# Patient Record
Sex: MALE | Race: WHITE | Employment: OTHER | URBAN - METROPOLITAN AREA
[De-identification: names, ages, dates, MRNs, and addresses within clinical notes are randomized per-mention and may not be internally consistent; named-entity substitution may affect disease eponyms.]

---

## 2017-01-24 RX ORDER — GLIMEPIRIDE 4 MG/1
TABLET ORAL
Qty: 90 TABLET | Refills: 0 | Status: SHIPPED | OUTPATIENT
Start: 2017-01-24 | End: 2017-04-26 | Stop reason: SDUPTHER

## 2017-01-31 RX ORDER — OXYCODONE HYDROCHLORIDE 10 MG/1
TABLET ORAL
Qty: 120 TABLET | Refills: 0 | Status: SHIPPED | OUTPATIENT
Start: 2017-01-31 | End: 2017-03-01 | Stop reason: SDUPTHER

## 2017-01-31 RX ORDER — FENTANYL 75 UG/H
1 PATCH TRANSDERMAL
Qty: 10 PATCH | Refills: 0 | Status: SHIPPED | OUTPATIENT
Start: 2017-01-31 | End: 2017-03-01 | Stop reason: SDUPTHER

## 2017-02-28 ENCOUNTER — HOSPITAL ENCOUNTER (OUTPATIENT)
Dept: OTHER | Age: 52
Discharge: HOME | End: 2017-02-28
Attending: FAMILY MEDICINE | Admitting: FAMILY MEDICINE

## 2017-02-28 LAB
A/G RATIO: 1 (ref 1.1–2.2)
ALBUMIN SERPL-MCNC: 4.1 G/DL (ref 3.4–5)
ALP BLD-CCNC: 93 U/L (ref 40–129)
ALT SERPL-CCNC: 41 U/L (ref 10–40)
ANION GAP SERPL CALCULATED.3IONS-SCNC: 17 MMOL/L (ref 3–16)
AST SERPL-CCNC: 22 U/L (ref 15–37)
BILIRUB SERPL-MCNC: 0.3 MG/DL (ref 0–1)
BUN BLDV-MCNC: 13 MG/DL (ref 7–20)
CALCIUM SERPL-MCNC: 9.3 MG/DL (ref 8.3–10.6)
CHLORIDE BLD-SCNC: 97 MMOL/L (ref 99–110)
CHOLESTEROL, TOTAL: 139 MG/DL (ref 0–199)
CO2: 25 MMOL/L (ref 21–32)
CREAT SERPL-MCNC: 0.7 MG/DL (ref 0.9–1.3)
CREATININE URINE: 304.5 MG/DL (ref 39–259)
ESTIMATED AVERAGE GLUCOSE: 165.7 MG/DL
GFR AFRICAN AMERICAN: >60
GFR NON-AFRICAN AMERICAN: >60
GLOBULIN: 4.2 G/DL
GLUCOSE BLD-MCNC: 118 MG/DL (ref 70–99)
HBA1C MFR BLD: 7.4 %
HDLC SERPL-MCNC: 36 MG/DL (ref 40–60)
LDL CHOLESTEROL CALCULATED: 74 MG/DL
MICROALBUMIN UR-MCNC: 3 MG/DL
MICROALBUMIN/CREAT UR-RTO: 9.9 MG/G (ref 0–30)
POTASSIUM SERPL-SCNC: 4 MMOL/L (ref 3.5–5.1)
SODIUM BLD-SCNC: 139 MMOL/L (ref 136–145)
TOTAL PROTEIN: 8.3 G/DL (ref 6.4–8.2)
TRIGL SERPL-MCNC: 147 MG/DL (ref 0–150)
VLDLC SERPL CALC-MCNC: 29 MG/DL

## 2017-02-28 RX ORDER — OXYCODONE HYDROCHLORIDE 10 MG/1
TABLET ORAL
Qty: 120 TABLET | Refills: 0 | OUTPATIENT
Start: 2017-02-28

## 2017-02-28 RX ORDER — FENTANYL 75 UG/H
1 PATCH TRANSDERMAL
Qty: 10 PATCH | Refills: 0 | OUTPATIENT
Start: 2017-02-28

## 2017-02-28 RX ORDER — DIAZEPAM 5 MG/1
5 TABLET ORAL EVERY 12 HOURS PRN
Qty: 30 TABLET | Refills: 2 | OUTPATIENT
Start: 2017-02-28

## 2017-03-01 ENCOUNTER — OFFICE VISIT (OUTPATIENT)
Dept: FAMILY MEDICINE CLINIC | Age: 52
End: 2017-03-01

## 2017-03-01 VITALS
HEART RATE: 67 BPM | DIASTOLIC BLOOD PRESSURE: 80 MMHG | WEIGHT: 308 LBS | BODY MASS INDEX: 40.92 KG/M2 | OXYGEN SATURATION: 98 % | SYSTOLIC BLOOD PRESSURE: 124 MMHG

## 2017-03-01 DIAGNOSIS — E11.65 TYPE 2 DIABETES MELLITUS WITH HYPERGLYCEMIA, WITH LONG-TERM CURRENT USE OF INSULIN (HCC): Primary | ICD-10-CM

## 2017-03-01 DIAGNOSIS — Z79.4 TYPE 2 DIABETES MELLITUS WITH HYPERGLYCEMIA, WITH LONG-TERM CURRENT USE OF INSULIN (HCC): Primary | ICD-10-CM

## 2017-03-01 DIAGNOSIS — Z79.4 TYPE 2 DIABETES MELLITUS WITH DIABETIC NEPHROPATHY, WITH LONG-TERM CURRENT USE OF INSULIN (HCC): ICD-10-CM

## 2017-03-01 DIAGNOSIS — G89.4 CHRONIC PAIN SYNDROME: ICD-10-CM

## 2017-03-01 DIAGNOSIS — I10 ESSENTIAL HYPERTENSION: ICD-10-CM

## 2017-03-01 DIAGNOSIS — E11.21 TYPE 2 DIABETES MELLITUS WITH DIABETIC NEPHROPATHY, WITH LONG-TERM CURRENT USE OF INSULIN (HCC): ICD-10-CM

## 2017-03-01 DIAGNOSIS — E78.2 MIXED HYPERLIPIDEMIA: ICD-10-CM

## 2017-03-01 PROCEDURE — 99214 OFFICE O/P EST MOD 30 MIN: CPT | Performed by: FAMILY MEDICINE

## 2017-03-01 RX ORDER — OXYCODONE HYDROCHLORIDE 10 MG/1
TABLET ORAL
Qty: 120 TABLET | Refills: 0 | Status: SHIPPED | OUTPATIENT
Start: 2017-03-01 | End: 2017-03-29 | Stop reason: SDUPTHER

## 2017-03-01 RX ORDER — FENTANYL 75 UG/H
1 PATCH TRANSDERMAL
Qty: 10 PATCH | Refills: 0 | Status: SHIPPED | OUTPATIENT
Start: 2017-03-01 | End: 2017-03-29 | Stop reason: SDUPTHER

## 2017-03-01 RX ORDER — DIAZEPAM 5 MG/1
5 TABLET ORAL EVERY 12 HOURS PRN
Qty: 30 TABLET | Refills: 2 | Status: SHIPPED | OUTPATIENT
Start: 2017-03-01 | End: 2017-08-07 | Stop reason: SDUPTHER

## 2017-03-14 RX ORDER — INSULIN DETEMIR 100 [IU]/ML
INJECTION, SOLUTION SUBCUTANEOUS
Qty: 15 ML | Refills: 12 | Status: SHIPPED | OUTPATIENT
Start: 2017-03-14 | End: 2017-08-31 | Stop reason: SDUPTHER

## 2017-03-29 RX ORDER — LOSARTAN POTASSIUM AND HYDROCHLOROTHIAZIDE 25; 100 MG/1; MG/1
TABLET ORAL
Qty: 90 TABLET | Refills: 0 | Status: SHIPPED | OUTPATIENT
Start: 2017-03-29 | End: 2017-06-26 | Stop reason: SDUPTHER

## 2017-03-30 RX ORDER — FENTANYL 75 UG/H
1 PATCH TRANSDERMAL
Qty: 10 PATCH | Refills: 0 | Status: SHIPPED | OUTPATIENT
Start: 2017-03-30 | End: 2017-04-28 | Stop reason: SDUPTHER

## 2017-03-30 RX ORDER — OXYCODONE HYDROCHLORIDE 10 MG/1
TABLET ORAL
Qty: 120 TABLET | Refills: 0 | Status: SHIPPED | OUTPATIENT
Start: 2017-03-30 | End: 2017-04-28 | Stop reason: SDUPTHER

## 2017-04-04 RX ORDER — SIMVASTATIN 40 MG
TABLET ORAL
Qty: 90 TABLET | Refills: 1 | Status: SHIPPED | OUTPATIENT
Start: 2017-04-04 | End: 2017-10-02 | Stop reason: SDUPTHER

## 2017-04-06 RX ORDER — METFORMIN HYDROCHLORIDE 500 MG/1
TABLET, EXTENDED RELEASE ORAL
Qty: 360 TABLET | Refills: 0 | Status: SHIPPED | OUTPATIENT
Start: 2017-04-06 | End: 2017-07-07 | Stop reason: SDUPTHER

## 2017-04-26 RX ORDER — GLIMEPIRIDE 4 MG/1
TABLET ORAL
Qty: 90 TABLET | Refills: 0 | Status: SHIPPED | OUTPATIENT
Start: 2017-04-26 | End: 2017-07-23 | Stop reason: SDUPTHER

## 2017-04-27 RX ORDER — ONDANSETRON 4 MG/1
TABLET, FILM COATED ORAL
Qty: 30 TABLET | Refills: 0 | Status: SHIPPED | OUTPATIENT
Start: 2017-04-27 | End: 2017-06-26 | Stop reason: SDUPTHER

## 2017-04-28 RX ORDER — OXYCODONE HYDROCHLORIDE 10 MG/1
TABLET ORAL
Qty: 120 TABLET | Refills: 0 | Status: SHIPPED | OUTPATIENT
Start: 2017-04-28 | End: 2017-05-30 | Stop reason: SDUPTHER

## 2017-04-28 RX ORDER — FENTANYL 75 UG/H
1 PATCH TRANSDERMAL
Qty: 10 PATCH | Refills: 0 | Status: SHIPPED | OUTPATIENT
Start: 2017-04-28 | End: 2017-05-30 | Stop reason: SDUPTHER

## 2017-05-03 ENCOUNTER — TELEPHONE (OUTPATIENT)
Dept: FAMILY MEDICINE CLINIC | Age: 52
End: 2017-05-03

## 2017-05-25 RX ORDER — EMPAGLIFLOZIN 10 MG/1
TABLET, FILM COATED ORAL
Qty: 90 TABLET | Refills: 0 | Status: SHIPPED | OUTPATIENT
Start: 2017-05-25 | End: 2017-08-28 | Stop reason: SDUPTHER

## 2017-05-30 ENCOUNTER — HOSPITAL ENCOUNTER (OUTPATIENT)
Dept: OTHER | Age: 52
Discharge: HOME | End: 2017-05-30
Attending: FAMILY MEDICINE | Admitting: FAMILY MEDICINE

## 2017-05-30 DIAGNOSIS — E11.65 TYPE 2 DIABETES MELLITUS WITH HYPERGLYCEMIA, WITH LONG-TERM CURRENT USE OF INSULIN (HCC): ICD-10-CM

## 2017-05-30 DIAGNOSIS — Z79.4 TYPE 2 DIABETES MELLITUS WITH HYPERGLYCEMIA, WITH LONG-TERM CURRENT USE OF INSULIN (HCC): ICD-10-CM

## 2017-05-30 LAB
ANION GAP SERPL CALCULATED.3IONS-SCNC: 15 MMOL/L (ref 3–16)
BUN BLDV-MCNC: 14 MG/DL (ref 7–20)
CALCIUM SERPL-MCNC: 8.6 MG/DL (ref 8.3–10.6)
CHLORIDE BLD-SCNC: 100 MMOL/L (ref 99–110)
CO2: 27 MMOL/L (ref 21–32)
CREAT SERPL-MCNC: 0.6 MG/DL (ref 0.9–1.3)
ESTIMATED AVERAGE GLUCOSE: 159.9 MG/DL
GFR AFRICAN AMERICAN: >60
GFR NON-AFRICAN AMERICAN: >60
GLUCOSE BLD-MCNC: 141 MG/DL (ref 70–99)
HBA1C MFR BLD: 7.2 %
POTASSIUM SERPL-SCNC: 3.9 MMOL/L (ref 3.5–5.1)
SODIUM BLD-SCNC: 142 MMOL/L (ref 136–145)

## 2017-05-30 RX ORDER — FENTANYL 75 UG/H
1 PATCH TRANSDERMAL
Qty: 10 PATCH | Refills: 0 | Status: SHIPPED | OUTPATIENT
Start: 2017-05-30 | End: 2017-06-28 | Stop reason: SDUPTHER

## 2017-05-30 RX ORDER — OXYCODONE HYDROCHLORIDE 10 MG/1
TABLET ORAL
Qty: 120 TABLET | Refills: 0 | Status: SHIPPED | OUTPATIENT
Start: 2017-05-30 | End: 2017-06-28 | Stop reason: SDUPTHER

## 2017-05-31 ENCOUNTER — OFFICE VISIT (OUTPATIENT)
Dept: FAMILY MEDICINE CLINIC | Age: 52
End: 2017-05-31

## 2017-05-31 VITALS
OXYGEN SATURATION: 98 % | BODY MASS INDEX: 40.38 KG/M2 | DIASTOLIC BLOOD PRESSURE: 88 MMHG | WEIGHT: 304 LBS | HEART RATE: 69 BPM | SYSTOLIC BLOOD PRESSURE: 134 MMHG

## 2017-05-31 DIAGNOSIS — G89.4 CHRONIC PAIN SYNDROME: ICD-10-CM

## 2017-05-31 DIAGNOSIS — J02.9 SORETHROAT: ICD-10-CM

## 2017-05-31 DIAGNOSIS — Z79.4 TYPE 2 DIABETES MELLITUS WITH HYPERGLYCEMIA, WITH LONG-TERM CURRENT USE OF INSULIN (HCC): Primary | ICD-10-CM

## 2017-05-31 DIAGNOSIS — E11.65 TYPE 2 DIABETES MELLITUS WITH HYPERGLYCEMIA, WITH LONG-TERM CURRENT USE OF INSULIN (HCC): Primary | ICD-10-CM

## 2017-05-31 DIAGNOSIS — I10 ESSENTIAL HYPERTENSION: ICD-10-CM

## 2017-05-31 PROCEDURE — 99214 OFFICE O/P EST MOD 30 MIN: CPT | Performed by: FAMILY MEDICINE

## 2017-06-26 RX ORDER — LOSARTAN POTASSIUM AND HYDROCHLOROTHIAZIDE 25; 100 MG/1; MG/1
TABLET ORAL
Qty: 90 TABLET | Refills: 0 | Status: SHIPPED | OUTPATIENT
Start: 2017-06-26 | End: 2017-09-25 | Stop reason: SDUPTHER

## 2017-06-26 RX ORDER — ONDANSETRON 4 MG/1
TABLET, FILM COATED ORAL
Qty: 30 TABLET | Refills: 0 | Status: SHIPPED | OUTPATIENT
Start: 2017-06-26 | End: 2017-08-07 | Stop reason: SDUPTHER

## 2017-06-28 ENCOUNTER — TELEPHONE (OUTPATIENT)
Dept: FAMILY MEDICINE CLINIC | Age: 52
End: 2017-06-28

## 2017-06-30 RX ORDER — FENTANYL 75 UG/H
1 PATCH TRANSDERMAL
Qty: 10 PATCH | Refills: 0 | Status: SHIPPED | OUTPATIENT
Start: 2017-06-30 | End: 2017-07-31 | Stop reason: SDUPTHER

## 2017-06-30 RX ORDER — OXYCODONE HYDROCHLORIDE 10 MG/1
TABLET ORAL
Qty: 120 TABLET | Refills: 0 | Status: SHIPPED | OUTPATIENT
Start: 2017-06-30 | End: 2017-07-31 | Stop reason: SDUPTHER

## 2017-07-01 ENCOUNTER — TELEPHONE (OUTPATIENT)
Dept: FAMILY MEDICINE CLINIC | Age: 52
End: 2017-07-01

## 2017-07-03 ENCOUNTER — TELEPHONE (OUTPATIENT)
Dept: FAMILY MEDICINE CLINIC | Age: 52
End: 2017-07-03

## 2017-07-07 RX ORDER — METFORMIN HYDROCHLORIDE 500 MG/1
TABLET, EXTENDED RELEASE ORAL
Qty: 360 TABLET | Refills: 0 | Status: SHIPPED | OUTPATIENT
Start: 2017-07-07 | End: 2017-10-11 | Stop reason: SDUPTHER

## 2017-07-14 RX ORDER — ESOMEPRAZOLE MAGNESIUM 40 MG/1
CAPSULE, DELAYED RELEASE ORAL
Qty: 90 CAPSULE | Refills: 3 | Status: SHIPPED | OUTPATIENT
Start: 2017-07-14 | End: 2017-11-27

## 2017-07-14 RX ORDER — NYSTATIN 100000 U/G
CREAM TOPICAL
Qty: 60 G | Refills: 3 | Status: SHIPPED | OUTPATIENT
Start: 2017-07-14 | End: 2018-11-05 | Stop reason: SDUPTHER

## 2017-07-14 RX ORDER — CLOBETASOL PROPIONATE 0.5 MG/G
AEROSOL, FOAM TOPICAL
Qty: 50 G | Refills: 3 | Status: SHIPPED | OUTPATIENT
Start: 2017-07-14 | End: 2017-12-28 | Stop reason: SDUPTHER

## 2017-07-14 RX ORDER — FLUTICASONE PROPIONATE 50 MCG
SPRAY, SUSPENSION (ML) NASAL
Qty: 3 BOTTLE | Refills: 3 | Status: SHIPPED | OUTPATIENT
Start: 2017-07-14 | End: 2017-08-31

## 2017-07-24 RX ORDER — GLIMEPIRIDE 4 MG/1
TABLET ORAL
Qty: 90 TABLET | Refills: 0 | Status: SHIPPED | OUTPATIENT
Start: 2017-07-24 | End: 2017-10-23 | Stop reason: SDUPTHER

## 2017-07-31 ENCOUNTER — TELEPHONE (OUTPATIENT)
Dept: FAMILY MEDICINE CLINIC | Age: 52
End: 2017-07-31

## 2017-07-31 RX ORDER — OXYCODONE HYDROCHLORIDE 10 MG/1
TABLET ORAL
Qty: 120 TABLET | Refills: 0 | Status: SHIPPED | OUTPATIENT
Start: 2017-07-31 | End: 2017-08-31 | Stop reason: SDUPTHER

## 2017-07-31 RX ORDER — FENTANYL 75 UG/H
1 PATCH TRANSDERMAL
Qty: 10 PATCH | Refills: 0 | Status: SHIPPED | OUTPATIENT
Start: 2017-07-31 | End: 2017-08-31 | Stop reason: SDUPTHER

## 2017-08-07 RX ORDER — ONDANSETRON 4 MG/1
TABLET, FILM COATED ORAL
Qty: 30 TABLET | Refills: 0 | Status: SHIPPED | OUTPATIENT
Start: 2017-08-07 | End: 2017-09-26 | Stop reason: SDUPTHER

## 2017-08-07 RX ORDER — DIAZEPAM 5 MG/1
TABLET ORAL
Qty: 30 TABLET | Refills: 0 | Status: SHIPPED | OUTPATIENT
Start: 2017-08-07 | End: 2017-08-31 | Stop reason: SDUPTHER

## 2017-08-28 RX ORDER — EMPAGLIFLOZIN 10 MG/1
TABLET, FILM COATED ORAL
Qty: 90 TABLET | Refills: 0 | Status: SHIPPED | OUTPATIENT
Start: 2017-08-28 | End: 2017-11-27 | Stop reason: SDUPTHER

## 2017-08-30 ENCOUNTER — HOSPITAL ENCOUNTER (OUTPATIENT)
Dept: OTHER | Age: 52
Discharge: HOME | End: 2017-08-30
Attending: FAMILY MEDICINE | Admitting: FAMILY MEDICINE

## 2017-08-30 DIAGNOSIS — Z79.4 TYPE 2 DIABETES MELLITUS WITH HYPERGLYCEMIA, WITH LONG-TERM CURRENT USE OF INSULIN (HCC): ICD-10-CM

## 2017-08-30 DIAGNOSIS — E11.65 TYPE 2 DIABETES MELLITUS WITH HYPERGLYCEMIA, WITH LONG-TERM CURRENT USE OF INSULIN (HCC): ICD-10-CM

## 2017-08-30 LAB
ANION GAP SERPL CALCULATED.3IONS-SCNC: 16 MMOL/L (ref 3–16)
BUN BLDV-MCNC: 7 MG/DL (ref 7–20)
CALCIUM SERPL-MCNC: 8.8 MG/DL (ref 8.3–10.6)
CHLORIDE BLD-SCNC: 103 MMOL/L (ref 99–110)
CO2: 24 MMOL/L (ref 21–32)
CREAT SERPL-MCNC: 0.6 MG/DL (ref 0.9–1.3)
ESTIMATED AVERAGE GLUCOSE: 157.1 MG/DL
GFR AFRICAN AMERICAN: >60
GFR NON-AFRICAN AMERICAN: >60
GLUCOSE BLD-MCNC: 107 MG/DL (ref 70–99)
HBA1C MFR BLD: 7.1 %
POTASSIUM SERPL-SCNC: 3.9 MMOL/L (ref 3.5–5.1)
SODIUM BLD-SCNC: 143 MMOL/L (ref 136–145)

## 2017-08-31 ENCOUNTER — OFFICE VISIT (OUTPATIENT)
Dept: FAMILY MEDICINE CLINIC | Age: 52
End: 2017-08-31

## 2017-08-31 ENCOUNTER — TELEPHONE (OUTPATIENT)
Dept: FAMILY MEDICINE CLINIC | Age: 52
End: 2017-08-31

## 2017-08-31 VITALS
HEART RATE: 80 BPM | BODY MASS INDEX: 39.49 KG/M2 | SYSTOLIC BLOOD PRESSURE: 120 MMHG | DIASTOLIC BLOOD PRESSURE: 84 MMHG | WEIGHT: 298 LBS | OXYGEN SATURATION: 97 % | HEIGHT: 73 IN

## 2017-08-31 DIAGNOSIS — E11.65 TYPE 2 DIABETES MELLITUS WITH HYPERGLYCEMIA, WITH LONG-TERM CURRENT USE OF INSULIN (HCC): Primary | ICD-10-CM

## 2017-08-31 DIAGNOSIS — S46.912A STRAIN OF LEFT UPPER ARM, INITIAL ENCOUNTER: ICD-10-CM

## 2017-08-31 DIAGNOSIS — Z12.11 SCREENING FOR COLON CANCER: Primary | ICD-10-CM

## 2017-08-31 DIAGNOSIS — I10 ESSENTIAL HYPERTENSION: ICD-10-CM

## 2017-08-31 DIAGNOSIS — G89.4 CHRONIC PAIN SYNDROME: ICD-10-CM

## 2017-08-31 DIAGNOSIS — Z79.4 TYPE 2 DIABETES MELLITUS WITH HYPERGLYCEMIA, WITH LONG-TERM CURRENT USE OF INSULIN (HCC): Primary | ICD-10-CM

## 2017-08-31 PROCEDURE — 99214 OFFICE O/P EST MOD 30 MIN: CPT | Performed by: FAMILY MEDICINE

## 2017-08-31 RX ORDER — OXYCODONE HYDROCHLORIDE 10 MG/1
TABLET ORAL
Qty: 120 TABLET | Refills: 0 | Status: SHIPPED | OUTPATIENT
Start: 2017-08-31 | End: 2017-09-28 | Stop reason: SDUPTHER

## 2017-08-31 RX ORDER — DIAZEPAM 5 MG/1
5 TABLET ORAL EVERY 12 HOURS PRN
Qty: 30 TABLET | Refills: 0 | Status: SHIPPED | OUTPATIENT
Start: 2017-08-31 | End: 2017-10-11 | Stop reason: SDUPTHER

## 2017-08-31 RX ORDER — MELOXICAM 15 MG/1
TABLET ORAL
Qty: 90 TABLET | Refills: 0 | Status: SHIPPED | OUTPATIENT
Start: 2017-08-31 | End: 2017-11-27

## 2017-08-31 RX ORDER — DIAZEPAM 5 MG/1
TABLET ORAL
Qty: 30 TABLET | Refills: 0 | Status: CANCELLED | OUTPATIENT
Start: 2017-08-31

## 2017-08-31 RX ORDER — MELOXICAM 15 MG/1
15 TABLET ORAL DAILY
Qty: 30 TABLET | Refills: 3 | Status: SHIPPED | OUTPATIENT
Start: 2017-08-31 | End: 2017-08-31 | Stop reason: SDUPTHER

## 2017-08-31 RX ORDER — FENTANYL 75 UG/H
1 PATCH TRANSDERMAL
Qty: 10 PATCH | Refills: 0 | Status: SHIPPED | OUTPATIENT
Start: 2017-08-31 | End: 2017-09-28 | Stop reason: SDUPTHER

## 2017-08-31 ASSESSMENT — PATIENT HEALTH QUESTIONNAIRE - PHQ9
SUM OF ALL RESPONSES TO PHQ QUESTIONS 1-9: 2
1. LITTLE INTEREST OR PLEASURE IN DOING THINGS: 1
2. FEELING DOWN, DEPRESSED OR HOPELESS: 1
SUM OF ALL RESPONSES TO PHQ9 QUESTIONS 1 & 2: 2

## 2017-09-26 RX ORDER — LOSARTAN POTASSIUM AND HYDROCHLOROTHIAZIDE 25; 100 MG/1; MG/1
TABLET ORAL
Qty: 90 TABLET | Refills: 1 | Status: SHIPPED | OUTPATIENT
Start: 2017-09-26 | End: 2018-03-26 | Stop reason: SDUPTHER

## 2017-09-27 RX ORDER — ONDANSETRON 4 MG/1
TABLET, FILM COATED ORAL
Qty: 30 TABLET | Refills: 0 | Status: SHIPPED | OUTPATIENT
Start: 2017-09-27 | End: 2017-11-16 | Stop reason: SDUPTHER

## 2017-09-28 RX ORDER — OXYCODONE HYDROCHLORIDE 10 MG/1
TABLET ORAL
Qty: 120 TABLET | Refills: 0 | Status: SHIPPED | OUTPATIENT
Start: 2017-09-28 | End: 2017-10-30 | Stop reason: SDUPTHER

## 2017-09-28 RX ORDER — FENTANYL 75 UG/H
1 PATCH TRANSDERMAL
Qty: 10 PATCH | Refills: 0 | Status: SHIPPED | OUTPATIENT
Start: 2017-09-28 | End: 2017-10-30 | Stop reason: SDUPTHER

## 2017-10-02 RX ORDER — SIMVASTATIN 40 MG
TABLET ORAL
Qty: 90 TABLET | Refills: 3 | Status: SHIPPED | OUTPATIENT
Start: 2017-10-02 | End: 2018-10-08 | Stop reason: SDUPTHER

## 2017-10-11 RX ORDER — DIAZEPAM 5 MG/1
TABLET ORAL
Qty: 30 TABLET | Refills: 0 | Status: SHIPPED | OUTPATIENT
Start: 2017-10-11 | End: 2017-11-16 | Stop reason: SDUPTHER

## 2017-10-12 RX ORDER — METFORMIN HYDROCHLORIDE 500 MG/1
TABLET, EXTENDED RELEASE ORAL
Qty: 360 TABLET | Refills: 0 | Status: SHIPPED | OUTPATIENT
Start: 2017-10-12 | End: 2018-01-18 | Stop reason: SDUPTHER

## 2017-10-23 RX ORDER — GLIMEPIRIDE 4 MG/1
TABLET ORAL
Qty: 90 TABLET | Refills: 0 | Status: SHIPPED | OUTPATIENT
Start: 2017-10-23 | End: 2018-01-18 | Stop reason: SDUPTHER

## 2017-10-25 RX ORDER — PEN NEEDLE, DIABETIC 31 GX5/16"
NEEDLE, DISPOSABLE MISCELLANEOUS
Qty: 100 EACH | Refills: 3 | Status: SHIPPED | OUTPATIENT
Start: 2017-10-25 | End: 2018-11-28 | Stop reason: SDUPTHER

## 2017-10-30 ENCOUNTER — TELEPHONE (OUTPATIENT)
Dept: FAMILY MEDICINE CLINIC | Age: 52
End: 2017-10-30

## 2017-10-30 RX ORDER — FENTANYL 75 UG/H
1 PATCH TRANSDERMAL
Qty: 10 PATCH | Refills: 0 | Status: SHIPPED | OUTPATIENT
Start: 2017-10-30 | End: 2017-11-29

## 2017-10-30 RX ORDER — OXYCODONE HYDROCHLORIDE 10 MG/1
TABLET ORAL
Qty: 120 TABLET | Refills: 0 | Status: SHIPPED | OUTPATIENT
Start: 2017-10-30 | End: 2017-11-27 | Stop reason: SDUPTHER

## 2017-11-16 ENCOUNTER — TELEPHONE (OUTPATIENT)
Dept: FAMILY MEDICINE CLINIC | Age: 52
End: 2017-11-16

## 2017-11-20 RX ORDER — DIAZEPAM 5 MG/1
TABLET ORAL
Qty: 30 TABLET | Refills: 0 | Status: SHIPPED | OUTPATIENT
Start: 2017-11-20 | End: 2018-02-13 | Stop reason: SDUPTHER

## 2017-11-20 RX ORDER — ONDANSETRON 4 MG/1
TABLET, FILM COATED ORAL
Qty: 30 TABLET | Refills: 0 | Status: SHIPPED | OUTPATIENT
Start: 2017-11-20 | End: 2018-01-09 | Stop reason: SDUPTHER

## 2017-11-20 NOTE — TELEPHONE ENCOUNTER
Noted. Have doubts about this given pt has had normal UDS in past, OARRS report does not show scripts from any providers other than this office and pt does not request early refills on meds and comes in for visits as requested. Will check Care Everywhere when comes in for visit as well as run UDS again. Pt and wife have had a long history of marital issues and apparently are now getting , may be attempt by wife to cause issues for .

## 2017-11-21 ENCOUNTER — HOSPITAL ENCOUNTER (OUTPATIENT)
Dept: OTHER | Age: 52
Discharge: HOME | End: 2017-11-21
Attending: FAMILY MEDICINE | Admitting: FAMILY MEDICINE

## 2017-11-21 DIAGNOSIS — E11.65 TYPE 2 DIABETES MELLITUS WITH HYPERGLYCEMIA, WITH LONG-TERM CURRENT USE OF INSULIN (HCC): ICD-10-CM

## 2017-11-21 DIAGNOSIS — Z79.4 TYPE 2 DIABETES MELLITUS WITH HYPERGLYCEMIA, WITH LONG-TERM CURRENT USE OF INSULIN (HCC): ICD-10-CM

## 2017-11-21 LAB
ANION GAP SERPL CALCULATED.3IONS-SCNC: 16 MMOL/L (ref 3–16)
BUN BLDV-MCNC: 15 MG/DL (ref 7–20)
CALCIUM SERPL-MCNC: 9.5 MG/DL (ref 8.3–10.6)
CHLORIDE BLD-SCNC: 98 MMOL/L (ref 99–110)
CO2: 27 MMOL/L (ref 21–32)
CREAT SERPL-MCNC: 0.8 MG/DL (ref 0.9–1.3)
ESTIMATED AVERAGE GLUCOSE: 151.3 MG/DL
GFR AFRICAN AMERICAN: >60
GFR NON-AFRICAN AMERICAN: >60
GLUCOSE BLD-MCNC: 102 MG/DL (ref 70–99)
HBA1C MFR BLD: 6.9 %
POTASSIUM SERPL-SCNC: 3.4 MMOL/L (ref 3.5–5.1)
SODIUM BLD-SCNC: 141 MMOL/L (ref 136–145)

## 2017-11-27 ENCOUNTER — OFFICE VISIT (OUTPATIENT)
Dept: FAMILY MEDICINE CLINIC | Age: 52
End: 2017-11-27

## 2017-11-27 VITALS
DIASTOLIC BLOOD PRESSURE: 90 MMHG | HEART RATE: 75 BPM | OXYGEN SATURATION: 98 % | SYSTOLIC BLOOD PRESSURE: 138 MMHG | WEIGHT: 298 LBS | BODY MASS INDEX: 39.59 KG/M2

## 2017-11-27 DIAGNOSIS — G89.4 CHRONIC PAIN SYNDROME: ICD-10-CM

## 2017-11-27 DIAGNOSIS — Z79.4 TYPE 2 DIABETES MELLITUS WITH DIABETIC NEPHROPATHY, WITH LONG-TERM CURRENT USE OF INSULIN (HCC): Primary | ICD-10-CM

## 2017-11-27 DIAGNOSIS — E11.21 TYPE 2 DIABETES MELLITUS WITH DIABETIC NEPHROPATHY, WITH LONG-TERM CURRENT USE OF INSULIN (HCC): Primary | ICD-10-CM

## 2017-11-27 PROCEDURE — 99213 OFFICE O/P EST LOW 20 MIN: CPT | Performed by: FAMILY MEDICINE

## 2017-11-27 NOTE — PROGRESS NOTES
Subjective:      Patient ID: Darrell Renteria is a 50 y.o. male. HPI patient presents today for his diabetes check up and his 3 month medication check up. Patient is here today to follow up for their chronic conditions. Sugars in AM [id number]. Has been walking 2 miles 4-5 times a week. Has been having where sugars drop to 60;s. Taking other meds ok, no SE. Taking diazepam prn, usually few times a week. No SE. Pain has been stable on current dosing. Has good days and bad but overall stable. No SE from meds noted. Review of Systems    Objective:   Physical Exam  Vitals:    11/27/17 0830   BP: (!) 138/90   Site: Left Arm   Position: Sitting   Cuff Size: Large Adult   Pulse: 75   SpO2: 98%   Weight: 298 lb (135.2 kg)     Wt Readings from Last 3 Encounters:   11/27/17 298 lb (135.2 kg)   08/31/17 298 lb (135.2 kg)   08/05/17 299 lb 4 oz (135.7 kg)     Body mass index is 39.59 kg/m². GENERAL: Alert and oriented x 4 NAD, Obese, well hydrated, well developed. NECK:supple and non tender without mass, no thyromegaly or thyroid nodules, no cervical lymphadenopathy  LUNG:clear to auscultation bilaterally with normal respiratory effort  CV: Normal heart sounds, regular rate and rhythm without murmurs  Monofilament Sensation: done last visit  Pulses:  normal,   Edema: normal,  Skin lesions: callus left great toe and ball of foot laterally, right callus great toe and ball under great toe, wart top of right great toe      Assessment:       Jose E Root was seen today for diabetes.     Diagnoses and all orders for this visit:    Type 2 diabetes mellitus with diabetic nephropathy, with long-term current use of insulin ([facility])  -decrease levemir to 35 units nightly, call if continued low sugars or if start to go up again  -reviewed labs with patient  Lab Results   Component Value Date    LABA1C 6.9 11/21/2017     -lab slip given for next visit  -patient to check sugars 1 times a day(s)  -encouraged a healthy diet, regular exercise and maintaining a healthy weight  -RTO  3 months  -eye form given:  No    -     Hemoglobin A1C; Future  -     Comprehensive Metabolic Panel; Future  -     Lipid Panel; Future  -     Microalbumin / Creatinine Urine Ratio; Future    Chronic pain syndrome  Majority of today spent discussing pain medication. Discussed with pt his soon to be ex-wife's phone calls. Pt's OARRS has always been ok. Has never failed UDS and comes to all visits. Discussed with pt very high MME dose. Discussed high risk of OD. Discussed data on higher doses not improving pain. Pt has not had good experience with pain mgmt in past. Has been put on morphine which made him \"drool and out of it. \" Has been stable on this dose for some time. Had gone off fentanyl in past and then restarted and on lower dose than was previously. Has concerns that will have more pain attacks and end up in ED more. Discussed the paradoxical effects high doses can have on pain. Discussed would like to attempt dose reduction on the fentanyl. Discussed will go slow. Also discussed consultation with pain mgmt. Pt reluctant as in past has been too \"doped up\" when seen pain mgmt but discussed with pt that that was many years ago and we are trying to be better about not over drugging patients. Pt also under tremendous stress with divorce and he is concerned this is just one more thing to deal with. Encouraged pt to try to be positive about the change, that we can reduce dose and still control pain. Controlled Substances Monitoring:     Attestation: The Prescription Monitoring Report for this patient was reviewed today. Sonal Ochoa MD)  Documentation: Possible medication side effects, risk of tolerance and/or dependence, and alternative treatments discussed. , Random urine drug screen sent today., No signs of potential drug abuse or diversion identified.  Sonal Ochoa MD)  Chronic Pain: Functional status reviewed - continues with improved or maintaining ADL's., Dose reduction has been attempted. Fabián Patel MD)        Other orders    -     insulin detemir (LEVEMIR FLEXTOUCH) 100 UNIT/ML injection pen; Inject 35 Units into the skin nightly  -     empagliflozin (JARDIANCE) 10 MG tablet;  Take 1 tablet by mouth daily

## 2017-11-29 RX ORDER — FENTANYL 75 UG/H
1 PATCH TRANSDERMAL
Qty: 10 PATCH | Refills: 0 | Status: CANCELLED | OUTPATIENT
Start: 2017-11-29

## 2017-11-29 RX ORDER — OXYCODONE HYDROCHLORIDE 10 MG/1
TABLET ORAL
Qty: 120 TABLET | Refills: 0 | Status: SHIPPED | OUTPATIENT
Start: 2017-11-29 | End: 2017-12-28 | Stop reason: SDUPTHER

## 2017-11-29 RX ORDER — FENTANYL 50 UG/H
1 PATCH TRANSDERMAL
Qty: 10 PATCH | Refills: 0 | Status: SHIPPED | OUTPATIENT
Start: 2017-11-29 | End: 2017-12-28 | Stop reason: SDUPTHER

## 2017-12-28 ENCOUNTER — TELEPHONE (OUTPATIENT)
Dept: FAMILY MEDICINE CLINIC | Age: 52
End: 2017-12-28

## 2017-12-28 RX ORDER — OXYCODONE HYDROCHLORIDE 10 MG/1
TABLET ORAL
Qty: 120 TABLET | Refills: 0 | Status: SHIPPED | OUTPATIENT
Start: 2017-12-28 | End: 2018-01-29 | Stop reason: SDUPTHER

## 2017-12-28 RX ORDER — FENTANYL 50 UG/H
1 PATCH TRANSDERMAL
Qty: 10 PATCH | Refills: 0 | Status: SHIPPED | OUTPATIENT
Start: 2017-12-28 | End: 2018-01-29 | Stop reason: SDUPTHER

## 2017-12-28 RX ORDER — CLOBETASOL PROPIONATE 0.5 MG/G
AEROSOL, FOAM TOPICAL
Qty: 50 G | Refills: 3 | Status: SHIPPED | OUTPATIENT
Start: 2017-12-28 | End: 2020-12-03 | Stop reason: SDUPTHER

## 2017-12-28 NOTE — TELEPHONE ENCOUNTER
Patient requesting medication refill    oxyCODONE HCl (OXY-IR) 10 MG immediate release tablet 120 tablet 0 2017     Si tablets every six hours prn pain      fentaNYL (DURAGESIC) 50 MCG/HR 10 patch 0 2017    Sig - Route: Place 1 patch onto the skin every 72 hours .  - Transdermal      clobetasol (OLUX) 0.05 % foam 50 g 3 2017     Sig: APPLY EXTERNALLY TO THE SCALP TWICE DAILY AS NEEDED          Pharmacy:  [facility] [facility] [address], [facility] P [phone] - F [phone]

## 2018-01-09 RX ORDER — ONDANSETRON 4 MG/1
TABLET, FILM COATED ORAL
Qty: 30 TABLET | Refills: 0 | Status: SHIPPED | OUTPATIENT
Start: 2018-01-09 | End: 2018-02-13 | Stop reason: SDUPTHER

## 2018-01-19 RX ORDER — METFORMIN HYDROCHLORIDE 500 MG/1
TABLET, EXTENDED RELEASE ORAL
Qty: 360 TABLET | Refills: 0 | Status: SHIPPED | OUTPATIENT
Start: 2018-01-19 | End: 2018-04-20 | Stop reason: SDUPTHER

## 2018-01-19 RX ORDER — GLIMEPIRIDE 4 MG/1
TABLET ORAL
Qty: 90 TABLET | Refills: 0 | Status: SHIPPED | OUTPATIENT
Start: 2018-01-19 | End: 2018-04-23 | Stop reason: SDUPTHER

## 2018-01-29 ENCOUNTER — TELEPHONE (OUTPATIENT)
Dept: FAMILY MEDICINE CLINIC | Age: 53
End: 2018-01-29

## 2018-01-29 DIAGNOSIS — G89.4 CHRONIC PAIN SYNDROME: Primary | ICD-10-CM

## 2018-01-29 RX ORDER — FENTANYL 50 UG/H
1 PATCH TRANSDERMAL
Qty: 10 PATCH | Refills: 0 | Status: SHIPPED | OUTPATIENT
Start: 2018-01-29 | End: 2018-02-26

## 2018-01-29 RX ORDER — OXYCODONE HYDROCHLORIDE 10 MG/1
TABLET ORAL
Qty: 120 TABLET | Refills: 0 | Status: SHIPPED | OUTPATIENT
Start: 2018-01-29 | End: 2018-02-26 | Stop reason: SDUPTHER

## 2018-02-02 RX ORDER — ONDANSETRON 4 MG/1
TABLET, FILM COATED ORAL
Qty: 30 TABLET | Refills: 0 | OUTPATIENT
Start: 2018-02-02

## 2018-02-14 RX ORDER — DIAZEPAM 5 MG/1
TABLET ORAL
Qty: 30 TABLET | Refills: 0 | Status: SHIPPED | OUTPATIENT
Start: 2018-02-14 | End: 2018-03-23 | Stop reason: SDUPTHER

## 2018-02-14 RX ORDER — ONDANSETRON 4 MG/1
TABLET, FILM COATED ORAL
Qty: 30 TABLET | Refills: 0 | Status: SHIPPED | OUTPATIENT
Start: 2018-02-14 | End: 2018-02-26 | Stop reason: SDUPTHER

## 2018-02-24 ENCOUNTER — HOSPITAL ENCOUNTER (OUTPATIENT)
Dept: OTHER | Age: 53
Discharge: HOME | End: 2018-02-24
Attending: FAMILY MEDICINE | Admitting: FAMILY MEDICINE

## 2018-02-24 DIAGNOSIS — Z79.4 TYPE 2 DIABETES MELLITUS WITH DIABETIC NEPHROPATHY, WITH LONG-TERM CURRENT USE OF INSULIN (HCC): ICD-10-CM

## 2018-02-24 DIAGNOSIS — E11.21 TYPE 2 DIABETES MELLITUS WITH DIABETIC NEPHROPATHY, WITH LONG-TERM CURRENT USE OF INSULIN (HCC): ICD-10-CM

## 2018-02-24 LAB
A/G RATIO: 1 (ref 1.1–2.2)
ALBUMIN SERPL-MCNC: 3.9 G/DL (ref 3.4–5)
ALP BLD-CCNC: 70 U/L (ref 40–129)
ALT SERPL-CCNC: 32 U/L (ref 10–40)
ANION GAP SERPL CALCULATED.3IONS-SCNC: 14 MMOL/L (ref 3–16)
AST SERPL-CCNC: 22 U/L (ref 15–37)
BILIRUB SERPL-MCNC: 0.5 MG/DL (ref 0–1)
BUN BLDV-MCNC: 13 MG/DL (ref 7–20)
CALCIUM SERPL-MCNC: 8.9 MG/DL (ref 8.3–10.6)
CHLORIDE BLD-SCNC: 102 MMOL/L (ref 99–110)
CHOLESTEROL, TOTAL: 130 MG/DL (ref 0–199)
CO2: 26 MMOL/L (ref 21–32)
CREAT SERPL-MCNC: 0.7 MG/DL (ref 0.9–1.3)
CREATININE URINE: 161.4 MG/DL (ref 39–259)
GFR AFRICAN AMERICAN: >60
GFR NON-AFRICAN AMERICAN: >60
GLOBULIN: 3.8 G/DL
GLUCOSE BLD-MCNC: 110 MG/DL (ref 70–99)
HDLC SERPL-MCNC: 37 MG/DL (ref 40–60)
LDL CHOLESTEROL CALCULATED: 71 MG/DL
MICROALBUMIN UR-MCNC: <1.2 MG/DL
MICROALBUMIN/CREAT UR-RTO: NORMAL MG/G (ref 0–30)
POTASSIUM SERPL-SCNC: 4 MMOL/L (ref 3.5–5.1)
SODIUM BLD-SCNC: 142 MMOL/L (ref 136–145)
TOTAL PROTEIN: 7.7 G/DL (ref 6.4–8.2)
TRIGL SERPL-MCNC: 111 MG/DL (ref 0–150)
VLDLC SERPL CALC-MCNC: 22 MG/DL

## 2018-02-25 LAB
ESTIMATED AVERAGE GLUCOSE: 151.3 MG/DL
HBA1C MFR BLD: 6.9 %

## 2018-02-26 ENCOUNTER — OFFICE VISIT (OUTPATIENT)
Dept: FAMILY MEDICINE CLINIC | Age: 53
End: 2018-02-26

## 2018-02-26 VITALS
WEIGHT: 296 LBS | HEART RATE: 74 BPM | BODY MASS INDEX: 39.32 KG/M2 | OXYGEN SATURATION: 98 % | DIASTOLIC BLOOD PRESSURE: 84 MMHG | SYSTOLIC BLOOD PRESSURE: 126 MMHG

## 2018-02-26 DIAGNOSIS — Z79.4 TYPE 2 DIABETES MELLITUS WITH DIABETIC NEPHROPATHY, WITH LONG-TERM CURRENT USE OF INSULIN (HCC): Primary | ICD-10-CM

## 2018-02-26 DIAGNOSIS — E11.21 TYPE 2 DIABETES MELLITUS WITH DIABETIC NEPHROPATHY, WITH LONG-TERM CURRENT USE OF INSULIN (HCC): Primary | ICD-10-CM

## 2018-02-26 DIAGNOSIS — G89.4 CHRONIC PAIN SYNDROME: ICD-10-CM

## 2018-02-26 DIAGNOSIS — B07.8 OTHER VIRAL WARTS: ICD-10-CM

## 2018-02-26 PROCEDURE — 99214 OFFICE O/P EST MOD 30 MIN: CPT | Performed by: FAMILY MEDICINE

## 2018-02-26 PROCEDURE — 17110 DESTRUCTION B9 LES UP TO 14: CPT | Performed by: FAMILY MEDICINE

## 2018-02-26 RX ORDER — OXYCODONE HYDROCHLORIDE 10 MG/1
TABLET ORAL
Qty: 120 TABLET | Refills: 0 | Status: CANCELLED | OUTPATIENT
Start: 2018-02-26 | End: 2018-03-28

## 2018-02-26 RX ORDER — ONDANSETRON 4 MG/1
4 TABLET, FILM COATED ORAL EVERY 8 HOURS PRN
Qty: 30 TABLET | Refills: 5 | Status: SHIPPED | OUTPATIENT
Start: 2018-02-26 | End: 2018-11-30 | Stop reason: SDUPTHER

## 2018-02-26 RX ORDER — FENTANYL 37.5 UG/H
37.5 PATCH, EXTENDED RELEASE TRANSDERMAL
Qty: 10 PATCH | Refills: 0 | Status: SHIPPED | OUTPATIENT
Start: 2018-02-26 | End: 2018-03-23 | Stop reason: DRUGHIGH

## 2018-02-26 RX ORDER — OXYCODONE HYDROCHLORIDE 10 MG/1
TABLET ORAL
Qty: 120 TABLET | Refills: 0 | Status: SHIPPED | OUTPATIENT
Start: 2018-02-26 | End: 2018-03-23 | Stop reason: SDUPTHER

## 2018-02-26 RX ORDER — FENTANYL 50 UG/H
1 PATCH TRANSDERMAL
Qty: 10 PATCH | Refills: 0 | Status: CANCELLED | OUTPATIENT
Start: 2018-02-26 | End: 2018-03-28

## 2018-02-26 NOTE — PROGRESS NOTES
Subjective:      Patient ID: Masha Jauregui is a 50 y.o. male. HPI patient presents today for his 3 month medication check up and diabetes check up. Patient is due in March for his eye exam.  Patient would like to discuss his insulin. States he has only taken his levemir 5 times in the last 5-6 weeks. Patient is here today to follow up for their chronic conditions. Had been having lot of lows. Mostly stopped the levemir and when would take would reduce to 25 units. Highest have been 140 but dropping to 50's in AM a lot. Feels bad. Hasn't been exercising     Changed fentanyl last visit 3 months ago. States pain no better, no worse. January was a bigger flare of pain than normal but some better now. Luis Schuler with continuing to work on dose reduction. Wart on great toe right starting to get irritated. Hasn't done anything OTC. Has some skin tags around belt line that are starting to get irritated, wonders about removing next time. OD risk score: 360  [address] MME: 168       Review of Systems    Objective:   Physical Exam    Body mass index is 39.32 kg/m². Vitals:    02/26/18 0921   BP: 126/84   Site: Left Arm   Position: Sitting   Cuff Size: Large Adult   Pulse: 74   SpO2: 98%   Weight: 296 lb (134.3 kg)     Wt Readings from Last 3 Encounters:   02/26/18 296 lb (134.3 kg)   11/27/17 298 lb (135.2 kg)   08/31/17 298 lb (135.2 kg)       GENERAL:Alert and oriented x 4 NAD, obese, well hydrated, well developed.   NECK:supple and non tender without mass, no thyromegaly or thyroid nodules, no cervical lymphadenopathy, no bruits  LUNG:clear to auscultation bilaterally with normal respiratory effort  CV: Normal heart sounds, regular rate and rhythm without murmurs  Monofilament Sensation:  normal,   Pulses:  normal,   Edema: normal,  Skin lesions: abnormal - right great toe at MTP 7mm wart, callus bilateral balls of feet under small toes    PHQ score: PHQ-9 Total Score: 2 (8/31/2017 10:54

## 2018-03-01 ENCOUNTER — TELEPHONE (OUTPATIENT)
Dept: FAMILY MEDICINE CLINIC | Age: 53
End: 2018-03-01

## 2018-03-01 NOTE — TELEPHONE ENCOUNTER
Letter received from insurance, they will provide the patient with a temporary supply of the 37.5 mcg patches, then per RK will decrease the dose to 25 mcg patches which can be sent in once the other is finished.     MB has redout

## 2018-03-19 RX ORDER — INSULIN DETEMIR 100 [IU]/ML
INJECTION, SOLUTION SUBCUTANEOUS
Qty: 15 ML | Refills: 3 | Status: SHIPPED | OUTPATIENT
Start: 2018-03-19 | End: 2018-04-16 | Stop reason: ALTCHOICE

## 2018-03-23 ENCOUNTER — TELEPHONE (OUTPATIENT)
Dept: FAMILY MEDICINE CLINIC | Age: 53
End: 2018-03-23

## 2018-03-23 DIAGNOSIS — G89.4 CHRONIC PAIN SYNDROME: ICD-10-CM

## 2018-03-23 RX ORDER — DIAZEPAM 5 MG/1
TABLET ORAL
Qty: 30 TABLET | Refills: 0 | Status: SHIPPED | OUTPATIENT
Start: 2018-03-23 | End: 2018-05-03 | Stop reason: SDUPTHER

## 2018-03-23 RX ORDER — FENTANYL 25 UG/H
1 PATCH TRANSDERMAL
Qty: 10 PATCH | Refills: 0 | Status: SHIPPED | OUTPATIENT
Start: 2018-03-23 | End: 2018-04-26 | Stop reason: SDUPTHER

## 2018-03-23 RX ORDER — OXYCODONE HYDROCHLORIDE 10 MG/1
TABLET ORAL
Qty: 120 TABLET | Refills: 0 | Status: SHIPPED | OUTPATIENT
Start: 2018-03-23 | End: 2018-04-26 | Stop reason: SDUPTHER

## 2018-03-26 RX ORDER — LOSARTAN POTASSIUM AND HYDROCHLOROTHIAZIDE 25; 100 MG/1; MG/1
TABLET ORAL
Qty: 90 TABLET | Refills: 0 | Status: SHIPPED | OUTPATIENT
Start: 2018-03-26 | End: 2018-06-26 | Stop reason: SDUPTHER

## 2018-04-14 RX ORDER — ONDANSETRON 4 MG/1
TABLET, FILM COATED ORAL
Qty: 30 TABLET | Refills: 0 | Status: SHIPPED | OUTPATIENT
Start: 2018-04-14 | End: 2018-04-16 | Stop reason: SDUPTHER

## 2018-04-19 ENCOUNTER — HOSPITAL ENCOUNTER (OUTPATIENT)
Dept: ENDOSCOPY | Age: 53
Discharge: HOME | End: 2018-04-19
Attending: INTERNAL MEDICINE | Admitting: INTERNAL MEDICINE

## 2018-04-19 LAB
GLUCOSE BLD-MCNC: 122 MG/DL (ref 70–99)
GLUCOSE BLD-MCNC: 130 MG/DL (ref 70–99)
PERFORMED ON: ABNORMAL
PERFORMED ON: ABNORMAL

## 2018-04-19 RX ORDER — SODIUM CHLORIDE 0.9 % (FLUSH) 0.9 %
10 SYRINGE (ML) INJECTION PRN
Status: DISCONTINUED | OUTPATIENT
Start: 2018-04-19 | End: 2018-04-20 | Stop reason: HOSPADM

## 2018-04-19 RX ORDER — SODIUM CHLORIDE 9 MG/ML
INJECTION, SOLUTION INTRAVENOUS CONTINUOUS
Status: DISCONTINUED | OUTPATIENT
Start: 2018-04-19 | End: 2018-04-20 | Stop reason: HOSPADM

## 2018-04-19 RX ORDER — LIDOCAINE HYDROCHLORIDE 10 MG/ML
1 INJECTION, SOLUTION EPIDURAL; INFILTRATION; INTRACAUDAL; PERINEURAL
Status: ACTIVE | OUTPATIENT
Start: 2018-04-19 | End: 2018-04-19

## 2018-04-19 RX ORDER — SODIUM CHLORIDE 0.9 % (FLUSH) 0.9 %
10 SYRINGE (ML) INJECTION EVERY 12 HOURS SCHEDULED
Status: DISCONTINUED | OUTPATIENT
Start: 2018-04-19 | End: 2018-04-20 | Stop reason: HOSPADM

## 2018-04-20 RX ORDER — METFORMIN HYDROCHLORIDE 500 MG/1
TABLET, EXTENDED RELEASE ORAL
Qty: 360 TABLET | Refills: 0 | Status: SHIPPED | OUTPATIENT
Start: 2018-04-20 | End: 2018-07-20 | Stop reason: SDUPTHER

## 2018-04-23 RX ORDER — GLIMEPIRIDE 4 MG/1
TABLET ORAL
Qty: 90 TABLET | Refills: 0 | Status: SHIPPED | OUTPATIENT
Start: 2018-04-23 | End: 2018-07-27 | Stop reason: SDUPTHER

## 2018-04-26 DIAGNOSIS — G89.4 CHRONIC PAIN SYNDROME: ICD-10-CM

## 2018-04-26 RX ORDER — OXYCODONE HYDROCHLORIDE 10 MG/1
TABLET ORAL
Qty: 120 TABLET | Refills: 0 | Status: SHIPPED | OUTPATIENT
Start: 2018-04-26 | End: 2018-05-24 | Stop reason: SDUPTHER

## 2018-04-26 RX ORDER — FENTANYL 25 UG/H
1 PATCH TRANSDERMAL
Qty: 10 PATCH | Refills: 0 | Status: SHIPPED | OUTPATIENT
Start: 2018-04-26 | End: 2018-05-24 | Stop reason: SDUPTHER

## 2018-05-03 RX ORDER — DIAZEPAM 5 MG/1
TABLET ORAL
Qty: 30 TABLET | Refills: 0 | Status: SHIPPED | OUTPATIENT
Start: 2018-05-03 | End: 2018-06-05 | Stop reason: SDUPTHER

## 2018-05-24 DIAGNOSIS — G89.4 CHRONIC PAIN SYNDROME: ICD-10-CM

## 2018-05-24 RX ORDER — FENTANYL 25 UG/H
1 PATCH TRANSDERMAL
Qty: 10 PATCH | Refills: 0 | Status: SHIPPED | OUTPATIENT
Start: 2018-05-24 | End: 2018-06-22 | Stop reason: SDUPTHER

## 2018-05-24 RX ORDER — OXYCODONE HYDROCHLORIDE 10 MG/1
TABLET ORAL
Qty: 120 TABLET | Refills: 0 | Status: SHIPPED | OUTPATIENT
Start: 2018-05-24 | End: 2018-06-22 | Stop reason: SDUPTHER

## 2018-05-25 ENCOUNTER — TELEPHONE (OUTPATIENT)
Dept: FAMILY MEDICINE CLINIC | Age: 53
End: 2018-05-25

## 2018-05-29 ENCOUNTER — TELEPHONE (OUTPATIENT)
Dept: FAMILY MEDICINE CLINIC | Age: 53
End: 2018-05-29

## 2018-05-29 ENCOUNTER — HOSPITAL ENCOUNTER (OUTPATIENT)
Dept: OTHER | Age: 53
Discharge: HOME | End: 2018-05-29
Attending: FAMILY MEDICINE | Admitting: FAMILY MEDICINE

## 2018-05-29 ENCOUNTER — PROCEDURE VISIT (OUTPATIENT)
Dept: FAMILY MEDICINE CLINIC | Age: 53
End: 2018-05-29

## 2018-05-29 VITALS
WEIGHT: 290 LBS | DIASTOLIC BLOOD PRESSURE: 88 MMHG | BODY MASS INDEX: 39.33 KG/M2 | HEART RATE: 71 BPM | SYSTOLIC BLOOD PRESSURE: 128 MMHG | OXYGEN SATURATION: 97 %

## 2018-05-29 DIAGNOSIS — G89.4 CHRONIC PAIN SYNDROME: Primary | ICD-10-CM

## 2018-05-29 DIAGNOSIS — E11.21 TYPE 2 DIABETES MELLITUS WITH DIABETIC NEPHROPATHY, WITH LONG-TERM CURRENT USE OF INSULIN (HCC): Primary | ICD-10-CM

## 2018-05-29 DIAGNOSIS — E11.21 TYPE 2 DIABETES MELLITUS WITH DIABETIC NEPHROPATHY, WITH LONG-TERM CURRENT USE OF INSULIN (HCC): ICD-10-CM

## 2018-05-29 DIAGNOSIS — Z79.4 TYPE 2 DIABETES MELLITUS WITH DIABETIC NEPHROPATHY, WITH LONG-TERM CURRENT USE OF INSULIN (HCC): Primary | ICD-10-CM

## 2018-05-29 DIAGNOSIS — B07.9 VIRAL WARTS, UNSPECIFIED TYPE: ICD-10-CM

## 2018-05-29 DIAGNOSIS — G89.4 CHRONIC PAIN SYNDROME: ICD-10-CM

## 2018-05-29 DIAGNOSIS — Z79.4 TYPE 2 DIABETES MELLITUS WITH DIABETIC NEPHROPATHY, WITH LONG-TERM CURRENT USE OF INSULIN (HCC): ICD-10-CM

## 2018-05-29 DIAGNOSIS — L91.8 ACQUIRED SKIN TAG: ICD-10-CM

## 2018-05-29 LAB
ANION GAP SERPL CALCULATED.3IONS-SCNC: 15 MMOL/L (ref 3–16)
BUN BLDV-MCNC: 18 MG/DL (ref 7–20)
CALCIUM SERPL-MCNC: 9 MG/DL (ref 8.3–10.6)
CHLORIDE BLD-SCNC: 100 MMOL/L (ref 99–110)
CO2: 25 MMOL/L (ref 21–32)
CREAT SERPL-MCNC: 0.7 MG/DL (ref 0.9–1.3)
ESTIMATED AVERAGE GLUCOSE: 154.2 MG/DL
GFR AFRICAN AMERICAN: >60
GFR NON-AFRICAN AMERICAN: >60
GLUCOSE BLD-MCNC: 131 MG/DL (ref 70–99)
HBA1C MFR BLD: 7 %
POTASSIUM SERPL-SCNC: 3.6 MMOL/L (ref 3.5–5.1)
SODIUM BLD-SCNC: 140 MMOL/L (ref 136–145)

## 2018-05-29 PROCEDURE — 11200 RMVL SKIN TAGS UP TO&INC 15: CPT | Performed by: FAMILY MEDICINE

## 2018-05-29 PROCEDURE — 17110 DESTRUCTION B9 LES UP TO 14: CPT | Performed by: FAMILY MEDICINE

## 2018-05-29 PROCEDURE — 99213 OFFICE O/P EST LOW 20 MIN: CPT | Performed by: FAMILY MEDICINE

## 2018-05-31 ENCOUNTER — TELEPHONE (OUTPATIENT)
Dept: FAMILY MEDICINE CLINIC | Age: 53
End: 2018-05-31

## 2018-06-05 DIAGNOSIS — F41.9 ANXIETY: Primary | ICD-10-CM

## 2018-06-07 RX ORDER — DIAZEPAM 5 MG/1
5 TABLET ORAL EVERY 12 HOURS PRN
Qty: 30 TABLET | Refills: 0 | Status: SHIPPED | OUTPATIENT
Start: 2018-06-07 | End: 2018-07-10 | Stop reason: SDUPTHER

## 2018-06-22 DIAGNOSIS — G89.4 CHRONIC PAIN SYNDROME: ICD-10-CM

## 2018-06-22 RX ORDER — OXYCODONE HYDROCHLORIDE 10 MG/1
TABLET ORAL
Qty: 120 TABLET | Refills: 0 | Status: SHIPPED | OUTPATIENT
Start: 2018-06-22 | End: 2018-08-30 | Stop reason: ALTCHOICE

## 2018-06-22 RX ORDER — FENTANYL 25 UG/H
1 PATCH TRANSDERMAL
Qty: 10 PATCH | Refills: 0 | Status: SHIPPED | OUTPATIENT
Start: 2018-06-22 | End: 2018-10-12 | Stop reason: SDUPTHER

## 2018-06-26 RX ORDER — LOSARTAN POTASSIUM AND HYDROCHLOROTHIAZIDE 25; 100 MG/1; MG/1
TABLET ORAL
Qty: 90 TABLET | Refills: 0 | Status: SHIPPED | OUTPATIENT
Start: 2018-06-26 | End: 2018-09-27 | Stop reason: SDUPTHER

## 2018-07-10 DIAGNOSIS — F41.9 ANXIETY: ICD-10-CM

## 2018-07-10 RX ORDER — DIAZEPAM 5 MG/1
TABLET ORAL
Qty: 30 TABLET | Refills: 0 | Status: SHIPPED | OUTPATIENT
Start: 2018-07-10 | End: 2018-08-23 | Stop reason: SDUPTHER

## 2018-07-21 RX ORDER — METFORMIN HYDROCHLORIDE 500 MG/1
TABLET, EXTENDED RELEASE ORAL
Qty: 360 TABLET | Refills: 0 | Status: SHIPPED | OUTPATIENT
Start: 2018-07-21 | End: 2018-10-20 | Stop reason: SDUPTHER

## 2018-07-27 RX ORDER — GLIMEPIRIDE 4 MG/1
TABLET ORAL
Qty: 90 TABLET | Refills: 0 | Status: SHIPPED | OUTPATIENT
Start: 2018-07-27 | End: 2018-10-31 | Stop reason: SDUPTHER

## 2018-08-23 DIAGNOSIS — F41.9 ANXIETY: ICD-10-CM

## 2018-08-23 RX ORDER — DIAZEPAM 5 MG/1
TABLET ORAL
Qty: 30 TABLET | Refills: 0 | Status: SHIPPED | OUTPATIENT
Start: 2018-08-23 | End: 2019-06-07 | Stop reason: SDUPTHER

## 2018-08-23 RX ORDER — FLUTICASONE PROPIONATE 50 MCG
SPRAY, SUSPENSION (ML) NASAL
Qty: 1 BOTTLE | Refills: 12 | Status: SHIPPED | OUTPATIENT
Start: 2018-08-23 | End: 2018-08-30 | Stop reason: SDUPTHER

## 2018-08-29 ENCOUNTER — HOSPITAL ENCOUNTER (OUTPATIENT)
Dept: OTHER | Age: 53
Discharge: HOME | End: 2018-08-29
Attending: FAMILY MEDICINE | Admitting: FAMILY MEDICINE

## 2018-08-29 ENCOUNTER — TELEPHONE (OUTPATIENT)
Dept: FAMILY MEDICINE CLINIC | Age: 53
End: 2018-08-29

## 2018-08-29 DIAGNOSIS — E11.21 TYPE 2 DIABETES MELLITUS WITH DIABETIC NEPHROPATHY, WITH LONG-TERM CURRENT USE OF INSULIN (HCC): ICD-10-CM

## 2018-08-29 DIAGNOSIS — Z79.4 TYPE 2 DIABETES MELLITUS WITH DIABETIC NEPHROPATHY, WITH LONG-TERM CURRENT USE OF INSULIN (HCC): ICD-10-CM

## 2018-08-29 LAB
ESTIMATED AVERAGE GLUCOSE: 171.4 MG/DL
HBA1C MFR BLD: 7.6 %

## 2018-08-29 NOTE — TELEPHONE ENCOUNTER
Pt was called to confirm his appt 8-30 pt states he may 10 to 15 mins he was told he maybe asked to reschedule if he 15 mins late pt said if he's late oh well    He told me to eat him as he was hanging up

## 2018-08-30 ENCOUNTER — OFFICE VISIT (OUTPATIENT)
Dept: FAMILY MEDICINE CLINIC | Age: 53
End: 2018-08-30

## 2018-08-30 VITALS
HEART RATE: 68 BPM | DIASTOLIC BLOOD PRESSURE: 86 MMHG | SYSTOLIC BLOOD PRESSURE: 130 MMHG | BODY MASS INDEX: 40.33 KG/M2 | WEIGHT: 297.4 LBS | OXYGEN SATURATION: 96 %

## 2018-08-30 DIAGNOSIS — E11.21 TYPE 2 DIABETES MELLITUS WITH DIABETIC NEPHROPATHY, WITH LONG-TERM CURRENT USE OF INSULIN (HCC): Primary | ICD-10-CM

## 2018-08-30 DIAGNOSIS — Z79.4 TYPE 2 DIABETES MELLITUS WITH DIABETIC NEPHROPATHY, WITH LONG-TERM CURRENT USE OF INSULIN (HCC): Primary | ICD-10-CM

## 2018-08-30 DIAGNOSIS — B07.8 OTHER VIRAL WARTS: ICD-10-CM

## 2018-08-30 DIAGNOSIS — F51.01 PRIMARY INSOMNIA: ICD-10-CM

## 2018-08-30 DIAGNOSIS — G89.4 CHRONIC PAIN SYNDROME: ICD-10-CM

## 2018-08-30 PROCEDURE — 17110 DESTRUCTION B9 LES UP TO 14: CPT | Performed by: FAMILY MEDICINE

## 2018-08-30 PROCEDURE — 99214 OFFICE O/P EST MOD 30 MIN: CPT | Performed by: FAMILY MEDICINE

## 2018-08-30 RX ORDER — OXYCODONE AND ACETAMINOPHEN 7.5; 3 MG/1; MG/1
1 TABLET ORAL EVERY 4 HOURS PRN
COMMUNITY
End: 2018-10-08 | Stop reason: SDUPTHER

## 2018-08-30 NOTE — PROGRESS NOTES
cervical lymphadenopathy, no bruits  LUNG:clear to auscultation bilaterally with normal respiratory effort  CV: Normal heart sounds, regular rate and rhythm without murmurs  Monofilament Sensation:  normal,   LE Pulses:  normal,   LE Edema: normal,  LE Skin lesions: abnormal - 3 warts, 2-3 mm each, over MTP 1st right, left second nail thick and yellow, callus balls bilaterally         PHQ score: PHQ-9 Total Score: 2 (8/31/2017 10:54 AM)      Assessment:     Jessica Garland was seen today for 3 month follow-up and diabetes. Diagnoses and all orders for this visit:    Type 2 diabetes mellitus with diabetic nephropathy, with long-term current use of insulin ([address]  -     Hemoglobin A1C; Future  -     Basic Metabolic Panel; Future  -A1C worse  -continue current meds, encouraged to try to be more active again as really helped sugars  -reviewed labs with patient  Lab Results   Component Value Date    LABA1C 7.6 08/29/2018     -lab slip given for next visit  -patient to check sugars 1 times a day(s)  -encouraged a healthy diet, regular exercise and maintaining a healthy weight  -RTO  3 months  -eye form given:  No  ORDER FOOT EXAM!    Primary insomnia  -Stable, continue current medications. Controlled Substances Monitoring:     Attestation: The Prescription Monitoring Report for this patient was reviewed today. Kirit Wade MD)  Documentation: No signs of potential drug abuse or diversion identified. Kirit Wade MD)        Chronic Pain Syndrome  Encouraged pt to continue to work with pt mgmt finding pain meds that will work for him. Discussed may take a little to find the right one. Pt obviously frustrated as was doing well and we had been able to decrease his pain meds significantly over past year.        Other viral warts  -     NE DESTRUCTION BENIGN LESIONS UP TO 14  Discussed this is 3rd time cryo, have gotten smaller but if not resolved this time rec seeing derm given they bother him as on top of toe and rub on shoes. Also with DM risk for irritation and sores. Return in about 3 months (around 11/30/2018). Cryotherapy Procedure Note    Pre-operative Diagnosis:warts : #3    Post-operative Diagnosis: same    Locations: right great toe      Procedure Details   3 cycles of liquid nitrogen applied to affected sites until 1 mm ice ball formed. Complications: none. Plan:  1. Patient was educated regarding the potential risks of blister formation, discomfort, hypopigmentation, and scar. Verbal consent obtained. 2. Wound care was discussed. 3. Recommended that the patient use OTC analgesics as needed for pain. 4. Plan for RTC in 2-4 weeks for re-treatment if needed.

## 2018-08-31 RX ORDER — INSULIN DETEMIR 100 [IU]/ML
INJECTION, SOLUTION SUBCUTANEOUS
Qty: 15 ML | Refills: 5 | Status: SHIPPED | OUTPATIENT
Start: 2018-08-31 | End: 2018-11-30

## 2018-09-27 RX ORDER — LOSARTAN POTASSIUM AND HYDROCHLOROTHIAZIDE 25; 100 MG/1; MG/1
TABLET ORAL
Qty: 90 TABLET | Refills: 0 | Status: SHIPPED | OUTPATIENT
Start: 2018-09-27 | End: 2018-12-26 | Stop reason: SDUPTHER

## 2018-10-08 ENCOUNTER — OFFICE VISIT (OUTPATIENT)
Dept: FAMILY MEDICINE CLINIC | Age: 53
End: 2018-10-08
Payer: MEDICARE

## 2018-10-08 VITALS
BODY MASS INDEX: 40.28 KG/M2 | DIASTOLIC BLOOD PRESSURE: 92 MMHG | OXYGEN SATURATION: 98 % | HEART RATE: 73 BPM | WEIGHT: 297 LBS | SYSTOLIC BLOOD PRESSURE: 140 MMHG

## 2018-10-08 DIAGNOSIS — Z23 NEED FOR INFLUENZA VACCINATION: ICD-10-CM

## 2018-10-08 DIAGNOSIS — G89.4 CHRONIC PAIN SYNDROME: Primary | ICD-10-CM

## 2018-10-08 PROCEDURE — 90682 RIV4 VACC RECOMBINANT DNA IM: CPT | Performed by: FAMILY MEDICINE

## 2018-10-08 PROCEDURE — 99213 OFFICE O/P EST LOW 20 MIN: CPT | Performed by: FAMILY MEDICINE

## 2018-10-08 PROCEDURE — G0008 ADMIN INFLUENZA VIRUS VAC: HCPCS | Performed by: FAMILY MEDICINE

## 2018-10-08 RX ORDER — SIMVASTATIN 40 MG
TABLET ORAL
Qty: 90 TABLET | Refills: 3 | Status: SHIPPED | OUTPATIENT
Start: 2018-10-08 | End: 2019-10-07 | Stop reason: SDUPTHER

## 2018-10-08 RX ORDER — SIMVASTATIN 40 MG
TABLET ORAL
Qty: 90 TABLET | Refills: 3 | Status: CANCELLED | OUTPATIENT
Start: 2018-10-08

## 2018-10-08 RX ORDER — OXYCODONE AND ACETAMINOPHEN 7.5; 325 MG/1; MG/1
1 TABLET ORAL EVERY 6 HOURS PRN
Qty: 28 TABLET | Refills: 0 | Status: SHIPPED | OUTPATIENT
Start: 2018-10-08 | End: 2018-10-12 | Stop reason: SDUPTHER

## 2018-10-08 ASSESSMENT — PATIENT HEALTH QUESTIONNAIRE - PHQ9
2. FEELING DOWN, DEPRESSED OR HOPELESS: 3
SUM OF ALL RESPONSES TO PHQ QUESTIONS 1-9: 6
SUM OF ALL RESPONSES TO PHQ9 QUESTIONS 1 & 2: 6
SUM OF ALL RESPONSES TO PHQ QUESTIONS 1-9: 6
1. LITTLE INTEREST OR PLEASURE IN DOING THINGS: 3

## 2018-10-12 DIAGNOSIS — G89.4 CHRONIC PAIN SYNDROME: ICD-10-CM

## 2018-10-12 RX ORDER — OXYCODONE AND ACETAMINOPHEN 7.5; 325 MG/1; MG/1
1 TABLET ORAL EVERY 6 HOURS PRN
Qty: 120 TABLET | Refills: 0 | Status: SHIPPED | OUTPATIENT
Start: 2018-10-12 | End: 2018-11-05 | Stop reason: SDUPTHER

## 2018-10-12 RX ORDER — FENTANYL 25 UG/H
1 PATCH TRANSDERMAL
Qty: 10 PATCH | Refills: 0 | Status: SHIPPED | OUTPATIENT
Start: 2018-10-12 | End: 2018-11-05 | Stop reason: DRUGHIGH

## 2018-10-12 NOTE — TELEPHONE ENCOUNTER
Refill:      oxyCODONE-acetaminophen (ENDOCET) 7.5-325 MG per tablet [[id number]]   Order Details   Dose: 1 tablet Route: Oral Frequency: EVERY 6 HOURS PRN for Pain   Dispense Quantity:  28 tablet Refills:  0 Fills remaining:  --           Sig: Take 1 tablet by mouth every 6 hours as needed for Pain for up to 7 days. .          Written Date:  10/08/18 Expiration Date:  12/07/18     Start Date:  10/08/18 End Date:  10/15/18     Ordering Provider:  -- Authorizing Provider:  Shanel Aden MD Ordering User:  Shanel Aden MD          Diagnosis Association: Chronic pain syndrome (G89.4)      Original Order:  oxyCODONE-acetaminophen (Dayami Lemons) 7.5-300 MG per tablet [[id number]]      Pharmacy:  [facility] [phone] - F [phone]        fentaNYL ([address]) 25 MCG/HR [[id number]] ENDED   Order Details   Dose: 1 patch Route: Transdermal Frequency: EVERY 72 HOURS   Dispense Quantity:  10 patch Refills:  0 Fills remaining:  --           Sig: Place 1 patch onto the skin every 72 hours for 30 days. .          Written Date:  06/22/18 Expiration Date:  08/21/18     Start Date:  06/22/18 End Date:  07/22/18 after 10 doses     Ordering Provider:  -- Authorizing Provider:  Shanel Aden MD Ordering User:  Shanel Aden MD          Diagnosis Association: Chronic pain syndrome (G89.4)      Original Order:  fentaNYL ([address]) [address] [[id number]]      Pharmacy:  [facility] [address] [phone] - F [phone]

## 2018-10-22 ENCOUNTER — TELEPHONE (OUTPATIENT)
Dept: PAIN MANAGEMENT | Age: 53
End: 2018-10-22

## 2018-10-22 RX ORDER — METFORMIN HYDROCHLORIDE 500 MG/1
TABLET, EXTENDED RELEASE ORAL
Qty: 360 TABLET | Refills: 0 | Status: SHIPPED | OUTPATIENT
Start: 2018-10-22 | End: 2019-01-23 | Stop reason: SDUPTHER

## 2018-10-22 NOTE — TELEPHONE ENCOUNTER
[address]    Screening Questionnaire     Name of current [address] [facility] PCP (per patient): Dr. Magali Deluca   Referring diagnosis: Abdomen pain      1. Name of last Pain provider: Dr. Holly Buckley   2. When were you last seen by this Pain provider: 08/2018   3. Last time you had MRI/XRays done for your pain: No xrays pt has had Ct scans    Report available?: Yes  4. Are you taking any opioids at this time:  Yes   Name of medication: oxyCODONE fentaNYL  5. Are you under opioid contract with your current provider:  No   Last date medication filled: 10/12/18   6. Reason for switch:    - Were you discharged?:  Yes   - Other Reason: N/a      We need the last 3 office notes and MRI reports (within past 5 years), if any    available, before being seen    PLEASE READ FOLLOWING INFORMATION TO PATIENTS:     - We are a Comprehensive Pain Management program.   - Prior pain medications may be changed, based on our evaluation.   - You need a CURRENT [facility] provider.    (check with the referring provider's office to confirm). IF OLD RECORDS (INCLUDING MRI REPORT) NOT RECEIVED WITHIN 2 WEEKS, WE MAY SEND REFERRAL BACK TO REFERRING PROVIDER.      Any unresolved questions, please refer to the Provider for approval.    Approved for Consult:  Pending

## 2018-11-01 RX ORDER — GLIMEPIRIDE 4 MG/1
TABLET ORAL
Qty: 90 TABLET | Refills: 0 | Status: SHIPPED | OUTPATIENT
Start: 2018-11-01 | End: 2019-01-29 | Stop reason: SDUPTHER

## 2018-11-02 NOTE — PROGRESS NOTES
use, malignancy, weakness, perianal numbness, bladder/bowel changes) - No    Co-morbid Issues     HTN, GERD, diabetes type 2, chronic pancreatitis, MRSA, kidney stones, sleep apnea, anxiety, hearing impaired. History of subtotal colectomy and tracheostomy in 2001.        PRIOR TREATMENTS:     Therapies Tried      [x] Chiropractic Manipulation:  No / N/A    [x] Physical Therapy:  No / N/A    [x] Home Exercises:  No / N/A    [x] Behavioral Health Counseling: No / N/A    [x] Injections/Interventions: No / N/A    [x] Surgery: None Recently / N/A    Pain Medications Tried      [x] NSAIDS: Yes / N/A - meloxicam    [x] Antidepressants/Anxiolytics: Yes / anxiety - Valium    [x] Anticonvulsants: Yes / gabapentin     [x] Muscle Relaxants: Yes / N/A - baclofen    [x] Prescription Pain Medications: Yes / Benefit 40% - OxyIR 10 mg, [place], Fentanyl 25 mcg/hr q3d, Percocet 7.5 mg q6h PRN    CO-EXISTING CONDITIONS:     · Past Medical History:  Past Medical History:   Diagnosis Date    Anxiety state, unspecified     Calculus of kidney     Deaf     RIGHT EAR    Diabetes mellitus ([address]     Diverticula     GERD (gastroesophageal reflux disease)     Hordeolum externum     Hyperlipidemia     Hypertension     Insomnia, unspecified     Kidney stones     MRSA carrier     Pancreatitis, chronic ([address]     Psychiatric problem     Unspecified hypertrophic and atrophic condition of skin        · Past Surgical History:  Past Surgical History:   Procedure Laterality Date    ABSCESS DRAINAGE Left 1/5/14    incision and drainage of an abcess on left calf    APPENDECTOMY      CHOLECYSTECTOMY      COLOSTOMY      INNER EAR SURGERY      RIGHT    PANCREAS SURGERY      duct stent    REVISION COLOSTOMY      SUBTOTAL COLECTOMY      Had wound dehiscence, mesh    TRACHEOSTOMY      TRACHEOSTOMY CLOSURE         · Allergies:  Amoxicillin and Morphine    · Current Medications:  Current Outpatient Prescriptions   Medication Sig Dispense Hematologic/lymphatic:   []  Bleeding    []  Easy bruising/bleeding   []  Anemia    [] Cancer       [x] Denies all of the above   Musculoskeletal:    [] Back pain       []  Neck pain    []  Myalgia     [] Joint pain    [] Arthritis       [x] Denies all of the above  Neurological:    [] Headache    []  Dizziness     []  Seizures   [] Tingling    [] Sensory changes    []  Focal weakness     []  Incontinence      [x] Denies all of the above   Behavioral/Psych:     [x] Anxiety    [x]  Depression     []  Mood swings      [] Denies all of the above   Endocrine:     [x] Diabetes       [] Thyroid issues    [] Fatigue     [] Other      [] Denies all of the above   Allergic/Immunologic:    [] Seasonal allergies  [] allergies to iodine/seafood  [] Contrast dye allergies      [x] Denies all of the above     [x] The patient was instructed to contact primary care physician regarding ROS positives not being addressed during today's visit. OBJECTIVE:     PHYSICAL EXAMINATION     Vital Signs:  /86 (Site: Right Upper Arm, Position: Sitting, Cuff Size: Large Adult)   Pulse 112   Ht 6' (1.829 m)   Wt 296 lb (134.3 kg)   BMI 40.14 kg/m²    Body mass index is 40.14 kg/m². Pain Score:   6 /10    Constitution:  Generally, patient is [x] alert, [x] appears stated age,  [] older than stated age   [x] in no acute distress  [] in mild distress.   Body habitus is [x] Normal  [] Thin  [] Obese  [] Morbidly Obese      Gait:   Gait is [x] Normal  [] Antalgic  [] Compensated  [] Other   Assistive Device: [x] None [] Cane  [] Crutches   [] Keegan Gisela   [] Wheelchair  [] Other    HEENT:  Head: [x] Normocephalic  Eyes: [x] Extra-occular muscles intact  Ear: [x] External Ear normal   Nose: [x] Normal  Mouth: [x] Oral mucosa moist  [x] No perioral lesions    Lymphatic:   [x] No focal or generalized adenopathy    Skin:   [x] Warm [x] No rash [] Dry [] Diaphoretic     Neck:   [x] Supple [] ROM normal    [x] No meningeal signs [] Other: Alkaline Phosphatase 70  40 - 129 U/L Final 02/24/2018 11:42 AM [facility]   ALT 32  10 - 40 U/L Final 02/24/2018 11:42 AM [facility]   AST 22  15 - 37 U/L Final 02/24/2018 11:42 AM [facility]   Globulin 3.8  g/dL Final 02/24/2018 11:42 AM        [x] Results of the above studies were personally reviewed by me with the patient in detail along with the images, if available. The patient was instructed to contact the primary care provider regarding other unrelated findings not addressed during today's visit. FUNCTIONAL EVALUATION:     [x] Brief Pain Inventory (BPI)   Pain Score = 7/10   Interference Score = 6 /10    [x] PEG-3 Score = 6 /10    [x] Pain Disability Index (PDI)    Score = 24 /70      [x] PHQ-9 Score = 17 /27    ASSESSMENT (Medical Decision Making): Ward Kilgore is a 48y.o. year old male with the following diagnosis and treatments ordered today:    1. Chronic generalized abdominal pain  - pregabalin (LYRICA) 75 MG capsule; Take 1 capsule by mouth 2 times daily for 30 days. .  Dispense: 60 capsule; Refill: 0  - tiZANidine (ZANAFLEX) 4 MG tablet; Take 1 tablet by mouth 2 times daily  Dispense: 60 tablet; Refill: 0  - oxyCODONE-acetaminophen (ENDOCET) 7.5-325 MG per tablet; Take 1 tablet by mouth every 6 hours as needed for Pain for up to 20 days. Bevely Brisker Date: 11/12/18  Dispense: 80 tablet; Refill: 0    2. H/O chronic pancreatitis  - pregabalin (LYRICA) 75 MG capsule; Take 1 capsule by mouth 2 times daily for 30 days. .  Dispense: 60 capsule; Refill: 0  - tiZANidine (ZANAFLEX) 4 MG tablet; Take 1 tablet by mouth 2 times daily  Dispense: 60 tablet; Refill: 0  - oxyCODONE-acetaminophen (ENDOCET) 7.5-325 MG per tablet; Take 1 tablet by mouth every 6 hours as needed for Pain for up to 20 days. Bevely Brisker Date: 11/12/18  Dispense: 80 tablet; Refill: 0    3. Chronic pain syndrome  - pregabalin (LYRICA) 75 MG capsule;  Take 1 capsule by mouth 2 times daily

## 2018-11-05 ENCOUNTER — OFFICE VISIT (OUTPATIENT)
Dept: PAIN MANAGEMENT | Age: 53
End: 2018-11-05
Payer: MEDICARE

## 2018-11-05 ENCOUNTER — TELEPHONE (OUTPATIENT)
Dept: PAIN MANAGEMENT | Age: 53
End: 2018-11-05

## 2018-11-05 VITALS
SYSTOLIC BLOOD PRESSURE: 138 MMHG | HEIGHT: 72 IN | DIASTOLIC BLOOD PRESSURE: 86 MMHG | BODY MASS INDEX: 40.09 KG/M2 | WEIGHT: 296 LBS | HEART RATE: 112 BPM

## 2018-11-05 DIAGNOSIS — Z87.19 H/O CHRONIC PANCREATITIS: ICD-10-CM

## 2018-11-05 DIAGNOSIS — Z79.891 ENCOUNTER FOR MONITORING OPIOID MAINTENANCE THERAPY: ICD-10-CM

## 2018-11-05 DIAGNOSIS — G89.29 CHRONIC GENERALIZED ABDOMINAL PAIN: Primary | ICD-10-CM

## 2018-11-05 DIAGNOSIS — R10.84 CHRONIC GENERALIZED ABDOMINAL PAIN: Primary | ICD-10-CM

## 2018-11-05 DIAGNOSIS — G89.4 CHRONIC PAIN SYNDROME: ICD-10-CM

## 2018-11-05 DIAGNOSIS — Z51.81 ENCOUNTER FOR MONITORING OPIOID MAINTENANCE THERAPY: ICD-10-CM

## 2018-11-05 PROCEDURE — 99204 OFFICE O/P NEW MOD 45 MIN: CPT | Performed by: ANESTHESIOLOGY

## 2018-11-05 RX ORDER — TIZANIDINE 4 MG/1
4 TABLET ORAL 2 TIMES DAILY
Qty: 60 TABLET | Refills: 0 | Status: SHIPPED | OUTPATIENT
Start: 2018-11-05 | End: 2018-12-03

## 2018-11-05 RX ORDER — NYSTATIN 100000 U/G
CREAM TOPICAL
Qty: 60 G | Refills: 0 | Status: SHIPPED | OUTPATIENT
Start: 2018-11-05

## 2018-11-05 RX ORDER — OXYCODONE AND ACETAMINOPHEN 7.5; 325 MG/1; MG/1
1 TABLET ORAL EVERY 6 HOURS PRN
Qty: 80 TABLET | Refills: 0 | Status: SHIPPED | OUTPATIENT
Start: 2018-11-12 | End: 2018-12-03 | Stop reason: DRUGHIGH

## 2018-11-05 RX ORDER — PREGABALIN 75 MG/1
75 CAPSULE ORAL 2 TIMES DAILY
Qty: 60 CAPSULE | Refills: 0 | Status: SHIPPED | OUTPATIENT
Start: 2018-11-05 | End: 2018-12-03 | Stop reason: DRUGHIGH

## 2018-11-06 NOTE — TELEPHONE ENCOUNTER
Pt called back and stated that it was several years ago that he tried that Gabapentin. Pt stated that he did not tolerate it well at all.

## 2018-11-08 ENCOUNTER — TELEPHONE (OUTPATIENT)
Dept: FAMILY MEDICINE CLINIC | Age: 53
End: 2018-11-08

## 2018-11-10 LAB
6-ACETYLMORPHINE: NOT DETECTED
7-AMINOCLONAZEPAM: NOT DETECTED
ALPHA-OH-ALPRAZOLAM: NOT DETECTED
ALPRAZOLAM: NOT DETECTED
AMPHETAMINE: NOT DETECTED
BARBITURATES: NOT DETECTED
BENZOYLECGONINE: NOT DETECTED
BUPRENORPHINE: NOT DETECTED
CARISOPRODOL: NOT DETECTED
CLONAZEPAM: NOT DETECTED
CODEINE: NOT DETECTED
CREATININE URINE: 235.7 MG/DL (ref 20–400)
DIAZEPAM: NOT DETECTED
DRUGS EXPECTED: NORMAL
EER PAIN MGT DRUG PANEL, HIGH RES/EMIT U: NORMAL
ETHYL GLUCURONIDE: NOT DETECTED
FENTANYL: NOT DETECTED
HYDROCODONE: NOT DETECTED
HYDROMORPHONE: NOT DETECTED
LORAZEPAM: NOT DETECTED
MARIJUANA METABOLITE: NOT DETECTED
MDA: NOT DETECTED
MDEA: NOT DETECTED
MDMA URINE: NOT DETECTED
MEPERIDINE: NOT DETECTED
METHADONE: NOT DETECTED
METHAMPHETAMINE: NOT DETECTED
METHYLPHENIDATE: NOT DETECTED
MIDAZOLAM: NOT DETECTED
MORPHINE: NOT DETECTED
NORBUPRENORPHINE, FREE: NOT DETECTED
NORDIAZEPAM: PRESENT
NORFENTANYL: PRESENT
NORHYDROCODONE, URINE: NOT DETECTED
NOROXYCODONE: PRESENT
NOROXYMORPHONE, URINE: PRESENT
OXAZEPAM: PRESENT
OXYCODONE: PRESENT
OXYMORPHONE: NOT DETECTED
PAIN MANAGEMENT DRUG PANEL: NORMAL
PAIN MANAGEMENT DRUG PANEL: NORMAL
PCP: NOT DETECTED
PHENTERMINE: NOT DETECTED
PROPOXYPHENE: NOT DETECTED
TAPENTADOL, URINE: NOT DETECTED
TAPENTADOL-O-SULFATE, URINE: NOT DETECTED
TEMAZEPAM: PRESENT
TRAMADOL: NOT DETECTED
ZOLPIDEM: NOT DETECTED

## 2018-11-23 RX ORDER — EMPAGLIFLOZIN 10 MG/1
10 TABLET, FILM COATED ORAL DAILY
Qty: 90 TABLET | Refills: 0 | Status: SHIPPED | OUTPATIENT
Start: 2018-11-23 | End: 2019-02-20 | Stop reason: SDUPTHER

## 2018-11-27 RX ORDER — CLOBETASOL PROPIONATE 0.5 MG/G
AEROSOL, FOAM TOPICAL
Qty: 50 G | Refills: 3 | Status: SHIPPED | OUTPATIENT
Start: 2018-11-27 | End: 2018-11-30

## 2018-11-28 RX ORDER — PEN NEEDLE, DIABETIC 31 GX5/16"
NEEDLE, DISPOSABLE MISCELLANEOUS
Qty: 100 EACH | Refills: 3 | Status: SHIPPED | OUTPATIENT
Start: 2018-11-28 | End: 2019-12-10 | Stop reason: SDUPTHER

## 2018-11-30 ENCOUNTER — OFFICE VISIT (OUTPATIENT)
Dept: FAMILY MEDICINE CLINIC | Age: 53
End: 2018-11-30
Payer: MEDICARE

## 2018-11-30 VITALS
OXYGEN SATURATION: 97 % | WEIGHT: 296 LBS | BODY MASS INDEX: 40.14 KG/M2 | HEART RATE: 67 BPM | SYSTOLIC BLOOD PRESSURE: 128 MMHG | DIASTOLIC BLOOD PRESSURE: 84 MMHG

## 2018-11-30 DIAGNOSIS — E11.21 TYPE 2 DIABETES MELLITUS WITH DIABETIC NEPHROPATHY, WITH LONG-TERM CURRENT USE OF INSULIN (HCC): Primary | ICD-10-CM

## 2018-11-30 DIAGNOSIS — Z79.4 TYPE 2 DIABETES MELLITUS WITH DIABETIC NEPHROPATHY, WITH LONG-TERM CURRENT USE OF INSULIN (HCC): Primary | ICD-10-CM

## 2018-11-30 DIAGNOSIS — F41.9 ANXIETY: ICD-10-CM

## 2018-11-30 PROCEDURE — 99213 OFFICE O/P EST LOW 20 MIN: CPT | Performed by: FAMILY MEDICINE

## 2018-11-30 RX ORDER — IBUPROFEN 800 MG/1
TABLET ORAL
Qty: 270 TABLET | Refills: 1 | OUTPATIENT
Start: 2018-11-30

## 2018-11-30 RX ORDER — IBUPROFEN 800 MG/1
800 TABLET ORAL
Qty: 90 TABLET | Refills: 1 | Status: SHIPPED | OUTPATIENT
Start: 2018-11-30 | End: 2018-12-16 | Stop reason: ALTCHOICE

## 2018-11-30 RX ORDER — ONDANSETRON 4 MG/1
4 TABLET, FILM COATED ORAL EVERY 8 HOURS PRN
Qty: 30 TABLET | Refills: 5 | Status: SHIPPED | OUTPATIENT
Start: 2018-11-30 | End: 2018-12-16 | Stop reason: ALTCHOICE

## 2018-12-03 ENCOUNTER — OFFICE VISIT (OUTPATIENT)
Dept: PAIN MANAGEMENT | Age: 53
End: 2018-12-03
Payer: MEDICARE

## 2018-12-03 VITALS
WEIGHT: 299 LBS | HEIGHT: 72 IN | BODY MASS INDEX: 40.5 KG/M2 | SYSTOLIC BLOOD PRESSURE: 151 MMHG | TEMPERATURE: 97.6 F | DIASTOLIC BLOOD PRESSURE: 85 MMHG | HEART RATE: 63 BPM

## 2018-12-03 DIAGNOSIS — Z87.19 HX OF CHRONIC PANCREATITIS: ICD-10-CM

## 2018-12-03 DIAGNOSIS — R10.84 CHRONIC GENERALIZED ABDOMINAL PAIN: Primary | ICD-10-CM

## 2018-12-03 DIAGNOSIS — G89.4 CHRONIC PAIN SYNDROME: ICD-10-CM

## 2018-12-03 DIAGNOSIS — G89.29 CHRONIC GENERALIZED ABDOMINAL PAIN: Primary | ICD-10-CM

## 2018-12-03 DIAGNOSIS — F11.90 CHRONIC, CONTINUOUS USE OF OPIOIDS: ICD-10-CM

## 2018-12-03 PROCEDURE — 99214 OFFICE O/P EST MOD 30 MIN: CPT | Performed by: ANESTHESIOLOGY

## 2018-12-03 RX ORDER — TRAZODONE HYDROCHLORIDE 50 MG/1
50 TABLET ORAL NIGHTLY PRN
Qty: 30 TABLET | Refills: 0 | Status: SHIPPED | OUTPATIENT
Start: 2018-12-03 | End: 2019-01-03 | Stop reason: SINTOL

## 2018-12-03 RX ORDER — PREGABALIN 100 MG/1
100 CAPSULE ORAL 2 TIMES DAILY
Qty: 60 CAPSULE | Refills: 0 | Status: SHIPPED | OUTPATIENT
Start: 2018-12-03 | End: 2019-01-03 | Stop reason: SDUPTHER

## 2018-12-03 RX ORDER — OXYCODONE AND ACETAMINOPHEN 10; 325 MG/1; MG/1
1 TABLET ORAL EVERY 8 HOURS PRN
Qty: 90 TABLET | Refills: 0 | Status: SHIPPED | OUTPATIENT
Start: 2018-12-03 | End: 2019-01-03 | Stop reason: SDUPTHER

## 2018-12-03 ASSESSMENT — ENCOUNTER SYMPTOMS
EYE PAIN: 0
CONSTIPATION: 0
EYE DISCHARGE: 0
COUGH: 0
ABDOMINAL PAIN: 1
SHORTNESS OF BREATH: 0
WHEEZING: 0
BACK PAIN: 0
NAUSEA: 0
EYE REDNESS: 0
VOMITING: 0

## 2018-12-03 NOTE — PROGRESS NOTES
side and 2+ on the left side. Skin: Skin is warm. No rash noted. He is not diaphoretic. Psychiatric: He has a normal mood and affect. His behavior is normal. Thought content normal.       Vitals:    12/03/18 1039 12/03/18 1040   BP: (!) 150/88 (!) 151/85   Site: Left Upper Arm    Position: Sitting    Cuff Size: Large Adult    Pulse: 63    Temp: 97.6 °F (36.4 °C)    Weight: 299 lb (135.6 kg)    Height: 6' (1.829 m)        Body mass index is 40.55 kg/m². Pain Score:   7 /10    Goals of chronic pain therapy:      Multi-disciplinary comprehensive pain management with functional improvement and reducedopioid use. Prescription pain medication monitoring:      MEDD current = 45   ORT Score =  3 (LOW)   Other Risk factors - depression, anxiety. OARRS:    Checked today: Yes    Adversereport: No   UDS:    Date of last UDS: 11/05/2018    Adverse report: No      Medication Effects -        Analgesia:      Average level of pain for the past month = 8/10     Percentage of pain relief = 50%     Activities Improved: Activities of daily living = 50%     Sleep = 40%     Mood = 50%     Social functioning = 50%     Adverse Effects: Any adverse effects: No     Type/Severity of side effect: None    Aberrant Behavior:     Requests for frequent early refills: No     Increased dose without authorization: No     Reports lost or stolen prescriptions: No     Attempts to obtain prescriptions from other providers: No     Use of pain medication in response to situational stressor: No      Increasingly unkempt or impaired: No     Negative mood changes: No     Abusing alcohol or illicit drugs: No     Appears intoxicated: No     Other: NA     Controlled Substances Monitoring:    Attestation: The Prescription Monitoring Report for this patient was reviewed today.  Angelique Siddiqui MD)  Documentation: Possible medication side effects, risk of tolerance/dependence & alternative treatments discussed., No signs of potential drug

## 2018-12-03 NOTE — PATIENT INSTRUCTIONS
nerve block injection. Medicines are the most common treatment for pain. But to feel better, you'll need to do more than take medicine. You can also do things like reducing your stress level and changing how you think. How can you take medicine safely? Medicines can help you get better. But they can also be dangerous, especially if you don't take them the right way. Be safe with medicines. Read and follow all instructions on the label. If the medicine you take causes side effects such as constipation or nausea, you may need to take other medicines for those problems. Talk to your doctor about any side effects you have. If you were prescribed an opioid pain reliever, your care team will give you information on how to use it safely. You will also get directions for how to safely store the medicine and how to get rid of any that's left over. Follow these instructions carefully. What physical treatments can help? Physical treatments can be an important part of managing chronic pain. You may find that combining more than one treatment helps the most.  These treatments can include:  · Heat or cold. This can help arthritis, sore muscles, and other aches. · Hydrotherapy. It uses flowing water to relax muscles. · Massage. Massage involves rubbing the soft tissues of the body. It eases tension and pain. · Transcutaneous electrical nerve stimulation (TENS). This treatment uses a gentle electric current applied to the skin for pain relief. · Acupuncture. This is a form of traditional [facility] medicine. It uses very thin needles inserted into certain points of the body. · Physical therapy. This treatment uses stretches and exercises to reduce pain and help you move better. If you get physical therapy, make sure to do any home exercises or stretching your therapist has prescribed. Stay as active as you can. Try to get some physical activity every day. What other things can help?   You can manage chronic pain by using things other than medicines or physical treatments. For example, you can keep track of your pain in a pain diary. It can help you understand how the things you do affect your pain. Reducing stress and tension can reduce pain. And being more aware of your thought patterns can be helpful. In some cases, shifting how you think about pain can affect how you feel. Here are some options to think about:  · Breathing exercises and meditation. These techniques can help you focus your attention, relax, and get rid of tension. · Guided imagery. This is a series of thoughts and images that can focus your attention away from your pain. · Hypnosis. It's a state of focused concentration that makes you less aware of your surroundings. · Cognitive behavioral therapy. This type of counseling helps you change your thought patterns. · Yoga. Stretching and exercises can reduce stress and improve flexibility. If what you're doing to control your pain isn't working, or if you're feeling depressed, talk to your doctor. He or she can help you change your pain management plan and find resources for emotional support. Where can you learn more? Go to [email or web address] and sign in to your Program[employer] account. Enter P119 in the BluelightApp box to learn more about \"Learning About Managing Chronic Pain. \"     If you do not have an account, please click on the \"Sign Up Now\" link. Current as of: December 11, 2017  Content Version: 11.8  © 9753-3277 Healthwise, Incorporated. Care instructions adapted under license by [facility] ([facility]). If you have questions about a medical condition or this instruction, always ask your healthcare professional. [address] any warranty or liability for your use of this information.

## 2018-12-07 ENCOUNTER — OFFICE VISIT (OUTPATIENT)
Dept: FAMILY MEDICINE CLINIC | Age: 53
End: 2018-12-07
Payer: MEDICARE

## 2018-12-07 VITALS
HEART RATE: 68 BPM | SYSTOLIC BLOOD PRESSURE: 136 MMHG | BODY MASS INDEX: 40.28 KG/M2 | OXYGEN SATURATION: 98 % | DIASTOLIC BLOOD PRESSURE: 88 MMHG | TEMPERATURE: 97.5 F | WEIGHT: 297 LBS

## 2018-12-07 DIAGNOSIS — N30.00 ACUTE CYSTITIS WITHOUT HEMATURIA: Primary | ICD-10-CM

## 2018-12-07 LAB
BACTERIA URINE, POC: 1
BILIRUBIN URINE: 0 MG/DL
BLOOD, URINE: POSITIVE
CASTS URINE, POC: 0
CLARITY: CLEAR
COLOR: YELLOW
CRYSTALS URINE, POC: 0
EPI CELLS URINE, POC: 0
GLUCOSE URINE: POSITIVE
KETONES, URINE: POSITIVE
LEUKOCYTE EST, POC: NEGATIVE
NITRITE, URINE: NEGATIVE
PH UA: 6 (ref 4.5–8)
PROTEIN UA: NEGATIVE
RBC URINE, POC: NORMAL
SPECIFIC GRAVITY UA: 1.02 (ref 1–1.03)
UROBILINOGEN, URINE: NORMAL
WBC URINE, POC: 0
YEAST URINE, POC: 0

## 2018-12-07 PROCEDURE — 99213 OFFICE O/P EST LOW 20 MIN: CPT | Performed by: FAMILY MEDICINE

## 2018-12-07 PROCEDURE — 81000 URINALYSIS NONAUTO W/SCOPE: CPT | Performed by: FAMILY MEDICINE

## 2018-12-07 RX ORDER — SULFAMETHOXAZOLE AND TRIMETHOPRIM 800; 160 MG/1; MG/1
1 TABLET ORAL 2 TIMES DAILY
Qty: 28 TABLET | Refills: 0 | Status: SHIPPED | OUTPATIENT
Start: 2018-12-07 | End: 2018-12-21

## 2018-12-07 NOTE — PROGRESS NOTES
Subjective:      Patient ID: Sammi Vigil is a 48 y.o. male with PMHx significant for frequent kidney stones. HPI Patient presents today for urinary frequency, urgency, dysuria, and suprapubic pain x 8 days. Pt describes dysuria as \"sharp\" and 7/10 in severity, noting he has a high pain tolerance. He reports suprapubic pain occurs frequently, especially prior to urinating and is 7/10 in severity. He is urinating a couple times per hour. Notes some generalized testicular and penile during urination. No discharge or blood. States this feels unlike any previous kidney stones. He takes Percocet 7.5-325 mg every 4-6 hours regularly for his pancreatitis and is taking ibuprofen 800mg every 6 hours for tooth pain. Neither have improved urinary pain. Pt currently being tx for L upper molar tooth abscess x 8 days. He is taking Clindamycin 300mg BID and is scheduled to have tooth pulled in 1 week. Denies sexual activity for some time    Review of Systems    Objective:   Physical Exam   Body mass index is 40.28 kg/m². Vitals:    12/07/18 1214   BP: 136/88   Site: Left Upper Arm   Position: Sitting   Cuff Size: Large Adult   Pulse: 68   Temp: 97.5 °F (36.4 °C)   TempSrc: Tympanic   SpO2: 98%   Weight: 297 lb (134.7 kg)     Wt Readings from Last 3 Encounters:   12/07/18 297 lb (134.7 kg)   12/03/18 299 lb (135.6 kg)   11/30/18 296 lb (134.3 kg)     PHQ score: PHQ-9 Total Score: 6 (10/8/2018  2:54 PM)    GENERAL:Alert and oriented x 4 NAD, no acute distress and obese, well hydrated, well developed. ABD: normal BS, soft, NT, ND, no masses, no R/G, no organomegaly  : Skin warm and dry. Penile shaft without sores, redness, or swelling, no urethral discharge or redness, redundant foreskin, no erythema, edema, or yeast under foreskin. Left testicle mildly tender to palpation near epididymis, R testicle non-tender.  No masses or swelling note on testicles or scrotum    Assessment/plan:      Thierno Jacobson was seen today

## 2018-12-07 NOTE — PROGRESS NOTES
Subjective:      Patient ID: Teto Gr is a 48 y.o. male. HPI Patient presents today for urinary frequency, burning with urination and painful urination. Denies blood in urine, N/V. Patient is currently on an ATB for an abscessed tooth.      Review of Systems    Objective:   Physical Exam    Assessment:      ***      Plan:      ***        Danis Juárez LPN

## 2018-12-09 LAB — URINE CULTURE, ROUTINE: NORMAL

## 2018-12-10 ENCOUNTER — HOSPITAL ENCOUNTER (OUTPATIENT)
Age: 53
Discharge: HOME | End: 2018-12-10
Payer: MEDICARE

## 2018-12-10 DIAGNOSIS — Z79.4 TYPE 2 DIABETES MELLITUS WITH DIABETIC NEPHROPATHY, WITH LONG-TERM CURRENT USE OF INSULIN (HCC): ICD-10-CM

## 2018-12-10 DIAGNOSIS — E11.21 TYPE 2 DIABETES MELLITUS WITH DIABETIC NEPHROPATHY, WITH LONG-TERM CURRENT USE OF INSULIN (HCC): ICD-10-CM

## 2018-12-10 LAB
ANION GAP SERPL CALCULATED.3IONS-SCNC: 20 MMOL/L (ref 3–16)
BUN BLDV-MCNC: 17 MG/DL (ref 7–20)
CALCIUM SERPL-MCNC: 9.9 MG/DL (ref 8.3–10.6)
CHLORIDE BLD-SCNC: 99 MMOL/L (ref 99–110)
CO2: 22 MMOL/L (ref 21–32)
CREAT SERPL-MCNC: 0.9 MG/DL (ref 0.9–1.3)
GFR AFRICAN AMERICAN: >60
GFR NON-AFRICAN AMERICAN: >60
GLUCOSE BLD-MCNC: 110 MG/DL (ref 70–99)
POTASSIUM SERPL-SCNC: 3.7 MMOL/L (ref 3.5–5.1)
SODIUM BLD-SCNC: 141 MMOL/L (ref 136–145)

## 2018-12-10 PROCEDURE — 36415 COLL VENOUS BLD VENIPUNCTURE: CPT

## 2018-12-10 PROCEDURE — 80048 BASIC METABOLIC PNL TOTAL CA: CPT

## 2018-12-10 PROCEDURE — 83036 HEMOGLOBIN GLYCOSYLATED A1C: CPT

## 2018-12-11 LAB
ESTIMATED AVERAGE GLUCOSE: 159.9 MG/DL
HBA1C MFR BLD: 7.2 %

## 2018-12-16 ENCOUNTER — HOSPITAL ENCOUNTER (EMERGENCY)
Age: 53
Discharge: HOME | End: 2018-12-16
Payer: MEDICARE

## 2018-12-16 ENCOUNTER — APPOINTMENT (OUTPATIENT)
Dept: CT IMAGING | Age: 53
End: 2018-12-16
Payer: MEDICARE

## 2018-12-16 VITALS
TEMPERATURE: 98.6 F | RESPIRATION RATE: 14 BRPM | SYSTOLIC BLOOD PRESSURE: 118 MMHG | BODY MASS INDEX: 39.33 KG/M2 | HEART RATE: 70 BPM | DIASTOLIC BLOOD PRESSURE: 74 MMHG | WEIGHT: 290 LBS | OXYGEN SATURATION: 94 %

## 2018-12-16 DIAGNOSIS — N28.82 DILATATION OF LEFT URETER: ICD-10-CM

## 2018-12-16 DIAGNOSIS — R10.9 ACUTE LEFT FLANK PAIN: Primary | ICD-10-CM

## 2018-12-16 DIAGNOSIS — Z87.442 HISTORY OF KIDNEY STONES: ICD-10-CM

## 2018-12-16 LAB
ANION GAP SERPL CALCULATED.3IONS-SCNC: 14 MMOL/L (ref 3–16)
BASOPHILS ABSOLUTE: 0 K/UL (ref 0–0.2)
BASOPHILS RELATIVE PERCENT: 0.3 %
BILIRUBIN URINE: NEGATIVE
BLOOD, URINE: NEGATIVE
BUN BLDV-MCNC: 14 MG/DL (ref 7–20)
CALCIUM SERPL-MCNC: 8.7 MG/DL (ref 8.3–10.6)
CHLORIDE BLD-SCNC: 104 MMOL/L (ref 99–110)
CLARITY: CLEAR
CO2: 22 MMOL/L (ref 21–32)
COLOR: YELLOW
CREAT SERPL-MCNC: 0.8 MG/DL (ref 0.9–1.3)
EOSINOPHILS ABSOLUTE: 0.3 K/UL (ref 0–0.6)
EOSINOPHILS RELATIVE PERCENT: 4.1 %
GFR AFRICAN AMERICAN: >60
GFR NON-AFRICAN AMERICAN: >60
GLUCOSE BLD-MCNC: 97 MG/DL (ref 70–99)
GLUCOSE URINE: >=1000 MG/DL
HCT VFR BLD CALC: 47.3 % (ref 40.5–52.5)
HEMOGLOBIN: 15.5 G/DL (ref 13.5–17.5)
KETONES, URINE: NEGATIVE MG/DL
LEUKOCYTE ESTERASE, URINE: NEGATIVE
LYMPHOCYTES ABSOLUTE: 3.2 K/UL (ref 1–5.1)
LYMPHOCYTES RELATIVE PERCENT: 44.9 %
MCH RBC QN AUTO: 27.5 PG (ref 26–34)
MCHC RBC AUTO-ENTMCNC: 32.8 G/DL (ref 31–36)
MCV RBC AUTO: 83.9 FL (ref 80–100)
MICROSCOPIC EXAMINATION: ABNORMAL
MONOCYTES ABSOLUTE: 0.7 K/UL (ref 0–1.3)
MONOCYTES RELATIVE PERCENT: 10.6 %
NEUTROPHILS ABSOLUTE: 2.8 K/UL (ref 1.7–7.7)
NEUTROPHILS RELATIVE PERCENT: 40.1 %
NITRITE, URINE: NEGATIVE
PDW BLD-RTO: 14.1 % (ref 12.4–15.4)
PH UA: 6
PLATELET # BLD: 200 K/UL (ref 135–450)
PMV BLD AUTO: 10.2 FL (ref 5–10.5)
POTASSIUM SERPL-SCNC: 3.7 MMOL/L (ref 3.5–5.1)
PROTEIN UA: NEGATIVE MG/DL
RBC # BLD: 5.64 M/UL (ref 4.2–5.9)
SODIUM BLD-SCNC: 140 MMOL/L (ref 136–145)
SPECIFIC GRAVITY UA: >1.03
URINE REFLEX TO CULTURE: ABNORMAL
URINE TYPE: ABNORMAL
UROBILINOGEN, URINE: 0.2 E.U./DL
WBC # BLD: 7 K/UL (ref 4–11)

## 2018-12-16 PROCEDURE — 85025 COMPLETE CBC W/AUTO DIFF WBC: CPT

## 2018-12-16 PROCEDURE — 2580000003 HC RX 258: Performed by: PHYSICIAN ASSISTANT

## 2018-12-16 PROCEDURE — 96374 THER/PROPH/DIAG INJ IV PUSH: CPT

## 2018-12-16 PROCEDURE — 80048 BASIC METABOLIC PNL TOTAL CA: CPT

## 2018-12-16 PROCEDURE — 96375 TX/PRO/DX INJ NEW DRUG ADDON: CPT

## 2018-12-16 PROCEDURE — 6360000002 HC RX W HCPCS: Performed by: PHYSICIAN ASSISTANT

## 2018-12-16 PROCEDURE — 81003 URINALYSIS AUTO W/O SCOPE: CPT

## 2018-12-16 PROCEDURE — 74176 CT ABD & PELVIS W/O CONTRAST: CPT

## 2018-12-16 PROCEDURE — 96361 HYDRATE IV INFUSION ADD-ON: CPT

## 2018-12-16 PROCEDURE — 99284 EMERGENCY DEPT VISIT MOD MDM: CPT

## 2018-12-16 RX ORDER — ONDANSETRON 2 MG/ML
4 INJECTION INTRAMUSCULAR; INTRAVENOUS EVERY 30 MIN PRN
Status: DISCONTINUED | OUTPATIENT
Start: 2018-12-16 | End: 2018-12-16 | Stop reason: HOSPADM

## 2018-12-16 RX ORDER — ONDANSETRON 4 MG/1
4-8 TABLET, ORALLY DISINTEGRATING ORAL EVERY 8 HOURS PRN
Qty: 20 TABLET | Refills: 0 | Status: SHIPPED | OUTPATIENT
Start: 2018-12-16 | End: 2020-01-14

## 2018-12-16 RX ORDER — KETOROLAC TROMETHAMINE 30 MG/ML
15 INJECTION, SOLUTION INTRAMUSCULAR; INTRAVENOUS ONCE
Status: COMPLETED | OUTPATIENT
Start: 2018-12-16 | End: 2018-12-16

## 2018-12-16 RX ORDER — ACETAMINOPHEN 500 MG
1000 TABLET ORAL EVERY 6 HOURS PRN
Qty: 30 TABLET | Refills: 0 | Status: SHIPPED | OUTPATIENT
Start: 2018-12-16 | End: 2019-05-02 | Stop reason: CLARIF

## 2018-12-16 RX ORDER — NAPROXEN 500 MG/1
500 TABLET ORAL 2 TIMES DAILY WITH MEALS
Qty: 20 TABLET | Refills: 0 | Status: SHIPPED | OUTPATIENT
Start: 2018-12-16 | End: 2019-03-13

## 2018-12-16 RX ORDER — TAMSULOSIN HYDROCHLORIDE 0.4 MG/1
0.4 CAPSULE ORAL DAILY
Qty: 5 CAPSULE | Refills: 0 | Status: SHIPPED | OUTPATIENT
Start: 2018-12-16 | End: 2019-03-13

## 2018-12-16 RX ORDER — SODIUM CHLORIDE, SODIUM LACTATE, POTASSIUM CHLORIDE, CALCIUM CHLORIDE 600; 310; 30; 20 MG/100ML; MG/100ML; MG/100ML; MG/100ML
1000 INJECTION, SOLUTION INTRAVENOUS ONCE
Status: COMPLETED | OUTPATIENT
Start: 2018-12-16 | End: 2018-12-16

## 2018-12-16 RX ADMIN — SODIUM CHLORIDE, POTASSIUM CHLORIDE, SODIUM LACTATE AND CALCIUM CHLORIDE 1000 ML: 600; 310; 30; 20 INJECTION, SOLUTION INTRAVENOUS at 07:52

## 2018-12-16 RX ADMIN — ONDANSETRON 4 MG: 2 INJECTION INTRAMUSCULAR; INTRAVENOUS at 07:52

## 2018-12-16 RX ADMIN — HYDROMORPHONE HYDROCHLORIDE 1 MG: 1 INJECTION, SOLUTION INTRAMUSCULAR; INTRAVENOUS; SUBCUTANEOUS at 07:52

## 2018-12-16 RX ADMIN — KETOROLAC TROMETHAMINE 15 MG: 30 INJECTION, SOLUTION INTRAMUSCULAR at 07:53

## 2018-12-16 ASSESSMENT — PAIN DESCRIPTION - PAIN TYPE: TYPE: ACUTE PAIN

## 2018-12-16 ASSESSMENT — PAIN SCALES - GENERAL
PAINLEVEL_OUTOF10: 8
PAINLEVEL_OUTOF10: 8

## 2018-12-16 NOTE — ED PROVIDER NOTES
CURRENT MEDICATIONS  (may include discharge medications prescribed in the ED)  Current Outpatient Rx   Medication Sig Dispense Refill    sulfamethoxazole-trimethoprim (BACTRIM DS;SEPTRA DS) 800-160 MG per tablet Take 1 tablet by mouth 2 times daily for 14 days Drink lots of fluids 28 tablet 0    pregabalin (LYRICA) 100 MG capsule Take 1 capsule by mouth 2 times daily for 30 days. . 60 capsule 0    traZODone (DESYREL) 50 MG tablet Take 1 tablet by mouth nightly as needed for Sleep (and chronic pain) 30 tablet 0    oxyCODONE-acetaminophen (PERCOCET)  MG per tablet Take 1 tablet by mouth every 8 hours as needed for Pain for up to 30 days. Intended supply: 30 days.  Earliest Fill Date: 12/3/18 90 tablet 0    ondansetron (ZOFRAN) 4 MG tablet Take 1 tablet by mouth every 8 hours as needed for Nausea or Vomiting 30 tablet 5    ibuprofen (ADVIL;MOTRIN) 800 MG tablet Take 1 tablet by mouth 3 times daily (with meals) 90 tablet 1    insulin detemir (LEVEMIR FLEXTOUCH) 100 UNIT/ML injection pen Inject 20 Units into the skin nightly 15 mL 12    B-D UF III MINI PEN NEEDLES 31G X 5 MM MISC USE ONCE DAILY AS DIRECTED 100 each 3    JARDIANCE 10 MG tablet TAKE 1 TABLET BY MOUTH DAILY 90 tablet 0    nystatin (MYCOSTATIN) 916400 UNIT/GM cream APPLY TOPICALLY TWICE DAILY TO FOUR TIMES DAILY 60 g 0    glimepiride (AMARYL) 4 MG tablet TAKE 1 TABLET BY MOUTH EVERY MORNING 90 tablet 0    metFORMIN (GLUCOPHAGE-XR) 500 MG extended release tablet TAKE 2 TABLETS BY MOUTH TWICE DAILY 360 tablet 0    simvastatin (ZOCOR) 40 MG tablet TAKE 1 TABLET BY MOUTH NIGHTLY 90 tablet 3    losartan-hydrochlorothiazide (HYZAAR) 100-25 MG per tablet TAKE 1 TABLET BY MOUTH DAILY 90 tablet 0    clobetasol (OLUX) 0.05 % foam APPLY EXTERNALLY TO THE SCALP TWICE DAILY AS NEEDED 50 g 3    ONE TOUCH ULTRA TEST strip USE TO TEST THREE TIMES DAILY AS NEEDED 300 strip 1    fluticasone (FLONASE) 50 MCG/ACT nasal spray SHAKE LIQUID AND USE 2

## 2018-12-26 RX ORDER — LOSARTAN POTASSIUM AND HYDROCHLOROTHIAZIDE 25; 100 MG/1; MG/1
TABLET ORAL
Qty: 90 TABLET | Refills: 2 | Status: SHIPPED | OUTPATIENT
Start: 2018-12-26 | End: 2019-09-04 | Stop reason: SDUPTHER

## 2018-12-28 RX ORDER — ESOMEPRAZOLE MAGNESIUM 40 MG/1
CAPSULE, DELAYED RELEASE ORAL
Qty: 90 CAPSULE | Refills: 3 | Status: SHIPPED | OUTPATIENT
Start: 2018-12-28 | End: 2020-04-07

## 2019-01-03 ENCOUNTER — OFFICE VISIT (OUTPATIENT)
Dept: PAIN MANAGEMENT | Age: 54
End: 2019-01-03
Payer: MEDICARE

## 2019-01-03 VITALS
WEIGHT: 297 LBS | DIASTOLIC BLOOD PRESSURE: 86 MMHG | SYSTOLIC BLOOD PRESSURE: 128 MMHG | HEIGHT: 72 IN | BODY MASS INDEX: 40.23 KG/M2

## 2019-01-03 DIAGNOSIS — F11.90 CHRONIC, CONTINUOUS USE OF OPIOIDS: ICD-10-CM

## 2019-01-03 DIAGNOSIS — G89.29 CHRONIC GENERALIZED ABDOMINAL PAIN: Primary | ICD-10-CM

## 2019-01-03 DIAGNOSIS — Z87.19 HX OF CHRONIC PANCREATITIS: ICD-10-CM

## 2019-01-03 DIAGNOSIS — R10.84 CHRONIC GENERALIZED ABDOMINAL PAIN: Primary | ICD-10-CM

## 2019-01-03 DIAGNOSIS — G89.4 CHRONIC PAIN SYNDROME: ICD-10-CM

## 2019-01-03 PROCEDURE — 99214 OFFICE O/P EST MOD 30 MIN: CPT | Performed by: ANESTHESIOLOGY

## 2019-01-03 RX ORDER — TIZANIDINE 4 MG/1
4 TABLET ORAL NIGHTLY PRN
Qty: 30 TABLET | Refills: 0 | Status: SHIPPED | OUTPATIENT
Start: 2019-01-03 | End: 2019-02-07

## 2019-01-03 RX ORDER — OXYCODONE AND ACETAMINOPHEN 10; 325 MG/1; MG/1
1 TABLET ORAL EVERY 8 HOURS PRN
Qty: 90 TABLET | Refills: 0 | Status: SHIPPED | OUTPATIENT
Start: 2019-01-10 | End: 2019-02-07 | Stop reason: SDUPTHER

## 2019-01-03 RX ORDER — PREGABALIN 100 MG/1
100 CAPSULE ORAL 2 TIMES DAILY
Qty: 60 CAPSULE | Refills: 1 | Status: SHIPPED | OUTPATIENT
Start: 2019-01-03 | End: 2019-03-13

## 2019-01-03 ASSESSMENT — ENCOUNTER SYMPTOMS
ABDOMINAL PAIN: 1
EYE PAIN: 0
BACK PAIN: 0
EYE REDNESS: 0
SHORTNESS OF BREATH: 0
EYE DISCHARGE: 0
VOMITING: 0
CONSTIPATION: 0
NAUSEA: 0
WHEEZING: 0
COUGH: 0

## 2019-01-23 RX ORDER — METFORMIN HYDROCHLORIDE 500 MG/1
TABLET, EXTENDED RELEASE ORAL
Qty: 360 TABLET | Refills: 0 | Status: SHIPPED | OUTPATIENT
Start: 2019-01-23 | End: 2019-04-25 | Stop reason: SDUPTHER

## 2019-01-29 DIAGNOSIS — F41.9 ANXIETY: ICD-10-CM

## 2019-01-31 ENCOUNTER — TELEPHONE (OUTPATIENT)
Dept: FAMILY MEDICINE CLINIC | Age: 54
End: 2019-01-31

## 2019-01-31 RX ORDER — DIAZEPAM 5 MG/1
TABLET ORAL
Qty: 30 TABLET | Refills: 0 | Status: SHIPPED | OUTPATIENT
Start: 2019-01-31 | End: 2019-09-23 | Stop reason: SDUPTHER

## 2019-01-31 RX ORDER — GLIMEPIRIDE 4 MG/1
TABLET ORAL
Qty: 90 TABLET | Refills: 0 | Status: SHIPPED | OUTPATIENT
Start: 2019-01-31 | End: 2019-04-25 | Stop reason: SDUPTHER

## 2019-02-07 ENCOUNTER — OFFICE VISIT (OUTPATIENT)
Dept: PAIN MANAGEMENT | Age: 54
End: 2019-02-07
Payer: MEDICARE

## 2019-02-07 VITALS
HEART RATE: 102 BPM | DIASTOLIC BLOOD PRESSURE: 84 MMHG | HEIGHT: 72 IN | BODY MASS INDEX: 40.09 KG/M2 | SYSTOLIC BLOOD PRESSURE: 148 MMHG | WEIGHT: 296 LBS

## 2019-02-07 DIAGNOSIS — K86.1 OTHER CHRONIC PANCREATITIS (HCC): ICD-10-CM

## 2019-02-07 DIAGNOSIS — G89.29 CHRONIC GENERALIZED ABDOMINAL PAIN: Primary | ICD-10-CM

## 2019-02-07 DIAGNOSIS — G89.4 CHRONIC PAIN SYNDROME: ICD-10-CM

## 2019-02-07 DIAGNOSIS — N20.0 KIDNEY CALCULI: ICD-10-CM

## 2019-02-07 DIAGNOSIS — R10.84 CHRONIC GENERALIZED ABDOMINAL PAIN: Primary | ICD-10-CM

## 2019-02-07 DIAGNOSIS — F11.90 CHRONIC, CONTINUOUS USE OF OPIOIDS: ICD-10-CM

## 2019-02-07 PROCEDURE — 99214 OFFICE O/P EST MOD 30 MIN: CPT | Performed by: ANESTHESIOLOGY

## 2019-02-07 RX ORDER — VENLAFAXINE HYDROCHLORIDE 37.5 MG/1
37.5 CAPSULE, EXTENDED RELEASE ORAL NIGHTLY
Qty: 30 CAPSULE | Refills: 0 | Status: SHIPPED | OUTPATIENT
Start: 2019-02-07 | End: 2019-03-07 | Stop reason: ALTCHOICE

## 2019-02-07 RX ORDER — OXYCODONE AND ACETAMINOPHEN 10; 325 MG/1; MG/1
1 TABLET ORAL EVERY 8 HOURS PRN
Qty: 90 TABLET | Refills: 0 | Status: SHIPPED | OUTPATIENT
Start: 2019-02-07 | End: 2019-03-07 | Stop reason: SDUPTHER

## 2019-02-07 ASSESSMENT — ENCOUNTER SYMPTOMS
BACK PAIN: 0
EYE REDNESS: 0
EYE DISCHARGE: 0
CONSTIPATION: 0
SHORTNESS OF BREATH: 0
NAUSEA: 0
WHEEZING: 0
ABDOMINAL PAIN: 1
VOMITING: 0
COUGH: 0
EYE PAIN: 0

## 2019-02-20 RX ORDER — EMPAGLIFLOZIN 10 MG/1
10 TABLET, FILM COATED ORAL DAILY
Qty: 90 TABLET | Refills: 3 | Status: SHIPPED | OUTPATIENT
Start: 2019-02-20 | End: 2020-01-14 | Stop reason: DRUGHIGH

## 2019-02-20 RX ORDER — IBUPROFEN 800 MG/1
TABLET ORAL
Qty: 90 TABLET | Refills: 0 | Status: SHIPPED | OUTPATIENT
Start: 2019-02-20 | End: 2019-05-13 | Stop reason: SDUPTHER

## 2019-03-07 ENCOUNTER — OFFICE VISIT (OUTPATIENT)
Dept: PAIN MANAGEMENT | Age: 54
End: 2019-03-07
Payer: MEDICARE

## 2019-03-07 VITALS
SYSTOLIC BLOOD PRESSURE: 138 MMHG | WEIGHT: 299 LBS | HEART RATE: 81 BPM | HEIGHT: 72 IN | DIASTOLIC BLOOD PRESSURE: 85 MMHG | BODY MASS INDEX: 40.5 KG/M2

## 2019-03-07 DIAGNOSIS — R10.84 CHRONIC GENERALIZED ABDOMINAL PAIN: Primary | ICD-10-CM

## 2019-03-07 DIAGNOSIS — K86.1 OTHER CHRONIC PANCREATITIS (HCC): ICD-10-CM

## 2019-03-07 DIAGNOSIS — G89.4 CHRONIC PAIN SYNDROME: ICD-10-CM

## 2019-03-07 DIAGNOSIS — N20.0 PAIN DUE TO CALCULUS OF KIDNEY: ICD-10-CM

## 2019-03-07 DIAGNOSIS — G89.29 CHRONIC GENERALIZED ABDOMINAL PAIN: Primary | ICD-10-CM

## 2019-03-07 DIAGNOSIS — Z02.89 PAIN MEDICATION AGREEMENT: ICD-10-CM

## 2019-03-07 DIAGNOSIS — F11.90 CHRONIC, CONTINUOUS USE OF OPIOIDS: ICD-10-CM

## 2019-03-07 PROCEDURE — 99214 OFFICE O/P EST MOD 30 MIN: CPT | Performed by: ANESTHESIOLOGY

## 2019-03-07 RX ORDER — OXYCODONE AND ACETAMINOPHEN 10; 325 MG/1; MG/1
1 TABLET ORAL EVERY 8 HOURS PRN
Qty: 90 TABLET | Refills: 0 | Status: SHIPPED | OUTPATIENT
Start: 2019-03-07 | End: 2019-04-04 | Stop reason: SDUPTHER

## 2019-03-07 RX ORDER — VENLAFAXINE HYDROCHLORIDE 75 MG/1
75 CAPSULE, EXTENDED RELEASE ORAL DAILY
Qty: 30 CAPSULE | Refills: 0 | Status: SHIPPED | OUTPATIENT
Start: 2019-03-07 | End: 2019-04-04 | Stop reason: SDUPTHER

## 2019-03-07 ASSESSMENT — ENCOUNTER SYMPTOMS
EYE PAIN: 0
ABDOMINAL PAIN: 1
COUGH: 0
EYE DISCHARGE: 0
VOMITING: 0
CONSTIPATION: 0
BACK PAIN: 0
WHEEZING: 0
NAUSEA: 0
SHORTNESS OF BREATH: 0
EYE REDNESS: 0

## 2019-03-11 LAB
6-ACETYLMORPHINE: NOT DETECTED
7-AMINOCLONAZEPAM: NOT DETECTED
ALPHA-OH-ALPRAZOLAM: NOT DETECTED
ALPRAZOLAM: NOT DETECTED
AMPHETAMINE: NOT DETECTED
BARBITURATES: NOT DETECTED
BENZOYLECGONINE: NOT DETECTED
BUPRENORPHINE: NOT DETECTED
CARISOPRODOL: NOT DETECTED
CLONAZEPAM: NOT DETECTED
CODEINE: NOT DETECTED
CREATININE URINE: 35.7 MG/DL (ref 20–400)
DIAZEPAM: NOT DETECTED
DRUGS EXPECTED: NORMAL
EER PAIN MGT DRUG PANEL, HIGH RES/EMIT U: NORMAL
ETHYL GLUCURONIDE: NOT DETECTED
FENTANYL: NOT DETECTED
HYDROCODONE: NOT DETECTED
HYDROMORPHONE: NOT DETECTED
LORAZEPAM: NOT DETECTED
MARIJUANA METABOLITE: NOT DETECTED
MDA: NOT DETECTED
MDEA: NOT DETECTED
MDMA URINE: NOT DETECTED
MEPERIDINE: NOT DETECTED
METHADONE: NOT DETECTED
METHAMPHETAMINE: NOT DETECTED
METHYLPHENIDATE: NOT DETECTED
MIDAZOLAM: NOT DETECTED
MORPHINE: NOT DETECTED
NORBUPRENORPHINE, FREE: NOT DETECTED
NORDIAZEPAM: NOT DETECTED
NORFENTANYL: NOT DETECTED
NORHYDROCODONE, URINE: NOT DETECTED
NOROXYCODONE: PRESENT
NOROXYMORPHONE, URINE: NOT DETECTED
OXAZEPAM: PRESENT
OXYCODONE: PRESENT
OXYMORPHONE: NOT DETECTED
PAIN MANAGEMENT DRUG PANEL: NORMAL
PAIN MANAGEMENT DRUG PANEL: NORMAL
PCP: NOT DETECTED
PHENTERMINE: NOT DETECTED
PROPOXYPHENE: NOT DETECTED
TAPENTADOL, URINE: NOT DETECTED
TAPENTADOL-O-SULFATE, URINE: NOT DETECTED
TEMAZEPAM: NOT DETECTED
TRAMADOL: NOT DETECTED
ZOLPIDEM: NOT DETECTED

## 2019-03-13 ENCOUNTER — OFFICE VISIT (OUTPATIENT)
Dept: FAMILY MEDICINE CLINIC | Age: 54
End: 2019-03-13
Payer: MEDICARE

## 2019-03-13 ENCOUNTER — HOSPITAL ENCOUNTER (OUTPATIENT)
Age: 54
Discharge: HOME | End: 2019-03-13
Payer: MEDICARE

## 2019-03-13 VITALS
DIASTOLIC BLOOD PRESSURE: 76 MMHG | OXYGEN SATURATION: 97 % | BODY MASS INDEX: 40.55 KG/M2 | WEIGHT: 299 LBS | SYSTOLIC BLOOD PRESSURE: 122 MMHG | HEART RATE: 75 BPM

## 2019-03-13 DIAGNOSIS — I10 ESSENTIAL HYPERTENSION: ICD-10-CM

## 2019-03-13 DIAGNOSIS — E11.21 TYPE 2 DIABETES MELLITUS WITH DIABETIC NEPHROPATHY, WITH LONG-TERM CURRENT USE OF INSULIN (HCC): ICD-10-CM

## 2019-03-13 DIAGNOSIS — E78.2 MIXED HYPERLIPIDEMIA: ICD-10-CM

## 2019-03-13 DIAGNOSIS — H81.09 MENIERE'S DISEASE, UNSPECIFIED LATERALITY: ICD-10-CM

## 2019-03-13 DIAGNOSIS — E11.21 TYPE 2 DIABETES MELLITUS WITH DIABETIC NEPHROPATHY, WITH LONG-TERM CURRENT USE OF INSULIN (HCC): Primary | ICD-10-CM

## 2019-03-13 DIAGNOSIS — G89.29 CHRONIC GENERALIZED ABDOMINAL PAIN: ICD-10-CM

## 2019-03-13 DIAGNOSIS — Z79.4 TYPE 2 DIABETES MELLITUS WITH DIABETIC NEPHROPATHY, WITH LONG-TERM CURRENT USE OF INSULIN (HCC): ICD-10-CM

## 2019-03-13 DIAGNOSIS — R10.84 CHRONIC GENERALIZED ABDOMINAL PAIN: ICD-10-CM

## 2019-03-13 DIAGNOSIS — G89.4 CHRONIC PAIN SYNDROME: ICD-10-CM

## 2019-03-13 DIAGNOSIS — K86.1 OTHER CHRONIC PANCREATITIS (HCC): ICD-10-CM

## 2019-03-13 DIAGNOSIS — Z79.4 TYPE 2 DIABETES MELLITUS WITH DIABETIC NEPHROPATHY, WITH LONG-TERM CURRENT USE OF INSULIN (HCC): Primary | ICD-10-CM

## 2019-03-13 LAB
A/G RATIO: 1.1 (ref 1.1–2.2)
ALBUMIN SERPL-MCNC: 4.1 G/DL (ref 3.4–5)
ALP BLD-CCNC: 88 U/L (ref 40–129)
ALT SERPL-CCNC: 45 U/L (ref 10–40)
ANION GAP SERPL CALCULATED.3IONS-SCNC: 15 MMOL/L (ref 3–16)
AST SERPL-CCNC: 22 U/L (ref 15–37)
BILIRUB SERPL-MCNC: 0.3 MG/DL (ref 0–1)
BUN BLDV-MCNC: 16 MG/DL (ref 7–20)
CALCIUM SERPL-MCNC: 9.3 MG/DL (ref 8.3–10.6)
CHLORIDE BLD-SCNC: 103 MMOL/L (ref 99–110)
CHOLESTEROL, TOTAL: 158 MG/DL (ref 0–199)
CO2: 23 MMOL/L (ref 21–32)
CREAT SERPL-MCNC: 0.6 MG/DL (ref 0.9–1.3)
CREATININE URINE: 95.7 MG/DL (ref 39–259)
GFR AFRICAN AMERICAN: >60
GFR NON-AFRICAN AMERICAN: >60
GLOBULIN: 3.8 G/DL
GLUCOSE BLD-MCNC: 157 MG/DL (ref 70–99)
HDLC SERPL-MCNC: 31 MG/DL (ref 40–60)
LDL CHOLESTEROL CALCULATED: 78 MG/DL
MICROALBUMIN UR-MCNC: <1.2 MG/DL
MICROALBUMIN/CREAT UR-RTO: NORMAL MG/G (ref 0–30)
POTASSIUM SERPL-SCNC: 3.8 MMOL/L (ref 3.5–5.1)
SODIUM BLD-SCNC: 141 MMOL/L (ref 136–145)
TOTAL PROTEIN: 7.9 G/DL (ref 6.4–8.2)
TRIGL SERPL-MCNC: 244 MG/DL (ref 0–150)
VLDLC SERPL CALC-MCNC: 49 MG/DL

## 2019-03-13 PROCEDURE — 80053 COMPREHEN METABOLIC PANEL: CPT

## 2019-03-13 PROCEDURE — 82043 UR ALBUMIN QUANTITATIVE: CPT

## 2019-03-13 PROCEDURE — 83036 HEMOGLOBIN GLYCOSYLATED A1C: CPT

## 2019-03-13 PROCEDURE — 36415 COLL VENOUS BLD VENIPUNCTURE: CPT

## 2019-03-13 PROCEDURE — 82570 ASSAY OF URINE CREATININE: CPT

## 2019-03-13 PROCEDURE — 99214 OFFICE O/P EST MOD 30 MIN: CPT | Performed by: FAMILY MEDICINE

## 2019-03-13 PROCEDURE — 80061 LIPID PANEL: CPT

## 2019-03-13 RX ORDER — MONTELUKAST SODIUM 10 MG/1
TABLET ORAL
Qty: 90 TABLET | Refills: 3 | Status: SHIPPED | OUTPATIENT
Start: 2019-03-13 | End: 2021-06-28

## 2019-03-13 RX ORDER — VENLAFAXINE HYDROCHLORIDE 75 MG/1
75 CAPSULE, EXTENDED RELEASE ORAL DAILY
Qty: 30 CAPSULE | Refills: 0 | Status: CANCELLED | OUTPATIENT
Start: 2019-03-13

## 2019-03-14 LAB
ESTIMATED AVERAGE GLUCOSE: 171.4 MG/DL
HBA1C MFR BLD: 7.6 %

## 2019-04-04 ENCOUNTER — OFFICE VISIT (OUTPATIENT)
Dept: PAIN MANAGEMENT | Age: 54
End: 2019-04-04
Payer: MEDICARE

## 2019-04-04 VITALS
HEIGHT: 72 IN | HEART RATE: 85 BPM | DIASTOLIC BLOOD PRESSURE: 84 MMHG | WEIGHT: 295 LBS | SYSTOLIC BLOOD PRESSURE: 135 MMHG | BODY MASS INDEX: 39.96 KG/M2

## 2019-04-04 DIAGNOSIS — G89.4 CHRONIC PAIN SYNDROME: ICD-10-CM

## 2019-04-04 DIAGNOSIS — K86.1 OTHER CHRONIC PANCREATITIS (HCC): ICD-10-CM

## 2019-04-04 DIAGNOSIS — F45.42 PAIN DISORDER WITH PSYCHOLOGICAL FACTORS: ICD-10-CM

## 2019-04-04 DIAGNOSIS — G89.29 CHRONIC GENERALIZED ABDOMINAL PAIN: Primary | ICD-10-CM

## 2019-04-04 DIAGNOSIS — Z79.899 CHRONIC PRESCRIPTION BENZODIAZEPINE USE: ICD-10-CM

## 2019-04-04 DIAGNOSIS — R10.84 CHRONIC GENERALIZED ABDOMINAL PAIN: Primary | ICD-10-CM

## 2019-04-04 DIAGNOSIS — Z02.89 PAIN MEDICATION AGREEMENT: ICD-10-CM

## 2019-04-04 DIAGNOSIS — F11.90 CHRONIC, CONTINUOUS USE OF OPIOIDS: ICD-10-CM

## 2019-04-04 PROCEDURE — 99214 OFFICE O/P EST MOD 30 MIN: CPT | Performed by: ANESTHESIOLOGY

## 2019-04-04 RX ORDER — OXYCODONE AND ACETAMINOPHEN 10; 325 MG/1; MG/1
1 TABLET ORAL EVERY 8 HOURS PRN
Qty: 90 TABLET | Refills: 0 | Status: SHIPPED | OUTPATIENT
Start: 2019-04-06 | End: 2019-05-06 | Stop reason: SDUPTHER

## 2019-04-04 RX ORDER — VENLAFAXINE HYDROCHLORIDE 75 MG/1
75 CAPSULE, EXTENDED RELEASE ORAL DAILY
Qty: 30 CAPSULE | Refills: 0 | Status: SHIPPED | OUTPATIENT
Start: 2019-04-04 | End: 2019-05-02 | Stop reason: CLARIF

## 2019-04-04 ASSESSMENT — ENCOUNTER SYMPTOMS
EYE DISCHARGE: 0
VOMITING: 0
CONSTIPATION: 0
EYE PAIN: 0
COUGH: 0
SHORTNESS OF BREATH: 0
EYE REDNESS: 0
NAUSEA: 0
BACK PAIN: 0
ABDOMINAL PAIN: 0
WHEEZING: 0

## 2019-04-04 NOTE — PROGRESS NOTES
[facility]  [address], [place], [address]  [phone] (C)  [phone] (f)    4/4/19        Jannet Homans  1965      Eamon Diane MD      Chief Complaint:   Chief Complaint   Patient presents with    Pain     F/u on persistent abdominal pain. History of Present Illness   HPI    Seen us since 11/2018 -  Chronic abdominal pain from multiple surgeries; on chronic opioid. Pain constant in abdominal area, achy, throbbing without nausea/vomiting or changes in bowel habits. Episodic pain in the paralumbar area due to kidney stones. Pain worse with daily activities and helped somewhat by medications. RED FLAG symptoms (Symptoms may include, but not limited to, acute trauma,fever, drug use, malignancy, weakness, perianal numbness, bladder/bowel changes) since last visit - No    Changes since last visit:  Unsure about benefit from Effexor.       Past Medical History:   Diagnosis Date    Anxiety state, unspecified     Calculus of kidney     Deaf     RIGHT EAR    Diabetes mellitus ([address]     Diverticula     GERD (gastroesophageal reflux disease)     Hordeolum externum     Hyperlipidemia     Hypertension     Insomnia, unspecified     Kidney stones     MRSA carrier     Pancreatitis, chronic ([address]     Psychiatric problem     Unspecified hypertrophic and atrophic condition of skin        Past Surgical History:   Procedure Laterality Date    ABSCESS DRAINAGE Left 1/5/14    incision and drainage of an abcess on left calf    APPENDECTOMY      CHOLECYSTECTOMY      COLOSTOMY      INNER EAR SURGERY      RIGHT    PANCREAS SURGERY      duct stent    REVISION COLOSTOMY      SUBTOTAL COLECTOMY      Had wound dehiscence, mesh    TRACHEOSTOMY      TRACHEOSTOMY CLOSURE         Allergies   Allergen Reactions    Amoxicillin      Rash    Morphine      RASH, hallucinations oral and IV       Current Outpatient Medications   Medication Sig Dispense Refill  venlafaxine (EFFEXOR XR) 75 MG extended release capsule Take 1 capsule by mouth daily 30 capsule 0    [START ON 4/6/2019] oxyCODONE-acetaminophen (PERCOCET)  MG per tablet Take 1 tablet by mouth every 8 hours as needed for Pain for up to 30 days. Intended supply: 30 days 90 tablet 0    insulin detemir (LEVEMIR FLEXTOUCH) 100 UNIT/ML injection pen Inject 40 Units into the skin nightly 15 mL 12    montelukast (SINGULAIR) 10 MG tablet TAKE 1 TABLET BY MOUTH NIGHTLY 90 tablet 3    ibuprofen (ADVIL;MOTRIN) 800 MG tablet TAKE 1 TABLET BY MOUTH THREE TIMES DAILY WITH MEALS 90 tablet 0    JARDIANCE 10 MG tablet TAKE 1 TABLET BY MOUTH DAILY 90 tablet 3    glimepiride (AMARYL) 4 MG tablet TAKE 1 TABLET BY MOUTH EVERY MORNING 90 tablet 0    metFORMIN (GLUCOPHAGE-XR) 500 MG extended release tablet TAKE 2 TABLETS BY MOUTH TWICE DAILY 360 tablet 0    esomeprazole (NEXIUM) 40 MG delayed release capsule TAKE 1 CAPSULE BY MOUTH DAILY 90 capsule 3    losartan-hydrochlorothiazide (HYZAAR) 100-25 MG per tablet TAKE 1 TABLET BY MOUTH DAILY 90 tablet 2    acetaminophen (APAP EXTRA STRENGTH) 500 MG tablet Take 2 tablets by mouth every 6 hours as needed for Pain DO NOT TAKE WITH OTHER MEDICATIONS CONTAINING ACETAMINOPHEN. 30 tablet 0    ondansetron (ZOFRAN ODT) 4 MG disintegrating tablet Take 1-2 tablets by mouth every 8 hours as needed for Nausea May Sub regular tablet (non-ODT) if insurance does not cover ODT.  20 tablet 0    B-D UF III MINI PEN NEEDLES 31G X 5 MM MISC USE ONCE DAILY AS DIRECTED 100 each 3    nystatin (MYCOSTATIN) 604734 UNIT/GM cream APPLY TOPICALLY TWICE DAILY TO FOUR TIMES DAILY 60 g 0    simvastatin (ZOCOR) 40 MG tablet TAKE 1 TABLET BY MOUTH NIGHTLY 90 tablet 3    clobetasol (OLUX) 0.05 % foam APPLY EXTERNALLY TO THE SCALP TWICE DAILY AS NEEDED 50 g 3    ONE TOUCH ULTRA TEST strip USE TO TEST THREE TIMES DAILY AS NEEDED 300 strip 1    fluticasone (FLONASE) 50 MCG/ACT nasal spray SHAKE LIQUID AND USE 2 SPRAYS IN EACH NOSTRIL DAILY (Patient taking differently: as needed SHAKE LIQUID AND USE 2 SPRAYS IN EACH NOSTRIL DAILY) 3 Bottle 3    ONETOUCH DELICA LANCETS FINE MISC USE TO TEST THREE TIMES DAILY 300 each 12    Blood Glucose Monitoring Suppl (ONE TOUCH ULTRA 2) W/DEVICE KIT 1 kit by Does not apply route daily as needed. 1 kit 0    MICROLET LANCETS MISC TEST 3 TO 4 TIMES DAILY 300 each 10     No current facility-administered medications for this visit.         Family History   Problem Relation Age of Onset    Diabetes Mother     Sudden Death Mother         suicide    Alcohol Abuse Father     Alcohol Abuse Brother        Social History     Socioeconomic History    Marital status:      Spouse name: Not on file    Number of children: Not on file    Years of education: Not on file    Highest education level: Not on file   Occupational History    Occupation: Disabled     Comment: 2000   Social Needs    Financial resource strain: Not on file    Food insecurity:     Worry: Not on file     Inability: Not on file   [employer] needs:     Medical: Not on file     Non-medical: Not on file   Tobacco Use    Smoking status: Never Smoker    Smokeless tobacco: Never Used   Substance and Sexual Activity    Alcohol use: No     Alcohol/week: 0.0 oz    Drug use: No    Sexual activity: Yes     Partners: Female   Lifestyle    Physical activity:     Days per week: Not on file     Minutes per session: Not on file    Stress: Not on file   Relationships    Social connections:     Talks on phone: Not on file     Gets together: Not on file     Attends Yazdanism service: Not on file     Active member of club or organization: Not on file     Attends meetings of clubs or organizations: Not on file     Relationship status: Not on file    Intimate partner violence:     Fear of current or ex partner: Not on file     Emotionally abused: Not on file     Physically abused: Not on file     Forced sexual activity: Not on file   Other Topics Concern    Not on file   Social History Narrative     but . Imaging Studies:   CT (12/16/2018)  Impression   1. No acute infective or inflammatory process. 2. No ureteric calculi however there are couple of punctate nonobstructing   bilateral renal calculi. 3. Stable cystic lesions associated with the pancreas consistent with   pseudocyst dating back to 2010.          Results of the above studies were personally reviewed by me with the patient indetail along with the images, when available. The patient was instructed to contact the primary care provider regarding other unrelated findings not addressed during today's visit. Review of Systems:   Review of Systems   Constitutional: Negative for chills and fever. HENT: Negative for hearing loss and tinnitus. Eyes: Negative for pain, discharge and redness. Respiratory: Negative for cough, shortness of breath and wheezing. Cardiovascular: Negative for chest pain and palpitations. Gastrointestinal: Negative for abdominal pain, constipation, nausea and vomiting. Genitourinary: Negative for frequency, hematuria and urgency. Musculoskeletal: Negative for back pain, myalgias and neck pain. Skin: Negative for rash. Neurological: Negative for dizziness, seizures, weakness and headaches. Hematological: Does not bruise/bleed easily. Psychiatric/Behavioral: Negative for suicidal ideas. The patient is not nervous/anxious. The patient was instructed to contact primary care physician regarding ROS positives not being addressed during today's visit. Physical Exam:   Physical Exam   Constitutional: He is oriented to person, place, and time. No distress. No acute distress; obese  Ambulates without help   HENT:   Head: Normocephalic. Eyes: Pupils are equal, round, and reactive to light. EOM are normal.   Neck: Normal range of motion. Neck supple. No thyromegaly present. Cardiovascular: Normal rate, regular rhythm, normal heart sounds and intact distal pulses. Pulmonary/Chest: Effort normal and breath sounds normal. No respiratory distress. He exhibits no tenderness. Abdominal: Soft. Bowel sounds are normal. He exhibits no mass. There is no tenderness. There is no guarding. Musculoskeletal: Normal range of motion. He exhibits no tenderness or deformity. Lymphadenopathy:     He has no cervical adenopathy. Neurological: He is alert and oriented to person, place, and time. He has normal strength and normal reflexes. He displays normal reflexes. No cranial nerve deficit or sensory deficit. He exhibits normal muscle tone. Coordination and gait normal. He displays no Babinski's sign on the right side. He displays no Babinski's sign on the left side. Reflex Scores:       Tricep reflexes are 2+ on the right side and 2+ on the left side. Bicep reflexes are 2+ on the right side and 2+ on the left side. Brachioradialis reflexes are 2+ on the right side and 2+ on the left side. Patellar reflexes are 2+ on the right side and 2+ on the left side. Achilles reflexes are 2+ on the right side and 2+ on the left side. Skin: Skin is warm. No rash noted. He is not diaphoretic. Psychiatric: He has a normal mood and affect. His behavior is normal. Thought content normal.       Vitals:    04/04/19 1448 04/04/19 1449   BP: (!) 142/87 135/84   Site: Left Upper Arm    Position: Sitting    Cuff Size: Large Adult    Pulse: 85    Weight: 295 lb (133.8 kg)    Height: 6' (1.829 m)        Body mass index is 40.01 kg/m². Pain Score:   6 /10    Goals of chronic pain therapy:      Multi-disciplinary comprehensive pain management with functional improvement and reduced opioid use. Prescription pain medication monitoring:      MEDD current = 45   ORT Score = 3   LOW     Other Risk factors - Depression, anxiety.        Date of Last Medication Agreement: 11/05/2018   Date Naloxone prescribed: NA     UDT:    Date of last UDT: 03/07/2019    Adverse report: YES - positive for oxazepam.      OARRS:    Checked today: Yes    Adverse report: No     Medication Effects -        Analgesia:      Average level of pain for the past month = 9/10     Percentage of pain relief = 50%     Activities Improved: Activities of daily living = 50%     Sleep = 0%     Mood = 50%     Social functioning = 50%     Adverse Effects: Any adverse effects: Yes     Type/Severity of side effect: constipation - on laxative    Aberrant Behavior:     Requests for frequent early refills: No     Increased dose without authorization: No     Reports lost or stolen prescriptions: No     Attempts to obtain prescriptions from other providers: No     Use of pain medication in response to situational stressor: No      Increasingly unkempt or impaired: No     Negative mood changes: No     Abusing alcohol or illicit drugs: No     Appears intoxicated: No     Other: NA     Controlled Substances Monitoring:    Attestation: The Prescription Monitoring Report for this patient was reviewed today. Nathalia Roe MD)  Chronic Pain Routine Monitoring: Possible medication side effects, risk of tolerance/dependence & alternative treatments discussed., No signs of potential drug abuse or diversion identified: otherwise, see note documentation Nathalia Roe MD)      Overall Progress:       PEG Score = 7 / 10     Physical Therapy: N/A   Counseling: N/A   Injections: N/A     Patient compliance on plan of care: Fair   Progress on current plan:  Fair    Assessment:      ICD-10-CM    1. Chronic generalized abdominal pain R10.84 venlafaxine (EFFEXOR XR) 75 MG extended release capsule    G89.29 oxyCODONE-acetaminophen (PERCOCET)  MG per tablet   2. Other chronic pancreatitis (HCC) K86.1 venlafaxine (EFFEXOR XR) 75 MG extended release capsule     oxyCODONE-acetaminophen (PERCOCET)  MG per tablet   3.  Chronic pain syndrome G89.4 venlafaxine (EFFEXOR XR) 75 MG extended release capsule     oxyCODONE-acetaminophen (PERCOCET)  MG per tablet   4. Chronic, continuous use of opioids F11.90    5. Pain medication agreement Z02.89 oxyCODONE-acetaminophen (PERCOCET)  MG per tablet   6. Chronic prescription benzodiazepine use Z79.899    7. 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 Chronic intractable abdominal pain from multiple prior surgeries. 2. Intolerant of NSAIDs, Neurontin/Lyrica, trazodone; on trial of Effexor. 3. Last UDT positive for benzo (oxazepam); Reports he takes Valium as needed. Counseled on FDA warning on combining benzo with opioid - reports he has cut back on the medication. 4. He has chronic pain and anxiety issues - has problems with pain coping. Recommend [facility] counseling for chronic pain (Dr Federica Herrera) in our office. 5. Counseled on limitation of opioid; he has been on it for years. Encouraged limiting use for severe pain only. 6. He is not receptive to the idea of dose reduction or limiting opioid at this time, even though there is no active abdominal pathology. I will refill Percocet 10 mg TID PRN at this time. Continue multidisciplinary care. Analgesic Plan:   Continue present regimen: Yes - Percocet 10 mg TID PRN   Adjust dose of present analgesic: No   Switch analgesics: No   Add/Adjust concomitant therapy: Yes - Effexor    Education:    - Reviewed OARRS report in detail, including Narx Scores and Overdose Risk Score. - Encouraged regular home exercises and strengthening as long-termgoals. - Counseled on dual effects, limitations, side effects and long-term complications of opioids. Discussed proper use and potential life threatening side effects of inappropriate use. - Educational material provided on multidisciplinary chronic pain management, safe opioid use. Follow-up: RTC X 1 month.     Patient engaged in shared decision making. The entire treatment plan was discussed with patient and agreed. Thank you for allowing us to participate in the care of your patient - please do not hesitate to contact us if you have any questions or concerns.         Bart Adams MD  Board Certified in Anesthesiology and Pain Medicine

## 2019-04-04 NOTE — PATIENT INSTRUCTIONS
Patient Education     I have referred you to Counselor (Dr Ranjan Nath) in our office  Continue Effexor and limit use of Percocet for severe pain as discussed. Frequent Abdominal Pain: Care Instructions  Your Care Instructions    Frequent abdominal pain means you have belly pain that occurs at least 3 times over 3 months. Sometimes the pain is linked to eating certain foods or having a bowel movement. But most of the time the pain cannot be explained. Your doctor may use the words \"functional abdominal pain\" or \"recurrent abdominal pain\" to describe the problem. It can be hard to deal with pain when your doctor cannot find a cause, even after tests are done. When the pain is very bad, it can keep you from doing your normal activities. Sometimes stress can make your pain worse. Even if you cannot make the pain go away, there are things you can do to make it a little easier to manage. Follow-up care is a key part of your treatment and safety. Be sure to make and go to all appointments, and call your doctor if you are having problems. It's also a good idea to know your test results and keep a list of the medicines you take. How can you care for yourself at home? · Keep a symptom diary. This can help you see if there are events or emotions that make your pain worse. Think about what you ate, drank, or felt before the pain began. Maybe pain comes after a stressful meeting or when you have spicy foods, dairy products, or alcohol. · Reduce stress. Breathing exercises and relaxation techniques can help. Try taking a walk or doing other exercise when you feel stressed. · Try cognitive-behavioral therapy. You can work with a counselor to learn how to do this therapy. It can help you cope with pain by changing how you think. It can help you notice discouraging thoughts that make you feel bad. When should you call for help?   Call your doctor now or seek immediate medical care if:    · You have a fever and belly pain.     · You have severe pain that is different from your usual belly pain.    Watch closely for changes in your health, and be sure to contact your doctor if:    · Your pattern of pain changes.     · You have questions or concerns about your belly pain. Where can you learn more? Go to [email or web address] org and sign in to your [employer] account. Enter P973 in the get2play box to learn more about \"Frequent Abdominal Pain: Care Instructions. \"     If you do not have an account, please click on the \"Sign Up Now\" link. Current as of: September 23, 2018  Content Version: 11.9  © 3031-3768 [employer]. Care instructions adapted under license by [facility] ([facility]). If you have questions about a medical condition or this instruction, always ask your healthcare professional. [address] any warranty or liability for your use of this information.

## 2019-04-18 ENCOUNTER — TELEPHONE (OUTPATIENT)
Dept: PAIN MANAGEMENT | Age: 54
End: 2019-04-18

## 2019-04-18 NOTE — TELEPHONE ENCOUNTER
Patient called requesting last 3 office notes to be faxed to another pain management doctor, closer to his home. I faxed paperwork to [phone].

## 2019-04-25 RX ORDER — METFORMIN HYDROCHLORIDE 500 MG/1
TABLET, EXTENDED RELEASE ORAL
Qty: 360 TABLET | Refills: 3 | Status: SHIPPED | OUTPATIENT
Start: 2019-04-25 | End: 2020-04-14 | Stop reason: SDUPTHER

## 2019-04-25 RX ORDER — GLIMEPIRIDE 4 MG/1
TABLET ORAL
Qty: 90 TABLET | Refills: 3 | Status: SHIPPED | OUTPATIENT
Start: 2019-04-25 | End: 2020-04-14 | Stop reason: SDUPTHER

## 2019-05-02 ENCOUNTER — OFFICE VISIT (OUTPATIENT)
Dept: FAMILY MEDICINE CLINIC | Age: 54
End: 2019-05-02
Payer: MEDICARE

## 2019-05-02 VITALS
DIASTOLIC BLOOD PRESSURE: 82 MMHG | HEART RATE: 98 BPM | SYSTOLIC BLOOD PRESSURE: 122 MMHG | BODY MASS INDEX: 40.14 KG/M2 | OXYGEN SATURATION: 97 % | WEIGHT: 296 LBS

## 2019-05-02 DIAGNOSIS — G89.4 CHRONIC PAIN SYNDROME: Primary | ICD-10-CM

## 2019-05-02 PROCEDURE — 99213 OFFICE O/P EST LOW 20 MIN: CPT | Performed by: FAMILY MEDICINE

## 2019-05-02 NOTE — PROGRESS NOTES
Here to f/u chronic pain medication. Takes oxycodone for chronic pain. Stopped seeing the pain doctor after not being able to get his pain medication anymore. He states that he was told at his last visit that the pain doctor would no longer prescribe his medications and the only reason he gave him some last month was because of passing a kidney stone. He states that he was trying to get into another pain medication physician but since the other physician was in the same practice [facility]) he could not be scheduled. He still has an appointment scheduled with Dr. Kevin Church on May 7th. He says that he has been cut down from 120 to 90 tablets a month and states he (patient) will not go down any further than 60 tablets. He says that he does not have a problem weaning off, but he \"needs the medication and will go to the ED\" for emergency medication management if he cannot get his medications. He says he has tried to call other providers and none of them will prescribe medication, they just want to do injections. He feels like he has followed all the recommendations of the pain mgmt doctor but he doesn't think \"talking to a shrink\" will help. He is very frustrated by the situation. Reviewed OARRS and last note by Dr. Kevin Church. Vitals:    05/02/19 1312   BP: 122/82   Site: Left Upper Arm   Position: Sitting   Cuff Size: Large Adult   Pulse: 98   SpO2: 97%   Weight: 296 lb (134.3 kg)     Wt Readings from Last 3 Encounters:   05/02/19 296 lb (134.3 kg)   04/04/19 295 lb (133.8 kg)   03/13/19 299 lb (135.6 kg)     Body mass index is 40.14 kg/m². PHQ-9 Total Score: 6 (10/8/2018  2:54 PM)      GEN: Alert and oriented x 4 NAD, affect appropriate and obese, well hydrated, well developed. ASSESSMENT AND PLAN:       Simona Jara was seen today for medication check.     Diagnoses and all orders for this visit:    Chronic pain syndrome  -     External Referral To Pain Clinic      More than 50% of today's 15 minute encounter was spent counseling the patient/coordinating care of the patient regarding his pain. Discussed with pt that Dr Blaze Hodges last note does not say anything about stopping meds completely. Discussed with pt that there is a lot of data on behavioral health and improvement with pain. Pt states he can't afford to keep paying $50 for all those visits. Told patient to get the workbook Living Beyond Your Pain: Using Acceptance and Commitment Therapy to Ease Chronic Pain. By: Jackie Conrad PhD as it has been shown to help patients reduce their pain. Discussed with patient that per last note there is no documentation that medications would be stopped. Recommended he keep appt with Dr. Johanny Chris next week as scheduled.  Will send referral to  pain mgmt per pt request.      Scribe attestation: I, Tamiko Pérez, am scribing for and in the presence of Rebecca Ruvalcaba MD. Electronically signed by MARY LOU Espino on 5/2/19 at 1:26 PM    Note per MARY LOU Espino and Scribe with corrections and edits per Rebecca Ruvalcaba MD.  I agree with entirety of note and was present and performed history and physical.  I also confirm that the note above accurately reflects all work, treatment, procedures, and medical decision making performed by me, Rebecca Ruvalcaba MD

## 2019-05-02 NOTE — PATIENT INSTRUCTIONS
Living Beyond Your Pain: Using Acceptance and Commitment Therapy to Ease Chronic Pain.     By: Guillermo Vergara PhD

## 2019-05-06 ENCOUNTER — OFFICE VISIT (OUTPATIENT)
Dept: PAIN MANAGEMENT | Age: 54
End: 2019-05-06
Payer: MEDICARE

## 2019-05-06 ENCOUNTER — TELEPHONE (OUTPATIENT)
Dept: FAMILY MEDICINE CLINIC | Age: 54
End: 2019-05-06

## 2019-05-06 VITALS
SYSTOLIC BLOOD PRESSURE: 162 MMHG | DIASTOLIC BLOOD PRESSURE: 86 MMHG | HEIGHT: 72 IN | BODY MASS INDEX: 40.09 KG/M2 | HEART RATE: 86 BPM | WEIGHT: 296 LBS

## 2019-05-06 DIAGNOSIS — R10.84 CHRONIC GENERALIZED ABDOMINAL PAIN: Primary | ICD-10-CM

## 2019-05-06 DIAGNOSIS — Z02.89 PAIN MEDICATION AGREEMENT: ICD-10-CM

## 2019-05-06 DIAGNOSIS — F11.90 CHRONIC, CONTINUOUS USE OF OPIOIDS: ICD-10-CM

## 2019-05-06 DIAGNOSIS — G89.29 CHRONIC GENERALIZED ABDOMINAL PAIN: Primary | ICD-10-CM

## 2019-05-06 DIAGNOSIS — K86.1 OTHER CHRONIC PANCREATITIS (HCC): ICD-10-CM

## 2019-05-06 DIAGNOSIS — G89.4 CHRONIC PAIN SYNDROME: ICD-10-CM

## 2019-05-06 DIAGNOSIS — F45.42 PAIN DISORDER WITH PSYCHOLOGICAL FACTORS: ICD-10-CM

## 2019-05-06 PROCEDURE — 99214 OFFICE O/P EST MOD 30 MIN: CPT | Performed by: ANESTHESIOLOGY

## 2019-05-06 RX ORDER — VENLAFAXINE HYDROCHLORIDE 75 MG/1
75 CAPSULE, EXTENDED RELEASE ORAL DAILY
Qty: 30 CAPSULE | Refills: 0 | Status: SHIPPED | OUTPATIENT
Start: 2019-05-06 | End: 2019-06-05 | Stop reason: ALTCHOICE

## 2019-05-06 RX ORDER — OXYCODONE AND ACETAMINOPHEN 10; 325 MG/1; MG/1
1 TABLET ORAL EVERY 8 HOURS PRN
Qty: 90 TABLET | Refills: 0 | Status: SHIPPED | OUTPATIENT
Start: 2019-05-06 | End: 2019-06-05 | Stop reason: SDUPTHER

## 2019-05-06 ASSESSMENT — ENCOUNTER SYMPTOMS
EYE REDNESS: 0
COUGH: 0
ABDOMINAL PAIN: 1
EYE DISCHARGE: 0
EYE PAIN: 0
WHEEZING: 0
BACK PAIN: 0
NAUSEA: 0
VOMITING: 0
CONSTIPATION: 0
SHORTNESS OF BREATH: 0

## 2019-05-06 NOTE — PROGRESS NOTES
[facility]  [address], [place], [address]  [phone] (R)  [phone] (f)    5/6/19        [facility]  1965      Mikayla Whitehead MD      Chief Complaint:   Chief Complaint   Patient presents with    Abdominal Pain     F/u on persistent abdominal pain. History of Present Illness   HPI    Seen us since 11/2018 -  Chronic abdominal pain from surgeries; on chronic opioid. Pain constant in abdomen, achy sharp throbbing without nausea, vomiting or radiation. Pain worse with daily activities and helped by pain medication. RED FLAG symptoms (Symptoms may include, but not limited to, acute trauma,fever, drug use, malignancy, weakness, perianal numbness, bladder/bowel changes) since last visit - No    Changes since last visit:  Pain stable on medication.       Past Medical History:   Diagnosis Date    Anxiety state, unspecified     Calculus of kidney     Deaf     RIGHT EAR    Diabetes mellitus ([address]     Diverticula     GERD (gastroesophageal reflux disease)     Hordeolum externum     Hyperlipidemia     Hypertension     Insomnia, unspecified     Kidney stones     MRSA carrier     Pancreatitis, chronic ([address]     Psychiatric problem     Unspecified hypertrophic and atrophic condition of skin        Past Surgical History:   Procedure Laterality Date    ABSCESS DRAINAGE Left 1/5/14    incision and drainage of an abcess on left calf    APPENDECTOMY      CHOLECYSTECTOMY      COLOSTOMY      INNER EAR SURGERY      RIGHT    PANCREAS SURGERY      duct stent    REVISION COLOSTOMY      SUBTOTAL COLECTOMY      Had wound dehiscence, mesh    TRACHEOSTOMY      TRACHEOSTOMY CLOSURE         Allergies   Allergen Reactions    Amoxicillin      Rash    Morphine      RASH, hallucinations oral and IV       Current Outpatient Medications   Medication Sig Dispense Refill    oxyCODONE-acetaminophen (PERCOCET)  MG per tablet Take 1 tablet by mouth every 8 hours as needed for Pain for up to 30 days. Intended supply: 30 days 90 tablet 0    venlafaxine (EFFEXOR XR) 75 MG extended release capsule Take 1 capsule by mouth daily 30 capsule 0    metFORMIN (GLUCOPHAGE-XR) 500 MG extended release tablet TAKE 2 TABLETS BY MOUTH TWICE DAILY 360 tablet 3    glimepiride (AMARYL) 4 MG tablet TAKE 1 TABLET BY MOUTH EVERY MORNING 90 tablet 3    insulin detemir (LEVEMIR FLEXTOUCH) 100 UNIT/ML injection pen Inject 40 Units into the skin nightly 15 mL 12    montelukast (SINGULAIR) 10 MG tablet TAKE 1 TABLET BY MOUTH NIGHTLY 90 tablet 3    ibuprofen (ADVIL;MOTRIN) 800 MG tablet TAKE 1 TABLET BY MOUTH THREE TIMES DAILY WITH MEALS 90 tablet 0    JARDIANCE 10 MG tablet TAKE 1 TABLET BY MOUTH DAILY 90 tablet 3    esomeprazole (NEXIUM) 40 MG delayed release capsule TAKE 1 CAPSULE BY MOUTH DAILY 90 capsule 3    losartan-hydrochlorothiazide (HYZAAR) 100-25 MG per tablet TAKE 1 TABLET BY MOUTH DAILY 90 tablet 2    ondansetron (ZOFRAN ODT) 4 MG disintegrating tablet Take 1-2 tablets by mouth every 8 hours as needed for Nausea May Sub regular tablet (non-ODT) if insurance does not cover ODT.  20 tablet 0    B-D UF III MINI PEN NEEDLES 31G X 5 MM MISC USE ONCE DAILY AS DIRECTED 100 each 3    nystatin (MYCOSTATIN) 598228 UNIT/GM cream APPLY TOPICALLY TWICE DAILY TO FOUR TIMES DAILY 60 g 0    simvastatin (ZOCOR) 40 MG tablet TAKE 1 TABLET BY MOUTH NIGHTLY 90 tablet 3    clobetasol (OLUX) 0.05 % foam APPLY EXTERNALLY TO THE SCALP TWICE DAILY AS NEEDED 50 g 3    ONE TOUCH ULTRA TEST strip USE TO TEST THREE TIMES DAILY AS NEEDED 300 strip 1    fluticasone (FLONASE) 50 MCG/ACT nasal spray SHAKE LIQUID AND USE 2 SPRAYS IN EACH NOSTRIL DAILY (Patient taking differently: as needed SHAKE LIQUID AND USE 2 SPRAYS IN EACH NOSTRIL DAILY) 3 Bottle 3    ONETOUCH DELICA LANCETS FINE MISC USE TO TEST THREE TIMES DAILY 300 each 12    Blood Glucose Monitoring Suppl (ONE TOUCH ULTRA 2) W/DEVICE KIT 1 kit by Does not apply route daily as needed. 1 kit 0    MICROLET LANCETS MISC TEST 3 TO 4 TIMES DAILY 300 each 10     No current facility-administered medications for this visit. Family History   Problem Relation Age of Onset    Diabetes Mother     Sudden Death Mother         suicide    Alcohol Abuse Father     Alcohol Abuse Brother        Social History     Socioeconomic History    Marital status:      Spouse name: Not on file    Number of children: Not on file    Years of education: Not on file    Highest education level: Not on file   Occupational History    Occupation: Disabled     Comment: 2000   Social Needs    Financial resource strain: Not on file    Food insecurity:     Worry: Not on file     Inability: Not on file   RTF Logic needs:     Medical: Not on file     Non-medical: Not on file   Tobacco Use    Smoking status: Never Smoker    Smokeless tobacco: Never Used   Substance and Sexual Activity    Alcohol use: No     Alcohol/week: 0.0 oz    Drug use: No    Sexual activity: Yes     Partners: Female   Lifestyle    Physical activity:     Days per week: Not on file     Minutes per session: Not on file    Stress: Not on file   Relationships    Social connections:     Talks on phone: Not on file     Gets together: Not on file     Attends Buddhist service: Not on file     Active member of club or organization: Not on file     Attends meetings of clubs or organizations: Not on file     Relationship status: Not on file    Intimate partner violence:     Fear of current or ex partner: Not on file     Emotionally abused: Not on file     Physically abused: Not on file     Forced sexual activity: Not on file   Other Topics Concern    Not on file   Social History Narrative     but . Imaging Studies:   CT (12/16/2018)  Impression   1. No acute infective or inflammatory process.    2. No ureteric calculi however there are couple of punctate pain (Dr Luis Zamorano)    Plan:     1. Chronic intractable abdominal pain s/p multiple surgeries. 2. Intolerant of NSAIDs and several neuropathic medications (Neurontin/Lyrica, trazodone). 3. He is still struggling with the pain issues and reports he needs the opioid to function. 4. Reports some benefit from Effexor with sleep and anxiety issues. 5. He has reduced opioid use from high doses a year ago to current levels. Given no active disease, our goal is to lower opioid dose as much as tolerable. 6. Counseled him on dependence and limitation of opioids - I will set him up with [facility] in our office for chronic pain counseling. 7. I will refill Percocet 10 mg TID PRN, pending further evaluation by [facility]      Analgesic Plan:   Continue present regimen: Yes   Adjust dose of present analgesic: No   Switch analgesics: No   Add/Adjust concomitant therapy: Yes -  for counseling. Education:    - Reviewed OARRS report in detail, including Narx Scores and Overdose Risk Score. - Encouraged regular home exercises and strengthening as long-termgoals. - Counseled on dual effects, limitations, side effects and long-term complications of opioids. Discussed proper use and potential life threatening side effects of inappropriate use. - Educational materialprovided on multidisciplinary chronic pain management, safe opioid use. Follow-up: RTC X 1 month. Patient engaged in shared decision making. The entire treatment plan was discussed with patient and agreed. Thank you for allowing us to participate in the care of your patient - please do not hesitate to contact us if you have any questions or concerns.         Carlota Garcia MD  Board Certified in Anesthesiology and Pain Medicine

## 2019-05-06 NOTE — PATIENT INSTRUCTIONS
fat.  · Avoid anti-inflammatory medicines such as aspirin, ibuprofen (Advil, Motrin), and naproxen (Aleve). These can cause stomach upset. Talk to your doctor if you take daily aspirin for another health problem. When should you call for help? Call 911 anytime you think you may need emergency care. For example, call if:    · You passed out (lost consciousness).     · You pass maroon or very bloody stools.     · You vomit blood or what looks like coffee grounds.     · You have new, severe belly pain.    Call your doctor now or seek immediate medical care if:    · Your pain gets worse, especially if it becomes focused in one area of your belly.     · You have a new or higher fever.     · Your stools are black and look like tar, or they have streaks of blood.     · You have unexpected vaginal bleeding.     · You have symptoms of a urinary tract infection. These may include:  ? Pain when you urinate. ? Urinating more often than usual.  ? Blood in your urine.     · You are dizzy or lightheaded, or you feel like you may faint.    Watch closely for changes in your health, and be sure to contact your doctor if:    · You are not getting better after 1 day (24 hours). Where can you learn more? Go to [email or web address] and sign in to your [employer] account. Enter Z845 in the Rebelle box to learn more about \"Abdominal Pain: Care Instructions. \"     If you do not have an account, please click on the \"Sign Up Now\" link. Current as of: September 23, 2018  Content Version: 11.9  © 0742-2125 [employer]. Care instructions adapted under license by [facility] ([facility]). If you have questions about a medical condition or this instruction, always ask your healthcare professional. [address] any warranty or liability for your use of this information.

## 2019-05-13 RX ORDER — IBUPROFEN 800 MG/1
TABLET ORAL
Qty: 90 TABLET | Refills: 0 | Status: SHIPPED | OUTPATIENT
Start: 2019-05-13 | End: 2019-08-29 | Stop reason: SDUPTHER

## 2019-05-20 RX ORDER — FLUTICASONE PROPIONATE 50 MCG
SPRAY, SUSPENSION (ML) NASAL
Qty: 1 BOTTLE | Refills: 12 | Status: SHIPPED | OUTPATIENT
Start: 2019-05-20 | End: 2019-05-20 | Stop reason: SDUPTHER

## 2019-05-21 RX ORDER — FLUTICASONE PROPIONATE 50 MCG
SPRAY, SUSPENSION (ML) NASAL
Qty: 3 BOTTLE | Refills: 3 | Status: SHIPPED | OUTPATIENT
Start: 2019-05-21 | End: 2020-04-07

## 2019-06-05 ENCOUNTER — OFFICE VISIT (OUTPATIENT)
Dept: PAIN MANAGEMENT | Age: 54
End: 2019-06-05
Payer: MEDICARE

## 2019-06-05 VITALS
HEIGHT: 72 IN | WEIGHT: 295 LBS | DIASTOLIC BLOOD PRESSURE: 90 MMHG | BODY MASS INDEX: 39.96 KG/M2 | SYSTOLIC BLOOD PRESSURE: 142 MMHG | HEART RATE: 62 BPM

## 2019-06-05 DIAGNOSIS — F11.90 CHRONIC, CONTINUOUS USE OF OPIOIDS: ICD-10-CM

## 2019-06-05 DIAGNOSIS — G89.4 CHRONIC PAIN SYNDROME: ICD-10-CM

## 2019-06-05 DIAGNOSIS — F32.A ANXIETY AND DEPRESSION: ICD-10-CM

## 2019-06-05 DIAGNOSIS — G89.29 CHRONIC GENERALIZED ABDOMINAL PAIN: Primary | ICD-10-CM

## 2019-06-05 DIAGNOSIS — K86.1 OTHER CHRONIC PANCREATITIS (HCC): ICD-10-CM

## 2019-06-05 DIAGNOSIS — F45.42 PAIN DISORDER WITH PSYCHOLOGICAL FACTORS: Primary | ICD-10-CM

## 2019-06-05 DIAGNOSIS — Z02.89 PAIN MEDICATION AGREEMENT: ICD-10-CM

## 2019-06-05 DIAGNOSIS — F45.42 PAIN DISORDER WITH PSYCHOLOGICAL FACTORS: ICD-10-CM

## 2019-06-05 DIAGNOSIS — R10.84 CHRONIC GENERALIZED ABDOMINAL PAIN: Primary | ICD-10-CM

## 2019-06-05 DIAGNOSIS — F41.9 ANXIETY AND DEPRESSION: ICD-10-CM

## 2019-06-05 PROCEDURE — 99214 OFFICE O/P EST MOD 30 MIN: CPT | Performed by: ANESTHESIOLOGY

## 2019-06-05 PROCEDURE — 90791 PSYCH DIAGNOSTIC EVALUATION: CPT | Performed by: PSYCHOLOGIST

## 2019-06-05 RX ORDER — AMITRIPTYLINE HYDROCHLORIDE 25 MG/1
25 TABLET, FILM COATED ORAL NIGHTLY PRN
Qty: 15 TABLET | Refills: 1 | Status: SHIPPED | OUTPATIENT
Start: 2019-06-05 | End: 2019-06-24 | Stop reason: CLARIF

## 2019-06-05 RX ORDER — OXYCODONE AND ACETAMINOPHEN 10; 325 MG/1; MG/1
1 TABLET ORAL EVERY 8 HOURS PRN
Qty: 90 TABLET | Refills: 0 | Status: SHIPPED | OUTPATIENT
Start: 2019-06-05 | End: 2019-07-03 | Stop reason: SDUPTHER

## 2019-06-05 ASSESSMENT — PATIENT HEALTH QUESTIONNAIRE - PHQ9
4. FEELING TIRED OR HAVING LITTLE ENERGY: 2
9. THOUGHTS THAT YOU WOULD BE BETTER OFF DEAD, OR OF HURTING YOURSELF: 0
SUM OF ALL RESPONSES TO PHQ9 QUESTIONS 1 & 2: 4
5. POOR APPETITE OR OVEREATING: 2
SUM OF ALL RESPONSES TO PHQ QUESTIONS 1-9: 15
1. LITTLE INTEREST OR PLEASURE IN DOING THINGS: 2
2. FEELING DOWN, DEPRESSED OR HOPELESS: 2
SUM OF ALL RESPONSES TO PHQ QUESTIONS 1-9: 15
8. MOVING OR SPEAKING SO SLOWLY THAT OTHER PEOPLE COULD HAVE NOTICED. OR THE OPPOSITE, BEING SO FIGETY OR RESTLESS THAT YOU HAVE BEEN MOVING AROUND A LOT MORE THAN USUAL: 1
3. TROUBLE FALLING OR STAYING ASLEEP: 3
7. TROUBLE CONCENTRATING ON THINGS, SUCH AS READING THE NEWSPAPER OR WATCHING TELEVISION: 1
6. FEELING BAD ABOUT YOURSELF - OR THAT YOU ARE A FAILURE OR HAVE LET YOURSELF OR YOUR FAMILY DOWN: 2

## 2019-06-05 ASSESSMENT — ANXIETY QUESTIONNAIRES
7. FEELING AFRAID AS IF SOMETHING AWFUL MIGHT HAPPEN: 1-SEVERAL DAYS
2. NOT BEING ABLE TO STOP OR CONTROL WORRYING: 3-NEARLY EVERY DAY
6. BECOMING EASILY ANNOYED OR IRRITABLE: 1-SEVERAL DAYS
3. WORRYING TOO MUCH ABOUT DIFFERENT THINGS: 3-NEARLY EVERY DAY
GAD7 TOTAL SCORE: 13
1. FEELING NERVOUS, ANXIOUS, OR ON EDGE: 2-OVER HALF THE DAYS
4. TROUBLE RELAXING: 1-SEVERAL DAYS
5. BEING SO RESTLESS THAT IT IS HARD TO SIT STILL: 2-OVER HALF THE DAYS

## 2019-06-05 ASSESSMENT — ENCOUNTER SYMPTOMS
EYE PAIN: 0
COUGH: 0
WHEEZING: 0
SHORTNESS OF BREATH: 0
CONSTIPATION: 0
NAUSEA: 0
EYE REDNESS: 0
ABDOMINAL PAIN: 1
EYE DISCHARGE: 0
VOMITING: 0
BACK PAIN: 0

## 2019-06-05 NOTE — PROGRESS NOTES
[facility]  [address], [place], [address]  [phone] (Z)  [phone] (f)    6/5/19        Peaceon Donna  1965      Fatoumata Dorantes MD      Chief Complaint:   Chief Complaint   Patient presents with    Abdominal Pain     f/u on chronic abdominal pain       History of Present Illness   HPI    Seen us since 11/2018 -  Chronic abdominal pain, s/p multiple surgeries; on chronic opioid. Pain constant in abdomen, achy throbbing occasionally sharp without radiation; no nausea or diarrhea. Pain worse with anxiety, prolonged activities and helped somewhat by medications. RED FLAG symptoms (Symptoms may include, but not limited to, acute trauma,fever, drug use, malignancy, weakness, perianal numbness, bladder/bowel changes) since last visit - No    Changes since last visit:  Complains of poor sleep issues.       Past Medical History:   Diagnosis Date    Anxiety state, unspecified     Calculus of kidney     Deaf     RIGHT EAR    Diabetes mellitus ([address]     Diverticula     GERD (gastroesophageal reflux disease)     Hordeolum externum     Hyperlipidemia     Hypertension     Insomnia, unspecified     Kidney stones     MRSA carrier     Pancreatitis, chronic ([address]     Psychiatric problem     Unspecified hypertrophic and atrophic condition of skin        Past Surgical History:   Procedure Laterality Date    ABSCESS DRAINAGE Left 1/5/14    incision and drainage of an abcess on left calf    APPENDECTOMY      CHOLECYSTECTOMY      COLOSTOMY      INNER EAR SURGERY      RIGHT    PANCREAS SURGERY      duct stent    REVISION COLOSTOMY      SUBTOTAL COLECTOMY      Had wound dehiscence, mesh    TRACHEOSTOMY      TRACHEOSTOMY CLOSURE         Allergies   Allergen Reactions    Amoxicillin      Rash    Morphine      RASH, hallucinations oral and IV       Current Outpatient Medications   Medication Sig Dispense Refill    amitriptyline (ELAVIL) 25 MG tablet Take 1 tablet by mouth nightly as needed (sleep and chronic pain) 15 tablet 1    oxyCODONE-acetaminophen (PERCOCET)  MG per tablet Take 1 tablet by mouth every 8 hours as needed for Pain for up to 30 days. Intended supply: 30 days 90 tablet 0    fluticasone (FLONASE) 50 MCG/ACT nasal spray SHAKE WELL AND USE 2 SPRAYS IN EACH NOSTRIL DAILY 3 Bottle 3    ibuprofen (ADVIL;MOTRIN) 800 MG tablet TAKE 1 TABLET BY MOUTH THREE TIMES DAILY WITH MEALS 90 tablet 0    metFORMIN (GLUCOPHAGE-XR) 500 MG extended release tablet TAKE 2 TABLETS BY MOUTH TWICE DAILY 360 tablet 3    glimepiride (AMARYL) 4 MG tablet TAKE 1 TABLET BY MOUTH EVERY MORNING 90 tablet 3    insulin detemir (LEVEMIR FLEXTOUCH) 100 UNIT/ML injection pen Inject 40 Units into the skin nightly 15 mL 12    montelukast (SINGULAIR) 10 MG tablet TAKE 1 TABLET BY MOUTH NIGHTLY 90 tablet 3    JARDIANCE 10 MG tablet TAKE 1 TABLET BY MOUTH DAILY 90 tablet 3    esomeprazole (NEXIUM) 40 MG delayed release capsule TAKE 1 CAPSULE BY MOUTH DAILY 90 capsule 3    losartan-hydrochlorothiazide (HYZAAR) 100-25 MG per tablet TAKE 1 TABLET BY MOUTH DAILY 90 tablet 2    ondansetron (ZOFRAN ODT) 4 MG disintegrating tablet Take 1-2 tablets by mouth every 8 hours as needed for Nausea May Sub regular tablet (non-ODT) if insurance does not cover ODT.  20 tablet 0    B-D UF III MINI PEN NEEDLES 31G X 5 MM MISC USE ONCE DAILY AS DIRECTED 100 each 3    nystatin (MYCOSTATIN) 260881 UNIT/GM cream APPLY TOPICALLY TWICE DAILY TO FOUR TIMES DAILY 60 g 0    simvastatin (ZOCOR) 40 MG tablet TAKE 1 TABLET BY MOUTH NIGHTLY 90 tablet 3    clobetasol (OLUX) 0.05 % foam APPLY EXTERNALLY TO THE SCALP TWICE DAILY AS NEEDED 50 g 3    ONE TOUCH ULTRA TEST strip USE TO TEST THREE TIMES DAILY AS NEEDED 300 strip 1    fluticasone (FLONASE) 50 MCG/ACT nasal spray SHAKE LIQUID AND USE 2 SPRAYS IN EACH NOSTRIL DAILY (Patient taking differently: as needed SHAKE LIQUID AND USE 2 SPRAYS IN [facility] NOSTRIL DAILY) 3 Bottle 3    ONETOUCH DELICA LANCETS FINE MISC USE TO TEST THREE TIMES DAILY 300 each 12    Blood Glucose Monitoring Suppl (ONE TOUCH ULTRA 2) W/DEVICE KIT 1 kit by Does not apply route daily as needed. 1 kit 0    MICROLET LANCETS MISC TEST 3 TO 4 TIMES DAILY 300 each 10     No current facility-administered medications for this visit. Family History   Problem Relation Age of Onset    Diabetes Mother     Sudden Death Mother         suicide    Alcohol Abuse Father     Alcohol Abuse Brother        Social History     Socioeconomic History    Marital status:      Spouse name: Not on file    Number of children: Not on file    Years of education: Not on file    Highest education level: Not on file   Occupational History    Occupation: Disabled     Comment: 2000   Social Needs    Financial resource strain: Not on file    Food insecurity:     Worry: Not on file     Inability: Not on file   [employer] needs:     Medical: Not on file     Non-medical: Not on file   Tobacco Use    Smoking status: Never Smoker    Smokeless tobacco: Never Used   Substance and Sexual Activity    Alcohol use: No     Alcohol/week: 0.0 oz    Drug use: No    Sexual activity: Yes     Partners: Female   Lifestyle    Physical activity:     Days per week: Not on file     Minutes per session: Not on file    Stress: Not on file   Relationships    Social connections:     Talks on phone: Not on file     Gets together: Not on file     Attends [employer] service: Not on file     Active member of club or organization: Not on file     Attends meetings of clubs or organizations: Not on file     Relationship status: Not on file    Intimate partner violence:     Fear of current or ex partner: Not on file     Emotionally abused: Not on file     Physically abused: Not on file     Forced sexual activity: Not on file   Other Topics Concern    Not on file   Social History Narrative     but . Imaging Studies:   CT (12/16/2018)  Impression   1. No acute infective or inflammatory process. 2. No ureteric calculi however there are couple of punctate nonobstructing   bilateral renal calculi. 3. Stable cystic lesions associated with the pancreas consistent with   pseudocyst dating back to 2010.           Results of the above studies were personally reviewed by me with the patient indetail along with the images, when available. The patient was instructed to contact the primary care provider regarding other unrelated findings not addressed during today's visit. Review of Systems:   Review of Systems   Constitutional: Negative for chills and fever. HENT: Negative for hearing loss and tinnitus. Eyes: Negative for pain, discharge and redness. Respiratory: Negative for cough, shortness of breath and wheezing. Cardiovascular: Negative for chest pain and palpitations. Gastrointestinal: Positive for abdominal pain. Negative for constipation, nausea and vomiting. Genitourinary: Negative for frequency, hematuria and urgency. Musculoskeletal: Negative for back pain, myalgias and neck pain. Skin: Negative for rash. Neurological: Negative for dizziness, seizures, weakness and headaches. Hematological: Does not bruise/bleed easily. Psychiatric/Behavioral: Negative for suicidal ideas. The patient is not nervous/anxious. The patient was instructed to contact primary care physician regarding ROS positives not being addressed during today's visit. Physical Exam:   Physical Exam   Constitutional: He is oriented to person, place, and time. No distress. HENT:   Head: Normocephalic. Eyes: Pupils are equal, round, and reactive to light. EOM are normal.   Neck: Normal range of motion. Neck supple. No thyromegaly present. Cardiovascular: Normal rate, regular rhythm, normal heart sounds and intact distal pulses.    Pulmonary/Chest: Effort normal and breath sounds normal. No respiratory distress. He exhibits no tenderness. Abdominal: Soft. Bowel sounds are normal. He exhibits no mass. There is no tenderness. There is no guarding. Musculoskeletal: Normal range of motion. He exhibits no tenderness or deformity. Lymphadenopathy:     He has no cervical adenopathy. Neurological: He is alert and oriented to person, place, and time. He has normal strength and normal reflexes. He displays normal reflexes. No cranial nerve deficit or sensory deficit. He exhibits normal muscle tone. Coordination and gait normal. He displays no Babinski's sign on the right side. He displays no Babinski's sign on the left side. Reflex Scores:       Tricep reflexes are 2+ on the right side and 2+ on the left side. Bicep reflexes are 2+ on the right side and 2+ on the left side. Brachioradialis reflexes are 2+ on the right side and 2+ on the left side. Patellar reflexes are 2+ on the right side and 2+ on the left side. Achilles reflexes are 2+ on the right side and 2+ on the left side. Skin: Skin is warm. No rash noted. He is not diaphoretic. Psychiatric: He has a normal mood and affect. His behavior is normal. Thought content normal.       There were no vitals filed for this visit. There is no height or weight on file to calculate BMI.    /10    Goals of chronic pain therapy:      Multi-disciplinary comprehensive pain management with functional improvement and reduced opioid use. Prescription pain medication monitoring:      MEDD current = 45   ORT Score = 3   LOW     Other Risk factors - depression, anxiety.        Date of Last Medication Agreement: 11/05/2018   Date Naloxone prescribed: MARIANNA     UDT:    Date of last UDT: 03/07/2019    Adverse report: Yes (benzo)     OARRS:    Checked today: Yes    Adverse report: No     Medication Effects -        Analgesia:      Average level of pain for the past month = 8/10     Percentage of pain relief = 60%     Activities Improved: Activities of daily living = 60%     Sleep = 10%     Mood = 30%     Social functioning = 50%     Adverse Effects: Any adverse effects: Yes     Type/Severity of side effect: constipation - on laxative    Aberrant Behavior:     Requests for frequent early refills: No     Increased dose without authorization: No     Reports lost or stolen prescriptions: No     Attempts to obtain prescriptions from other providers: No     Use of pain medication in response to situational stressor: No      Increasingly unkempt or impaired: No     Negative mood changes: No     Abusing alcohol or illicit drugs: No     Appears intoxicated: No     Other: NA     Controlled Substances Monitoring:    Periodic Controlled Substance Monitoring: Possible medication side effects, risk of tolerance/dependence & alternative treatments discussed., No signs of potential drug abuse or diversion identified. Bryant Alvarez MD)      Overall Progress:       PEG Score = 6 / 10     Physical Therapy: N/A   Counseling: N/A   Injections: N/A     Patient compliance on plan of care: Fair   Progress on current plan:  Fair    Assessment:      ICD-10-CM    1. Chronic generalized abdominal pain R10.84 oxyCODONE-acetaminophen (PERCOCET)  MG per tablet    G89.29    2. Other chronic pancreatitis (HCC) K86.1 oxyCODONE-acetaminophen (PERCOCET)  MG per tablet   3. Chronic pain syndrome G89.4 amitriptyline (ELAVIL) 25 MG tablet     oxyCODONE-acetaminophen (PERCOCET)  MG per tablet   4. Pain medication agreement Z02.89 oxyCODONE-acetaminophen (PERCOCET)  MG per tablet   5. Chronic, continuous use of opioids F11.90    6. Pain disorder with psychological factors F45.41        Plan:     1. Chronic abdominal pain due to diverticulitis and chronic pancreatitis; in remission. 2. He is s/p subtotal colectomy, and multiple surgeries (last in 2004).   3. Intolerant of NSAIDs, Neurontin, Lyrica, and trazodone; on chronic opioid for years.  4. He has reduced opioid use by at least 50% since starting with us approximately 7 months ago. 5. Reports some benefit from Effexor; ongoing poor sleep issues - may trial amitriptyline. 6. He is scheduled to follow with [facility] for chronic pain counseling (Dr Joie Keys) today. 7. Meanwhile, I will continue Percocet 10 mg TID PRN - overall pain is stable. Analgesic Plan:   Continue present regimen: Yes - Percocet 10 mg TID PRN   Adjust dose of present analgesic: No   Switch analgesics: No   Add/Adjust concomitant therapy: No - , Elavil    Education:    - Reviewed OARRS report in detail, including Narx Scores and Overdose Risk Score. - Encouraged regular home exercises and strengthening as long-termgoals. - Counseled on dual effects, limitations, side effects and long-term complications of opioids. Discussed proper use and potential life threatening side effects of inappropriate use. - Educational materialprovided on multidisciplinary chronic pain management, safe opioid use. Follow-up: RTC X 1 month    Patient engaged in shared decision making. The entire treatment plan was discussed with patient and agreed. Thank you for allowing us to participate in the care of your patient - please do not hesitate to contact us if you have any questions or concerns.         Sj Beasley MD  Board Certified in Anesthesiology and Pain Medicine

## 2019-06-05 NOTE — PATIENT INSTRUCTIONS
Patient Education      May try amitriptyline for sleep and chronic pain. Limit use of Percocet for severe pain    Learning About Managing Chronic Pain  What is a plan for pain management? A pain management plan helps you find ways to control pain with side effects you can live with. Some diseases and injuries can cause pain that lasts a long time. Constant pain can make you depressed. It can cause stress and make it hard for you to eat and sleep. But you don't need to live with uncontrolled pain. How can you plan for managing your pain? You and your doctor will work to make your plan. Your plan can include more than one type of pain control. You may take prescription or over-the-counter drugs. You can also try physical treatments, like massage and acupuncture. Other things can help too, such as meditation or a type of therapy to change how you think about your pain. It's important to let your doctor know how you prefer to control your pain. Sometimes the goal of a pain management plan isn't to totally get rid of pain. Instead, it might be to reduce the pain enough that daily activities are easier. If your pain isn't controlled well enough, talk with your doctor. You may need to make a new plan. Or your doctor may refer you to a specialist.  What medicines are used? Your doctor may prescribe medicine to help with your pain. If you aren't taking a prescription medicine, you may be able to take an over-the-counter one. Here are the main types of medicine for chronic pain. · Non-opioids. These are things like acetaminophen, such as Tylenol, and non-steroidal anti-inflammatory drugs (NSAIDs), such as Advil. · Opioids. Morphine, codeine, and oxycodone are some examples. · Other medicines. Antidepressants and anti-seizure medicines may be used. These medicines seem to change the way your brain senses pain. Another option may be a nerve block injection. Medicines are the most common treatment for pain.  But to feel better, you'll need to do more than take medicine. You can also do things like reducing your stress level and changing how you think. How can you take medicine safely? Medicines can help you get better. But they can also be dangerous, especially if you don't take them the right way. Be safe with medicines. Read and follow all instructions on the label. If the medicine you take causes side effects such as constipation or nausea, you may need to take other medicines for those problems. Talk to your doctor about any side effects you have. If you were prescribed an opioid pain reliever, your care team will give you information on how to use it safely. You will also get directions for how to safely store the medicine and how to get rid of any that's left over. Follow these instructions carefully. What physical treatments can help? Physical treatments can be an important part of managing chronic pain. You may find that combining more than one treatment helps the most.  These treatments can include:  · Heat or cold. This can help arthritis, sore muscles, and other aches. · Hydrotherapy. It uses flowing water to relax muscles. · Massage. Massage involves rubbing the soft tissues of the body. It eases tension and pain. · Transcutaneous electrical nerve stimulation (TENS). This treatment uses a gentle electric current applied to the skin for pain relief. · Acupuncture. This is a form of traditional [facility] medicine. It uses very thin needles inserted into certain points of the body. · Physical therapy. This treatment uses stretches and exercises to reduce pain and help you move better. If you get physical therapy, make sure to do any home exercises or stretching your therapist has prescribed. Stay as active as you can. Try to get some physical activity every day. What other things can help? You can manage chronic pain by using things other than medicines or physical treatments.  For example, you can keep

## 2019-06-05 NOTE — PROGRESS NOTES
[address], [place], [address]  [phone] (o)  [phone] (f)    Angelica Joshi Psy.D. Clinical Psychologist/ Behavioral Health Consultant  6/5/2019  1:06 PM    Time spent with Patient: 35 minutes    The pt was referred for [facility] consult by Dr. Colton Elkins MD pain management. The pt provided informed consent for the behavioral health program. The pt was informed of the psychologists role and availability in the pain clinic. Pt indicated understanding. Feedback given to Dr. Colton Elkins, pain management physician. S:        HPI: The pt has been seen by Dr. Dusty Izquierdo since 11/2018 for chronic abdominal pain due to diverticulitis and chronic pancreatitis. Had colectomy 2001 reports multiple surgeries until 2004. The pt had abd mesh placed after that. The pt has been primary followed by PCP for chronic high dose opioid therapy and benzo for years. Other medical hx includes: HTN, GERD, diabetes type 2, chronic pancreatitis, MRSA, kidney stones, sleep apnea,           Current Visit: , lives in his daughters basement. Completes ADLs independently. Helps babysit, cut grass and do light housework. Pain is variable- 3/7 days can't get out of bed. Resting seems to help. Dealt with pain better when younger, takes more to \"gather self in the morning. \"    Reported hx of depression, Dad and mom death by suicide when pt was age 29. Depression increased in 2001 after pain increased due to mutliple surgeries as well as medical interference and decrease of QOL. Qualified for disability in 2001-02 (was in [employer]. work). Also reported some cognitive decline with memory due to pain and lack of sleep. Reported hx of anxiety due to pain that turns into generalized worry. Worries about son in [place]. When pain increases worries about health, prognosis, long term care- begins pacing, sweating and worrying about other things.       Dx with sleep facility-administered medications for this visit. Social History:   Social History     Socioeconomic History    Marital status:      Spouse name: Not on file    Number of children: Not on file    Years of education: Not on file    Highest education level: Not on file   Occupational History    Occupation: Disabled     Comment: 2000   Social Needs    Financial resource strain: Not on file    Food insecurity:     Worry: Not on file     Inability: Not on file   [employer] needs:     Medical: Not on file     Non-medical: Not on file   Tobacco Use    Smoking status: Never Smoker    Smokeless tobacco: Never Used   Substance and Sexual Activity    Alcohol use: No     Alcohol/week: 0.0 oz    Drug use: No    Sexual activity: Yes     Partners: Female   Lifestyle    Physical activity:     Days per week: Not on file     Minutes per session: Not on file    Stress: Not on file   Relationships    Social connections:     Talks on phone: Not on file     Gets together: Not on file     Attends [employer] service: Not on file     Active member of club or organization: Not on file     Attends meetings of clubs or organizations: Not on file     Relationship status: Not on file    Intimate partner violence:     Fear of current or ex partner: Not on file     Emotionally abused: Not on file     Physically abused: Not on file     Forced sexual activity: Not on file   Other Topics Concern    Not on file   Social History Narrative     but . TOBACCO:   reports that he has never smoked. He has never used smokeless tobacco.  ETOH:   reports that he does not drink alcohol. Family History:   Family History   Problem Relation Age of Onset    Diabetes Mother     Sudden Death Mother         suicide    Alcohol Abuse Father     Alcohol Abuse Brother        A:  The pt presented with chronic abd pain after multiple surgeries for diverticulitis and chronic pancreatitis.  The pt reported hx of

## 2019-06-05 NOTE — PROGRESS NOTES
Pain Medication Review -    Last Pain Medication Agreement date: 11/5/2018    Last UDT date: 3/7/2019    Last Opioid fill date (per [facility] Report): 5/6/2019   Days filled: 30    Expected Opioid pill count: 0     Actual Opiod pill count: 0    Notes -

## 2019-06-07 DIAGNOSIS — F41.9 ANXIETY: ICD-10-CM

## 2019-06-07 DIAGNOSIS — H81.09 MENIERE'S DISEASE, UNSPECIFIED LATERALITY: Primary | ICD-10-CM

## 2019-06-07 RX ORDER — DIAZEPAM 5 MG/1
5 TABLET ORAL EVERY 12 HOURS PRN
Qty: 30 TABLET | Refills: 0 | Status: SHIPPED | OUTPATIENT
Start: 2019-06-07 | End: 2019-06-24 | Stop reason: DRUGHIGH

## 2019-06-07 NOTE — TELEPHONE ENCOUNTER
Medication:   Requested Prescriptions     Pending Prescriptions Disp Refills    diazepam (VALIUM) 5 MG tablet [Pharmacy Med Name: DIAZEPAM 5MG TABLETS] 30 tablet 0     Sig: TAKE 1 TABLET BY MOUTH EVERY 12 HOURS AS NEEDED FOR ANXIETY      Last Filled: 1/31/19    Patient Phone Number: [phone] (home) [phone] (work)    Last appt: 5/2/2019   Next appt: 6/12/2019    Last OARRS:   RX Monitoring 6/5/2019   Attestation -   Periodic Controlled Substance Monitoring Possible medication side effects, risk of tolerance/dependence & alternative treatments discussed. ;No signs of potential drug abuse or diversion identified. Chronic Pain > 50 MEDD Obtained or confirmed \"Consent for Opioid Use\" on file.    Chronic Pain > 80 MEDD -       Preferred Pharmacy:   [id number] - [id number] [phone]  [address] [place] [id number]  Phone: [phone] Fax: [phone]    [facility] [address] [place] [address] [phone]  [address] [facility] [id number]  Phone: [phone] Fax: [phone]

## 2019-06-24 ENCOUNTER — HOSPITAL ENCOUNTER (OUTPATIENT)
Age: 54
Discharge: HOME | End: 2019-06-24
Payer: MEDICARE

## 2019-06-24 ENCOUNTER — OFFICE VISIT (OUTPATIENT)
Dept: FAMILY MEDICINE CLINIC | Age: 54
End: 2019-06-24
Payer: MEDICARE

## 2019-06-24 VITALS
DIASTOLIC BLOOD PRESSURE: 84 MMHG | WEIGHT: 302 LBS | SYSTOLIC BLOOD PRESSURE: 124 MMHG | BODY MASS INDEX: 40.96 KG/M2 | OXYGEN SATURATION: 97 % | HEART RATE: 70 BPM

## 2019-06-24 DIAGNOSIS — H81.09 MENIERE'S DISEASE, UNSPECIFIED LATERALITY: ICD-10-CM

## 2019-06-24 DIAGNOSIS — Z79.4 TYPE 2 DIABETES MELLITUS WITH DIABETIC NEPHROPATHY, WITH LONG-TERM CURRENT USE OF INSULIN (HCC): ICD-10-CM

## 2019-06-24 DIAGNOSIS — E11.21 TYPE 2 DIABETES MELLITUS WITH DIABETIC NEPHROPATHY, WITH LONG-TERM CURRENT USE OF INSULIN (HCC): ICD-10-CM

## 2019-06-24 DIAGNOSIS — Z79.4 TYPE 2 DIABETES MELLITUS WITH HYPERGLYCEMIA, WITH LONG-TERM CURRENT USE OF INSULIN (HCC): Primary | ICD-10-CM

## 2019-06-24 DIAGNOSIS — F51.01 PRIMARY INSOMNIA: ICD-10-CM

## 2019-06-24 DIAGNOSIS — E11.65 TYPE 2 DIABETES MELLITUS WITH HYPERGLYCEMIA, WITH LONG-TERM CURRENT USE OF INSULIN (HCC): Primary | ICD-10-CM

## 2019-06-24 DIAGNOSIS — F41.9 ANXIETY: ICD-10-CM

## 2019-06-24 LAB
ESTIMATED AVERAGE GLUCOSE: 180 MG/DL
HBA1C MFR BLD: 7.9 %

## 2019-06-24 PROCEDURE — 99214 OFFICE O/P EST MOD 30 MIN: CPT | Performed by: FAMILY MEDICINE

## 2019-06-24 PROCEDURE — 83036 HEMOGLOBIN GLYCOSYLATED A1C: CPT

## 2019-06-24 PROCEDURE — 36415 COLL VENOUS BLD VENIPUNCTURE: CPT

## 2019-06-24 RX ORDER — DIAZEPAM 5 MG/1
5 TABLET ORAL EVERY 12 HOURS PRN
Qty: 15 TABLET | Refills: 0 | Status: SHIPPED
Start: 2019-06-24 | End: 2019-07-24

## 2019-06-24 RX ORDER — DOXEPIN HYDROCHLORIDE 25 MG/1
25 CAPSULE ORAL NIGHTLY
Qty: 30 CAPSULE | Refills: 3 | Status: SHIPPED | OUTPATIENT
Start: 2019-06-24 | End: 2019-06-24 | Stop reason: SDUPTHER

## 2019-06-24 RX ORDER — DOXEPIN HYDROCHLORIDE 25 MG/1
25 CAPSULE ORAL NIGHTLY
Qty: 90 CAPSULE | Refills: 3 | Status: SHIPPED | OUTPATIENT
Start: 2019-06-24 | End: 2020-01-14

## 2019-06-24 NOTE — PROGRESS NOTES
Blood sugars 5-6 times a month will hit close to 200    Cut daughters grass with push mower 2-3 times a week. Walk every day, some days will not feel well, but will try to stay active. Just filled the diazepam and trying to take it for sleep, usually only taking when absolutely necessary when stressed out. Pain medicine put him on amitriptyline, he reports didn't help and tried trazodone 50 mg for 1 month and was not effective. Wearing his CPAP at night. Having difficulty falling asleep, and staying asleep. Once asleep will only sleep for 2 hours and will be wide awake again. He reports both heals have been hurting after mowing the lawn. He also states warts reappeared on the right great toe.       LE Pulses:  normal,   LE Edema: normal,  LE Skin lesions: abnormal - 4 warts on R MTP joint,

## 2019-06-24 NOTE — PATIENT INSTRUCTIONS
If the doxepin makes you groggy the next morning, cut the tablet in half. Podiatrists       Dr. Lalitha Willson  [address] [facility] , [address]  [phone]    [facility] [address]  Dr. Foote Phlegm  Dr. Federica Amato  [address] [facility], [address]  [phone]      [place] for [address] [place] [address]  Dr. Tanna Arellano. [address]   [phone]             Learning About Diabetes Food Guidelines  Your Care Instructions    Meal planning is important to manage diabetes. It helps keep your blood sugar at a target level (which you set with your doctor). You don't have to eat special foods. You can eat what your family eats, including sweets once in a while. But you do have to pay attention to how often you eat and how much you eat of certain foods. You may want to work with a dietitian or a certified diabetes educator (CDE) to help you plan meals and snacks. A dietitian or CDE can also help you lose weight if that is one of your goals. What should you know about eating carbs? Managing the amount of carbohydrate (carbs) you eat is an important part of healthy meals when you have diabetes. Carbohydrate is found in many foods. · Learn which foods have carbs. And learn the amounts of carbs in different foods. ? Bread, cereal, pasta, and rice have about 15 grams of carbs in a serving. A serving is 1 slice of bread (1 ounce), ½ cup of cooked cereal, or 1/3 cup of cooked pasta or rice. ? Fruits have 15 grams of carbs in a serving. A serving is 1 small fresh fruit, such as an apple or orange; ½ of a banana; ½ cup of cooked or canned fruit; ½ cup of fruit juice; 1 cup of melon or raspberries; or 2 tablespoons of dried fruit. ? Milk and no-sugar-added yogurt have 15 grams of carbs in a serving. A serving is 1 cup of milk or 2/3 cup of no-sugar-added yogurt.   ? Starchy vegetables have 15 grams of carbs in a serving. A serving is ½ cup of mashed potatoes or sweet potato; 1 cup winter squash; ½ of a small baked potato; ½ cup of cooked beans; or ½ cup cooked corn or green peas. · Learn how much carbs to eat each day and at each meal. A dietitian or CDE can teach you how to keep track of the amount of carbs you eat. This is called carbohydrate counting. · If you are not sure how to count carbohydrate grams, use the Plate Method to plan meals. It is a good, quick way to make sure that you have a balanced meal. It also helps you spread carbs throughout the day. ? Divide your plate by types of foods. Put non-starchy vegetables on half the plate, meat or other protein food on one-quarter of the plate, and a grain or starchy vegetable in the final quarter of the plate. To this you can add a small piece of fruit and 1 cup of milk or yogurt, depending on how many carbs you are supposed to eat at a meal.  · Try to eat about the same amount of carbs at each meal. Do not \"save up\" your daily allowance of carbs to eat at one meal.  · Proteins have very little or no carbs per serving. Examples of proteins are beef, chicken, turkey, fish, eggs, tofu, cheese, cottage cheese, and peanut butter. A serving size of meat is 3 ounces, which is about the size of a deck of cards. Examples of meat substitute serving sizes (equal to 1 ounce of meat) are 1/4 cup of cottage cheese, 1 egg, 1 tablespoon of peanut butter, and ½ cup of tofu. How can you eat out and still eat healthy? · Learn to estimate the serving sizes of foods that have carbohydrate. If you measure food at home, it will be easier to estimate the amount in a serving of restaurant food. · If the meal you order has too much carbohydrate (such as potatoes, corn, or baked beans), ask to have a low-carbohydrate food instead. Ask for a salad or green vegetables.   · If you use insulin, check your blood sugar before and after eating out to help you plan how much to eat in the future. · If you eat more carbohydrate at a meal than you had planned, take a walk or do other exercise. This will help lower your blood sugar. What else should you know? · Limit saturated fat, such as the fat from meat and dairy products. This is a healthy choice because people who have diabetes are at higher risk of heart disease. So choose lean cuts of meat and nonfat or low-fat dairy products. Use olive or canola oil instead of butter or shortening when cooking. · Don't skip meals. Your blood sugar may drop too low if you skip meals and take insulin or certain medicines for diabetes. · Check with your doctor before you drink alcohol. Alcohol can cause your blood sugar to drop too low. Alcohol can also cause a bad reaction if you take certain diabetes medicines. Follow-up care is a key part of your treatment and safety. Be sure to make and go to all appointments, and call your doctor if you are having problems. It's also a good idea to know your test results and keep a list of the medicines you take. Where can you learn more? Go to https://Buddha SoftwarepepicewVibby.DICOM Grid. org and sign in to your [employer] account. Enter Q691 in the MyLife box to learn more about \"Learning About Diabetes Food Guidelines. \"     If you do not have an account, please click on the \"Sign Up Now\" link. Current as of: July 25, 2018  Content Version: 12.0  © 4588-5728 Healthwise, Incorporated. Care instructions adapted under license by [facility] ([facility]). If you have questions about a medical condition or this instruction, always ask your healthcare professional. [place] [address] any warranty or liability for your use of this information. Patient Education        Learning About Diabetes and Exercise  Can you exercise if you have diabetes? When you have diabetes, it's important to get regular exercise. This helps control your blood sugar level.  You can still play sports, run, ride a bike, go swimming, and do other activities when you have diabetes. How can exercise help you manage diabetes? Your body turns the food you eat into glucose, a type of sugar. You need this sugar for energy. When you have diabetes, the sugar builds up in your blood. But when you exercise, your body uses sugar. This helps keep it from building up in your blood and results in lower blood sugar and better control of diabetes. Exercise may help you in other ways too. It can help you reach and stay at a healthy weight. It also helps improve blood pressure and cholesterol, which can reduce the risk of heart disease. Exercise can make you feel stronger and happier. It can help you relax and sleep better, and give you confidence in other things you do. How can you exercise safely? Before you start a new exercise program, talk to your doctor about how and when to exercise. You may need to have a medical exam and tests before you begin. Some types of exercise can be harmful if your diabetes is causing other problems, such as problems with your feet. Your doctor can tell you what types of exercise are good choices for you. These tips can help you exercise safely when you have diabetes. If your diabetes is controlled by diet or medicine that doesn't lower your blood sugar, you don't need to eat a snack before you exercise. · Check your blood sugar before you exercise. And be careful about what you eat. ? If your blood sugar is less than 100, eat a carbohydrate snack before you exercise. ? Be careful when you exercise if your blood sugar is over 300. High blood sugar can make you dehydrated. And that makes your blood sugar levels go even higher. If you have ketones in your blood or urine and your blood sugar is over 300, do not exercise. · Don't try to do too much at first. Build up your exercise program bit by bit. Try to get at least 30 minutes of exercise on most days of the week.  Walking is a good choice. You also may want to do other activities, such as riding a bike or swimming. You might try running or gardening. Try to include muscle-strengthening exercises at least 2 times a week. These exercises include push-ups and weight training. You can also use rubber tubing or stretch bands. You stretch or pull the tubing or band to build muscle strength. If you want to exercise more, slowly increase how hard or long you exercise. · You may get symptoms of low blood sugar during exercise or up to 24 hours later. Some symptoms of low blood sugar, such as sweating, a fast heartbeat, or feeling tired, can be confused with what can happen anytime you exercise. Other symptoms may include feeling anxious, dizzy, weak, or shaky. So it's a good idea to check your blood sugar again. · You can treat low blood sugar by eating or drinking something that has 15 grams of carbohydrate. These should be quick-sugar foods. Mariama Settles foods such as fruit juice, regular (not diet) soda, glucose tablets, hard candy, or raisins can help raise blood sugar. Check your blood sugar level again 15 minutes after having a quick-sugar food to make sure your level is getting back to your target range. · Drink plenty of water before, during, and after you exercise. · Wear medical alert jewelry that says you have diabetes. You can buy this at most drugstores. · Pay attention to your body. If you are used to exercise and notice that you can't do as much as usual, talk to your doctor. Follow-up care is a key part of your treatment and safety. Be sure to make and go to all appointments, and call your doctor if you are having problems. It's also a good idea to know your test results and keep a list of the medicines you take. Where can you learn more? Go to https://chofe.SoFits.Me. org and sign in to your [employer] account.  Enter E263 in the [employer] box to learn more about \"Learning About Diabetes and Exercise. \"     If you do not have an account, please click on the \"Sign Up Now\" link. Current as of: July 25, 2018  Content Version: 12.0  © 0219-4332 Healthwise, Incorporated. Care instructions adapted under license by [facility] ([facility]). If you have questions about a medical condition or this instruction, always ask your healthcare professional. [address] any warranty or liability for your use of this information.

## 2019-06-24 NOTE — PROGRESS NOTES
Digan Thompson is a 48 y.o. male who presents for follow-up of Type 2 diabetes mellitus. Current medication use: glimepiride, Levemir, Jardiance, metformin, taking as prescribed  Eye exam current (within one year): yes in May  Current diet: not so good, eating late at night, a lot of snacking  Current exercise:no formal exercise but does walk, sometimes will go to the mall, depends on how pain is doing, somedays when feels ok can mow ggrass but then takes it out of him for 2-3 days after.      Checks sugars at home: Yes 3x day  Hypoglycemia symptoms: Yes but not as much as he use too  AM Fasting: normal 120, this morning was 150  Postprandial:  170-200     Checks BP at home: no  Current medication use: losartan-hctz, taking as prescribed, no SE     Taking cholesterol medication simvastatin regularly:  no SE    He reports having trouble falling asleep as well as staying asleep. He states he usually gets 2 hours of sleep a night. Taking valium for sleep, does not help just makes him feel \"mellowed out. \"    No CP, no palpitations, no sob, no edema, no cough, no change in bowel or bladder,  no nausea, no vomiting, no abdominal pain, No numbness or tingling in feet. Patient's medications, allergies, past medical, surgical, social and family histories were reviewed and updated in the EHR as appropriate. Body mass index is 40.96 kg/m². Vitals:    06/24/19 1106   BP: 124/84   Site: Left Upper Arm   Position: Sitting   Cuff Size: Large Adult   Pulse: 70   SpO2: 97%   Weight: (!) 302 lb (137 kg)     Wt Readings from Last 3 Encounters:   06/24/19 (!) 302 lb (137 kg)   06/05/19 295 lb (133.8 kg)   05/06/19 296 lb (134.3 kg)       GENERAL:Alert and oriented x 4 NAD, affect appropriate and obese, well hydrated, well developed.   NECK:supple and non tender without mass, no thyromegaly or thyroid nodules, no cervical lymphadenopathy  LUNG:clear to auscultation bilaterally with normal respiratory effort and good air movement  CV: Normal heart sounds, regular rate and rhythm without murmurs  Monofilament Sensation:  normal,   LE Pulses:  normal,   LE Edema: normal,  LE Skin lesions: abnormal -  5 warts over MTP 1st right       PHQ score: PHQ-9 Total Score: 15 (6/5/2019  1:12 PM)        ASSESSMENT AND PLAN:       Easton Rausch was seen today for diabetes. Diagnoses and all orders for this visit:    Type 2 diabetes mellitus with hyperglycemia, with long-term current use of insulin ([address]  -     Hemoglobin A1C; Future  -     Basic Metabolic Panel; Future      -A1C  Higher  -back on diet and exercise  -continue current meds  -reviewed labs with patient  Lab Results   Component Value Date    LABA1C 7.9 06/24/2019     -lab slip given for next visit  -patient to check sugars as needed  -encouraged a healthy diet, regular exercise and maintaining a healthy weight  -RTO  3 months  -eye form given:  No  =  Type 2 diabetes mellitus with diabetic nephropathy, with long-term current use of insulin ([address]  -Stable, continue current medications. On losartan      Primary insomnia  [place] doxepin  Work ot decrease ambien    Anxiety  Takes diazepam for this and menieres, happens rarely, monitor  Work on cutting back  Controlled Substances Monitoring:   OARRS report reviewed       Meniere's disease, unspecified laterality  -     diazepam (VALIUM) 5 MG tablet; Take 1 tablet by mouth every 12 hours as needed (meniere's) for up to 30 days. As above    Other orders  -     Discontinue: doxepin (SINEQUAN) 25 MG capsule; Take 1 capsule by mouth nightly            Return in about 3 months (around 9/24/2019) for diabetes 30 min.

## 2019-07-03 ENCOUNTER — OFFICE VISIT (OUTPATIENT)
Dept: PAIN MANAGEMENT | Age: 54
End: 2019-07-03
Payer: MEDICARE

## 2019-07-03 VITALS
HEART RATE: 99 BPM | DIASTOLIC BLOOD PRESSURE: 87 MMHG | WEIGHT: 295.6 LBS | BODY MASS INDEX: 40.04 KG/M2 | SYSTOLIC BLOOD PRESSURE: 136 MMHG | HEIGHT: 72 IN

## 2019-07-03 DIAGNOSIS — G89.4 CHRONIC PAIN SYNDROME: Primary | ICD-10-CM

## 2019-07-03 DIAGNOSIS — F45.42 PAIN DISORDER WITH PSYCHOLOGICAL FACTORS: ICD-10-CM

## 2019-07-03 DIAGNOSIS — F41.9 CHRONIC ANXIETY: ICD-10-CM

## 2019-07-03 DIAGNOSIS — G89.29 CHRONIC GENERALIZED ABDOMINAL PAIN: Primary | ICD-10-CM

## 2019-07-03 DIAGNOSIS — F11.90 CHRONIC, CONTINUOUS USE OF OPIOIDS: ICD-10-CM

## 2019-07-03 DIAGNOSIS — R10.84 CHRONIC GENERALIZED ABDOMINAL PAIN: Primary | ICD-10-CM

## 2019-07-03 DIAGNOSIS — F41.9 ANXIETY AND DEPRESSION: ICD-10-CM

## 2019-07-03 DIAGNOSIS — K86.1 OTHER CHRONIC PANCREATITIS (HCC): ICD-10-CM

## 2019-07-03 DIAGNOSIS — G89.4 CHRONIC PAIN SYNDROME: ICD-10-CM

## 2019-07-03 DIAGNOSIS — F32.A ANXIETY AND DEPRESSION: ICD-10-CM

## 2019-07-03 DIAGNOSIS — Z02.89 PAIN MEDICATION AGREEMENT: ICD-10-CM

## 2019-07-03 PROCEDURE — 90832 PSYTX W PT 30 MINUTES: CPT | Performed by: PSYCHOLOGIST

## 2019-07-03 PROCEDURE — 99214 OFFICE O/P EST MOD 30 MIN: CPT | Performed by: ANESTHESIOLOGY

## 2019-07-03 RX ORDER — OXYCODONE AND ACETAMINOPHEN 10; 325 MG/1; MG/1
1 TABLET ORAL EVERY 8 HOURS PRN
Qty: 90 TABLET | Refills: 0 | Status: SHIPPED | OUTPATIENT
Start: 2019-07-03 | End: 2019-07-31 | Stop reason: SDUPTHER

## 2019-07-03 ASSESSMENT — ANXIETY QUESTIONNAIRES
6. BECOMING EASILY ANNOYED OR IRRITABLE: 1-SEVERAL DAYS
1. FEELING NERVOUS, ANXIOUS, OR ON EDGE: 2-OVER HALF THE DAYS
7. FEELING AFRAID AS IF SOMETHING AWFUL MIGHT HAPPEN: 1-SEVERAL DAYS
2. NOT BEING ABLE TO STOP OR CONTROL WORRYING: 1-SEVERAL DAYS
5. BEING SO RESTLESS THAT IT IS HARD TO SIT STILL: 1-SEVERAL DAYS
GAD7 TOTAL SCORE: 10
4. TROUBLE RELAXING: 3-NEARLY EVERY DAY
3. WORRYING TOO MUCH ABOUT DIFFERENT THINGS: 1-SEVERAL DAYS

## 2019-07-03 ASSESSMENT — PATIENT HEALTH QUESTIONNAIRE - PHQ9
2. FEELING DOWN, DEPRESSED OR HOPELESS: 1
4. FEELING TIRED OR HAVING LITTLE ENERGY: 1
9. THOUGHTS THAT YOU WOULD BE BETTER OFF DEAD, OR OF HURTING YOURSELF: 0
8. MOVING OR SPEAKING SO SLOWLY THAT OTHER PEOPLE COULD HAVE NOTICED. OR THE OPPOSITE, BEING SO FIGETY OR RESTLESS THAT YOU HAVE BEEN MOVING AROUND A LOT MORE THAN USUAL: 0
1. LITTLE INTEREST OR PLEASURE IN DOING THINGS: 1
SUM OF ALL RESPONSES TO PHQ QUESTIONS 1-9: 10
10. IF YOU CHECKED OFF ANY PROBLEMS, HOW DIFFICULT HAVE THESE PROBLEMS MADE IT FOR YOU TO DO YOUR WORK, TAKE CARE OF THINGS AT HOME, OR GET ALONG WITH OTHER PEOPLE: 0
6. FEELING BAD ABOUT YOURSELF - OR THAT YOU ARE A FAILURE OR HAVE LET YOURSELF OR YOUR FAMILY DOWN: 2
SUM OF ALL RESPONSES TO PHQ QUESTIONS 1-9: 10
5. POOR APPETITE OR OVEREATING: 2
3. TROUBLE FALLING OR STAYING ASLEEP: 3
SUM OF ALL RESPONSES TO PHQ9 QUESTIONS 1 & 2: 2
7. TROUBLE CONCENTRATING ON THINGS, SUCH AS READING THE NEWSPAPER OR WATCHING TELEVISION: 0

## 2019-07-03 ASSESSMENT — ENCOUNTER SYMPTOMS
EYE PAIN: 0
BACK PAIN: 1
SHORTNESS OF BREATH: 0
CONSTIPATION: 0
EYE REDNESS: 0
VOMITING: 0
COUGH: 0
NAUSEA: 0
EYE DISCHARGE: 0
WHEEZING: 0
ABDOMINAL PAIN: 0

## 2019-07-03 NOTE — PROGRESS NOTES
Emotionally abused: Not on file     Physically abused: Not on file     Forced sexual activity: Not on file   Other Topics Concern    Not on file   Social History Narrative     but . TOBACCO:   reports that he has never smoked. He has never used smokeless tobacco.  ETOH:   reports that he does not drink alcohol. Family History:   Family History   Problem Relation Age of Onset    Diabetes Mother     Sudden Death Mother         suicide    Alcohol Abuse Father     Alcohol Abuse Brother        A:  Continues to have difficulty sleeping, mainly due to anxiety and racing thoughts. Recently increased stress with ex wife conflict. Wants psychiatric referral     MARILOU 7 SCORE 7/3/2019 6/5/2019   MARILOU-7 Total Score 10 13     Interpretation of MARILOU-7 score: 5-9 = mild anxiety, 10-14 = moderate anxiety, 15+ = severe anxiety. Recommend referral to behavioral health for scores 10 or greater. PHQ Scores 7/3/2019 6/5/2019 10/8/2018 8/31/2017   PHQ2 Score 2 4 6 2   PHQ9 Score 10 15 6 2     Interpretation of Total Score Depression Severity: 1-4 = Minimal depression, 5-9 = Mild depression, 10-14 = Moderate depression, 15-19 = Moderately severe depression, 20-27 = Severe depression    Diagnosis:    1. Chronic pain syndrome    2. Anxiety and depression        Patient Active Problem List   Diagnosis    Mixed hyperlipidemia    Essential hypertension    Chronic pancreatitis    Chronic pain syndrome    Esophageal reflux    Sleep apnea    Allergic rhinitis    Depressive disorder, not elsewhere classified    Erectile dysfunction, non organic    Meniere's disease    Insomnia    Morbid obesity ([address]    Type 2 diabetes mellitus with renal manifestations ([address]    Microalbuminuria    Chronic generalized abdominal pain    Anxiety    Chronic, continuous use of opioids         Plan:  Pt interventions:  Continued to provide the pt with psycho-education regarding biopsychosocial model of pain.    Continued to

## 2019-07-08 LAB
6-ACETYLMORPHINE: NOT DETECTED
7-AMINOCLONAZEPAM: NOT DETECTED
ALPHA-OH-ALPRAZOLAM: NOT DETECTED
ALPRAZOLAM: NOT DETECTED
AMPHETAMINE: NOT DETECTED
BARBITURATES: NOT DETECTED
BENZOYLECGONINE: NOT DETECTED
BUPRENORPHINE: NOT DETECTED
CARISOPRODOL: NOT DETECTED
CLONAZEPAM: NOT DETECTED
CODEINE: NOT DETECTED
CREATININE URINE: 70.3 MG/DL (ref 20–400)
DIAZEPAM: NOT DETECTED
DRUGS EXPECTED: NORMAL
EER PAIN MGT DRUG PANEL, HIGH RES/EMIT U: NORMAL
ETHYL GLUCURONIDE: NOT DETECTED
FENTANYL: NOT DETECTED
HYDROCODONE: NOT DETECTED
HYDROMORPHONE: NOT DETECTED
LORAZEPAM: NOT DETECTED
MARIJUANA METABOLITE: NOT DETECTED
MDA: NOT DETECTED
MDEA: NOT DETECTED
MDMA URINE: NOT DETECTED
MEPERIDINE: NOT DETECTED
METHADONE: NOT DETECTED
METHAMPHETAMINE: NOT DETECTED
METHYLPHENIDATE: NOT DETECTED
MIDAZOLAM: NOT DETECTED
MORPHINE: NOT DETECTED
NORBUPRENORPHINE, FREE: NOT DETECTED
NORDIAZEPAM: NOT DETECTED
NORFENTANYL: NOT DETECTED
NORHYDROCODONE, URINE: NOT DETECTED
NOROXYCODONE: PRESENT
NOROXYMORPHONE, URINE: NOT DETECTED
OXAZEPAM: PRESENT
OXYCODONE: PRESENT
OXYMORPHONE: PRESENT
PAIN MANAGEMENT DRUG PANEL: NORMAL
PAIN MANAGEMENT DRUG PANEL: NORMAL
PCP: NOT DETECTED
PHENTERMINE: NOT DETECTED
PROPOXYPHENE: NOT DETECTED
TAPENTADOL, URINE: NOT DETECTED
TAPENTADOL-O-SULFATE, URINE: NOT DETECTED
TEMAZEPAM: PRESENT
TRAMADOL: NOT DETECTED
ZOLPIDEM: NOT DETECTED

## 2019-07-09 DIAGNOSIS — Z79.4 TYPE 2 DIABETES MELLITUS WITH DIABETIC NEPHROPATHY, WITH LONG-TERM CURRENT USE OF INSULIN (HCC): ICD-10-CM

## 2019-07-09 DIAGNOSIS — E11.21 TYPE 2 DIABETES MELLITUS WITH DIABETIC NEPHROPATHY, WITH LONG-TERM CURRENT USE OF INSULIN (HCC): ICD-10-CM

## 2019-07-09 RX ORDER — ONDANSETRON 4 MG/1
TABLET, FILM COATED ORAL
Qty: 30 TABLET | Refills: 0 | Status: SHIPPED | OUTPATIENT
Start: 2019-07-09 | End: 2019-08-15 | Stop reason: SDUPTHER

## 2019-07-30 DIAGNOSIS — Z02.89 PAIN MEDICATION AGREEMENT: ICD-10-CM

## 2019-07-30 DIAGNOSIS — G89.29 CHRONIC GENERALIZED ABDOMINAL PAIN: ICD-10-CM

## 2019-07-30 DIAGNOSIS — K86.1 OTHER CHRONIC PANCREATITIS (HCC): ICD-10-CM

## 2019-07-30 DIAGNOSIS — R10.84 CHRONIC GENERALIZED ABDOMINAL PAIN: ICD-10-CM

## 2019-07-30 DIAGNOSIS — G89.4 CHRONIC PAIN SYNDROME: ICD-10-CM

## 2019-07-30 NOTE — TELEPHONE ENCOUNTER
MEDICATION ISSUES     Reported Issue:  Medication Refill    Medication Information     - Refill medication requested: Percocet 10/325 mg       - Medication dosage and quantity: 10/325MG 1 Tab every 8 hours PRN Disp-90      - Reporting side effects, if any: No     - Other issues, if any: None      Controlled Substances      - Follow-up Appointment date: Yes    Pharmacy Information      - PHARMACY updated with patient: Yes     - PHARMACY updated in Chart: Yes      NOTE for Controlled Substances     PLEASE READ TO PATIENTS:    1. For routine refills: ALL MEDICATION REFILLS NEED 2 WORKING DAYS (48-HOUR) ADVANCED NOTICE - Not filled on weekends or holidays.     - Patient informed about the above policy:  Yes    2. If switching or changing opioid pain medications -    PATIENTS NEED TO BRING OLD MEDICATION FOR PILL COUNT AND POSSIBLE DESTRUCTION - before new medication could be filled.      - Patient informed about the above policy:  Yes

## 2019-07-31 PROBLEM — Z02.89 PAIN MEDICATION AGREEMENT: Status: ACTIVE | Noted: 2019-07-31

## 2019-07-31 RX ORDER — OXYCODONE AND ACETAMINOPHEN 10; 325 MG/1; MG/1
1 TABLET ORAL EVERY 8 HOURS PRN
Qty: 90 TABLET | Refills: 0 | Status: SHIPPED | OUTPATIENT
Start: 2019-08-02 | End: 2019-09-03 | Stop reason: SDUPTHER

## 2019-08-13 ENCOUNTER — TELEPHONE (OUTPATIENT)
Dept: PAIN MANAGEMENT | Age: 54
End: 2019-08-13

## 2019-08-14 NOTE — TELEPHONE ENCOUNTER
Patient called back today. 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 Chaz Adams will be here.

## 2019-08-15 DIAGNOSIS — E11.21 TYPE 2 DIABETES MELLITUS WITH DIABETIC NEPHROPATHY, WITH LONG-TERM CURRENT USE OF INSULIN (HCC): ICD-10-CM

## 2019-08-15 DIAGNOSIS — Z79.4 TYPE 2 DIABETES MELLITUS WITH DIABETIC NEPHROPATHY, WITH LONG-TERM CURRENT USE OF INSULIN (HCC): ICD-10-CM

## 2019-08-15 RX ORDER — ONDANSETRON 4 MG/1
TABLET, FILM COATED ORAL
Qty: 30 TABLET | Refills: 0 | Status: SHIPPED | OUTPATIENT
Start: 2019-08-15 | End: 2019-09-23 | Stop reason: SDUPTHER

## 2019-08-19 NOTE — TELEPHONE ENCOUNTER
Pt came into the office today with his bottle of Percocet 10/325mg for a pill count. Pt filled his last Rx of percocet on 8/1/19 per oarrs report. Pt had 43 Percocet pills today at his count.

## 2019-08-26 ENCOUNTER — HOSPITAL ENCOUNTER (OUTPATIENT)
Dept: GENERAL RADIOLOGY | Age: 54
Discharge: HOME | End: 2019-08-26
Payer: MEDICARE

## 2019-08-26 ENCOUNTER — OFFICE VISIT (OUTPATIENT)
Dept: FAMILY MEDICINE CLINIC | Age: 54
End: 2019-08-26
Payer: MEDICARE

## 2019-08-26 ENCOUNTER — HOSPITAL ENCOUNTER (OUTPATIENT)
Age: 54
Discharge: HOME | End: 2019-08-26
Payer: MEDICARE

## 2019-08-26 VITALS
OXYGEN SATURATION: 96 % | HEART RATE: 74 BPM | BODY MASS INDEX: 39.44 KG/M2 | DIASTOLIC BLOOD PRESSURE: 86 MMHG | WEIGHT: 290.8 LBS | SYSTOLIC BLOOD PRESSURE: 126 MMHG

## 2019-08-26 DIAGNOSIS — R05.9 COUGH: ICD-10-CM

## 2019-08-26 DIAGNOSIS — R05.9 COUGH: Primary | ICD-10-CM

## 2019-08-26 DIAGNOSIS — J40 BRONCHITIS: ICD-10-CM

## 2019-08-26 DIAGNOSIS — J01.91 ACUTE RECURRENT SINUSITIS, UNSPECIFIED LOCATION: ICD-10-CM

## 2019-08-26 PROCEDURE — 71046 X-RAY EXAM CHEST 2 VIEWS: CPT

## 2019-08-26 PROCEDURE — 99214 OFFICE O/P EST MOD 30 MIN: CPT | Performed by: FAMILY MEDICINE

## 2019-08-26 RX ORDER — LEVOFLOXACIN 750 MG/1
750 TABLET ORAL DAILY
Qty: 7 TABLET | Refills: 0 | Status: SHIPPED | OUTPATIENT
Start: 2019-08-26 | End: 2019-09-03 | Stop reason: SDUPTHER

## 2019-08-26 RX ORDER — BENZONATATE 200 MG/1
200 CAPSULE ORAL 3 TIMES DAILY PRN
Qty: 30 CAPSULE | Refills: 0 | Status: SHIPPED | OUTPATIENT
Start: 2019-08-26 | End: 2019-09-02

## 2019-08-26 RX ORDER — GUAIFENESIN, PSEUDOEPHEDRINE HYDROCHLORIDE 600; 60 MG/1; MG/1
1 TABLET, EXTENDED RELEASE ORAL EVERY 12 HOURS
Qty: 14 TABLET | Refills: 1 | Status: SHIPPED | OUTPATIENT
Start: 2019-08-26 | End: 2019-09-02

## 2019-08-26 ASSESSMENT — ENCOUNTER SYMPTOMS
DIARRHEA: 1
COUGH: 1
RHINORRHEA: 1
SORE THROAT: 1

## 2019-08-26 NOTE — PROGRESS NOTES
LIQUID AND USE 2 SPRAYS IN EACH NOSTRIL DAILY (Patient taking differently: as needed SHAKE LIQUID AND USE 2 SPRAYS IN EACH NOSTRIL DAILY) 3 Bottle 3    ONETOUCH DELICA LANCETS FINE MISC USE TO TEST THREE TIMES DAILY 300 each 12    Blood Glucose Monitoring Suppl (ONE TOUCH ULTRA 2) W/DEVICE KIT 1 kit by Does not apply route daily as needed. 1 kit 0    MICROLET LANCETS MISC TEST 3 TO 4 TIMES DAILY 300 each 10     No current facility-administered medications for this visit. Review of Systems   Constitutional: Negative for fever. HENT: Positive for ear pain, postnasal drip, rhinorrhea and sore throat. Respiratory: Positive for cough. Gastrointestinal: Positive for diarrhea. Neurological: Positive for headaches. OBJECTIVE:    /86 (Site: Left Upper Arm, Position: Sitting, Cuff Size: Large Adult)   Pulse 74   Wt 290 lb 12.8 oz (131.9 kg)   SpO2 96%   BMI 39.44 kg/m²    Physical Exam   Constitutional: He is oriented to person, place, and time. He appears well-developed and well-nourished. HENT:   Head: Normocephalic and atraumatic. Right Ear: External ear normal.   Left Ear: External ear normal.   Nose: Nose normal.   Mouth/Throat: Oropharynx is clear and moist.   Eyes: Conjunctivae and EOM are normal.   Neck: Normal range of motion. Neck supple. No JVD present. No tracheal deviation present. No thyromegaly present. Cardiovascular: Normal rate, regular rhythm and normal heart sounds. Pulmonary/Chest: Effort normal. No respiratory distress. He has rales. Lymphadenopathy:     He has no cervical adenopathy. Neurological: He is alert and oriented to person, place, and time. Skin: Skin is warm and dry. Psychiatric: He has a normal mood and affect. ASSESSMENT/PLAN:    Moshe Wilson was seen today for cough, diarrhea and nasal congestion. Diagnoses and all orders for this visit:    Cough  -     XR CHEST STANDARD (2 VW);  Future  -     levofloxacin (LEVAQUIN) 750 MG tablet;

## 2019-08-29 RX ORDER — IBUPROFEN 800 MG/1
TABLET ORAL
Qty: 90 TABLET | Refills: 0 | Status: SHIPPED | OUTPATIENT
Start: 2019-08-29 | End: 2019-11-06 | Stop reason: SDUPTHER

## 2019-09-03 ENCOUNTER — OFFICE VISIT (OUTPATIENT)
Dept: PAIN MANAGEMENT | Age: 54
End: 2019-09-03
Payer: MEDICARE

## 2019-09-03 ENCOUNTER — TELEPHONE (OUTPATIENT)
Dept: FAMILY MEDICINE CLINIC | Age: 54
End: 2019-09-03

## 2019-09-03 VITALS
DIASTOLIC BLOOD PRESSURE: 89 MMHG | BODY MASS INDEX: 39.28 KG/M2 | HEIGHT: 72 IN | HEART RATE: 89 BPM | WEIGHT: 290 LBS | SYSTOLIC BLOOD PRESSURE: 144 MMHG

## 2019-09-03 DIAGNOSIS — K86.1 OTHER CHRONIC PANCREATITIS (HCC): ICD-10-CM

## 2019-09-03 DIAGNOSIS — G89.4 CHRONIC PAIN SYNDROME: ICD-10-CM

## 2019-09-03 DIAGNOSIS — F11.90 CHRONIC, CONTINUOUS USE OF OPIOIDS: ICD-10-CM

## 2019-09-03 DIAGNOSIS — G89.29 CHRONIC GENERALIZED ABDOMINAL PAIN: Primary | ICD-10-CM

## 2019-09-03 DIAGNOSIS — R05.9 COUGH: ICD-10-CM

## 2019-09-03 DIAGNOSIS — R10.84 CHRONIC GENERALIZED ABDOMINAL PAIN: Primary | ICD-10-CM

## 2019-09-03 DIAGNOSIS — Z02.89 PAIN MEDICATION AGREEMENT: ICD-10-CM

## 2019-09-03 DIAGNOSIS — F45.42 PAIN DISORDER WITH PSYCHOLOGICAL FACTORS: ICD-10-CM

## 2019-09-03 PROCEDURE — 99213 OFFICE O/P EST LOW 20 MIN: CPT | Performed by: ANESTHESIOLOGY

## 2019-09-03 RX ORDER — OXYCODONE AND ACETAMINOPHEN 10; 325 MG/1; MG/1
1 TABLET ORAL EVERY 8 HOURS PRN
Qty: 90 TABLET | Refills: 0 | Status: SHIPPED | OUTPATIENT
Start: 2019-09-03 | End: 2021-05-19

## 2019-09-03 RX ORDER — LEVOFLOXACIN 750 MG/1
750 TABLET ORAL DAILY
Qty: 7 TABLET | Refills: 0 | Status: SHIPPED | OUTPATIENT
Start: 2019-09-03 | End: 2019-09-10

## 2019-09-03 ASSESSMENT — ENCOUNTER SYMPTOMS
VOMITING: 0
NAUSEA: 0
SHORTNESS OF BREATH: 0
EYE DISCHARGE: 0
BACK PAIN: 0
ABDOMINAL PAIN: 1
EYE PAIN: 0
COUGH: 0
EYE REDNESS: 0
CONSTIPATION: 0
WHEEZING: 0

## 2019-09-03 NOTE — PROGRESS NOTES
Pain Medication Review -    Last Pain Medication Agreement date: NA    Last UDT date: 7/03/19    Last Opioid fill date (per [facility] Report): NA   Days filled: 8/02/19    Expected Opioid pill count: 0     Actual Opiod pill count: 0    Notes -

## 2019-09-03 NOTE — PROGRESS NOTES
tablet Take 1 tablet by mouth every 8 hours as needed (severe pain) for up to 30 days. Intended supply: 30 days 90 tablet 0    ibuprofen (ADVIL;MOTRIN) 800 MG tablet TAKE 1 TABLET BY MOUTH THREE TIMES DAILY WITH MEALS 90 tablet 0    ondansetron (ZOFRAN) 4 MG tablet TAKE 1 TABLET BY MOUTH EVERY 8 HOURS AS NEEDED FOR NAUSEA AND VOMITING 30 tablet 0    doxepin (SINEQUAN) 25 MG capsule TAKE 1 CAPSULE BY MOUTH NIGHTLY 90 capsule 3    fluticasone (FLONASE) 50 MCG/ACT nasal spray SHAKE WELL AND USE 2 SPRAYS IN EACH NOSTRIL DAILY 3 Bottle 3    metFORMIN (GLUCOPHAGE-XR) 500 MG extended release tablet TAKE 2 TABLETS BY MOUTH TWICE DAILY 360 tablet 3    glimepiride (AMARYL) 4 MG tablet TAKE 1 TABLET BY MOUTH EVERY MORNING 90 tablet 3    insulin detemir (LEVEMIR FLEXTOUCH) 100 UNIT/ML injection pen Inject 40 Units into the skin nightly 15 mL 12    montelukast (SINGULAIR) 10 MG tablet TAKE 1 TABLET BY MOUTH NIGHTLY 90 tablet 3    JARDIANCE 10 MG tablet TAKE 1 TABLET BY MOUTH DAILY 90 tablet 3    esomeprazole (NEXIUM) 40 MG delayed release capsule TAKE 1 CAPSULE BY MOUTH DAILY 90 capsule 3    losartan-hydrochlorothiazide (HYZAAR) 100-25 MG per tablet TAKE 1 TABLET BY MOUTH DAILY 90 tablet 2    ondansetron (ZOFRAN ODT) 4 MG disintegrating tablet Take 1-2 tablets by mouth every 8 hours as needed for Nausea May Sub regular tablet (non-ODT) if insurance does not cover ODT.  20 tablet 0    B-D UF III MINI PEN NEEDLES 31G X 5 MM MISC USE ONCE DAILY AS DIRECTED 100 each 3    nystatin (MYCOSTATIN) 806117 UNIT/GM cream APPLY TOPICALLY TWICE DAILY TO FOUR TIMES DAILY 60 g 0    simvastatin (ZOCOR) 40 MG tablet TAKE 1 TABLET BY MOUTH NIGHTLY 90 tablet 3    clobetasol (OLUX) 0.05 % foam APPLY EXTERNALLY TO THE SCALP TWICE DAILY AS NEEDED 50 g 3    ONE TOUCH ULTRA TEST strip USE TO TEST THREE TIMES DAILY AS NEEDED 300 strip 1    fluticasone (FLONASE) 50 MCG/ACT nasal spray SHAKE LIQUID AND USE 2 SPRAYS IN EACH NOSTRIL DAILY (Patient taking differently: as needed SHAKE LIQUID AND USE 2 SPRAYS IN EACH NOSTRIL DAILY) 3 Bottle 3    ONETOUCH DELICA LANCETS FINE MISC USE TO TEST THREE TIMES DAILY 300 each 12    Blood Glucose Monitoring Suppl (ONE TOUCH ULTRA 2) W/DEVICE KIT 1 kit by Does not apply route daily as needed. 1 kit 0    MICROLET LANCETS MISC TEST 3 TO 4 TIMES DAILY 300 each 10     No current facility-administered medications for this visit.         Family History   Problem Relation Age of Onset    Diabetes Mother     Sudden Death Mother         suicide    Alcohol Abuse Father     Alcohol Abuse Brother        Social History     Socioeconomic History    Marital status:      Spouse name: Not on file    Number of children: Not on file    Years of education: Not on file    Highest education level: Not on file   Occupational History    Occupation: Disabled     Comment: 2000   Social Needs    Financial resource strain: Not on file    Food insecurity:     Worry: Not on file     Inability: Not on file   Phonethics [employer] needs:     Medical: Not on file     Non-medical: Not on file   Tobacco Use    Smoking status: Never Smoker    Smokeless tobacco: Never Used   Substance and Sexual Activity    Alcohol use: No     Alcohol/week: 0.0 standard drinks    Drug use: No    Sexual activity: Yes     Partners: Female   Lifestyle    Physical activity:     Days per week: Not on file     Minutes per session: Not on file    Stress: Not on file   Relationships    Social connections:     Talks on phone: Not on file     Gets together: Not on file     Attends [employer]ism service: Not on file     Active member of club or organization: Not on file     Attends meetings of clubs or organizations: Not on file     Relationship status: Not on file    Intimate partner violence:     Fear of current or ex partner: Not on file     Emotionally abused: Not on file     Physically abused: Not on file     Forced sexual activity: Not on file   Other NA     UDT:    Date of last UDT: 07/03/2019    Adverse report: YES (benzo)     OARRS:    Checked today: Yes    Adverse report: No     Medication Effects -        Analgesia:      Average level of pain for the past month = 8/10     Percentage of pain relief = 50%     Activities Improved: Activities of daily living = 50%     Sleep = 0%     Mood = 50%     Social functioning = 50%     Adverse Effects: Any adverse effects: Yes     Type/Severity of side effect: constipation (on laxative)    Aberrant Behavior:     Requests for frequent early refills: No     Increased dose without authorization: No     Reports lost or stolen prescriptions: No     Attempts to obtain prescriptions from other providers: No     Use of pain medication in response to situational stressor: No      Increasingly unkempt or impaired: No     Negative mood changes: No     Abusing alcohol or illicit drugs: No     Appears intoxicated: No     Other: NA     Controlled Substances Monitoring:    Periodic Controlled Substance Monitoring: Possible medication side effects, risk of tolerance/dependence & alternative treatments discussed. , Assessed functional status., Obtaining appropriate analgesic effect of treatment. Frank Stubbs MD)      Overall Progress:       PEG Score = 8 / 10     Physical Therapy: N/A   Counseling: N/A   Injections: N/A     Patient compliance on plan of care: Fair   Progress on current plan:  Fair    Assessment:      ICD-10-CM    1. Chronic generalized abdominal pain R10.84 oxyCODONE-acetaminophen (PERCOCET)  MG per tablet    G89.29    2. Other chronic pancreatitis (HCC) K86.1 oxyCODONE-acetaminophen (PERCOCET)  MG per tablet   3. Chronic, continuous use of opioids F11.90    4. Chronic pain syndrome G89.4 oxyCODONE-acetaminophen (PERCOCET)  MG per tablet   5. Pain medication agreement Z02.89 oxyCODONE-acetaminophen (PERCOCET)  MG per tablet   6.  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

## 2019-09-03 NOTE — PATIENT INSTRUCTIONS
exercises such as walking, swimming, and stationary biking. Do stretches to stay flexible. · Try heat, cold packs, and massage. · Get enough sleep. Chronic pain can make you tired and drain your energy. Talk with your doctor if you have trouble sleeping because of pain. · Think positive. Your thoughts can affect your pain level. Do things that you enjoy to distract yourself when you have pain instead of focusing on the pain. See a movie, read a book, listen to music, or spend time with a friend. · If you think you are depressed, talk to your doctor about treatment. · Keep a daily pain diary. Record how your moods, thoughts, sleep patterns, activities, and medicine affect your pain. You may find that your pain is worse during or after certain activities or when you are feeling a certain emotion. Having a record of your pain can help you and your doctor find the best ways to treat your pain. · Take pain medicines exactly as directed. ? If the doctor gave you a prescription medicine for pain, take it as prescribed. ? If you are not taking a prescription pain medicine, ask your doctor if you can take an over-the-counter medicine. Reducing constipation caused by pain medicine  · Include fruits, vegetables, beans, and whole grains in your diet each day. These foods are high in fiber. · Drink plenty of fluids, enough so that your urine is light yellow or clear like water. If you have kidney, heart, or liver disease and have to limit fluids, talk with your doctor before you increase the amount of fluids you drink. · If your doctor recommends it, get more exercise. Walking is a good choice. Bit by bit, increase the amount you walk every day. Try for at least 30 minutes on most days of the week. · Schedule time each day for a bowel movement. A daily routine may help. Take your time and do not strain when having a bowel movement. When should you call for help?   Call your doctor now or seek immediate medical care

## 2019-09-05 RX ORDER — LOSARTAN POTASSIUM AND HYDROCHLOROTHIAZIDE 25; 100 MG/1; MG/1
TABLET ORAL
Qty: 90 TABLET | Refills: 3 | Status: SHIPPED | OUTPATIENT
Start: 2019-09-05 | End: 2019-11-11 | Stop reason: SDUPTHER

## 2019-09-23 DIAGNOSIS — Z79.4 TYPE 2 DIABETES MELLITUS WITH DIABETIC NEPHROPATHY, WITH LONG-TERM CURRENT USE OF INSULIN (HCC): ICD-10-CM

## 2019-09-23 DIAGNOSIS — E11.21 TYPE 2 DIABETES MELLITUS WITH DIABETIC NEPHROPATHY, WITH LONG-TERM CURRENT USE OF INSULIN (HCC): ICD-10-CM

## 2019-09-23 DIAGNOSIS — F41.9 ANXIETY: ICD-10-CM

## 2019-09-24 NOTE — TELEPHONE ENCOUNTER
Medication:   Requested Prescriptions     Pending Prescriptions Disp Refills    diazepam (VALIUM) 5 MG tablet [Pharmacy Med Name: DIAZEPAM 5MG TABLETS] 30 tablet 0     Sig: TAKE 1 TABLET BY MOUTH EVERY 12 HOURS AS NEEDED FOR ANXIETY    ondansetron (ZOFRAN) 4 MG tablet [Pharmacy Med Name: ONDANSETRON 4MG TABLETS] 30 tablet 0     Sig: TAKE 1 TABLET BY MOUTH EVERY 8 HOURS AS NEEDED FOR NAUSEA AND VOMITING      Last Filled:  Diazepam 6/24/19                        Zofran  12/16/18    Patient Phone Number: [phone] (home) [phone] (work)    Last appt: 8/30/2018   Next appt: 10/7/2019    Last OARRS:   RX Monitoring 9/3/2019   Attestation -   Periodic Controlled Substance Monitoring Possible medication side effects, risk of tolerance/dependence & alternative treatments discussed. ;Assessed functional status. ;Obtaining appropriate analgesic effect of treatment. Chronic Pain > 50 MEDD Obtained or confirmed \"Consent for Opioid Use\" on file.    Chronic Pain > 80 MEDD -       Preferred Pharmacy:   [address] [phone]  [address] [place] [phone]  Phone: [phone] Fax: [phone]

## 2019-09-25 RX ORDER — DIAZEPAM 5 MG/1
TABLET ORAL
Qty: 30 TABLET | Refills: 0 | Status: SHIPPED | OUTPATIENT
Start: 2019-09-25 | End: 2019-10-25

## 2019-09-25 RX ORDER — ONDANSETRON 4 MG/1
TABLET, FILM COATED ORAL
Qty: 30 TABLET | Refills: 0 | Status: SHIPPED | OUTPATIENT
Start: 2019-09-25 | End: 2019-11-06 | Stop reason: SDUPTHER

## 2019-10-07 ENCOUNTER — TELEPHONE (OUTPATIENT)
Dept: FAMILY MEDICINE CLINIC | Age: 54
End: 2019-10-07

## 2019-10-07 ENCOUNTER — OFFICE VISIT (OUTPATIENT)
Dept: FAMILY MEDICINE CLINIC | Age: 54
End: 2019-10-07
Payer: MEDICARE

## 2019-10-07 VITALS
TEMPERATURE: 97.5 F | DIASTOLIC BLOOD PRESSURE: 78 MMHG | HEART RATE: 72 BPM | WEIGHT: 287 LBS | OXYGEN SATURATION: 97 % | SYSTOLIC BLOOD PRESSURE: 128 MMHG | BODY MASS INDEX: 38.92 KG/M2

## 2019-10-07 DIAGNOSIS — Z79.4 TYPE 2 DIABETES MELLITUS WITH DIABETIC NEPHROPATHY, WITH LONG-TERM CURRENT USE OF INSULIN (HCC): Primary | ICD-10-CM

## 2019-10-07 DIAGNOSIS — H81.09 MENIERE'S DISEASE, UNSPECIFIED LATERALITY: ICD-10-CM

## 2019-10-07 DIAGNOSIS — E11.21 TYPE 2 DIABETES MELLITUS WITH DIABETIC NEPHROPATHY, WITH LONG-TERM CURRENT USE OF INSULIN (HCC): Primary | ICD-10-CM

## 2019-10-07 DIAGNOSIS — J32.9 CHRONIC SINUSITIS, UNSPECIFIED LOCATION: ICD-10-CM

## 2019-10-07 DIAGNOSIS — I10 ESSENTIAL HYPERTENSION: ICD-10-CM

## 2019-10-07 PROCEDURE — 99214 OFFICE O/P EST MOD 30 MIN: CPT | Performed by: FAMILY MEDICINE

## 2019-10-07 PROCEDURE — 96372 THER/PROPH/DIAG INJ SC/IM: CPT | Performed by: FAMILY MEDICINE

## 2019-10-07 RX ORDER — METHYLPREDNISOLONE ACETATE 80 MG/ML
80 INJECTION, SUSPENSION INTRA-ARTICULAR; INTRALESIONAL; INTRAMUSCULAR; SOFT TISSUE ONCE
Status: COMPLETED | OUTPATIENT
Start: 2019-10-07 | End: 2019-10-07

## 2019-10-07 RX ORDER — SIMVASTATIN 40 MG
TABLET ORAL
Qty: 90 TABLET | Refills: 3 | Status: SHIPPED | OUTPATIENT
Start: 2019-10-07 | End: 2020-10-09 | Stop reason: SDUPTHER

## 2019-10-07 RX ADMIN — METHYLPREDNISOLONE ACETATE 80 MG: 80 INJECTION, SUSPENSION INTRA-ARTICULAR; INTRALESIONAL; INTRAMUSCULAR; SOFT TISSUE at 12:52

## 2019-10-14 RX ORDER — LOSARTAN POTASSIUM AND HYDROCHLOROTHIAZIDE 25; 100 MG/1; MG/1
TABLET ORAL
Qty: 90 TABLET | Refills: 3 | Status: SHIPPED | OUTPATIENT
Start: 2019-10-14 | End: 2020-09-10 | Stop reason: SDUPTHER

## 2019-11-06 DIAGNOSIS — E11.21 TYPE 2 DIABETES MELLITUS WITH DIABETIC NEPHROPATHY, WITH LONG-TERM CURRENT USE OF INSULIN (HCC): ICD-10-CM

## 2019-11-06 DIAGNOSIS — Z79.4 TYPE 2 DIABETES MELLITUS WITH DIABETIC NEPHROPATHY, WITH LONG-TERM CURRENT USE OF INSULIN (HCC): ICD-10-CM

## 2019-11-07 RX ORDER — ONDANSETRON 4 MG/1
TABLET, FILM COATED ORAL
Qty: 30 TABLET | Refills: 0 | Status: SHIPPED | OUTPATIENT
Start: 2019-11-07 | End: 2019-12-03 | Stop reason: SDUPTHER

## 2019-11-07 RX ORDER — IBUPROFEN 800 MG/1
TABLET ORAL
Qty: 90 TABLET | Refills: 0 | Status: SHIPPED | OUTPATIENT
Start: 2019-11-07 | End: 2019-12-30

## 2019-11-08 ENCOUNTER — TELEPHONE (OUTPATIENT)
Dept: FAMILY MEDICINE CLINIC | Age: 54
End: 2019-11-08

## 2019-12-03 DIAGNOSIS — Z79.4 TYPE 2 DIABETES MELLITUS WITH DIABETIC NEPHROPATHY, WITH LONG-TERM CURRENT USE OF INSULIN (HCC): ICD-10-CM

## 2019-12-03 DIAGNOSIS — E11.21 TYPE 2 DIABETES MELLITUS WITH DIABETIC NEPHROPATHY, WITH LONG-TERM CURRENT USE OF INSULIN (HCC): ICD-10-CM

## 2019-12-03 RX ORDER — ONDANSETRON 4 MG/1
TABLET, FILM COATED ORAL
Qty: 30 TABLET | Refills: 0 | Status: SHIPPED | OUTPATIENT
Start: 2019-12-03 | End: 2020-01-06

## 2019-12-03 RX ORDER — BLOOD-GLUCOSE METER
1 EACH MISCELLANEOUS DAILY
Qty: 1 KIT | Refills: 0 | Status: SHIPPED | OUTPATIENT
Start: 2019-12-03

## 2019-12-10 DIAGNOSIS — Z79.4 TYPE 2 DIABETES MELLITUS WITH DIABETIC NEPHROPATHY, WITH LONG-TERM CURRENT USE OF INSULIN (HCC): Primary | ICD-10-CM

## 2019-12-10 DIAGNOSIS — E11.21 TYPE 2 DIABETES MELLITUS WITH DIABETIC NEPHROPATHY, WITH LONG-TERM CURRENT USE OF INSULIN (HCC): Primary | ICD-10-CM

## 2019-12-10 RX ORDER — PEN NEEDLE, DIABETIC 31 GX5/16"
NEEDLE, DISPOSABLE MISCELLANEOUS
Qty: 100 EACH | Refills: 12 | Status: SHIPPED | OUTPATIENT
Start: 2019-12-10 | End: 2021-10-04 | Stop reason: SDUPTHER

## 2019-12-14 ENCOUNTER — OFFICE VISIT (OUTPATIENT)
Dept: FAMILY MEDICINE CLINIC | Age: 54
End: 2019-12-14
Payer: MEDICARE

## 2019-12-14 VITALS
TEMPERATURE: 97.7 F | SYSTOLIC BLOOD PRESSURE: 118 MMHG | OXYGEN SATURATION: 96 % | DIASTOLIC BLOOD PRESSURE: 80 MMHG | HEART RATE: 77 BPM | BODY MASS INDEX: 39.47 KG/M2 | WEIGHT: 291 LBS

## 2019-12-14 DIAGNOSIS — B96.89 ACUTE BACTERIAL SINUSITIS: Primary | ICD-10-CM

## 2019-12-14 DIAGNOSIS — J01.90 ACUTE BACTERIAL SINUSITIS: Primary | ICD-10-CM

## 2019-12-14 PROCEDURE — 99213 OFFICE O/P EST LOW 20 MIN: CPT | Performed by: FAMILY MEDICINE

## 2019-12-14 RX ORDER — GUAIFENESIN, PSEUDOEPHEDRINE HYDROCHLORIDE 600; 60 MG/1; MG/1
1 TABLET, EXTENDED RELEASE ORAL EVERY 12 HOURS
Qty: 20 TABLET | Refills: 1 | Status: SHIPPED | OUTPATIENT
Start: 2019-12-14 | End: 2019-12-24

## 2019-12-14 RX ORDER — LEVOFLOXACIN 500 MG/1
500 TABLET, FILM COATED ORAL DAILY
Qty: 10 TABLET | Refills: 1 | Status: SHIPPED | OUTPATIENT
Start: 2019-12-14 | End: 2019-12-24

## 2019-12-14 ASSESSMENT — ENCOUNTER SYMPTOMS
COUGH: 1
ABDOMINAL PAIN: 1
SINUS PRESSURE: 1
HOARSE VOICE: 1
SORE THROAT: 1
DIARRHEA: 1
CONSTIPATION: 1

## 2019-12-27 DIAGNOSIS — H81.09 MENIERE'S DISEASE, UNSPECIFIED LATERALITY: ICD-10-CM

## 2019-12-30 RX ORDER — IBUPROFEN 800 MG/1
TABLET ORAL
Qty: 90 TABLET | Refills: 0 | Status: SHIPPED | OUTPATIENT
Start: 2019-12-30 | End: 2020-02-20

## 2019-12-30 RX ORDER — DIAZEPAM 5 MG/1
TABLET ORAL
Qty: 30 TABLET | Refills: 0 | Status: SHIPPED | OUTPATIENT
Start: 2019-12-30 | End: 2020-02-20

## 2020-01-06 RX ORDER — ONDANSETRON 4 MG/1
TABLET, FILM COATED ORAL
Qty: 30 TABLET | Refills: 0 | Status: SHIPPED | OUTPATIENT
Start: 2020-01-06 | End: 2020-02-20

## 2020-01-13 ENCOUNTER — HOSPITAL ENCOUNTER (OUTPATIENT)
Age: 55
Discharge: HOME | End: 2020-01-13
Payer: MEDICARE

## 2020-01-13 LAB
A/G RATIO: 1.1 (ref 1.1–2.2)
ALBUMIN SERPL-MCNC: 4.1 G/DL (ref 3.4–5)
ALP BLD-CCNC: 76 U/L (ref 40–129)
ALT SERPL-CCNC: 44 U/L (ref 10–40)
ANION GAP SERPL CALCULATED.3IONS-SCNC: 14 MMOL/L (ref 3–16)
AST SERPL-CCNC: 21 U/L (ref 15–37)
BILIRUB SERPL-MCNC: 0.4 MG/DL (ref 0–1)
BUN BLDV-MCNC: 16 MG/DL (ref 7–20)
CALCIUM SERPL-MCNC: 9.2 MG/DL (ref 8.3–10.6)
CHLORIDE BLD-SCNC: 102 MMOL/L (ref 99–110)
CHOLESTEROL, TOTAL: 158 MG/DL (ref 0–199)
CO2: 26 MMOL/L (ref 21–32)
CREAT SERPL-MCNC: 0.7 MG/DL (ref 0.9–1.3)
CREATININE URINE: 97.9 MG/DL (ref 39–259)
ESTIMATED AVERAGE GLUCOSE: 182.9 MG/DL
GFR AFRICAN AMERICAN: >60
GFR NON-AFRICAN AMERICAN: >60
GLOBULIN: 3.8 G/DL
GLUCOSE BLD-MCNC: 131 MG/DL (ref 70–99)
HBA1C MFR BLD: 8 %
HDLC SERPL-MCNC: 39 MG/DL (ref 40–60)
LDL CHOLESTEROL CALCULATED: 96 MG/DL
MICROALBUMIN UR-MCNC: <1.2 MG/DL
MICROALBUMIN/CREAT UR-RTO: NORMAL MG/G (ref 0–30)
POTASSIUM SERPL-SCNC: 4.2 MMOL/L (ref 3.5–5.1)
SODIUM BLD-SCNC: 142 MMOL/L (ref 136–145)
TOTAL PROTEIN: 7.9 G/DL (ref 6.4–8.2)
TRIGL SERPL-MCNC: 117 MG/DL (ref 0–150)
VLDLC SERPL CALC-MCNC: 23 MG/DL

## 2020-01-13 PROCEDURE — 36415 COLL VENOUS BLD VENIPUNCTURE: CPT

## 2020-01-13 PROCEDURE — 80053 COMPREHEN METABOLIC PANEL: CPT

## 2020-01-13 PROCEDURE — 83036 HEMOGLOBIN GLYCOSYLATED A1C: CPT

## 2020-01-13 PROCEDURE — 80061 LIPID PANEL: CPT

## 2020-01-13 PROCEDURE — 82043 UR ALBUMIN QUANTITATIVE: CPT

## 2020-01-13 PROCEDURE — 82570 ASSAY OF URINE CREATININE: CPT

## 2020-01-14 ENCOUNTER — OFFICE VISIT (OUTPATIENT)
Dept: FAMILY MEDICINE CLINIC | Age: 55
End: 2020-01-14
Payer: MEDICARE

## 2020-01-14 VITALS
WEIGHT: 298 LBS | DIASTOLIC BLOOD PRESSURE: 82 MMHG | HEART RATE: 75 BPM | BODY MASS INDEX: 40.42 KG/M2 | OXYGEN SATURATION: 96 % | SYSTOLIC BLOOD PRESSURE: 112 MMHG

## 2020-01-14 PROCEDURE — 99214 OFFICE O/P EST MOD 30 MIN: CPT | Performed by: FAMILY MEDICINE

## 2020-01-14 NOTE — PROGRESS NOTES
Terrance Dutton is a 48 y.o. male who presents for follow-up of Type 2 diabetes mellitus. Current medication use: glimepiride, Levemir, Jardiance, metformin taking as prescribed, no SE  Eye exam current (within one year): yes  Current diet: in general, an \"unhealthy\" diet due to holidays and has been experiencing more abdominal pain, pancreas has been flaring up. Current exercise:tries to walk but not walking lately due to pain  Sees podiatrist: yes, Dr. Chuck Lucero, taking care of warts    Checks sugars at home: Yes  Hypoglycemia symptoms: Yes, has been in the 60's 3-4 times in the last 6 weeks  AM Fastin-300 this last month  Lunch:   Dinner: , if low in the night, does not take Levemir  Postprandial:     Checks BP at home: no  Current medication use: losartan-HCTZ  taking as prescribed, no SE    Last seen 3 month(s) ago. Has been taking valium for his menieres prn. States the medication has been helpful. Side Effects from medication: none. Patient only takes valium as needed, only when dizzy, average 10 a month, also takes Zofran. Following with pain management, taking Percocet 3 times a day. Has been having kidney pain, did pass a stone, drinks plenty of fluids. Going to see an allergy doctor, having a lot of issue with sinuses. Has been using Flonase daily. No CP, no palpitations, no sob, no edema, no cough, no change in bowel or bladder,  no nausea, no vomiting, no abdominal pain,     Patient's medications, allergies, past medical, surgical, social and family histories were reviewed and updated in the EHR as appropriate. Body mass index is 40.42 kg/m².   Vitals:    20 1117   BP: 112/82   Site: Left Upper Arm   Position: Sitting   Cuff Size: Large Adult   Pulse: 75   SpO2: 96%   Weight: 298 lb (135.2 kg)     Wt Readings from Last 3 Encounters:   20 298 lb (135.2 kg)   19 291 lb (132 kg)   10/07/19 287 lb (130.2 kg)       GENERAL:Alert and oriented x 4

## 2020-01-15 ENCOUNTER — TELEPHONE (OUTPATIENT)
Dept: FAMILY MEDICINE CLINIC | Age: 55
End: 2020-01-15

## 2020-02-20 RX ORDER — IBUPROFEN 800 MG/1
TABLET ORAL
Qty: 90 TABLET | Refills: 0 | Status: SHIPPED | OUTPATIENT
Start: 2020-02-20 | End: 2020-04-28

## 2020-02-20 RX ORDER — ONDANSETRON 4 MG/1
TABLET, FILM COATED ORAL
Qty: 30 TABLET | Refills: 0 | Status: SHIPPED | OUTPATIENT
Start: 2020-02-20 | End: 2020-03-30 | Stop reason: SDUPTHER

## 2020-02-20 RX ORDER — DIAZEPAM 5 MG/1
TABLET ORAL
Qty: 30 TABLET | Refills: 0 | Status: SHIPPED | OUTPATIENT
Start: 2020-02-20 | End: 2020-04-07

## 2020-02-20 NOTE — TELEPHONE ENCOUNTER
Medication:   Requested Prescriptions     Pending Prescriptions Disp Refills    diazePAM (VALIUM) 5 MG tablet [Pharmacy Med Name: DIAZEPAM 5MG TABLETS] 30 tablet      Sig: TAKE 1 TABLET BY MOUTH EVERY 12 HOURS AS NEEDED    ibuprofen (ADVIL;MOTRIN) 800 MG tablet [Pharmacy Med Name: IBUPROFEN 800MG TABLETS] 90 tablet 0     Sig: TAKE 1 TABLET BY MOUTH THREE TIMES DAILY WITH MEALS    ondansetron (ZOFRAN) 4 MG tablet [Pharmacy Med Name: ONDANSETRON 4MG TABLETS] 30 tablet 0     Sig: TAKE 1 TABLET BY MOUTH EVERY 8 HOURS AS NEEDED FOR NAUSEA/VOMITING      Last Filled:  12/30/19    Patient Phone Number: [phone] (home) [phone] (work)    Last appt: 1/14/2020   Next appt: 4/14/2020    Last OARRS:   RX Monitoring 1/14/2020   Attestation -   Periodic Controlled Substance Monitoring No signs of potential drug abuse or diversion identified.    Chronic Pain > 50 MEDD -   Chronic Pain > 80 MEDD -     PDMP Monitoring:    Last PDMP Hung as Reviewed [facility]):  Review User Review Instant Review Result   Joslyn JOAQUIN 1874 1/14/2020 11:20 AM Reviewed PDMP [1]     Preferred Pharmacy:   [address] [phone]  [address] [place] [id number]  Phone: [phone] Fax: [phone]    [address], [address] [place] [address] [address] Bosch [phone]  Tiff Hanson [address] [facility] [address]  Phone: [phone] Fax: [phone]

## 2020-03-30 RX ORDER — ONDANSETRON 4 MG/1
TABLET, FILM COATED ORAL
Qty: 30 TABLET | Refills: 1 | Status: SHIPPED | OUTPATIENT
Start: 2020-03-30 | End: 2020-07-06 | Stop reason: SDUPTHER

## 2020-04-03 ENCOUNTER — TELEPHONE (OUTPATIENT)
Dept: FAMILY MEDICINE CLINIC | Age: 55
End: 2020-04-03

## 2020-04-07 RX ORDER — ESOMEPRAZOLE MAGNESIUM 40 MG/1
CAPSULE, DELAYED RELEASE ORAL
Qty: 90 CAPSULE | Refills: 3 | Status: SHIPPED | OUTPATIENT
Start: 2020-04-07 | End: 2021-06-28 | Stop reason: SDUPTHER

## 2020-04-07 RX ORDER — DIAZEPAM 5 MG/1
TABLET ORAL
Qty: 30 TABLET | Refills: 0 | Status: SHIPPED | OUTPATIENT
Start: 2020-04-07 | End: 2020-06-08 | Stop reason: SDUPTHER

## 2020-04-07 RX ORDER — INSULIN DETEMIR 100 [IU]/ML
INJECTION, SOLUTION SUBCUTANEOUS
Qty: 15 ML | Refills: 12 | Status: SHIPPED | OUTPATIENT
Start: 2020-04-07 | End: 2020-07-22 | Stop reason: SDUPTHER

## 2020-04-07 RX ORDER — FLUTICASONE PROPIONATE 50 MCG
SPRAY, SUSPENSION (ML) NASAL
Qty: 48 G | Refills: 3 | Status: SHIPPED | OUTPATIENT
Start: 2020-04-07 | End: 2021-03-01 | Stop reason: SDUPTHER

## 2020-04-14 ENCOUNTER — VIRTUAL VISIT (OUTPATIENT)
Dept: FAMILY MEDICINE CLINIC | Age: 55
End: 2020-04-14
Payer: MEDICARE

## 2020-04-14 PROBLEM — E11.65 TYPE 2 DIABETES MELLITUS WITH HYPERGLYCEMIA, WITHOUT LONG-TERM CURRENT USE OF INSULIN (HCC): Status: ACTIVE | Noted: 2020-04-14

## 2020-04-14 PROCEDURE — 99214 OFFICE O/P EST MOD 30 MIN: CPT | Performed by: FAMILY MEDICINE

## 2020-04-14 PROCEDURE — 3052F HG A1C>EQUAL 8.0%<EQUAL 9.0%: CPT | Performed by: FAMILY MEDICINE

## 2020-04-14 RX ORDER — GLIMEPIRIDE 4 MG/1
4 TABLET ORAL
Qty: 90 TABLET | Refills: 3 | Status: SHIPPED | OUTPATIENT
Start: 2020-04-14 | End: 2021-05-13 | Stop reason: SDUPTHER

## 2020-04-14 RX ORDER — METFORMIN HYDROCHLORIDE 500 MG/1
1000 TABLET, EXTENDED RELEASE ORAL 2 TIMES DAILY
Qty: 360 TABLET | Refills: 3 | Status: SHIPPED | OUTPATIENT
Start: 2020-04-14 | End: 2021-05-18 | Stop reason: SDUPTHER

## 2020-04-28 RX ORDER — IBUPROFEN 800 MG/1
TABLET ORAL
Qty: 90 TABLET | Refills: 0 | Status: SHIPPED | OUTPATIENT
Start: 2020-04-28 | End: 2020-06-08 | Stop reason: SDUPTHER

## 2020-06-08 RX ORDER — DIAZEPAM 5 MG/1
TABLET ORAL
Qty: 30 TABLET | Refills: 0 | Status: SHIPPED | OUTPATIENT
Start: 2020-06-08 | End: 2020-09-11 | Stop reason: SDUPTHER

## 2020-06-08 RX ORDER — IBUPROFEN 800 MG/1
TABLET ORAL
Qty: 90 TABLET | Refills: 0 | Status: SHIPPED | OUTPATIENT
Start: 2020-06-08 | End: 2020-08-03 | Stop reason: SDUPTHER

## 2020-06-11 ENCOUNTER — TELEPHONE (OUTPATIENT)
Dept: FAMILY MEDICINE CLINIC | Age: 55
End: 2020-06-11

## 2020-07-06 ENCOUNTER — OFFICE VISIT (OUTPATIENT)
Dept: PRIMARY CARE CLINIC | Age: 55
End: 2020-07-06
Payer: MEDICARE

## 2020-07-06 PROCEDURE — 99211 OFF/OP EST MAY X REQ PHY/QHP: CPT | Performed by: NURSE PRACTITIONER

## 2020-07-06 RX ORDER — ONDANSETRON 4 MG/1
TABLET, FILM COATED ORAL
Qty: 30 TABLET | Refills: 1 | Status: SHIPPED | OUTPATIENT
Start: 2020-07-06 | End: 2020-09-08 | Stop reason: SDUPTHER

## 2020-07-06 NOTE — PATIENT INSTRUCTIONS
You have received a viral test for COVID-19. Below is education on quarantine per the CDC guidelines. For any symptoms, seek care from your PCP, call [phone] to establish care with a doctor, or go directly to an urgent care or the emergency room. Test results will take 2-7 days and will be sent to you in your [email or web address] account. If you test positive, you will be contacted via phone. If you test negative, the ONLY communication will be through [address] GO TO [employer] AND SIGN UP FOR [email or web address]  (LOWER LEFT OF THE HOME PAGE)  No test is 100%. If you have symptoms, you should follow the guidance of quarantine as previously stated. You can still be contagious if you have symptoms. Your [facility] will reach out to you if you have a positive result. They will provide you with a return to work date and note. If you were tested for a pre-op, then you should remain in quarantine until your procedure. How do I know if I need to be in quarantine? If you live in a community where COVID-19 is or might be spreading (currently, that is virtually everywhere in the [place])  Be alert for symptoms. Watch for fever, cough, shortness of breath, or other symptoms of COVID-19.  Take your temperature if symptoms develop.  Practice social distancing. Maintain 6 feet of distance from others and stay out of crowded places.  Follow CDC guidance if symptoms develop. If you feel healthy but:   Recently had close contact with a person with COVID-19 you need to Quarantine:   Stay home until 14 days after your last exposure.  Check your temperature twice a day and watch for symptoms of COVID-19.  If possible, stay away from people who are at higher-risk for getting very sick from COVID-19.   Stay Home and Monitor Your Health if you:   Have been diagnosed with COVID-19, or   Are waiting for test results, or   Have cough, fever, or shortness of breath, or symptoms of COVID-19      When You Can

## 2020-07-08 RX ORDER — SULFAMETHOXAZOLE AND TRIMETHOPRIM 800; 160 MG/1; MG/1
1 TABLET ORAL 2 TIMES DAILY
Qty: 20 TABLET | Refills: 0 | Status: SHIPPED | OUTPATIENT
Start: 2020-07-08 | End: 2020-07-16 | Stop reason: CLARIF

## 2020-07-08 NOTE — TELEPHONE ENCOUNTER
[facility] received a call from:    Name of Caller: Jasmin Doe    Relationship to patient: self    Organization name: n/a    Best contact number: [phone]    Reason for call:   Pt is having s/s of a possible sinus infection, sinus drainage, nasal congestion and is requesting medication for treatment be called into the pharmacy. Requesting a call back when meds have been sent.      [facility] [address] [place] [address] [phone]

## 2020-07-08 NOTE — TELEPHONE ENCOUNTER
PT states for the last week has had cough, ear pain, ST, drainage that is green and blood tinged, no fever. Went to have a covid test on Monday 7/6/2020. He states tried mucinex and wanting to know if antibiotic can be sent to pharmacy.

## 2020-07-09 LAB
SARS-COV-2: NOT DETECTED
SOURCE: NORMAL

## 2020-07-15 ENCOUNTER — HOSPITAL ENCOUNTER (OUTPATIENT)
Age: 55
Discharge: HOME | End: 2020-07-15
Payer: MEDICARE

## 2020-07-15 LAB
ANION GAP SERPL CALCULATED.3IONS-SCNC: 11 MMOL/L (ref 3–16)
BUN BLDV-MCNC: 16 MG/DL (ref 7–20)
CALCIUM SERPL-MCNC: 9.6 MG/DL (ref 8.3–10.6)
CHLORIDE BLD-SCNC: 101 MMOL/L (ref 99–110)
CO2: 25 MMOL/L (ref 21–32)
CREAT SERPL-MCNC: 0.7 MG/DL (ref 0.9–1.3)
GFR AFRICAN AMERICAN: >60
GFR NON-AFRICAN AMERICAN: >60
GLUCOSE BLD-MCNC: 137 MG/DL (ref 70–99)
POTASSIUM SERPL-SCNC: 4.5 MMOL/L (ref 3.5–5.1)
SODIUM BLD-SCNC: 137 MMOL/L (ref 136–145)

## 2020-07-15 PROCEDURE — 36415 COLL VENOUS BLD VENIPUNCTURE: CPT

## 2020-07-15 PROCEDURE — 80048 BASIC METABOLIC PNL TOTAL CA: CPT

## 2020-07-15 PROCEDURE — 83036 HEMOGLOBIN GLYCOSYLATED A1C: CPT

## 2020-07-15 NOTE — PROGRESS NOTES
Em Doty is a 48 y.o. male who presents for follow-up of Type 2 diabetes mellitus. Current medication use: taking as prescribed  Eye exam current (within one year): yes form given  Current diet: in general, an \"unhealthy\" diet  Current exercise:walk  Sees podiatrist: yes     Checks sugars at home: Yes  Hypoglycemia symptoms: No  AM Fastin    Checks BP at home: no  Current medication use: taking as prescribed    Recently treated for sinusitis. Cough, sinus drainage, pressure in ears. On abx - bactrim - states sx are improving, not gone but improved. No fever. Taking trazodone for sleep, prescribed by pain doc initially but notes sleeping better with this, not needing valium as much (uses for sleep and meniere's)    No CP, no palpitations, no sob, no edema, no cough, no change in bowel or bladder,  no nausea, no vomiting, + chronic abdominal pain (worse recently), No numbness or tingling in feet. Patient's medications, allergies, past medical, surgical, social and family histories were reviewed and updated in the EHR as appropriate. Body mass index is 40.82 kg/m². Vitals:    20 0947   BP: 128/84   Site: Left Upper Arm   Position: Sitting   Cuff Size: Large Adult   Pulse: 69   Temp: 98.4 °F (36.9 °C)   TempSrc: Infrared   SpO2: 98%   Weight: (!) 301 lb (136.5 kg)     Wt Readings from Last 3 Encounters:   20 (!) 301 lb (136.5 kg)   20 298 lb (135.2 kg)   19 291 lb (132 kg)       GENERAL:Alert and oriented x 4 NAD, affect appropriate and obese, well hydrated, well developed.   NECK:supple and non tender without mass, no thyromegaly or thyroid nodules, no cervical lymphadenopathy  LUNG:clear to auscultation bilaterally with normal respiratory effort and good air movement  CV: Normal heart sounds, regular rate and rhythm without murmurs  Monofilament Sensation:  normal,   LE Pulses:  normal,   LE Edema: normal,  LE Skin lesions: normal     PHQ Scores 7/3/2019 6/5/2019 10/8/2018 8/31/2017   PHQ2 Score 2 4 6 2   PHQ9 Score 10 15 6 2           ASSESSMENT AND PLAN:       Diagnoses and all orders for this visit:    Type 2 diabetes mellitus with hyperglycemia, without long-term current use of insulin ([facility])  -      DIABETES FOOT EXAM  -     Hemoglobin A1C; Future  -     Basic Metabolic Panel; Future      -A1C up  -has recently increased levemir, needs to do better with diet, monitor sugars and drop off in few weeks so can adjust meds if needed  -reviewed labs with patient  Lab Results   Component Value Date    LABA1C 8.4 07/15/2020     -lab slip given for next visit  -patient to check sugars as needed  -encouraged a healthy diet, regular exercise and maintaining a healthy weight  -RTO  3 months  -eye form given:  Yes    Type 2 diabetes mellitus with diabetic nephropathy, with long-term current use of insulin ([facility])  On ARB  Better sugar control    Primary insomnia  Continue trazodone  Takes valium rarely for sleep or menieres    Controlled Substances Monitoring:   OARRS report reviewed  Periodic Controlled Substance Monitoring: No signs of potential drug abuse or diversion identified. Tonio Otto MD)        Gastroesophageal reflux disease, esophagitis presence not specified  -     MAGNESIUM; Future  -Stable, continue current medications. Other orders  -     traZODone (DESYREL) 50 MG tablet; Take 1 tablet by mouth nightly            Return in about 3 months (around 10/16/2020) for Diabetes, 30 min.              Note per MARY LOU Kim and Scribe with corrections and edits per Tonio Otto MD.  I agree with entirety of note and was present and performed history and physical.  I also confirm that the note above accurately reflects all work, treatment, procedures, and medical decision making performed by me, Tonio Otto MD

## 2020-07-16 ENCOUNTER — OFFICE VISIT (OUTPATIENT)
Dept: FAMILY MEDICINE CLINIC | Age: 55
End: 2020-07-16
Payer: MEDICARE

## 2020-07-16 VITALS
OXYGEN SATURATION: 98 % | WEIGHT: 301 LBS | DIASTOLIC BLOOD PRESSURE: 84 MMHG | HEART RATE: 69 BPM | BODY MASS INDEX: 40.82 KG/M2 | TEMPERATURE: 98.4 F | SYSTOLIC BLOOD PRESSURE: 128 MMHG

## 2020-07-16 LAB
ESTIMATED AVERAGE GLUCOSE: 194.4 MG/DL
HBA1C MFR BLD: 8.4 %

## 2020-07-16 PROCEDURE — 99214 OFFICE O/P EST MOD 30 MIN: CPT | Performed by: FAMILY MEDICINE

## 2020-07-16 PROCEDURE — 3052F HG A1C>EQUAL 8.0%<EQUAL 9.0%: CPT | Performed by: FAMILY MEDICINE

## 2020-07-16 RX ORDER — TRAZODONE HYDROCHLORIDE 50 MG/1
50 TABLET ORAL NIGHTLY
Qty: 90 TABLET | Refills: 3 | Status: SHIPPED | OUTPATIENT
Start: 2020-07-16 | End: 2021-06-28

## 2020-07-22 RX ORDER — INSULIN DETEMIR 100 [IU]/ML
50 INJECTION, SOLUTION SUBCUTANEOUS NIGHTLY
Qty: 5 PEN | Refills: 12
Start: 2020-07-22 | End: 2020-10-14 | Stop reason: DRUGHIGH

## 2020-08-03 RX ORDER — IBUPROFEN 800 MG/1
TABLET ORAL
Qty: 90 TABLET | Refills: 0 | Status: SHIPPED | OUTPATIENT
Start: 2020-08-03 | End: 2020-10-08

## 2020-08-03 NOTE — TELEPHONE ENCOUNTER
Medication:   Requested Prescriptions     Pending Prescriptions Disp Refills    ibuprofen (ADVIL;MOTRIN) 800 MG tablet 90 tablet 0     Sig: TAKE 1 TABLET BY MOUTH THREE TIMES DAILY WITH MEALS      Last Filled:  6/8/20    Patient Phone Number: [phone] (home) [phone] (work)    Last appt: 7/16/2020   Next appt: 10/14/2020    Last OARRS:   RX Monitoring 7/16/2020   Attestation -   Periodic Controlled Substance Monitoring No signs of potential drug abuse or diversion identified.    Chronic Pain > 50 MEDD -   Chronic Pain > 80 MEDD -     PDMP Monitoring:    Last PDMP Delorse Alpha as Reviewed [facility]):  Review User Review Instant Review Result   Joslyn Jerome 1874 7/16/2020 10:16 AM Reviewed PDMP [1]     Preferred Pharmacy:     [address] Roberto Tomlin [phone] Mishel [place] [phone]  [address]iff [address] [place] [id number] [address] [facility] [address]  Phone: [phone] Fax: [phone]

## 2020-09-08 RX ORDER — ONDANSETRON 4 MG/1
TABLET, FILM COATED ORAL
Qty: 30 TABLET | Refills: 1 | Status: SHIPPED | OUTPATIENT
Start: 2020-09-08 | End: 2020-11-05 | Stop reason: SDUPTHER

## 2020-09-10 RX ORDER — LOSARTAN POTASSIUM AND HYDROCHLOROTHIAZIDE 25; 100 MG/1; MG/1
TABLET ORAL
Qty: 90 TABLET | Refills: 3 | Status: SHIPPED | OUTPATIENT
Start: 2020-09-10 | End: 2021-09-09

## 2020-09-10 NOTE — TELEPHONE ENCOUNTER
Medication:   Requested Prescriptions     Pending Prescriptions Disp Refills    losartan-hydroCHLOROthiazide (HYZAAR) 100-25 MG per tablet 90 tablet 3     Sig: TAKE 1 TABLET BY MOUTH DAILY      Last Filled:  10/14/19    Patient Phone Number: [phone] (home) [phone] (work)    Last appt: 7/16/2020   Next appt: 10/14/2020    Last OARRS:   RX Monitoring 7/16/2020   Attestation -   Periodic Controlled Substance Monitoring No signs of potential drug abuse or diversion identified.    Chronic Pain > 50 MEDD -   Chronic Pain > 80 MEDD -     PDMP Monitoring:    Last PDMP Hung as Reviewed [facility]):  Review User Review Instant Review Result   Joslyn JOAQUIN 1874 7/16/2020 10:16 AM Reviewed PDMP [1]     Preferred Pharmacy:   [address] [phone]  [address] [address]  Phone: [phone] Fax: [phone]    [address] Pia Carbone [address] [phone]  Tiff Hanson [address] [place] [address]  Phone: [phone] Fax: [phone]

## 2020-09-11 RX ORDER — DIAZEPAM 5 MG/1
TABLET ORAL
Qty: 30 TABLET | Refills: 0 | Status: SHIPPED | OUTPATIENT
Start: 2020-09-11 | End: 2020-11-16 | Stop reason: SDUPTHER

## 2020-10-08 RX ORDER — IBUPROFEN 800 MG/1
TABLET ORAL
Qty: 90 TABLET | Refills: 0 | Status: SHIPPED | OUTPATIENT
Start: 2020-10-08 | End: 2020-12-08 | Stop reason: SDUPTHER

## 2020-10-09 RX ORDER — SIMVASTATIN 40 MG
TABLET ORAL
Qty: 90 TABLET | Refills: 3 | Status: SHIPPED | OUTPATIENT
Start: 2020-10-09 | End: 2021-10-01 | Stop reason: SDUPTHER

## 2020-10-09 NOTE — TELEPHONE ENCOUNTER
Medication:   Requested Prescriptions     Pending Prescriptions Disp Refills    simvastatin (ZOCOR) 40 MG tablet 90 tablet 3     Sig: TAKE 1 TABLET BY MOUTH NIGHTLY     Last Filled:  10/7/19    Patient Phone Number: [phone] (home) [phone] (work)    Last appt: 7/16/2020   Next appt: 10/14/2020    Last Lipid:   Lab Results   Component Value Date    CHOL 158 01/13/2020    TRIG 117 01/13/2020    HDL 39 01/13/2020    HDL 35 12/05/2011    [address] 96 01/13/2020       Last OARRS:   RX Monitoring 7/16/2020   Attestation -   Periodic Controlled Substance Monitoring No signs of potential drug abuse or diversion identified.    Chronic Pain > 50 MEDD -   Chronic Pain > 80 MEDD [facility] [address] [place] [address] [facility] [phone] (Phone)  [phone] (Fax)

## 2020-10-14 ENCOUNTER — VIRTUAL VISIT (OUTPATIENT)
Dept: FAMILY MEDICINE CLINIC | Age: 55
End: 2020-10-14
Payer: MEDICARE

## 2020-10-14 PROCEDURE — 3052F HG A1C>EQUAL 8.0%<EQUAL 9.0%: CPT | Performed by: FAMILY MEDICINE

## 2020-10-14 PROCEDURE — 99214 OFFICE O/P EST MOD 30 MIN: CPT | Performed by: FAMILY MEDICINE

## 2020-10-14 RX ORDER — INSULIN DETEMIR 100 [IU]/ML
55 INJECTION, SOLUTION SUBCUTANEOUS NIGHTLY
Qty: 5 PEN | Refills: 12 | Status: SHIPPED
Start: 2020-10-14 | End: 2021-04-12 | Stop reason: SDUPTHER

## 2020-10-14 NOTE — PROGRESS NOTES
Patient is being seen Virtually using  D[facility]. Patient is at [facility] and Dr. Sherrie Shetty is at the home. The patient has consented to having a virtual visit due to the [place] 19 pandemic. Vitals are patient reported. Chief Complaint   Patient presents with    Diabetes        Molly Bernabe is a 50 y.o. male who presents for follow-up of Type 2 diabetes mellitus. Current medication use: taking as prescribed and adverse effects: none  Eye exam current (within one year): yes  Current diet: sometimes good, somteims not  Current exercise: not much  Sees podiatrist: n/a    Checks sugars at home: Yes - checking 3 times a day  Hypoglycemia symptoms: No  AM Fastin this AM,     Highest sugars 220's, rarely gets low to 60's. Usually 130-160 in AM, 170 -220 after meals. States thinks sugars will be same as still not doing well with diet and exercise due to pain. Takes diazepam as needed, no real schedule. Takes for dizziness but also for his anxiety usually before bed. Takes trazodone most nights for sleep. Doesn't have AM grogginess. No CP, no palpitations, no sob,  + chronic abd pain, sees pain mgmt Dr. David Jacobs? ?. Stomach pain -  has been struggling more past few months. Not able to be as active due to pain. Overall mood is stable, up and down depending on how pain is doing. Patient's medications, allergies, past medical, surgical, social and family histories were reviewed and updated in the EHR as appropriate. Wt Readings from Last 3 Encounters:   20 (!) 301 lb (136.5 kg)   20 298 lb (135.2 kg)   19 291 lb (132 kg)     There is no height or weight on file to calculate BMI.   PHQ Scores 7/3/2019 2019 10/8/2018 2017   PHQ2 Score 2 4 6 2   PHQ9 Score 10 15 6 2     Patient-Reported Vitals 10/14/2020   Patient-Reported Weight (No Data)   Patient-Reported Systolic (No Data)   Patient-Reported Diastolic (No Data)                  GEN: Alert and oriented x 4 NAD, affect appropriate and obese, well developed. ASSESSMENT AND PLAN:       Solis Waller was seen today for diabetes. Diagnoses and all orders for this visit:    Type 2 diabetes mellitus with hyperglycemia, without long-term current use of insulin (HCC)  -     Hemoglobin A1C; Future  -     Comprehensive Metabolic Panel; Future  -     Lipid Panel; Future  -     Microalbumin / Creatinine Urine Ratio; Future      -A1C pending - pt getting labs done tomorrow  -increase levemir to 55 units daily and drop off sugars in 2 weeks  -labs ordered for next visit  -patient to check sugars as needed  -encouraged a healthy diet, regular exercise and maintaining a healthy weight  -RTO  3 months  -eye form given:  No    Meniere's disease, unspecified laterality  -Stable, continue current medications. Controlled Substances Monitoring:   OARRS report reviewed  Periodic Controlled Substance Monitoring: No signs of potential drug abuse or diversion identified. Jenny Herrera MD)      Anxiety  -Stable, continue current medications. Other orders  -     insulin detemir (LEVEMIR FLEXTOUCH) 100 UNIT/ML injection pen; Inject 55 Units into the skin nightly            Return in about 3 months (around 1/14/2021) for Diabetes, 30 min, In person. Pursuant to the emergency declaration under the [facility], [facility] waiver authority and the [employer] Act, this Virtual  Visit was conducted, with patient's consent, to reduce the patient's risk of exposure to COVID-19 and provide continuity of care for an established patient. Services were provided through a video synchronous discussion virtually to substitute for in-person clinic visit. Patient was instructed that the AVS is available on My Chart or was emailed to the patient if not on My Chart. Lab orders were emailed to patient if they do not use a [facility] lab.  Any work notes were sent to patient through My Chart or email.

## 2020-10-14 NOTE — PATIENT INSTRUCTIONS
Increase levemir to 55 units nightly and drop off readings in 2 weeks. Learning About Diabetes Food Guidelines  Your Care Instructions     Meal planning is important to manage diabetes. It helps keep your blood sugar at a target level (which you set with your doctor). You don't have to eat special foods. You can eat what your family eats, including sweets once in a while. But you do have to pay attention to how often you eat and how much you eat of certain foods. You may want to work with a dietitian or a certified diabetes educator (CDE) to help you plan meals and snacks. A dietitian or CDE can also help you lose weight if that is one of your goals. What should you know about eating carbs? Managing the amount of carbohydrate (carbs) you eat is an important part of healthy meals when you have diabetes. Carbohydrate is found in many foods. · Learn which foods have carbs. And learn the amounts of carbs in different foods. ? Bread, cereal, pasta, and rice have about 15 grams of carbs in a serving. A serving is 1 slice of bread (1 ounce), ½ cup of cooked cereal, or 1/3 cup of cooked pasta or rice. ? Fruits have 15 grams of carbs in a serving. A serving is 1 small fresh fruit, such as an apple or orange; ½ of a banana; ½ cup of cooked or canned fruit; ½ cup of fruit juice; 1 cup of melon or raspberries; or 2 tablespoons of dried fruit. ? Milk and no-sugar-added yogurt have 15 grams of carbs in a serving. A serving is 1 cup of milk or 2/3 cup of no-sugar-added yogurt. ? Starchy vegetables have 15 grams of carbs in a serving. A serving is ½ cup of mashed potatoes or sweet potato; 1 cup winter squash; ½ of a small baked potato; ½ cup of cooked beans; or ½ cup cooked corn or green peas. · Learn how much carbs to eat each day and at each meal. A dietitian or CDE can teach you how to keep track of the amount of carbs you eat. This is called carbohydrate counting.   · If you are not sure how to count carbohydrate grams, use the Plate Method to plan meals. It is a good, quick way to make sure that you have a balanced meal. It also helps you spread carbs throughout the day. ? Divide your plate by types of foods. Put non-starchy vegetables on half the plate, meat or other protein food on one-quarter of the plate, and a grain or starchy vegetable in the final quarter of the plate. To this you can add a small piece of fruit and 1 cup of milk or yogurt, depending on how many carbs you are supposed to eat at a meal.  · Try to eat about the same amount of carbs at each meal. Do not \"save up\" your daily allowance of carbs to eat at one meal.  · Proteins have very little or no carbs per serving. Examples of proteins are beef, chicken, turkey, fish, eggs, tofu, cheese, cottage cheese, and peanut butter. A serving size of meat is 3 ounces, which is about the size of a deck of cards. Examples of meat substitute serving sizes (equal to 1 ounce of meat) are 1/4 cup of cottage cheese, 1 egg, 1 tablespoon of peanut butter, and ½ cup of tofu. How can you eat out and still eat healthy? · Learn to estimate the serving sizes of foods that have carbohydrate. If you measure food at home, it will be easier to estimate the amount in a serving of restaurant food. · If the meal you order has too much carbohydrate (such as potatoes, corn, or baked beans), ask to have a low-carbohydrate food instead. Ask for a salad or green vegetables. · If you use insulin, check your blood sugar before and after eating out to help you plan how much to eat in the future. · If you eat more carbohydrate at a meal than you had planned, take a walk or do other exercise. This will help lower your blood sugar. What else should you know? · Limit saturated fat, such as the fat from meat and dairy products. This is a healthy choice because people who have diabetes are at higher risk of heart disease. So choose lean cuts of meat and nonfat or low-fat dairy products.  Use control of diabetes. Exercise may help you in other ways too. It can help you reach and stay at a healthy weight. It also helps improve blood pressure and cholesterol, which can reduce the risk of heart disease. Exercise can make you feel stronger and happier. It can help you relax and sleep better, and give you confidence in other things you do. How can you exercise safely? Before you start a new exercise program, talk to your doctor about how and when to exercise. You may need to have a medical exam and tests before you begin. Some types of exercise can be harmful if your diabetes is causing other problems, such as problems with your feet. Your doctor can tell you what types of exercise are good choices for you. These tips can help you exercise safely when you have diabetes. If your diabetes is controlled by diet or medicine that doesn't lower your blood sugar, you don't need to eat a snack before you exercise. · Check your blood sugar before you exercise. And be careful about what you eat. ? If your blood sugar is less than 100, eat a carbohydrate snack before you exercise. ? Be careful when you exercise if your blood sugar is over 300. High blood sugar can make you dehydrated. And that makes your blood sugar levels go even higher. If you have ketones in your blood or urine and your blood sugar is over 300, do not exercise. · Don't try to do too much at first. Build up your exercise program bit by bit. Try to get at least 30 minutes of exercise on most days of the week. Walking is a good choice. You also may want to do other activities, such as riding a bike or swimming. You might try running or gardening. Try to include muscle-strengthening exercises at least 2 times a week. These exercises include push-ups and weight training. You can also use rubber tubing or stretch bands. You stretch or pull the tubing or band to build muscle strength.  If you want to exercise more, slowly increase how hard or long you exercise. · You may get symptoms of low blood sugar during exercise or up to 24 hours later. Some symptoms of low blood sugar, such as sweating, a fast heartbeat, or feeling tired, can be confused with what can happen anytime you exercise. Other symptoms may include feeling anxious, dizzy, weak, or shaky. So it's a good idea to check your blood sugar again. · You can treat low blood sugar by eating or drinking something that has 15 grams of carbohydrate. These should be quick-sugar foods. Quick-sugar foods such as glucose tablets, table sugar, honey, fruit juice, regular (not diet) soda pop, or hard candy can help raise blood sugar. Check your blood sugar level again 15 minutes after having a quick-sugar food to make sure your level is getting back to your target range. · Drink plenty of water before, during, and after you exercise. · Wear medical alert jewelry that says you have diabetes. You can buy this at most drugstores. · Pay attention to your body. If you are used to exercise and notice that you can't do as much as usual, talk to your doctor. Follow-up care is a key part of your treatment and safety. Be sure to make and go to all appointments, and call your doctor if you are having problems. It's also a good idea to know your test results and keep a list of the medicines you take. Where can you learn more? Go to [email or web address] org and sign in to your [employer] account. Enter P685 in the [facility] box to learn more about \"Learning About Diabetes and Exercise. \"     If you do not have an account, please click on the \"Sign Up Now\" link. Current as of: December 20, 2019               Content Version: 12.6  © 5067-7852 [employer]. Care instructions adapted under license by [facility] ([facility]).  If you have questions about a medical condition or this instruction, always ask your healthcare professional. Moshe Fung disclaims any warranty or liability for your use of this information.

## 2020-11-05 RX ORDER — ONDANSETRON 4 MG/1
TABLET, FILM COATED ORAL
Qty: 30 TABLET | Refills: 1 | Status: SHIPPED | OUTPATIENT
Start: 2020-11-05 | End: 2021-01-15 | Stop reason: SDUPTHER

## 2020-11-16 RX ORDER — DIAZEPAM 5 MG/1
TABLET ORAL
Qty: 30 TABLET | Refills: 0 | Status: SHIPPED | OUTPATIENT
Start: 2020-11-16 | End: 2020-12-16

## 2020-12-03 RX ORDER — CLOBETASOL PROPIONATE 0.5 MG/G
AEROSOL, FOAM TOPICAL
Qty: 50 G | Refills: 3 | Status: SHIPPED | OUTPATIENT
Start: 2020-12-03

## 2020-12-08 RX ORDER — IBUPROFEN 800 MG/1
TABLET ORAL
Qty: 90 TABLET | Refills: 0 | Status: SHIPPED | OUTPATIENT
Start: 2020-12-08 | End: 2021-03-01 | Stop reason: SDUPTHER

## 2020-12-23 ENCOUNTER — NURSE TRIAGE (OUTPATIENT)
Dept: OTHER | Facility: CLINIC | Age: 55
End: 2020-12-23

## 2020-12-23 ENCOUNTER — OFFICE VISIT (OUTPATIENT)
Dept: FAMILY MEDICINE CLINIC | Age: 55
End: 2020-12-23
Payer: MEDICARE

## 2020-12-23 VITALS
TEMPERATURE: 97.5 F | WEIGHT: 298.8 LBS | SYSTOLIC BLOOD PRESSURE: 136 MMHG | OXYGEN SATURATION: 97 % | DIASTOLIC BLOOD PRESSURE: 84 MMHG | HEART RATE: 67 BPM | BODY MASS INDEX: 40.52 KG/M2

## 2020-12-23 DIAGNOSIS — E11.65 TYPE 2 DIABETES MELLITUS WITH HYPERGLYCEMIA, WITHOUT LONG-TERM CURRENT USE OF INSULIN (HCC): ICD-10-CM

## 2020-12-23 DIAGNOSIS — L60.0 INGROWN TOENAIL OF LEFT FOOT: ICD-10-CM

## 2020-12-23 DIAGNOSIS — L03.032 CELLULITIS OF TOE OF LEFT FOOT: Primary | ICD-10-CM

## 2020-12-23 PROCEDURE — 99213 OFFICE O/P EST LOW 20 MIN: CPT | Performed by: NURSE PRACTITIONER

## 2020-12-23 PROCEDURE — 3052F HG A1C>EQUAL 8.0%<EQUAL 9.0%: CPT | Performed by: NURSE PRACTITIONER

## 2020-12-23 RX ORDER — DIAZEPAM 5 MG/1
5 TABLET ORAL EVERY 12 HOURS PRN
COMMUNITY
End: 2020-12-29 | Stop reason: SDUPTHER

## 2020-12-23 RX ORDER — SULFAMETHOXAZOLE AND TRIMETHOPRIM 800; 160 MG/1; MG/1
1 TABLET ORAL 2 TIMES DAILY
Qty: 20 TABLET | Refills: 0 | Status: SHIPPED | OUTPATIENT
Start: 2020-12-23 | End: 2021-01-02

## 2020-12-23 NOTE — PROGRESS NOTES
Saul Viktoriya  : 1965  Encounter date: 2020    This lindsey 50 y.o. male who presents with  Chief Complaint   Patient presents with    Toe Pain     Left foot great toe. Symptoms started in the last week area tender and red. History of present illness:    HPI   1. Presents to clinic today with concerns for an infection of his left great toenail bed. Per patient started approximately one week prior with discomfort of the nailbed and has progressively worsened with some redness and loosening of the nail. Denies any trauma to the area. Patient is diabetic - not well controlled. Hasn't seen podiatry for foot care in some time.      Allergies   Allergen Reactions    Amoxicillin      Rash    Morphine      RASH, hallucinations oral and IV     Current Outpatient Medications   Medication Sig Dispense Refill    ibuprofen (ADVIL;MOTRIN) 800 MG tablet TAKE 1 TABLET BY MOUTH THREE TIMES DAILY WITH MEALS 90 tablet 0    clobetasol (OLUX) 0.05 % foam APPLY EXTERNALLY TO THE SCALP TWICE DAILY AS NEEDED 50 g 3    ondansetron (ZOFRAN) 4 MG tablet TAKE 1 TABLET BY MOUTH EVERY 8 HOURS AS NEEDED FOR NAUSEA/VOMITING 30 tablet 1    insulin detemir (LEVEMIR FLEXTOUCH) 100 UNIT/ML injection pen Inject 55 Units into the skin nightly 5 pen 12    simvastatin (ZOCOR) 40 MG tablet TAKE 1 TABLET BY MOUTH NIGHTLY 90 tablet 3    losartan-hydroCHLOROthiazide (HYZAAR) 100-25 MG per tablet TAKE 1 TABLET BY MOUTH DAILY 90 tablet 3    traZODone (DESYREL) 50 MG tablet Take 1 tablet by mouth nightly 90 tablet 3    metFORMIN (GLUCOPHAGE-XR) 500 MG extended release tablet Take 2 tablets by mouth 2 times daily 360 tablet 3    glimepiride (AMARYL) 4 MG tablet Take 1 tablet by mouth every morning (before breakfast) 90 tablet 3    fluticasone (FLONASE) 50 MCG/ACT nasal spray USE 2 SPRAYS IN EACH NOSTRIL DAILY 48 g 3    esomeprazole (NEXIUM) 40 MG delayed release capsule TAKE 1 CAPSULE BY MOUTH DAILY 90 capsule 3  empagliflozin (JARDIANCE) 25 MG tablet Take 25 mg by mouth daily 90 tablet 3    B-D UF III MINI PEN NEEDLES 31G X 5 MM MISC USE ONCE DAILY AS DIRECTED 100 each 12    Blood Glucose Monitoring Suppl (ONE TOUCH ULTRA 2) w/Device KIT 1 kit by Does not apply route daily 1 kit 0    ONETOUCH DELICA LANCETS FINE MISC USE TO TEST THREE TIMES DAILY 300 each 12    oxyCODONE-acetaminophen (PERCOCET)  MG per tablet Take 1 tablet by mouth every 8 hours as needed (severe pain) for up to 30 days. Intended supply: 30 days 90 tablet 0    nystatin (MYCOSTATIN) 157721 UNIT/GM cream APPLY TOPICALLY TWICE DAILY TO FOUR TIMES DAILY 60 g 0    MICROLET LANCETS MISC TEST 3 TO 4 TIMES DAILY 300 each 10    diazePAM (VALIUM) 5 MG tablet Take 1 tablet by mouth every 12 hours as needed for Anxiety for up to 30 days. 30 tablet 0    blood glucose test strips (ONE TOUCH ULTRA TEST) strip USE TO TEST THREE TIMES DAILY AS NEEDED 300 strip 12    montelukast (SINGULAIR) 10 MG tablet TAKE 1 TABLET BY MOUTH NIGHTLY (Patient not taking: Reported on 10/14/2020) 90 tablet 3     No current facility-administered medications for this visit. Review of Systems   Constitutional: Negative for activity change, appetite change, chills, fatigue and fever. Respiratory: Negative for cough and shortness of breath. Cardiovascular: Negative for chest pain and palpitations. Gastrointestinal: Negative for diarrhea, nausea and vomiting. Past medical, surgical, family and social history were reviewed and updated with the patient.     Objective:    /84 (Site: Left Upper Arm, Position: Sitting, Cuff Size: Large Adult)   Pulse 67   Temp 97.5 °F (36.4 °C)   Wt 298 lb 12.8 oz (135.5 kg)   SpO2 97%   BMI 40.52 kg/m²   Weight: 298 lb 12.8 oz (135.5 kg)     BP Readings from Last 3 Encounters:   12/23/20 136/84   07/16/20 128/84   01/14/20 112/82     Wt Readings from Last 3 Encounters:   12/23/20 298 lb 12.8 oz (135.5 kg) 07/16/20 (!) 301 lb (136.5 kg)   01/14/20 298 lb (135.2 kg)     Physical Exam  Constitutional:       General: He is not in acute distress. Appearance: He is well-developed. HENT:      Head: Normocephalic and atraumatic. Cardiovascular:      Rate and Rhythm: Normal rate and regular rhythm. Heart sounds: Normal heart sounds, S1 normal and S2 normal.   Pulmonary:      Effort: Pulmonary effort is normal. No respiratory distress. Breath sounds: Normal breath sounds. Feet:      Left foot:      Toenail Condition: Left toenails are abnormally thick. Fungal disease present. Comments: Left great toenail - notable thickening, loose nail, some mild erythema surrounding nailbed - no open wound/drainage from area. Skin:     General: Skin is warm and dry. Neurological:      Mental Status: He is alert and oriented to person, place, and time. Psychiatric:         Thought Content: Thought content normal.         Judgment: Judgment normal.       Assessment/Plan    1. Cellulitis of toe of left foot  On physical exam low suspicion for cellulitis - looks as though just some irritation from ingrown toenail/nail thickening. Seems as though nail is getting ready to fall off - patient hasn't been to his podiatrist in some time. Did advise to utilize epsom salt soaks and if the redness worsens initiate Bactrim DS. Concerning with holiday coming up and patient being a poorly controlled diabetic. Follow up with office as needed. Follow up with podiatry for foot care. - sulfamethoxazole-trimethoprim (BACTRIM DS) 800-160 MG per tablet; Take 1 tablet by mouth 2 times daily for 10 days  Dispense: 20 tablet; Refill: 0    2. Ingrown toenail of left foot    3.  Type 2 diabetes mellitus with hyperglycemia, without long-term current use of insulin (HCC) Venus Borges was counseled regarding symptoms of current diagnosis, course and complications of disease if inadequately treated. Discussed side effects of medications, diagnosis, treatment options, and prognosis along with risks, benefits, complications, and alternatives of treatment including labs, imaging and other studies/treatment targets and goals. He verbalized understanding of instructions and counseling. Return if symptoms worsen or fail to improve.

## 2020-12-28 NOTE — TELEPHONE ENCOUNTER
Medication:   Requested Prescriptions      No prescriptions requested or ordered in this encounter      Last Filled:  07/30/2020    Patient Phone Number: [phone] (home) [phone] (work)    Last appt: 12/23/2020   Next appt: Visit date not found    Last OARRS:   RX Monitoring 10/14/2020   Attestation -   Periodic Controlled Substance Monitoring No signs of potential drug abuse or diversion identified.    Chronic Pain > 50 MEDD -   Chronic Pain > 80 MEDD -     PDMP Monitoring:    Last PDMP Hung as Reviewed [facility]):  Review User Review Instant Review Result   Joslyn JOAQUIN 1874 10/14/2020 11:10 AM Reviewed PDMP [1]     Preferred Pharmacy:   [place] [address] [address] [facility] [phone] - F [phone]  [address] [place] [phone]  Phone: [phone] Fax: [phone]    [address] 176 [facility] [phone]  [facility] [address] [facility] [address]  Phone: [phone] Fax: [phone]    [place] [address] [address] [facility],[address] [facility], [address] [place] [address] [phone] Altamese Carton [phone]  [address] [address]  Phone: [phone] Fax: [phone]

## 2020-12-29 ENCOUNTER — HOSPITAL ENCOUNTER (OUTPATIENT)
Age: 55
Discharge: HOME | End: 2020-12-29
Payer: MEDICARE

## 2020-12-29 DIAGNOSIS — F41.9 ANXIETY: ICD-10-CM

## 2020-12-29 DIAGNOSIS — H81.09 MENIERE'S DISEASE, UNSPECIFIED LATERALITY: Primary | ICD-10-CM

## 2020-12-29 LAB
A/G RATIO: 1.1 (ref 1.1–2.2)
ALBUMIN SERPL-MCNC: 4.1 G/DL (ref 3.4–5)
ALP BLD-CCNC: 82 U/L (ref 40–129)
ALT SERPL-CCNC: 40 U/L (ref 10–40)
ANION GAP SERPL CALCULATED.3IONS-SCNC: 12 MMOL/L (ref 3–16)
AST SERPL-CCNC: 29 U/L (ref 15–37)
BILIRUB SERPL-MCNC: 0.4 MG/DL (ref 0–1)
BUN BLDV-MCNC: 16 MG/DL (ref 7–20)
CALCIUM SERPL-MCNC: 10 MG/DL (ref 8.3–10.6)
CHLORIDE BLD-SCNC: 103 MMOL/L (ref 99–110)
CHOLESTEROL, TOTAL: 159 MG/DL (ref 0–199)
CO2: 27 MMOL/L (ref 21–32)
CREAT SERPL-MCNC: 0.7 MG/DL (ref 0.9–1.3)
CREATININE URINE: 71.4 MG/DL (ref 39–259)
ESTIMATED AVERAGE GLUCOSE: 185.8 MG/DL
GFR AFRICAN AMERICAN: >60
GFR NON-AFRICAN AMERICAN: >60
GLOBULIN: 3.6 G/DL
GLUCOSE BLD-MCNC: 148 MG/DL (ref 70–99)
HBA1C MFR BLD: 8.1 %
HCT VFR BLD CALC: 50.5 % (ref 40.5–52.5)
HDLC SERPL-MCNC: 32 MG/DL (ref 40–60)
HEMOGLOBIN: 16.3 G/DL (ref 13.5–17.5)
LDL CHOLESTEROL CALCULATED: 85 MG/DL
MAGNESIUM: 2 MG/DL (ref 1.8–2.4)
MCH RBC QN AUTO: 26.7 PG (ref 26–34)
MCHC RBC AUTO-ENTMCNC: 32.4 G/DL (ref 31–36)
MCV RBC AUTO: 82.5 FL (ref 80–100)
MICROALBUMIN UR-MCNC: <1.2 MG/DL
MICROALBUMIN/CREAT UR-RTO: NORMAL MG/G (ref 0–30)
PDW BLD-RTO: 14.4 % (ref 12.4–15.4)
PLATELET # BLD: 215 K/UL (ref 135–450)
PMV BLD AUTO: 10.8 FL (ref 5–10.5)
POTASSIUM SERPL-SCNC: 4.6 MMOL/L (ref 3.5–5.1)
RBC # BLD: 6.12 M/UL (ref 4.2–5.9)
SODIUM BLD-SCNC: 142 MMOL/L (ref 136–145)
TOTAL PROTEIN: 7.7 G/DL (ref 6.4–8.2)
TRIGL SERPL-MCNC: 209 MG/DL (ref 0–150)
VLDLC SERPL CALC-MCNC: 42 MG/DL
WBC # BLD: 7.3 K/UL (ref 4–11)

## 2020-12-29 PROCEDURE — 36415 COLL VENOUS BLD VENIPUNCTURE: CPT

## 2020-12-29 PROCEDURE — 83036 HEMOGLOBIN GLYCOSYLATED A1C: CPT

## 2020-12-29 PROCEDURE — 82570 ASSAY OF URINE CREATININE: CPT

## 2020-12-29 PROCEDURE — 80053 COMPREHEN METABOLIC PANEL: CPT

## 2020-12-29 PROCEDURE — 80061 LIPID PANEL: CPT

## 2020-12-29 PROCEDURE — 82043 UR ALBUMIN QUANTITATIVE: CPT

## 2020-12-29 PROCEDURE — 83735 ASSAY OF MAGNESIUM: CPT

## 2020-12-29 PROCEDURE — 85027 COMPLETE CBC AUTOMATED: CPT

## 2020-12-29 RX ORDER — DIAZEPAM 5 MG/1
5 TABLET ORAL EVERY 12 HOURS PRN
Qty: 30 TABLET | Refills: 0 | Status: SHIPPED | OUTPATIENT
Start: 2020-12-29 | End: 2021-03-09 | Stop reason: SDUPTHER

## 2020-12-29 NOTE — TELEPHONE ENCOUNTER
Medication:   Requested Prescriptions     Pending Prescriptions Disp Refills    diazePAM (VALIUM) 5 MG tablet        Sig: Take 1 tablet by mouth every 12 hours as needed for Anxiety. Last Filled:  11/16/2020    Patient Phone Number: [phone] (home) [phone] (work)    Last appt: 12/23/2020   Next appt: Visit date not found    Last OARRS:   RX Monitoring 10/14/2020   Attestation -   Periodic Controlled Substance Monitoring No signs of potential drug abuse or diversion identified.    Chronic Pain > 50 MEDD -   Chronic Pain > 80 MEDD -     PDMP Monitoring:    Last PDMP Hung as Reviewed [facility]):  Review User Review Instant Review Result   Joslyn JOAQUIN 1874 10/14/2020 11:10 AM Reviewed PDMP [1]     Preferred Pharmacy:   [facility] [address] [facility] [phone] - F [phone]  [address] [place] [phone]  Phone: [phone] Fax: [phone]    [facility] [address] 176 [facility] [phone]  Tiff Hutchison [place] [address] [address] [facility] [address]  Phone: [phone] Fax: [phone]    [facility] [address] [facility],4Th Floor, [address] [address] Esperanza Adank - P [phone] Oliver Mcmullen [phone]  [address] [place] [address]  [address] [place] [phone]  Phone: [phone] Fax: [phone]

## 2021-01-04 ASSESSMENT — ENCOUNTER SYMPTOMS
NAUSEA: 0
SHORTNESS OF BREATH: 0
COUGH: 0
DIARRHEA: 0
VOMITING: 0

## 2021-01-15 RX ORDER — ONDANSETRON 4 MG/1
TABLET, FILM COATED ORAL
Qty: 30 TABLET | Refills: 1 | Status: SHIPPED | OUTPATIENT
Start: 2021-01-15 | End: 2021-03-09 | Stop reason: SDUPTHER

## 2021-01-15 NOTE — TELEPHONE ENCOUNTER
Medication:   Requested Prescriptions     Pending Prescriptions Disp Refills    ondansetron (ZOFRAN) 4 MG tablet 30 tablet 1     Sig: TAKE 1 TABLET BY MOUTH EVERY 8 HOURS AS NEEDED FOR NAUSEA/VOMITING          Patient Phone Number: [phone] (home) [phone] (work)    Last appt: 12/23/2020   Next appt: 1/21/2021    Last OARRS:   RX Monitoring 10/14/2020   Attestation -   Periodic Controlled Substance Monitoring No signs of potential drug abuse or diversion identified.    Chronic Pain > 50 MEDD -   Chronic Pain > 80 MEDD -     PDMP Monitoring:    Last PDMP Hung as Reviewed [facility]):  Review User Review Instant Review Result   Joslyn JOAQUIN 1874 10/14/2020 11:10 AM Reviewed PDMP [1]     Preferred Pharmacy:   [address] [phone] - F [phone]  [address] [place] [phone]  Phone: [phone] Fax: [phone]    [address] [facility] Hiro [phone]  [facility] 12[address] [facility] [address]  Phone: [phone] Fax: [phone]    [facility] [address] [facility],[address], [address] [address] - P [phone] [place] [phone]  [address]  [address] [address]  Phone: [phone] Fax: [phone]

## 2021-01-21 ENCOUNTER — OFFICE VISIT (OUTPATIENT)
Dept: FAMILY MEDICINE CLINIC | Age: 56
End: 2021-01-21
Payer: MEDICARE

## 2021-01-21 VITALS
BODY MASS INDEX: 40.71 KG/M2 | TEMPERATURE: 96.4 F | WEIGHT: 300.2 LBS | OXYGEN SATURATION: 98 % | SYSTOLIC BLOOD PRESSURE: 128 MMHG | DIASTOLIC BLOOD PRESSURE: 80 MMHG | HEART RATE: 76 BPM

## 2021-01-21 DIAGNOSIS — I10 ESSENTIAL HYPERTENSION: ICD-10-CM

## 2021-01-21 DIAGNOSIS — Z79.4 TYPE 2 DIABETES MELLITUS WITH DIABETIC NEPHROPATHY, WITH LONG-TERM CURRENT USE OF INSULIN (HCC): ICD-10-CM

## 2021-01-21 DIAGNOSIS — E11.65 TYPE 2 DIABETES MELLITUS WITH HYPERGLYCEMIA, WITHOUT LONG-TERM CURRENT USE OF INSULIN (HCC): ICD-10-CM

## 2021-01-21 DIAGNOSIS — D75.1 POLYCYTHEMIA: Primary | ICD-10-CM

## 2021-01-21 DIAGNOSIS — F41.9 ANXIETY: ICD-10-CM

## 2021-01-21 DIAGNOSIS — E11.21 TYPE 2 DIABETES MELLITUS WITH DIABETIC NEPHROPATHY, WITH LONG-TERM CURRENT USE OF INSULIN (HCC): ICD-10-CM

## 2021-01-21 PROCEDURE — G8510 SCR DEP NEG, NO PLAN REQD: HCPCS | Performed by: FAMILY MEDICINE

## 2021-01-21 PROCEDURE — 99214 OFFICE O/P EST MOD 30 MIN: CPT | Performed by: FAMILY MEDICINE

## 2021-01-21 ASSESSMENT — PATIENT HEALTH QUESTIONNAIRE - PHQ9
1. LITTLE INTEREST OR PLEASURE IN DOING THINGS: 1
2. FEELING DOWN, DEPRESSED OR HOPELESS: 0
SUM OF ALL RESPONSES TO PHQ QUESTIONS 1-9: 1

## 2021-01-21 NOTE — PATIENT INSTRUCTIONS
Patient Education        Learning About Diabetes and Your Teeth  How does diabetes affect your teeth and gums? When you have diabetes, managing blood sugar levels and taking good care of your teeth and gums are both important. When blood sugar levels are high, there's a greater risk for:  · Gum (periodontal) disease. · Tooth decay. · Fungal infections in the mouth, like thrush. · Dry mouth, or xerostomia (say \"darryn-lucianh-STO-lacie-uh\"). The mouth needs saliva to neutralize the acids in your mouth. These acids can lead to gum disease and tooth decay. Keeping your blood sugar levels in your target range can help prevent problems with the teeth and gums. If you have any problems with your teeth or gums, see your dentist.  How do you care for your teeth and gums when you have diabetes? · Brush your teeth twice a day. · Floss daily. Make sure to press the floss against your teeth and not your gums. · Check each day for areas where your gums might be red or painful. Be sure to let your dentist know of any sores in your mouth. · See your dentist regularly for professional cleaning of your teeth and to look for gum problems. Many dentists recommend getting checkups twice a year. Remind your dentist that you have diabetes before any work is done. · Don't smoke or use smokeless tobacco. Tobacco use with diabetes can lead to a greater risk of severe gum disease. If you need help quitting, talk to your doctor about stop-smoking programs and medicines. These can increase your chances of quitting for good. Follow-up care is a key part of your treatment and safety. Be sure to make and go to all appointments, and call your doctor if you are having problems. It's also a good idea to know your test results and keep a list of the medicines you take. Where can you learn more? Go to https://chpepiceweb.[employer]. org and sign in to your [employer] account. Enter J536 in the ams AGhire box to learn more about \"Learning About Diabetes and Your Teeth. \"     If you do not have an account, please click on the \"Sign Up Now\" link. Current as of: December 20, 2019               Content Version: 12.6  © 4340-9746 [employer]. Care instructions adapted under license by [facility] ([facility]). If you have questions about a medical condition or this instruction, always ask your healthcare professional. [place] [address] any warranty or liability for your use of this information. Opioids and Benzodiazepines: What you need to know. When used together, these medications may slow or stop your breathing and increase your risk of accidental overdose. What is an accidental overdose? An accidental overdose is when your body has too much medication but you didn't know it was too much. An accidental overdose can cause you to stop breathing and die. The risk of accidental overdose can occur with any dose, large or small, even if you've been taking the medicines for a long time. Even when taken correctly, taking opioids and benzodiazepines together can cause an overdose. Work with your provider to see if there are other medications that may be safer for you. Any patient taking an opioid with a benzodiazepine has a risk of an overdose. Lung disease, sleep apnea, liver disease and alcohol use and many other things can increase your risk for an overdose. Your risks may increase as you age, even if you've been taking the medication for a long time. Do not stop taking your medications abruptly as this can lead to withdrawal which can be dangerous. Work with your provider to wean off the medication and find safer alternatives to help you.     (Courtesy of the [facility] Medical [employer] [facility])        Plastic Surgeons    Dr. Fitzpatrick Gisela [phone]  [address], [address]    Dr. Perez 43 and Reconstructive Surgery  1430 [facility]  [address] [facility], [address]  Phone:   [phone]       Dr. Bill Hawkins   [phone]  [address] [facility], [address], 2550 [facility] and Reconstructive Surgery  1000 [facility], [address] [place] [address]. [address]  Phone: [phone]

## 2021-01-21 NOTE — PROGRESS NOTES
Heidi Sheriff is a 50 y.o. male who presents for follow-up of Type 2 diabetes mellitus. Current medication use: taking as prescribed  Eye exam current (within one year): yes, Reviewed note from Dr. Familia Chang from visit on 2020 which showed no retinopathy. Current diet: states since first of year has been making concious effort to do better with diet. Current exercise:walking  Sees podiatrist: yes    Checks sugars at home: Yes  Hypoglycemia symptoms: Yes   AM Fastin's, highest is 145 in AM  Thru day highest 240's      Checks BP at home:  not doing  Current medication use: taking as prescribed      Taking cholesterol medication regularly: taking as prescribed     Takes diazepam 3-4 times a week for anxiety and meniere's. Takes it more for his anxiety. No CP, no palpitations, no sob, no edema, no cough, no change in bladder, No numbness or tingling in feet. Patient's medications, allergies, past medical, surgical, social and family histories were reviewed and updated in the EHR as appropriate. Body mass index is 40.71 kg/m². Vitals:    21 1000   BP: 128/80   Site: Left Upper Arm   Position: Sitting   Cuff Size: Large Adult   Pulse: 76   Temp: 96.4 °F (35.8 °C)   TempSrc: Infrared   SpO2: 98%   Weight: (!) 300 lb 3.2 oz (136.2 kg)     Wt Readings from Last 3 Encounters:   21 (!) 300 lb 3.2 oz (136.2 kg)   20 298 lb 12.8 oz (135.5 kg)   20 (!) 301 lb (136.5 kg)       GENERAL:Alert and oriented x 4 NAD, affect appropriate and obese, well hydrated, well developed.   NECK:supple and non tender without mass, no thyromegaly or thyroid nodules, no cervical lymphadenopathy  LUNG:clear to auscultation bilaterally with normal respiratory effort and good air movement  CV: Normal heart sounds, regular rate and rhythm without murmurs    LE Pulses:  normal,   LE Edema: normal,      PHQ Scores 2021 7/3/2019 2019 10/8/2018 2017   PHQ2 Score 1 2 4 6 2 PHQ9 Score 1 10 15 6 2           ASSESSMENT AND PLAN:       Leander Johnson was seen today for diabetes. Diagnoses and all orders for this visit:    Polycythemia  -     CBC; Future  Recheck labs    Type 2 diabetes mellitus with hyperglycemia, without long-term current use of insulin (HCC)  -     Hemoglobin A1C; Future  -     Basic Metabolic Panel; Future      -A1C stable to slightly improved but pt reporting improved TLC and sugars at home  -continue current meds, cont to monitor  -reviewed labs with patient  Lab Results   Component Value Date    LABA1C 8.1 12/29/2020     -lab slip given for next visit  -patient to check sugars as needed  -encouraged a healthy diet, regular exercise and maintaining a healthy weight  -RTO  3 months  -eye form given:  No    Type 2 diabetes mellitus with diabetic nephropathy, with long-term current use of insulin ([address]  On ARB  Renal fxn stable    Essential hypertension  -Stable, continue current medications. Anxiety  -Stable, continue current medications. Discussed again minimizing use given opiate use   Controlled Substances Monitoring:   OARRS report reviewed  Periodic Controlled Substance Monitoring: No signs of potential drug abuse or diversion identified. Delroy Roland MD)                Return in about 3 months (around 4/21/2021) for Diabetes, 30 min.

## 2021-01-22 ENCOUNTER — HOSPITAL ENCOUNTER (OUTPATIENT)
Age: 56
Discharge: HOME | End: 2021-01-22
Payer: MEDICARE

## 2021-01-22 DIAGNOSIS — D75.1 POLYCYTHEMIA: ICD-10-CM

## 2021-01-22 LAB
HCT VFR BLD CALC: 45.4 % (ref 40.5–52.5)
HEMOGLOBIN: 14.9 G/DL (ref 13.5–17.5)
MCH RBC QN AUTO: 27.3 PG (ref 26–34)
MCHC RBC AUTO-ENTMCNC: 32.9 G/DL (ref 31–36)
MCV RBC AUTO: 83 FL (ref 80–100)
PDW BLD-RTO: 14.3 % (ref 12.4–15.4)
PLATELET # BLD: 246 K/UL (ref 135–450)
PMV BLD AUTO: 10.8 FL (ref 5–10.5)
RBC # BLD: 5.47 M/UL (ref 4.2–5.9)
WBC # BLD: 7.3 K/UL (ref 4–11)

## 2021-01-22 PROCEDURE — 36415 COLL VENOUS BLD VENIPUNCTURE: CPT

## 2021-01-22 PROCEDURE — 85027 COMPLETE CBC AUTOMATED: CPT

## 2021-03-01 RX ORDER — IBUPROFEN 800 MG/1
TABLET ORAL
Qty: 90 TABLET | Refills: 0 | Status: SHIPPED | OUTPATIENT
Start: 2021-03-01 | End: 2021-06-28 | Stop reason: SDUPTHER

## 2021-03-01 RX ORDER — EMPAGLIFLOZIN 25 MG/1
25 TABLET, FILM COATED ORAL DAILY
Qty: 90 TABLET | Refills: 3 | Status: SHIPPED | OUTPATIENT
Start: 2021-03-01 | End: 2022-02-17

## 2021-03-01 RX ORDER — FLUTICASONE PROPIONATE 50 MCG
SPRAY, SUSPENSION (ML) NASAL
Qty: 48 G | Refills: 3 | Status: SHIPPED | OUTPATIENT
Start: 2021-03-01 | End: 2021-10-18 | Stop reason: SDUPTHER

## 2021-03-09 DIAGNOSIS — F41.9 ANXIETY: ICD-10-CM

## 2021-03-09 DIAGNOSIS — H81.09 MENIERE'S DISEASE, UNSPECIFIED LATERALITY: ICD-10-CM

## 2021-03-09 RX ORDER — ONDANSETRON 4 MG/1
TABLET, FILM COATED ORAL
Qty: 30 TABLET | Refills: 1 | Status: SHIPPED | OUTPATIENT
Start: 2021-03-09 | End: 2021-05-27 | Stop reason: SDUPTHER

## 2021-03-09 RX ORDER — DIAZEPAM 5 MG/1
5 TABLET ORAL EVERY 12 HOURS PRN
Qty: 30 TABLET | Refills: 0 | Status: SHIPPED | OUTPATIENT
Start: 2021-03-09 | End: 2021-05-19 | Stop reason: SDUPTHER

## 2021-03-09 NOTE — TELEPHONE ENCOUNTER
Medication:   Requested Prescriptions      No prescriptions requested or ordered in this encounter      Last Filled:  12/29/20    Patient Phone Number: [phone] (home) [phone] (work)    Last appt: 1/21/2021   Next appt: 4/22/2021    Last OARRS:   RX Monitoring 1/21/2021   Attestation -   Periodic Controlled Substance Monitoring No signs of potential drug abuse or diversion identified.    Chronic Pain > 50 MEDD -   Chronic Pain > 80 MEDD -     PDMP Monitoring:    Last PDMP Hung as Reviewed [facility]):  Review User Review Instant Review Result   HERMELINDA JOAQUIN 1/21/2021 10:15 AM Reviewed PDMP [1]     Preferred Pharmacy:   Usama Griffiths 4107 [facility] [phone] - F [phone]  [address] [place] [address]  Phone: [phone] Fax: [phone]    Usama Griffiths [address] [facility] [phone]  Tiff Hutchison [facility] 0030 [address] [facility] [address]  Phone: [phone] Fax: [phone]    Usama Griffiths 3663 [address] [facility],4Th [facility], [address] [address] Ano - P [phone] [facility] [phone]  [address]  [address] [place] [address]  Phone: [phone] Fax: [phone]

## 2021-04-12 RX ORDER — INSULIN DETEMIR 100 [IU]/ML
40 INJECTION, SOLUTION SUBCUTANEOUS NIGHTLY
Qty: 15 PEN | Refills: 12 | Status: SHIPPED | OUTPATIENT
Start: 2021-04-12 | End: 2021-06-28 | Stop reason: DRUGHIGH

## 2021-04-12 NOTE — TELEPHONE ENCOUNTER
Medication:   Requested Prescriptions      No prescriptions requested or ordered in this encounter      Pharmacy requesting with different directions on Levimir. Please advise     Patient Phone Number: [phone] (home) [phone] (work)    Last appt: 1/21/2021   Next appt: 5/3/2021    Last OARRS:   RX Monitoring 1/21/2021   Attestation -   Periodic Controlled Substance Monitoring No signs of potential drug abuse or diversion identified.    Chronic Pain > 50 MEDD -   Chronic Pain > 80 MEDD -     PDMP Monitoring:    Last PDMP Hung as Reviewed [facility]):  Review User Review Instant Review Result   HERMELINDODELLA MOLINACA 1/21/2021 10:15 AM Reviewed PDMP [1]     Preferred Pharmacy:   [place] [address] 2961 [facility] [phone] - F [phone]  [address] [place] [address]  Phone: [phone] Fax: [phone]    [address] 176 [facility] Hiro [phone]  Tiff Hutchison [facility] 1238 [address] [facility] [address]  Phone: [phone] Fax: [phone]    [place] [address] [address] [facility],4Th Floor, [address] [address] [facility] [phone] Sharyle Costa [phone]  [address] [place] [address]  Phone: [phone] Fax: [phone]

## 2021-04-22 ENCOUNTER — OFFICE VISIT (OUTPATIENT)
Dept: PRIMARY CARE CLINIC | Age: 56
End: 2021-04-22
Payer: MEDICARE

## 2021-04-22 DIAGNOSIS — Z20.828 EXPOSURE TO SARS-ASSOCIATED CORONAVIRUS: Primary | ICD-10-CM

## 2021-04-22 LAB — SARS-COV-2: NOT DETECTED

## 2021-04-22 PROCEDURE — 99211 OFF/OP EST MAY X REQ PHY/QHP: CPT | Performed by: NURSE PRACTITIONER

## 2021-04-22 NOTE — PATIENT INSTRUCTIONS

## 2021-05-05 ENCOUNTER — OFFICE VISIT (OUTPATIENT)
Dept: PRIMARY CARE CLINIC | Age: 56
End: 2021-05-05
Payer: MEDICARE

## 2021-05-05 DIAGNOSIS — Z20.828 EXPOSURE TO SARS-ASSOCIATED CORONAVIRUS: Primary | ICD-10-CM

## 2021-05-05 PROCEDURE — 99211 OFF/OP EST MAY X REQ PHY/QHP: CPT | Performed by: NURSE PRACTITIONER

## 2021-05-05 NOTE — PROGRESS NOTES
Aayush Greene received a viral test for COVID-19. They were educated on isolation and quarantine as appropriate. For any symptoms, they were directed to seek care from their PCP, given contact information to establish with a doctor, directed to an urgent care or the emergency room.

## 2021-05-06 LAB — SARS-COV-2: NOT DETECTED

## 2021-05-13 RX ORDER — GLIMEPIRIDE 4 MG/1
4 TABLET ORAL
Qty: 90 TABLET | Refills: 3 | Status: SHIPPED | OUTPATIENT
Start: 2021-05-13 | End: 2022-05-18

## 2021-05-18 RX ORDER — METFORMIN HYDROCHLORIDE 500 MG/1
1000 TABLET, EXTENDED RELEASE ORAL 2 TIMES DAILY
Qty: 360 TABLET | Refills: 3 | Status: SHIPPED | OUTPATIENT
Start: 2021-05-18 | End: 2022-06-02 | Stop reason: SDUPTHER

## 2021-05-18 NOTE — TELEPHONE ENCOUNTER
Medication:   Requested Prescriptions     Pending Prescriptions Disp Refills    metFORMIN (GLUCOPHAGE-XR) 500 MG extended release tablet 360 tablet 3     Sig: Take 2 tablets by mouth 2 times daily          Patient Phone Number: [phone] (home) [phone] (work)    Last appt: 1/21/2021   Next appt: 5/19/2021    Last OARRS:   RX Monitoring 1/21/2021   Attestation -   Periodic Controlled Substance Monitoring No signs of potential drug abuse or diversion identified.    Chronic Pain > 50 MEDD -   Chronic Pain > 80 MEDD -     PDMP Monitoring:    Last PDMP Hung as Reviewed [facility]):  Review User Review Instant Review Result   HERMELINDA JOAQUIN 1/21/2021 10:15 AM Reviewed PDMP [1]     Preferred Pharmacy:   Fabens Irina 4107 [facility] [phone] - F [phone]  [address] [place] [address]  Phone: [phone] Fax: [phone]    Fabens Irina [address] Akti [facility] [phone]  Tiff Hutchison [place] [address]6451 [address] [facility] [address]  Phone: [phone] Fax: [phone]    Fabens Irina 3663 S [facility],4Th Floor, [address] [place] [address] [phone] Shannon Houstond [phone]  3 [facility]  [address] [place] [address]  Phone: [phone] Fax: [phone]

## 2021-05-19 ENCOUNTER — VIRTUAL VISIT (OUTPATIENT)
Dept: FAMILY MEDICINE CLINIC | Age: 56
End: 2021-05-19
Payer: MEDICARE

## 2021-05-19 DIAGNOSIS — F41.9 ANXIETY: ICD-10-CM

## 2021-05-19 DIAGNOSIS — H81.09 MENIERE'S DISEASE, UNSPECIFIED LATERALITY: ICD-10-CM

## 2021-05-19 PROCEDURE — 99213 OFFICE O/P EST LOW 20 MIN: CPT | Performed by: FAMILY MEDICINE

## 2021-05-19 RX ORDER — DIAZEPAM 5 MG/1
5 TABLET ORAL EVERY 12 HOURS PRN
Qty: 30 TABLET | Refills: 0 | Status: SHIPPED | OUTPATIENT
Start: 2021-05-19 | End: 2021-09-09

## 2021-05-19 NOTE — PROGRESS NOTES
Patient is being seen Virtually using  Doximity. Patient is at home and Dr. Reese Tucker is at the home. The patient has consented to having a virtual visit due to the [place] 19 pandemic. Vitals are patient reported. Chief Complaint   Patient presents with    Diabetes     F/u still needs to get labs, also med check      Was f.u for DM but did not get labs done. States will go later this week. Valium - some days will take 2 doses for his dizziness, and some days will not take any at all. No SE with meds. Has been having a lot of sinus and allergy sx. Also was around someone that had COVID, tested twice and negative. States got Moderna - [facility] - got second dose 2 weeks. Starting to feel some better. Wt Readings from Last 3 Encounters:   01/21/21 (!) 300 lb 3.2 oz (136.2 kg)   12/23/20 298 lb 12.8 oz (135.5 kg)   07/16/20 (!) 301 lb (136.5 kg)     There is no height or weight on file to calculate BMI. PHQ Scores 1/21/2021 7/3/2019 6/5/2019 10/8/2018 8/31/2017   PHQ2 Score 1 2 4 6 2   PHQ9 Score 1 10 15 6 2     Patient-Reported Vitals 5/19/2021   Patient-Reported Weight 295lbs   Patient-Reported Height 72in   Patient-Reported Systolic -   Patient-Reported Diastolic -        GEN: Alert and oriented x 4 NAD, affect appropriate and obese, well developed. ASSESSMENT AND PLAN:       Dewayne Snowden was seen today for diabetes. Diagnoses and all orders for this visit:    Meniere's disease, unspecified laterality  -     diazePAM (VALIUM) 5 MG tablet; Take 1 tablet by mouth every 12 hours as needed for Anxiety for up to 30 days. Anxiety  -     diazePAM (VALIUM) 5 MG tablet; Take 1 tablet by mouth every 12 hours as needed for Anxiety for up to 30 days.      -Stable, continue current medications. Controlled Substances Monitoring:   OARRS report reviewed  Periodic Controlled Substance Monitoring: No signs of potential drug abuse or diversion identified.  Gini Simmons MD)      Get labs and RTO after for DM f/u  Will remove pt skin tag on leg that is bothering him at that time. Return in about 1 month (around 6/19/2021) for  , Diabetes, 30 min and remove skin tag. Pursuant to the emergency declaration under the [facility] waiver authority and the [employer] Act, this Virtual  Visit was conducted, with patient's consent, to reduce the patient's risk of exposure to COVID-19 and provide continuity of care for an established patient. Services were provided through a video synchronous discussion virtually to substitute for in-person clinic visit. Patient was instructed that the AVS is available on My Chart or was emailed to the patient if not on My Chart. Lab orders were emailed to patient if they do not use a Instant [facility] lab. Any work notes were sent to patient through My Chart or email.

## 2021-05-27 RX ORDER — ONDANSETRON 4 MG/1
TABLET, FILM COATED ORAL
Qty: 30 TABLET | Refills: 1 | Status: SHIPPED | OUTPATIENT
Start: 2021-05-27 | End: 2021-08-24

## 2021-05-27 NOTE — TELEPHONE ENCOUNTER
Medication:   Requested Prescriptions      No prescriptions requested or ordered in this encounter          Patient Phone Number: [phone] (home) [phone] (work)    Last appt: 5/19/2021   Next appt: 6/28/2021    Last OARRS:   RX Monitoring 5/19/2021   Attestation -   Periodic Controlled Substance Monitoring No signs of potential drug abuse or diversion identified.    Chronic Pain > 50 MEDD -   Chronic Pain > 80 MEDD -     PDMP Monitoring:    Last PDMP Hung as Reviewed [facility] [place]):  Review User Review Instant Review Result   Joslyn JOAQUIN Dave 1874 5/19/2021 10:38 AM Reviewed PDMP [1]     Preferred Pharmacy:   [place] [address] 4457 [facility] [phone] - F [phone]  [address] [place] [address],5Th [facility] [address]  Phone: [phone] Fax: [phone]    [address] 176 [facility] [phone]  Tiff Hutchison [facility] 1238 [address] [facility] [address]  Phone: [phone] Fax: [phone]    [place] [address] 2353 [facility],4Th Floor, [address] [place] [address] [phone] [facility] [phone]  [address] [place] [address]  [address] [place] [address]  Phone: [phone] Fax: [phone]

## 2021-06-16 ENCOUNTER — HOSPITAL ENCOUNTER (OUTPATIENT)
Age: 56
Discharge: HOME | End: 2021-06-16
Payer: MEDICARE

## 2021-06-16 DIAGNOSIS — E11.65 TYPE 2 DIABETES MELLITUS WITH HYPERGLYCEMIA, WITHOUT LONG-TERM CURRENT USE OF INSULIN (HCC): ICD-10-CM

## 2021-06-16 LAB
ANION GAP SERPL CALCULATED.3IONS-SCNC: 14 MMOL/L (ref 3–16)
BUN BLDV-MCNC: 14 MG/DL (ref 7–20)
CALCIUM SERPL-MCNC: 9.2 MG/DL (ref 8.3–10.6)
CHLORIDE BLD-SCNC: 103 MMOL/L (ref 99–110)
CO2: 24 MMOL/L (ref 21–32)
CREAT SERPL-MCNC: 0.6 MG/DL (ref 0.9–1.3)
GFR AFRICAN AMERICAN: >60
GFR NON-AFRICAN AMERICAN: >60
GLUCOSE BLD-MCNC: 129 MG/DL (ref 70–99)
POTASSIUM SERPL-SCNC: 3.7 MMOL/L (ref 3.5–5.1)
SODIUM BLD-SCNC: 141 MMOL/L (ref 136–145)

## 2021-06-16 PROCEDURE — 36415 COLL VENOUS BLD VENIPUNCTURE: CPT

## 2021-06-16 PROCEDURE — 80048 BASIC METABOLIC PNL TOTAL CA: CPT

## 2021-06-16 PROCEDURE — 83036 HEMOGLOBIN GLYCOSYLATED A1C: CPT

## 2021-06-17 LAB
ESTIMATED AVERAGE GLUCOSE: 182.9 MG/DL
HBA1C MFR BLD: 8 %

## 2021-06-25 NOTE — PROGRESS NOTES
benefits of the   procedure and Verbal informed consent obtained. The lesion and surrounding area was given a sterile prep using chloraprep. Tags removed with iris scissors. Bleeding minimal and controlled with pressure and aluminum chloride. Complications: none. Plan:  1. Wound care was discussed. 2. Recommended that the patient use OTC analgesics as needed for pain.

## 2021-06-28 ENCOUNTER — OFFICE VISIT (OUTPATIENT)
Dept: FAMILY MEDICINE CLINIC | Age: 56
End: 2021-06-28
Payer: MEDICARE

## 2021-06-28 VITALS
HEIGHT: 73 IN | SYSTOLIC BLOOD PRESSURE: 136 MMHG | TEMPERATURE: 97.4 F | BODY MASS INDEX: 39.34 KG/M2 | OXYGEN SATURATION: 97 % | HEART RATE: 65 BPM | DIASTOLIC BLOOD PRESSURE: 84 MMHG | WEIGHT: 296.8 LBS

## 2021-06-28 DIAGNOSIS — I10 ESSENTIAL HYPERTENSION: ICD-10-CM

## 2021-06-28 DIAGNOSIS — L91.8 SKIN TAGS, MULTIPLE ACQUIRED: ICD-10-CM

## 2021-06-28 DIAGNOSIS — E11.65 TYPE 2 DIABETES MELLITUS WITH HYPERGLYCEMIA, WITHOUT LONG-TERM CURRENT USE OF INSULIN (HCC): Primary | ICD-10-CM

## 2021-06-28 PROCEDURE — 11200 RMVL SKIN TAGS UP TO&INC 15: CPT | Performed by: FAMILY MEDICINE

## 2021-06-28 PROCEDURE — 3052F HG A1C>EQUAL 8.0%<EQUAL 9.0%: CPT | Performed by: FAMILY MEDICINE

## 2021-06-28 PROCEDURE — 99214 OFFICE O/P EST MOD 30 MIN: CPT | Performed by: FAMILY MEDICINE

## 2021-06-28 RX ORDER — ESOMEPRAZOLE MAGNESIUM 40 MG/1
40 CAPSULE, DELAYED RELEASE ORAL
Qty: 90 CAPSULE | Refills: 3 | Status: SHIPPED | OUTPATIENT
Start: 2021-06-28 | End: 2022-07-14

## 2021-06-28 RX ORDER — OXYCODONE AND ACETAMINOPHEN 10; 325 MG/1; MG/1
TABLET ORAL
Status: ON HOLD | COMMUNITY
Start: 2021-06-14 | End: 2022-02-21 | Stop reason: SDUPTHER

## 2021-06-28 RX ORDER — IBUPROFEN 800 MG/1
TABLET ORAL
Qty: 90 TABLET | Refills: 12 | Status: SHIPPED | OUTPATIENT
Start: 2021-06-28

## 2021-06-28 RX ORDER — INSULIN DETEMIR 100 [IU]/ML
55 INJECTION, SOLUTION SUBCUTANEOUS NIGHTLY
Qty: 15 PEN | Refills: 12 | Status: SHIPPED
Start: 2021-06-28 | End: 2021-09-09 | Stop reason: SDUPTHER

## 2021-06-28 ASSESSMENT — PATIENT HEALTH QUESTIONNAIRE - PHQ9
SUM OF ALL RESPONSES TO PHQ9 QUESTIONS 1 & 2: 0
1. LITTLE INTEREST OR PLEASURE IN DOING THINGS: 0
2. FEELING DOWN, DEPRESSED OR HOPELESS: 0
SUM OF ALL RESPONSES TO PHQ QUESTIONS 1-9: 0

## 2021-08-24 RX ORDER — ONDANSETRON 4 MG/1
TABLET, FILM COATED ORAL
Qty: 30 TABLET | Refills: 1 | Status: SHIPPED | OUTPATIENT
Start: 2021-08-24 | End: 2021-11-22

## 2021-08-24 NOTE — TELEPHONE ENCOUNTER
Medication:   Requested Prescriptions     Pending Prescriptions Disp Refills    ondansetron (ZOFRAN) 4 MG tablet [Pharmacy Med Name: ONDANSETRON 4MG TABLETS] 30 tablet 1     Sig: TAKE 1 TABLET BY MOUTH EVERY 8 HOURS AS NEEDED FOR NAUSEA OR VOMITING          Patient Phone Number: [phone] (home) [phone] (work)    Last appt: 6/28/2021   Next appt: 10/11/2021    Last OARRS:   RX Monitoring 5/19/2021   Attestation -   Periodic Controlled Substance Monitoring No signs of potential drug abuse or diversion identified.    Chronic Pain > 50 MEDD -   Chronic Pain > 80 MEDD -     PDMP Monitoring:    Last PDMP Hung as Reviewed [facility]):  Review User Review Instant Review Result   Joslyn JOAQUIN 1874 5/19/2021 10:38 AM Reviewed PDMP [1]     Preferred Pharmacy:   [address] [phone] - F [phone]  [address] [place] [address],[facility] [address]  Phone: [phone] Fax: [phone]    [address] 176 Naresh Bosch [phone]  Tiff Hanson 1237 [address] [place] [address]  Phone: [phone] Fax: [phone]    [facility] [address] [facility],4Th Floor, [address] [address] [place]  - P [phone] Amee Diane [phone]  [address] [place] [address]  Phone: [phone] Fax: [phone]

## 2021-09-08 DIAGNOSIS — F41.9 ANXIETY: ICD-10-CM

## 2021-09-08 DIAGNOSIS — H81.09 MENIERE'S DISEASE, UNSPECIFIED LATERALITY: ICD-10-CM

## 2021-09-08 DIAGNOSIS — E11.21 DIABETES MELLITUS WITH NEPHROPATHY (HCC): Primary | ICD-10-CM

## 2021-09-08 NOTE — TELEPHONE ENCOUNTER
[address] is requesting to clarify a script for insulin detemir (LEVEMIR FLEXTOUCH) 100 UNIT/ML injection pen and if its supposed to be 55 Units at night or 40 Units? ? Please Advise?

## 2021-09-09 RX ORDER — LOSARTAN POTASSIUM AND HYDROCHLOROTHIAZIDE 25; 100 MG/1; MG/1
TABLET ORAL
Qty: 90 TABLET | Refills: 3 | Status: SHIPPED | OUTPATIENT
Start: 2021-09-09 | End: 2022-09-13

## 2021-09-09 RX ORDER — INSULIN DETEMIR 100 [IU]/ML
55 INJECTION, SOLUTION SUBCUTANEOUS NIGHTLY
Qty: 15 PEN | Refills: 11 | Status: SHIPPED | OUTPATIENT
Start: 2021-09-09 | End: 2022-10-03 | Stop reason: SDUPTHER

## 2021-09-09 RX ORDER — DIAZEPAM 5 MG/1
TABLET ORAL
Qty: 30 TABLET | Refills: 0 | Status: SHIPPED | OUTPATIENT
Start: 2021-09-09 | End: 2021-12-09

## 2021-09-09 NOTE — TELEPHONE ENCOUNTER
Medication:   Requested Prescriptions     Pending Prescriptions Disp Refills    diazePAM (VALIUM) 5 MG tablet [Pharmacy Med Name: DIAZEPAM 5MG TABLETS] 30 tablet      Sig: TAKE 1 TABLET BY MOUTH EVERY 12 HOURS AS NEEDED FOR ANXIETY    losartan-hydroCHLOROthiazide (HYZAAR) 100-25 MG per tablet [Pharmacy Med Name: LOSARTAN/HCTZ 100/25MG TABLETS] 90 tablet 3     Sig: TAKE 1 TABLET BY MOUTH DAILY      Last Filled:  5/19/21    Patient Phone Number: [phone] (home) [phone] (work)    Last appt: 6/28/2021   Next appt: 9/8/2021    Last OARRS:   RX Monitoring 5/19/2021   Attestation -   Periodic Controlled Substance Monitoring No signs of potential drug abuse or diversion identified.    Chronic Pain > 50 MEDD -   Chronic Pain > 80 MEDD -     PDMP Monitoring:    Last PDMP Hung as Reviewed [facility]):  Review User Review Instant Review Result   Joslyn JOAQUIN 1874 5/19/2021 10:38 AM Reviewed PDMP [1]     Preferred Pharmacy:   [place] [address] 6339 [facility] [phone] - F [phone]  [facility] [address] [place] [address],[facility] [address]  Phone: [phone] Fax: [phone]    [address] 176 [facility] [phone]  Tiff Hutchison [facility] 1237 [address] [facility] [address]  Phone: [phone] Fax: [phone]    [place] [address] 2663 S [facility],4Th Floor, [address] [address] [phone] Tiera Arellano [phone]  [address] [place] [address]  Phone: [phone] Fax: [phone]

## 2021-10-01 RX ORDER — SIMVASTATIN 40 MG
TABLET ORAL
Qty: 90 TABLET | Refills: 3 | Status: SHIPPED | OUTPATIENT
Start: 2021-10-01 | End: 2022-10-03

## 2021-10-01 NOTE — TELEPHONE ENCOUNTER
Medication:   Requested Prescriptions      No prescriptions requested or ordered in this encounter      Provider out of office    Patient Phone Number: [phone] (home) [phone] (work)    Last appt: 6/28/2021   Next appt: 10/11/2021    Last OARRS:   RX Monitoring 5/19/2021   Attestation -   Periodic Controlled Substance Monitoring No signs of potential drug abuse or diversion identified.    Chronic Pain > 50 MEDD -   Chronic Pain > 80 MEDD -     PDMP Monitoring:    Last PDMP Hung as Reviewed [facility]):  Review User Review Instant Review Result   Joslyn JOAQUIN 1874 5/19/2021 10:38 AM Reviewed PDMP [1]     Preferred Pharmacy:   Julia Dalton 4107 [facility] [phone] - F [phone]  [address] [place] [address],5Th [facility] [address]  Phone: [phone] Fax: [phone]    Julia Dalton [address] [place] [address] 176 Akti Pagalou [phone]  Tiff Hutchison [facility] 1237 [address] [facility] [address]  Phone: [phone] Fax: [phone]    Julia Dalton 3663 S [facility],4Th Floor, [address] [place] [address] [phone] Bertin Hou [phone]  [address] [place], [place] [address]  [address] [address]  Phone: [phone] Fax: [phone]

## 2021-10-04 DIAGNOSIS — E11.21 TYPE 2 DIABETES MELLITUS WITH DIABETIC NEPHROPATHY, WITH LONG-TERM CURRENT USE OF INSULIN (HCC): ICD-10-CM

## 2021-10-04 DIAGNOSIS — Z79.4 TYPE 2 DIABETES MELLITUS WITH DIABETIC NEPHROPATHY, WITH LONG-TERM CURRENT USE OF INSULIN (HCC): ICD-10-CM

## 2021-10-04 RX ORDER — PEN NEEDLE, DIABETIC 31 GX5/16"
NEEDLE, DISPOSABLE MISCELLANEOUS
Qty: 100 EACH | Refills: 12 | Status: SHIPPED | OUTPATIENT
Start: 2021-10-04

## 2021-10-04 NOTE — TELEPHONE ENCOUNTER
Medication:   Requested Prescriptions      No prescriptions requested or ordered in this encounter      Provider out of office    Patient Phone Number: [phone] (home) [phone] (work)    Last appt: 6/28/2021   Next appt: 10/11/2021    Last OARRS:   RX Monitoring 5/19/2021   Attestation -   Periodic Controlled Substance Monitoring No signs of potential drug abuse or diversion identified.    Chronic Pain > 50 MEDD -   Chronic Pain > 80 MEDD -     PDMP Monitoring:    Last PDMP Hung as Reviewed [facility]):  Review User Review Instant Review Result   Joslyn JOAQUIN 1874 5/19/2021 10:38 AM Reviewed PDMP [1]     Preferred Pharmacy:   [place] [address] 6522 [facility] [phone] - F [phone]  3084 [address] [place] [address],5Th [facility] [address]  Phone: [phone] Fax: [phone]    [address] 176 [facility] [phone]  Tiff Hutchison [facility] 7189 8[address] [facility] [address]  Phone: [phone] Fax: [phone]    [place] [address] [address] [facility],4Th Floor, [address] [place] [address] [facility] [phone] Evan Martini [phone]  [address] [place] [address]  Phone: [phone] Fax: [phone]

## 2021-10-13 ENCOUNTER — HOSPITAL ENCOUNTER (OUTPATIENT)
Age: 56
Discharge: HOME | End: 2021-10-13
Payer: MEDICARE

## 2021-10-13 DIAGNOSIS — E11.65 TYPE 2 DIABETES MELLITUS WITH HYPERGLYCEMIA, WITHOUT LONG-TERM CURRENT USE OF INSULIN (HCC): ICD-10-CM

## 2021-10-13 LAB
ANION GAP SERPL CALCULATED.3IONS-SCNC: 12 MMOL/L (ref 3–16)
BUN BLDV-MCNC: 16 MG/DL (ref 7–20)
CALCIUM SERPL-MCNC: 9.3 MG/DL (ref 8.3–10.6)
CHLORIDE BLD-SCNC: 103 MMOL/L (ref 99–110)
CO2: 26 MMOL/L (ref 21–32)
CREAT SERPL-MCNC: 0.7 MG/DL (ref 0.9–1.3)
GFR AFRICAN AMERICAN: >60
GFR NON-AFRICAN AMERICAN: >60
GLUCOSE BLD-MCNC: 139 MG/DL (ref 70–99)
POTASSIUM SERPL-SCNC: 4.1 MMOL/L (ref 3.5–5.1)
SODIUM BLD-SCNC: 141 MMOL/L (ref 136–145)

## 2021-10-13 PROCEDURE — 36415 COLL VENOUS BLD VENIPUNCTURE: CPT

## 2021-10-13 PROCEDURE — 83036 HEMOGLOBIN GLYCOSYLATED A1C: CPT

## 2021-10-13 PROCEDURE — 80048 BASIC METABOLIC PNL TOTAL CA: CPT

## 2021-10-14 LAB
ESTIMATED AVERAGE GLUCOSE: 191.5 MG/DL
HBA1C MFR BLD: 8.3 %

## 2021-10-18 ENCOUNTER — OFFICE VISIT (OUTPATIENT)
Dept: FAMILY MEDICINE CLINIC | Age: 56
End: 2021-10-18
Payer: MEDICARE

## 2021-10-18 ENCOUNTER — TELEPHONE (OUTPATIENT)
Dept: FAMILY MEDICINE CLINIC | Age: 56
End: 2021-10-18

## 2021-10-18 VITALS
DIASTOLIC BLOOD PRESSURE: 80 MMHG | WEIGHT: 300.6 LBS | BODY MASS INDEX: 39.66 KG/M2 | OXYGEN SATURATION: 98 % | TEMPERATURE: 98.1 F | SYSTOLIC BLOOD PRESSURE: 134 MMHG | HEART RATE: 63 BPM

## 2021-10-18 DIAGNOSIS — I10 ESSENTIAL HYPERTENSION: ICD-10-CM

## 2021-10-18 DIAGNOSIS — E11.65 TYPE 2 DIABETES MELLITUS WITH HYPERGLYCEMIA, WITHOUT LONG-TERM CURRENT USE OF INSULIN (HCC): Primary | ICD-10-CM

## 2021-10-18 DIAGNOSIS — K04.7 DENTAL INFECTION: ICD-10-CM

## 2021-10-18 DIAGNOSIS — E11.21 TYPE 2 DIABETES MELLITUS WITH DIABETIC NEPHROPATHY, WITH LONG-TERM CURRENT USE OF INSULIN (HCC): ICD-10-CM

## 2021-10-18 DIAGNOSIS — Z79.4 TYPE 2 DIABETES MELLITUS WITH DIABETIC NEPHROPATHY, WITH LONG-TERM CURRENT USE OF INSULIN (HCC): ICD-10-CM

## 2021-10-18 DIAGNOSIS — Z23 NEED FOR VACCINATION: ICD-10-CM

## 2021-10-18 PROCEDURE — 90674 CCIIV4 VAC NO PRSV 0.5 ML IM: CPT | Performed by: FAMILY MEDICINE

## 2021-10-18 PROCEDURE — G0008 ADMIN INFLUENZA VIRUS VAC: HCPCS | Performed by: FAMILY MEDICINE

## 2021-10-18 PROCEDURE — 3052F HG A1C>EQUAL 8.0%<EQUAL 9.0%: CPT | Performed by: FAMILY MEDICINE

## 2021-10-18 PROCEDURE — 99214 OFFICE O/P EST MOD 30 MIN: CPT | Performed by: FAMILY MEDICINE

## 2021-10-18 RX ORDER — INSULIN LISPRO 100 [IU]/ML
INJECTION, SOLUTION INTRAVENOUS; SUBCUTANEOUS
Qty: 5 PEN | Refills: 3 | Status: SHIPPED | OUTPATIENT
Start: 2021-10-18 | End: 2022-07-20

## 2021-10-18 RX ORDER — CLINDAMYCIN HYDROCHLORIDE 300 MG/1
300 CAPSULE ORAL 3 TIMES DAILY
Qty: 30 CAPSULE | Refills: 0 | Status: SHIPPED | OUTPATIENT
Start: 2021-10-18 | End: 2021-10-28

## 2021-10-18 RX ORDER — FLUTICASONE PROPIONATE 50 MCG
SPRAY, SUSPENSION (ML) NASAL
Qty: 48 G | Refills: 3 | Status: SHIPPED | OUTPATIENT
Start: 2021-10-18 | End: 2022-08-15 | Stop reason: SDUPTHER

## 2021-10-18 NOTE — TELEPHONE ENCOUNTER
----- Message from [facility] (SOFIYA STARKEY) sent at 10/18/2021  3:40 PM EDT -----  Subject: Message to Provider    QUESTIONS  Information for Provider? Farshad Begum need to know the   direction for the Humalog [phone]  ---------------------------------------------------------------------------  --------------  CALL BACK INFO  What is the best way for the office to contact you? OK to leave message on   voicemail  Preferred Call Back Phone Number? [phone]  ---------------------------------------------------------------------------  --------------  SCRIPT ANSWERS  Relationship to Patient? Third Party  Representative Name?  Farshad Begum

## 2021-10-18 NOTE — PATIENT INSTRUCTIONS
Get COVID booster in November. Send me sugars in 2 weeks    Patient Education        Influenza (Flu) Vaccine (Inactivated or Recombinant): What You Need to Know  Why get vaccinated? Influenza vaccine can prevent influenza (flu). Flu is a contagious disease that spreads around the [place] every year, usually between October and May. Anyone can get the flu, but it is more dangerous for some people. Infants and young children, people 72years of age and older, pregnant women, and people with certain health conditions or a weakened immune system are at greatest risk of flu complications. Pneumonia, bronchitis, sinus infections and ear infections are examples of flu-related complications. If you have a medical condition, such as heart disease, cancer or diabetes, flu can make it worse. Flu can cause fever and chills, sore throat, muscle aches, fatigue, cough, headache, and runny or stuffy nose. Some people may have vomiting and diarrhea, though this is more common in children than adults. Each year, thousands of people in the [facility] die from flu, and many more are hospitalized. Flu vaccine prevents millions of illnesses and flu-related visits to the doctor each year. Influenza vaccine  CDC recommends everyone 10months of age and older get vaccinated every flu season. Children 6 months through 6years of age may need 2 doses during a single flu season. Everyone else needs only 1 dose each flu season. It takes about 2 weeks for protection to develop after vaccination. There are many flu viruses, and they are always changing. Each year a new flu vaccine is made to protect against three or four viruses that are likely to cause disease in the upcoming flu season. Even when the vaccine doesn't exactly match these viruses, it may still provide some protection. Influenza vaccine does not cause flu. Influenza vaccine may be given at the same time as other vaccines.   Talk with your health care provider  Tell your vaccine provider if the person getting the vaccine:  · Has had an allergic reaction after a previous dose of influenza vaccine, or has any severe, life-threatening allergies. · Has ever had Guillain-Barré Syndrome (also called GBS). In some cases, your health care provider may decide to postpone influenza vaccination to a future visit. People with minor illnesses, such as a cold, may be vaccinated. People who are moderately or severely ill should usually wait until they recover before getting influenza vaccine. Your health care provider can give you more information. Risks of a vaccine reaction  · Soreness, redness, and swelling where shot is given, fever, muscle aches, and headache can happen after influenza vaccine. · There may be a very small increased risk of Guillain-Barré Syndrome (GBS) after inactivated influenza vaccine (the flu shot). Ardine Gum children who get the flu shot along with pneumococcal vaccine (PCV13), and/or DTaP vaccine at the same time might be slightly more likely to have a seizure caused by fever. Tell your health care provider if a child who is getting flu vaccine has ever had a seizure. People sometimes faint after medical procedures, including vaccination. Tell your provider if you feel dizzy or have vision changes or ringing in the ears. As with any medicine, there is a very remote chance of a vaccine causing a severe allergic reaction, other serious injury, or death. What if there is a serious problem? An allergic reaction could occur after the vaccinated person leaves the clinic. If you see signs of a severe allergic reaction (hives, swelling of the face and throat, difficulty breathing, a fast heartbeat, dizziness, or weakness), call 9-1-1 and get the person to the nearest hospital.  For other signs that concern you, call your health care provider. Adverse reactions should be reported to the Vaccine Adverse Event Reporting System (VAERS).  Your health care provider will usually file this report, or you can do it yourself. Visit the VAERS website at www.vaers. [facility].gov or call 2-353.419.9191. VAERS is only for reporting reactions, and [facility] staff do not give medical advice. The National Vaccine Injury Compensation Program  The National Vaccine Injury Compensation Program (VICP) is a federal program that was created to compensate people who may have been injured by certain vaccines. Visit the VICP website at www.[facility].gov/vaccinecompensation or call 5-127.744.2891 to learn about the program and about filing a claim. There is a time limit to file a claim for compensation. How can I learn more? · Ask your healthcare provider. · Call your local or state health department. · Contact the Centers for Disease Control and Prevention (CDC):  ? Call 5-292.209.9856 (1-800-CDC-INFO) or  ? Visit CDC's website at www.cdc.gov/flu  Vaccine Information Statement (Interim)  Inactivated Influenza Vaccine  8/15/2019  42 MARY ANNE Sage 410PE-60  Department of Health and Human Services  Centers for Disease Control and Prevention  Many Vaccine Information Statements are available in Georgian and other languages. See www.immunize.org/vis. Muchas hojas de información sobre vacunas están disponibles en español y en otros idiomas. Visite www.immunize.org/vis. Care instructions adapted under license by [facility] [place] ([facility]). If you have questions about a medical condition or this instruction, always ask your healthcare professional. [place] [address] any warranty or liability for your use of this information.

## 2021-10-18 NOTE — PROGRESS NOTES
Eusebia Edwards is a 49 y.o. male who presents for follow-up of Type 2 diabetes mellitus. He also has abscessed tooth that has been going on for about 5 days. Current medication use: taking as prescribed (metformin, insulin, glimepiride and Jardiance)    Eye exam current (within one year): yes, Reviewed note from Dr. Connie López from visit on 21 which showed no retinopathy. Current diet: in general, an \"unhealthy\" diet, on average, 2 meals per day  Current exercise:walking, cutting grass  Sees podiatrist: no    Checks sugars at home: Yes - 1-3 times daily. Usually not higher than 200. Hypoglycemia symptoms: Yes - usually twice a month usually when misses a meal. States doesn't eat consistantly and diet isn't that good  AM Fastin      Checks BP at home:  not doing  Current medication use: taking as prescribed (losartan and HCTZ)    Has infection in tooth, making appt for dentist but needs abx until  Can get in. Body mass index is 39.66 kg/m². Vitals:    10/18/21 1350   BP: 134/80   Site: Left Upper Arm   Position: Sitting   Cuff Size: Large Adult   Pulse: 63   Temp: 98.1 °F (36.7 °C)   TempSrc: Infrared   SpO2: 98%   Weight: (!) 300 lb 9.6 oz (136.4 kg)     Wt Readings from Last 3 Encounters:   10/18/21 (!) 300 lb 9.6 oz (136.4 kg)   21 296 lb 12.8 oz (134.6 kg)   21 (!) 300 lb 3.2 oz (136.2 kg)       GENERAL:Alert and oriented x 4 NAD, affect appropriate and obese, well hydrated, well developed.   LUNG:clear to auscultation bilaterally with normal respiratory effort and good air movement  CV: Normal heart sounds, regular rate and rhythm without murmurs    LE Pulses:  normal,   LE Edema: normal,    Monofilament Sensation:  normal,   LE Skin lesions: normal   Bunions: yes -   Hammertoes: yes -   Callus: yes -   Nail Deformity: no  Signs of Fungus: no      PHQ Scores 2021 2021 7/3/2019 2019 10/8/2018 2017   PHQ2 Score 0 1 2 4 6 2   PHQ9 Score 0 1 10 15 6 2 ASSESSMENT AND PLAN:       Imelda Quintanilla was seen today for diabetes. Diagnoses and all orders for this visit:    Type 2 diabetes mellitus with hyperglycemia, without long-term current use of insulin ([facility])  -      DIABETES FOOT EXAM  -     Hemoglobin A1C; Future  -     Comprehensive Metabolic Panel; Future  -     Lipid Panel; Future  -     Microalbumin / Creatinine Urine Ratio; Future  -     CBC; Future      -A1C Uncontrolled  -add mealtime insulin, send me sugars in 2 weeks, more consistant with diet!!!  -Reviewed pre-visit labs with patient. (BMP and A1C) Review of these labs contributed to MDM. Lab Results   Component Value Date    LABA1C 8.3 10/13/2021       -lab slip given for next visit (ordered above, did not contribute to MDM)  -patient to check sugars as needed  -encouraged a healthy diet, regular exercise and maintaining a healthy weight  -RTO  3 months  -eye form given:  No  -     insulin lispro, 1 Unit Dial, (HUMALOG KWIKPEN) 100 UNIT/ML SOPN; <100 none, 100-150 4 units, 151-200 6 units, 201-250 8 units, 251-300 10 units      Type 2 diabetes mellitus with diabetic nephropathy, with long-term current use of insulin ([facility])  Stable, monitor  Better sugar control    Essential hypertension  -Stable, continue current medications. Need for vaccination  -     INFLUENZA, MDCK QUADV, 2 YRS AND OLDER, IM, PF, PREFILL SYR OR SDV, 0.5ML (FLUCELVAX QUADV, PF)    Dental infection  -     clindamycin (CLEOCIN) 300 MG capsule; Take 1 capsule by mouth 3 times daily for 10 days    Other orders  -     fluticasone (FLONASE) 50 MCG/ACT nasal spray; USE 2 SPRAYS IN EACH NOSTRIL DAILY              Return in about 3 months (around 1/18/2022) for Diabetes, 30 min.          Portions of Note per  Nahun Patterson CMA AAMA with corrections and edits per Ruth Ann Martin MD.  I agree with entirety of note and was present and performed history and physical.  I also confirm that the note above accurately reflects all work,

## 2021-11-08 ENCOUNTER — TELEPHONE (OUTPATIENT)
Dept: FAMILY MEDICINE CLINIC | Age: 56
End: 2021-11-08

## 2021-11-08 NOTE — TELEPHONE ENCOUNTER
Patient brought in BS log in for MD to review. Dr. Sully Roman states that numbers look better, but to still increase the Levemir to 60 units and to keep same meal time dosage. Bring sugars again in a month. Patient informed.

## 2021-11-22 RX ORDER — ONDANSETRON 4 MG/1
TABLET, FILM COATED ORAL
Qty: 30 TABLET | Refills: 1 | Status: SHIPPED | OUTPATIENT
Start: 2021-11-22 | End: 2022-02-14 | Stop reason: SDUPTHER

## 2021-11-22 NOTE — TELEPHONE ENCOUNTER
Medication:   Requested Prescriptions     Pending Prescriptions Disp Refills    ondansetron (ZOFRAN) 4 MG tablet [Pharmacy Med Name: ONDANSETRON 4MG TABLETS] 30 tablet 1     Sig: TAKE 1 TABLET BY MOUTH EVERY 8 HOURS AS NEEDED FOR NAUSEA OR VOMITING          Patient Phone Number: [phone] (home) [phone] (work)    Last appt: 10/18/2021   Next appt: Visit date not found    Last OARRS:   RX Monitoring 5/19/2021   Attestation -   Periodic Controlled Substance Monitoring No signs of potential drug abuse or diversion identified.    Chronic Pain > 50 MEDD -   Chronic Pain > 80 MEDD -     PDMP Monitoring:    Last PDMP Hung as Reviewed [facility]):  Review User Review Instant Review Result   Joslyn JOAQUIN 1874 5/19/2021 10:38 AM Reviewed PDMP [1]     Preferred Pharmacy:   [place] [address] [address] [facility] [phone] - F [phone]  [address] [place] [address],[facility] [address]  Phone: [phone] Fax: [phone]    [address] Naresh Bosch [phone]  Tiff Hutchison [facility] 1235 [address] [facility] [address]  Phone: [phone] Fax: [phone]    [place] [address] [address] [facility],[address] Floor, [address] [place] [address] [phone] [facility] [phone]   [facility]  [address] [place] [address]  Phone: [phone] Fax: [phone]

## 2021-11-26 ENCOUNTER — TELEPHONE (OUTPATIENT)
Dept: FAMILY MEDICINE CLINIC | Age: 56
End: 2021-11-26

## 2021-11-26 NOTE — TELEPHONE ENCOUNTER
Patient brought in BS log in for MD to review. Dr. Antoinette Hidalog states that increase Levemir to 70 units and send in numbers again in 2 wks. Patient informed. Log scanned to encounter.

## 2021-12-01 ENCOUNTER — OFFICE VISIT (OUTPATIENT)
Dept: FAMILY MEDICINE CLINIC | Age: 56
End: 2021-12-01
Payer: MEDICARE

## 2021-12-01 VITALS — HEART RATE: 87 BPM | TEMPERATURE: 97.8 F | OXYGEN SATURATION: 97 %

## 2021-12-01 DIAGNOSIS — B96.89 ACUTE BACTERIAL SINUSITIS: ICD-10-CM

## 2021-12-01 DIAGNOSIS — J01.90 ACUTE BACTERIAL SINUSITIS: ICD-10-CM

## 2021-12-01 DIAGNOSIS — Z20.822 SUSPECTED COVID-19 VIRUS INFECTION: Primary | ICD-10-CM

## 2021-12-01 PROCEDURE — 99213 OFFICE O/P EST LOW 20 MIN: CPT | Performed by: NURSE PRACTITIONER

## 2021-12-01 RX ORDER — DOXYCYCLINE 100 MG/1
100 CAPSULE ORAL 2 TIMES DAILY
Qty: 14 CAPSULE | Refills: 0 | Status: SHIPPED | OUTPATIENT
Start: 2021-12-01 | End: 2021-12-08

## 2021-12-01 NOTE — PROGRESS NOTES
2021  June Ross (:  1965)    Allergies: Allergies   Allergen Reactions    Amoxicillin      Rash    Morphine      RASH, hallucinations oral and IV     (review in Epic)      FLU/RESPIRATORY/COVID-19 CLINIC EVALUATION    HPI:   Chief Complaint   Patient presents with    Cough        SYMPTOMS:    INSTRUCTIONS:  \"[x]\" Indicates a positive item  \"[]\" Indicates a negative item        Symptom duration, days:    Date symptoms started : 5 days  [] 1   [] 2   [] 3   [x] 4 - 7   [] 8 - 10   [] 11 - 13   [] >14    [] Fevers    [] Symptom (not measured)  [] Measured (Result:  degrees)  [] Chills  [x] Cough [] Dry [] Productive   []Loss of Taste  [] Loss of Smell  []Decreased Appetite  [] Coughing up blood  }  [] Chest Congestion  [x] Nasal Congestion  [x] Runny  Nose- bloody  [] Sneezing  [] Feeling short of breath   []Sometimes    [] Frequently    [] All the time     [] Chest pain     [x] Headaches  []Tolerable  [] Severe     [] Fatigue  [] Sore throat  [x] Muscle aches  [x] Nausea  [] Vomiting  []Unable to keep fluids down     [] Diarrhea  [] Mild  []Severe       [x] Vaccinated for COVID 19  [] History of COVID 19 (Date:           )      [x] OTHER SYMPTOMS: Has been taking Mucinex with minimal relief. Recent sick contacts with grand-daughter- + Strep. Symptom course:   [x] Worsening     [] Stable     [] Improving      RISK FACTORS:1INSTRUCTIONS:  \"[x]\" Indicates a positive item. Negative  for risk factors if not checked.     [] Close contact with a lab confirmed COVID-19 patient within 14 days of symptom onset  [] History of travel from affected geographical areas within 14 days of symptom onset        PHYSICAL EXAMINATION:    Vitals:    21 1425   Pulse: 87   Temp: 97.8 °F (36.6 °C)   SpO2: 97%          [x] Alert  [x] Oriented to person/place/time    [x] No apparent distress   [] Toxic appearing  [] Face flushed appearing     [x] Normal Mood  [] Anxious appearing      [] Sclera clear    [x] Pinna, TMs,  Canals normal bilaterally  [] TM Red  [] Right [] Left [] Bilateral  [] TM Bulging [] Right [] Left []  Billateral    [x] Oropharynx [x] Clear [] Red [] Exudate [] Swollen    [x] No adenopathy [] Adenopathy __________    [x] Lungs clear with good movement and effort  [x] Breathing appears normal     [] Speaks in complete sentences  [] Appears tachypneic   [] Wheezing           [] Rhonchi   [] Decreased    [x] CV RRR  [x] No Murmur  [] Murmur  [] Irregular  [] Tachycardic    [] OTHER:  1}      TESTS ORDERED:    [] POCT FLU  [] POCT STREP  [x] COVID-19 Test sent  [] Appointment made at testing clinic for patient to get a COVID test.       TEST RESULTS:    POCT FLU test:  [] Positive  [] Negative  POCT STREP test:  [] Positive  [] Negative    ASSESSMENT:  [] Allergic Rhinitis  [] Asthma Exacerbation  [] Bronchitis  [] COPD Exacerbation  [] Gastroenteritis  [] Influenza  [] Sinusitis  [] Strep Throat [] Sore Throat  [] Viral URI   [x] Possible COVID-19   [] Exposure to COVID -19  [] Positive for COVID  [] Screening for Viral Disease (COVID test no sx)        Robb Avilez was seen today for cough. Diagnoses and all orders for this visit:    Suspected COVID-19 virus infection  -     Cancel: COVID-19    Acute bacterial sinusitis  Watch and wait over the next 48-72 hours before initiating antibiotics. If symptoms progressively worsen or do not resolve may start on antibiotics. -     doxycycline monohydrate (MONODOX) 100 MG capsule;  Take 1 capsule by mouth 2 times daily for 7 days    Other orders  -     COVID-19  -     Cancel: COVID-19  -     Cancel: COVID-19              [] Low risk for complications from COVID 19  [] Moderate risk for complications from COVID 19  [x] High risk for complications from COVID 19    PLAN:    [x] Discharge home with written instructions for:  [] Flu management  [] Strep throat management  [] Viral respiratory illness management  [] Sinusitis management  [] Bronchitis Management  [x] Possible COVID-19 infection with self-quarantine and management of symptoms  [x] Follow-up with primary care physician or emergency department if worsens  [] Note given for work    [] Referred to emergency department for evaluation      I, Zulma Dubin, LPN, am scribing for and in the presence of JOELLE Marie CNP.  Electronically signed by Zulma Dubin, LPN on 56/9/54 at 6:66 PM EST

## 2021-12-02 LAB — SARS-COV-2: NOT DETECTED

## 2021-12-08 DIAGNOSIS — F41.9 ANXIETY: ICD-10-CM

## 2021-12-08 DIAGNOSIS — H81.09 MENIERE'S DISEASE, UNSPECIFIED LATERALITY: ICD-10-CM

## 2021-12-09 RX ORDER — DIAZEPAM 5 MG/1
TABLET ORAL
Qty: 30 TABLET | Refills: 0 | Status: SHIPPED | OUTPATIENT
Start: 2021-12-09 | End: 2022-02-07 | Stop reason: SDUPTHER

## 2021-12-17 ENCOUNTER — TELEPHONE (OUTPATIENT)
Dept: FAMILY MEDICINE CLINIC | Age: 56
End: 2021-12-17

## 2021-12-17 DIAGNOSIS — J01.90 ACUTE BACTERIAL SINUSITIS: Primary | ICD-10-CM

## 2021-12-17 DIAGNOSIS — B96.89 ACUTE BACTERIAL SINUSITIS: Primary | ICD-10-CM

## 2021-12-17 RX ORDER — AZITHROMYCIN 250 MG/1
250 TABLET, FILM COATED ORAL SEE ADMIN INSTRUCTIONS
Qty: 6 TABLET | Refills: 0 | Status: SHIPPED | OUTPATIENT
Start: 2021-12-17 | End: 2021-12-22

## 2021-12-17 NOTE — TELEPHONE ENCOUNTER
Patient called with concerns that he feels worst than he did when he came in 12/1/21 to see Renetta Schaeffer      He would like to know if you can call something else in       [address] [place] [address] [place] [address] Roberto - F [phone]   Tiff Hanson [address] [place] [address], [address] [place] [address]   Phone:  [phone]  Fax:  [phone]

## 2022-01-24 ENCOUNTER — HOSPITAL ENCOUNTER (OUTPATIENT)
Age: 57
Discharge: HOME | End: 2022-01-24
Payer: MEDICARE

## 2022-01-24 DIAGNOSIS — E11.65 TYPE 2 DIABETES MELLITUS WITH HYPERGLYCEMIA, WITHOUT LONG-TERM CURRENT USE OF INSULIN (HCC): ICD-10-CM

## 2022-01-24 LAB
A/G RATIO: 1.2 (ref 1.1–2.2)
ALBUMIN SERPL-MCNC: 4.3 G/DL (ref 3.4–5)
ALP BLD-CCNC: 77 U/L (ref 40–129)
ALT SERPL-CCNC: 49 U/L (ref 10–40)
ANION GAP SERPL CALCULATED.3IONS-SCNC: 12 MMOL/L (ref 3–16)
AST SERPL-CCNC: 27 U/L (ref 15–37)
BILIRUB SERPL-MCNC: 0.6 MG/DL (ref 0–1)
BUN BLDV-MCNC: 13 MG/DL (ref 7–20)
CALCIUM SERPL-MCNC: 9 MG/DL (ref 8.3–10.6)
CHLORIDE BLD-SCNC: 100 MMOL/L (ref 99–110)
CHOLESTEROL, TOTAL: 153 MG/DL (ref 0–199)
CO2: 25 MMOL/L (ref 21–32)
CREAT SERPL-MCNC: 0.6 MG/DL (ref 0.9–1.3)
CREATININE URINE: 87.4 MG/DL (ref 39–259)
GFR AFRICAN AMERICAN: >60
GFR NON-AFRICAN AMERICAN: >60
GLUCOSE BLD-MCNC: 113 MG/DL (ref 70–99)
HCT VFR BLD CALC: 48.1 % (ref 40.5–52.5)
HDLC SERPL-MCNC: 35 MG/DL (ref 40–60)
HEMOGLOBIN: 16.1 G/DL (ref 13.5–17.5)
LDL CHOLESTEROL CALCULATED: 95 MG/DL
MCH RBC QN AUTO: 27.8 PG (ref 26–34)
MCHC RBC AUTO-ENTMCNC: 33.5 G/DL (ref 31–36)
MCV RBC AUTO: 82.9 FL (ref 80–100)
MICROALBUMIN UR-MCNC: <1.2 MG/DL
MICROALBUMIN/CREAT UR-RTO: NORMAL MG/G (ref 0–30)
PDW BLD-RTO: 14.5 % (ref 12.4–15.4)
PLATELET # BLD: 205 K/UL (ref 135–450)
PMV BLD AUTO: 10.3 FL (ref 5–10.5)
POTASSIUM SERPL-SCNC: 4.1 MMOL/L (ref 3.5–5.1)
RBC # BLD: 5.8 M/UL (ref 4.2–5.9)
SODIUM BLD-SCNC: 137 MMOL/L (ref 136–145)
TOTAL PROTEIN: 7.8 G/DL (ref 6.4–8.2)
TRIGL SERPL-MCNC: 114 MG/DL (ref 0–150)
VLDLC SERPL CALC-MCNC: 23 MG/DL
WBC # BLD: 6.3 K/UL (ref 4–11)

## 2022-01-24 PROCEDURE — 82043 UR ALBUMIN QUANTITATIVE: CPT

## 2022-01-24 PROCEDURE — 80053 COMPREHEN METABOLIC PANEL: CPT

## 2022-01-24 PROCEDURE — 85027 COMPLETE CBC AUTOMATED: CPT

## 2022-01-24 PROCEDURE — 82570 ASSAY OF URINE CREATININE: CPT

## 2022-01-24 PROCEDURE — 83036 HEMOGLOBIN GLYCOSYLATED A1C: CPT

## 2022-01-24 PROCEDURE — 36415 COLL VENOUS BLD VENIPUNCTURE: CPT

## 2022-01-24 PROCEDURE — 80061 LIPID PANEL: CPT

## 2022-01-25 LAB
ESTIMATED AVERAGE GLUCOSE: 182.9 MG/DL
HBA1C MFR BLD: 8 %

## 2022-02-07 ENCOUNTER — OFFICE VISIT (OUTPATIENT)
Dept: FAMILY MEDICINE CLINIC | Age: 57
End: 2022-02-07
Payer: MEDICARE

## 2022-02-07 VITALS
BODY MASS INDEX: 39.71 KG/M2 | TEMPERATURE: 97.2 F | HEART RATE: 74 BPM | DIASTOLIC BLOOD PRESSURE: 78 MMHG | OXYGEN SATURATION: 98 % | SYSTOLIC BLOOD PRESSURE: 138 MMHG | WEIGHT: 301 LBS

## 2022-02-07 DIAGNOSIS — E11.65 TYPE 2 DIABETES MELLITUS WITH HYPERGLYCEMIA, WITHOUT LONG-TERM CURRENT USE OF INSULIN (HCC): Primary | ICD-10-CM

## 2022-02-07 DIAGNOSIS — Z79.4 TYPE 2 DIABETES MELLITUS WITH MICROALBUMINURIA, WITH LONG-TERM CURRENT USE OF INSULIN (HCC): ICD-10-CM

## 2022-02-07 DIAGNOSIS — H81.09 MENIERE'S DISEASE, UNSPECIFIED LATERALITY: ICD-10-CM

## 2022-02-07 DIAGNOSIS — K21.9 GASTROESOPHAGEAL REFLUX DISEASE, UNSPECIFIED WHETHER ESOPHAGITIS PRESENT: ICD-10-CM

## 2022-02-07 DIAGNOSIS — R80.9 TYPE 2 DIABETES MELLITUS WITH MICROALBUMINURIA, WITH LONG-TERM CURRENT USE OF INSULIN (HCC): ICD-10-CM

## 2022-02-07 DIAGNOSIS — E78.2 MIXED HYPERLIPIDEMIA: ICD-10-CM

## 2022-02-07 DIAGNOSIS — I10 ESSENTIAL HYPERTENSION: ICD-10-CM

## 2022-02-07 DIAGNOSIS — F51.01 PRIMARY INSOMNIA: ICD-10-CM

## 2022-02-07 DIAGNOSIS — E11.29 TYPE 2 DIABETES MELLITUS WITH MICROALBUMINURIA, WITH LONG-TERM CURRENT USE OF INSULIN (HCC): ICD-10-CM

## 2022-02-07 DIAGNOSIS — Z12.11 SCREEN FOR COLON CANCER: ICD-10-CM

## 2022-02-07 PROCEDURE — 99214 OFFICE O/P EST MOD 30 MIN: CPT | Performed by: FAMILY MEDICINE

## 2022-02-07 PROCEDURE — 3052F HG A1C>EQUAL 8.0%<EQUAL 9.0%: CPT | Performed by: FAMILY MEDICINE

## 2022-02-07 RX ORDER — DIAZEPAM 5 MG/1
TABLET ORAL
Qty: 30 TABLET | Refills: 0 | Status: SHIPPED | OUTPATIENT
Start: 2022-02-07 | End: 2022-03-06

## 2022-02-07 ASSESSMENT — PATIENT HEALTH QUESTIONNAIRE - PHQ9
SUM OF ALL RESPONSES TO PHQ QUESTIONS 1-9: 1
SUM OF ALL RESPONSES TO PHQ9 QUESTIONS 1 & 2: 1
SUM OF ALL RESPONSES TO PHQ QUESTIONS 1-9: 1
2. FEELING DOWN, DEPRESSED OR HOPELESS: 1
1. LITTLE INTEREST OR PLEASURE IN DOING THINGS: 0

## 2022-02-07 NOTE — PROGRESS NOTES
Mark Abreu is a 49 y.o. male who presents for follow-up of Type 2 diabetes mellitus. Current medication use: taking as prescribed (metformin, insulin, glimepiride and Jardiance) States not using short acting insulin with every meal. Trying to be better about using it. Eye exam current (within one year): yes, Reviewed note from [facility] from visit on 6/16/21 which showed no retinopathy. Current diet: in general, an \"unhealthy\" diet, on average, 2 meals per day  Current exercise:walking, babysitting  Sees podiatrist: no  Checks sugars at home: Yes  Hypoglycemia symptoms: Yes, had a few days he bottomed out- states on days doesn't eat as much and more active. AM Fasting: less than 120  States highs are in 230-260's. States consistantly 170 in eveings. Checks BP at home:  not doing  Current medication use: taking as prescribed (losartan and HCTZ)      Taking cholesterol medication regularly: taking as prescribed (simvastatin)    Uses diazepam mostly for sleep now. Few times a week. Uses for menieres as well. Body mass index is 39.71 kg/m². Vitals:    02/07/22 1432   BP: 138/78   Site: Left Upper Arm   Position: Sitting   Cuff Size: Large Adult   Pulse: 74   Temp: 97.2 °F (36.2 °C)   TempSrc: Infrared   SpO2: 98%   Weight: (!) 301 lb (136.5 kg)     Wt Readings from Last 3 Encounters:   02/07/22 (!) 301 lb (136.5 kg)   10/18/21 (!) 300 lb 9.6 oz (136.4 kg)   06/28/21 296 lb 12.8 oz (134.6 kg)       GENERAL:Alert and oriented x 4 NAD, affect appropriate and obese, well hydrated, well developed.   LUNG:clear to auscultation bilaterally with normal respiratory effort and good air movement  CV: Normal heart sounds, regular rate and rhythm without murmurs    LE Edema: normal,        PHQ Scores 2/7/2022 6/28/2021 1/21/2021 7/3/2019 6/5/2019 10/8/2018 8/31/2017   PHQ2 Score 1 0 1 2 4 6 2   PHQ9 Score 1 0 1 10 15 6 2           ASSESSMENT AND PLAN:       Yessi Boone was seen today for diabetes. Diagnoses and all orders for this visit:    Type 2 diabetes mellitus with hyperglycemia, without long-term current use of insulin (HCC)  -     Hemoglobin A1C; Future  -     Comprehensive Metabolic Panel; Future      -A1C Improving and Uncontrolled  -continue insulin, likely needs increase but need sugars to determine dose.  -check sugars TID and send to me in a week  -Reviewed pre-visit labs with patient. (Lipid, CMP, A1C, Microalbumin and CBC) Review of these labs contributed to MDM. Lab Results   Component Value Date    LABA1C 8.0 01/24/2022       -lab slip given for next visit (ordered above, did not contribute to MDM)  -encouraged a healthy diet, regular exercise and maintaining a healthy weight. Discussed the importance of TLC in controlling diabetes and preventing long term complications of diabetes including CV, renal, neuropathic and ocular. -RTO  3 months  -eye form given:  no      Type 2 diabetes mellitus with microalbuminuria, with long-term current use of insulin (HCC)  Recent lab normal  On ARB  Better sugar control    Essential hypertension  -     MAGNESIUM; Future  -Stable, continue current medications. Mixed hyperlipidemia  -Stable, continue current medications. Meniere's disease, unspecified laterality  -     diazePAM (VALIUM) 5 MG tablet; TAKE 1 TABLET BY MOUTH EVERY 12 HOURS AS NEEDED FOR ANXIETY  Controlled Substances Monitoring:   OARRS report reviewed  Periodic Controlled Substance Monitoring: No signs of potential drug abuse or diversion identified. Patty Chong MD)      Insomnia  -     diazePAM (VALIUM) 5 MG tablet; TAKE 1 TABLET BY MOUTH EVERY 12 HOURS AS NEEDED FOR ANXIETY    Screen for colon cancer  -     AFL - Sunny Dempsey MD, Gastroenterology, [facility]    Gastroesophageal reflux disease, unspecified whether esophagitis present  -     MAGNESIUM; Future            Return in about 3 months (around 5/7/2022) for 30 min, AWV.          Portions of Note per  Sondra Mc CMA AAMA with corrections and edits per Zain Salgado MD.  I agree with entirety of note and was present and performed history and physical.  I also confirm that the note above accurately reflects all work, treatment, procedures, and medical decision making performed by me, Zain Salgado MD

## 2022-02-14 RX ORDER — ONDANSETRON 4 MG/1
TABLET, FILM COATED ORAL
Qty: 30 TABLET | Refills: 1 | Status: SHIPPED | OUTPATIENT
Start: 2022-02-14 | End: 2022-03-21

## 2022-02-14 NOTE — TELEPHONE ENCOUNTER
Medication:   Requested Prescriptions     Pending Prescriptions Disp Refills    ondansetron (ZOFRAN) 4 MG tablet 30 tablet 1     Sig: TAKE 1 TABLET BY MOUTH EVERY 8 HOURS AS NEEDED FOR NAUSEA OR VOMITING          Patient Phone Number: [phone] (home) [phone] (work)    Last appt: 2/7/2022   Next appt: 5/12/2022    Last OARRS:   RX Monitoring 2/7/2022   Attestation -   Periodic Controlled Substance Monitoring No signs of potential drug abuse or diversion identified.    Chronic Pain > 50 MEDD -   Chronic Pain > 80 MEDD -     PDMP Monitoring:    Last PDMP Hung as Reviewed [facility]):  Review User Review Instant Review Result   HERMELINDA Alvarado 2/7/2022  2:48 PM Reviewed PDMP [1]     Preferred Pharmacy:   Edin Hinds [address] [facility] [phone] - F [phone]  [address] [place] [address],[address] [facility] [address]  Phone: [phone] Fax: [phone]    Edin Hinds [address] [place] [address] [address] [facility] [phone]  Tiff Hutchison [place] [address] [address] [facility] [place] [place] [address]  Phone: [phone] Fax: [phone]    Edin Hinds [address] [facility],4Th Floor, [address] [place] [address] -  [phone] April Jesusita [phone]  [address] [place] [address]  Phone: [phone] Fax: [phone]

## 2022-02-16 NOTE — TELEPHONE ENCOUNTER
Medication:   Requested Prescriptions     Pending Prescriptions Disp Refills    JARDIANCE 25 MG tablet [Pharmacy Med Name: Tristan Garber 25MG TABLETS] 90 tablet 3     Sig: TAKE 1 TABLET BY MOUTH DAILY      Last Filled:      Patient Phone Number: [phone] (home) [phone] (work)    Last appt: 2/7/2022   Next appt: 5/12/2022    Last OARRS:   RX Monitoring 2/7/2022   Attestation -   Periodic Controlled Substance Monitoring No signs of potential drug abuse or diversion identified.    Chronic Pain > 50 MEDD -   Chronic Pain > 80 MEDD -     PDMP Monitoring:    Last PDMP Hung as Reviewed [facility]):  Review User Review Instant Review Result   HERMELINDA Huang 2/7/2022  2:48 PM Reviewed PDMP [1]     Preferred Pharmacy:   [facility] [address] [place] [address] [phone] - F [phone]  [address] [place] [address],[address] [facility] [address]  Phone: [phone] Fax: [phone]    [address] Naresh Bosch [phone]  [place] [id number] [address] [facility] [address]  Phone: [phone] Fax: [phone]    [facility] [address] [facility],4Th Floor, [address] [place] [address] [phone] [facility] [phone]  [facility] [address]  [address] [address]  Phone: [phone] Fax: [phone]

## 2022-02-17 RX ORDER — EMPAGLIFLOZIN 25 MG/1
TABLET, FILM COATED ORAL
Qty: 90 TABLET | Refills: 3 | Status: SHIPPED | OUTPATIENT
Start: 2022-02-17

## 2022-02-19 ENCOUNTER — APPOINTMENT (OUTPATIENT)
Dept: CT IMAGING | Age: 57
DRG: 439 | End: 2022-02-19
Payer: MEDICARE

## 2022-02-19 ENCOUNTER — HOSPITAL ENCOUNTER (INPATIENT)
Age: 57
LOS: 2 days | Discharge: HOME | DRG: 439 | End: 2022-02-21
Attending: INTERNAL MEDICINE | Admitting: INTERNAL MEDICINE
Payer: MEDICARE

## 2022-02-19 DIAGNOSIS — R10.9 INTRACTABLE ABDOMINAL PAIN: ICD-10-CM

## 2022-02-19 DIAGNOSIS — K85.00 IDIOPATHIC ACUTE PANCREATITIS WITHOUT INFECTION OR NECROSIS: ICD-10-CM

## 2022-02-19 DIAGNOSIS — K85.90 ACUTE PANCREATITIS, UNSPECIFIED COMPLICATION STATUS, UNSPECIFIED PANCREATITIS TYPE: Primary | ICD-10-CM

## 2022-02-19 DIAGNOSIS — R11.2 INTRACTABLE VOMITING WITH NAUSEA, UNSPECIFIED VOMITING TYPE: ICD-10-CM

## 2022-02-19 LAB
A/G RATIO: 1.1 (ref 1.1–2.2)
ALBUMIN SERPL-MCNC: 4 G/DL (ref 3.4–5)
ALP BLD-CCNC: 96 U/L (ref 40–129)
ALT SERPL-CCNC: 45 U/L (ref 10–40)
ANION GAP SERPL CALCULATED.3IONS-SCNC: 10 MMOL/L (ref 3–16)
AST SERPL-CCNC: 23 U/L (ref 15–37)
BASE EXCESS VENOUS: 2 MMOL/L (ref -3–3)
BASOPHILS ABSOLUTE: 0 K/UL (ref 0–0.2)
BASOPHILS RELATIVE PERCENT: 0.3 %
BETA-HYDROXYBUTYRATE: 0.2 MMOL/L (ref 0–0.27)
BILIRUB SERPL-MCNC: 0.3 MG/DL (ref 0–1)
BILIRUBIN URINE: NEGATIVE
BLOOD, URINE: NEGATIVE
BUN BLDV-MCNC: 16 MG/DL (ref 7–20)
CALCIUM SERPL-MCNC: 9.1 MG/DL (ref 8.3–10.6)
CARBOXYHEMOGLOBIN: 3.4 % (ref 0–1.5)
CHLORIDE BLD-SCNC: 102 MMOL/L (ref 99–110)
CLARITY: CLEAR
CO2: 25 MMOL/L (ref 21–32)
COLOR: YELLOW
CREAT SERPL-MCNC: 0.6 MG/DL (ref 0.9–1.3)
EOSINOPHILS ABSOLUTE: 0.2 K/UL (ref 0–0.6)
EOSINOPHILS RELATIVE PERCENT: 2.5 %
GFR AFRICAN AMERICAN: >60
GFR NON-AFRICAN AMERICAN: >60
GLUCOSE BLD-MCNC: 133 MG/DL (ref 70–99)
GLUCOSE BLD-MCNC: 137 MG/DL (ref 70–99)
GLUCOSE BLD-MCNC: 94 MG/DL (ref 70–99)
GLUCOSE URINE: >=1000 MG/DL
HCO3 VENOUS: 28.4 MMOL/L (ref 23–29)
HCT VFR BLD CALC: 47.6 % (ref 40.5–52.5)
HEMOGLOBIN, VEN, REDUCED: 8 %
HEMOGLOBIN: 15.8 G/DL (ref 13.5–17.5)
KETONES, URINE: ABNORMAL MG/DL
LEUKOCYTE ESTERASE, URINE: NEGATIVE
LIPASE: 254 U/L (ref 13–60)
LYMPHOCYTES ABSOLUTE: 2.4 K/UL (ref 1–5.1)
LYMPHOCYTES RELATIVE PERCENT: 34 %
MCH RBC QN AUTO: 27.8 PG (ref 26–34)
MCHC RBC AUTO-ENTMCNC: 33.2 G/DL (ref 31–36)
MCV RBC AUTO: 83.8 FL (ref 80–100)
METHEMOGLOBIN VENOUS: 0 %
MICROSCOPIC EXAMINATION: ABNORMAL
MONOCYTES ABSOLUTE: 0.7 K/UL (ref 0–1.3)
MONOCYTES RELATIVE PERCENT: 10.2 %
NEUTROPHILS ABSOLUTE: 3.8 K/UL (ref 1.7–7.7)
NEUTROPHILS RELATIVE PERCENT: 53 %
NITRITE, URINE: NEGATIVE
O2 CONTENT, VEN: 21 VOL %
O2 SAT, VEN: 92 %
O2 THERAPY: ABNORMAL
PCO2, VEN: 49.7 MMHG (ref 40–50)
PDW BLD-RTO: 14.8 % (ref 12.4–15.4)
PERFORMED ON: ABNORMAL
PERFORMED ON: NORMAL
PH UA: 5.5 (ref 5–8)
PH VENOUS: 7.37 (ref 7.35–7.45)
PLATELET # BLD: 196 K/UL (ref 135–450)
PMV BLD AUTO: 10.2 FL (ref 5–10.5)
PO2, VEN: 62.5 MMHG (ref 25–40)
POTASSIUM SERPL-SCNC: 3.9 MMOL/L (ref 3.5–5.1)
PROTEIN UA: NEGATIVE MG/DL
RBC # BLD: 5.67 M/UL (ref 4.2–5.9)
SODIUM BLD-SCNC: 137 MMOL/L (ref 136–145)
SPECIFIC GRAVITY UA: >1.03 (ref 1–1.03)
TCO2 CALC VENOUS: 67 MMOL/L
TOTAL PROTEIN: 7.5 G/DL (ref 6.4–8.2)
URINE REFLEX TO CULTURE: ABNORMAL
URINE TYPE: ABNORMAL
UROBILINOGEN, URINE: 0.2 E.U./DL
WBC # BLD: 7.2 K/UL (ref 4–11)

## 2022-02-19 PROCEDURE — 82010 KETONE BODYS QUAN: CPT

## 2022-02-19 PROCEDURE — 6360000002 HC RX W HCPCS: Performed by: INTERNAL MEDICINE

## 2022-02-19 PROCEDURE — 6360000002 HC RX W HCPCS: Performed by: NURSE PRACTITIONER

## 2022-02-19 PROCEDURE — 74177 CT ABD & PELVIS W/CONTRAST: CPT

## 2022-02-19 PROCEDURE — 81003 URINALYSIS AUTO W/O SCOPE: CPT

## 2022-02-19 PROCEDURE — 2580000003 HC RX 258: Performed by: INTERNAL MEDICINE

## 2022-02-19 PROCEDURE — 82803 BLOOD GASES ANY COMBINATION: CPT

## 2022-02-19 PROCEDURE — 6360000004 HC RX CONTRAST MEDICATION: Performed by: NURSE PRACTITIONER

## 2022-02-19 PROCEDURE — 1200000000 HC SEMI PRIVATE

## 2022-02-19 PROCEDURE — 99284 EMERGENCY DEPT VISIT MOD MDM: CPT

## 2022-02-19 PROCEDURE — 85025 COMPLETE CBC W/AUTO DIFF WBC: CPT

## 2022-02-19 PROCEDURE — 96376 TX/PRO/DX INJ SAME DRUG ADON: CPT

## 2022-02-19 PROCEDURE — 96374 THER/PROPH/DIAG INJ IV PUSH: CPT

## 2022-02-19 PROCEDURE — 80053 COMPREHEN METABOLIC PANEL: CPT

## 2022-02-19 PROCEDURE — 83690 ASSAY OF LIPASE: CPT

## 2022-02-19 PROCEDURE — 36415 COLL VENOUS BLD VENIPUNCTURE: CPT

## 2022-02-19 PROCEDURE — 96375 TX/PRO/DX INJ NEW DRUG ADDON: CPT

## 2022-02-19 PROCEDURE — 96361 HYDRATE IV INFUSION ADD-ON: CPT

## 2022-02-19 PROCEDURE — 2580000003 HC RX 258: Performed by: NURSE PRACTITIONER

## 2022-02-19 PROCEDURE — 84478 ASSAY OF TRIGLYCERIDES: CPT

## 2022-02-19 RX ORDER — ACETAMINOPHEN 325 MG/1
650 TABLET ORAL EVERY 6 HOURS PRN
Status: DISCONTINUED | OUTPATIENT
Start: 2022-02-19 | End: 2022-02-21 | Stop reason: HOSPADM

## 2022-02-19 RX ORDER — INSULIN LISPRO 100 [IU]/ML
0-6 INJECTION, SOLUTION INTRAVENOUS; SUBCUTANEOUS NIGHTLY
Status: DISCONTINUED | OUTPATIENT
Start: 2022-02-19 | End: 2022-02-21 | Stop reason: HOSPADM

## 2022-02-19 RX ORDER — DEXTROSE MONOHYDRATE 50 MG/ML
100 INJECTION, SOLUTION INTRAVENOUS PRN
Status: DISCONTINUED | OUTPATIENT
Start: 2022-02-19 | End: 2022-02-21 | Stop reason: HOSPADM

## 2022-02-19 RX ORDER — KETOROLAC TROMETHAMINE 30 MG/ML
30 INJECTION, SOLUTION INTRAMUSCULAR; INTRAVENOUS ONCE
Status: COMPLETED | OUTPATIENT
Start: 2022-02-19 | End: 2022-02-19

## 2022-02-19 RX ORDER — ONDANSETRON 2 MG/ML
4 INJECTION INTRAMUSCULAR; INTRAVENOUS ONCE
Status: COMPLETED | OUTPATIENT
Start: 2022-02-19 | End: 2022-02-19

## 2022-02-19 RX ORDER — HYDROMORPHONE HYDROCHLORIDE 1 MG/ML
0.5 INJECTION, SOLUTION INTRAMUSCULAR; INTRAVENOUS; SUBCUTANEOUS EVERY 4 HOURS PRN
Status: DISCONTINUED | OUTPATIENT
Start: 2022-02-19 | End: 2022-02-20

## 2022-02-19 RX ORDER — FLUTICASONE PROPIONATE 50 MCG
1 SPRAY, SUSPENSION (ML) NASAL DAILY PRN
Status: DISCONTINUED | OUTPATIENT
Start: 2022-02-19 | End: 2022-02-21 | Stop reason: HOSPADM

## 2022-02-19 RX ORDER — HYDRALAZINE HYDROCHLORIDE 20 MG/ML
5 INJECTION INTRAMUSCULAR; INTRAVENOUS ONCE
Status: COMPLETED | OUTPATIENT
Start: 2022-02-19 | End: 2022-02-19

## 2022-02-19 RX ORDER — ONDANSETRON 4 MG/1
4 TABLET, ORALLY DISINTEGRATING ORAL EVERY 8 HOURS PRN
Status: DISCONTINUED | OUTPATIENT
Start: 2022-02-19 | End: 2022-02-21 | Stop reason: HOSPADM

## 2022-02-19 RX ORDER — POLYETHYLENE GLYCOL 3350 17 G/17G
17 POWDER, FOR SOLUTION ORAL DAILY PRN
Status: DISCONTINUED | OUTPATIENT
Start: 2022-02-19 | End: 2022-02-21 | Stop reason: HOSPADM

## 2022-02-19 RX ORDER — 0.9 % SODIUM CHLORIDE 0.9 %
1000 INTRAVENOUS SOLUTION INTRAVENOUS ONCE
Status: COMPLETED | OUTPATIENT
Start: 2022-02-19 | End: 2022-02-19

## 2022-02-19 RX ORDER — SODIUM CHLORIDE 9 MG/ML
25 INJECTION, SOLUTION INTRAVENOUS PRN
Status: DISCONTINUED | OUTPATIENT
Start: 2022-02-19 | End: 2022-02-21 | Stop reason: HOSPADM

## 2022-02-19 RX ORDER — SODIUM CHLORIDE 0.9 % (FLUSH) 0.9 %
5-40 SYRINGE (ML) INJECTION EVERY 12 HOURS SCHEDULED
Status: DISCONTINUED | OUTPATIENT
Start: 2022-02-19 | End: 2022-02-21 | Stop reason: HOSPADM

## 2022-02-19 RX ORDER — NICOTINE POLACRILEX 4 MG
15 LOZENGE BUCCAL PRN
Status: DISCONTINUED | OUTPATIENT
Start: 2022-02-19 | End: 2022-02-21 | Stop reason: HOSPADM

## 2022-02-19 RX ORDER — PROMETHAZINE HYDROCHLORIDE 25 MG/ML
6.25 INJECTION, SOLUTION INTRAMUSCULAR; INTRAVENOUS ONCE
Status: COMPLETED | OUTPATIENT
Start: 2022-02-19 | End: 2022-02-19

## 2022-02-19 RX ORDER — HYDROMORPHONE HYDROCHLORIDE 1 MG/ML
1 INJECTION, SOLUTION INTRAMUSCULAR; INTRAVENOUS; SUBCUTANEOUS ONCE
Status: COMPLETED | OUTPATIENT
Start: 2022-02-19 | End: 2022-02-19

## 2022-02-19 RX ORDER — LOSARTAN POTASSIUM AND HYDROCHLOROTHIAZIDE 25; 100 MG/1; MG/1
1 TABLET ORAL DAILY
Status: DISCONTINUED | OUTPATIENT
Start: 2022-02-19 | End: 2022-02-19

## 2022-02-19 RX ORDER — ACETAMINOPHEN 650 MG/1
650 SUPPOSITORY RECTAL EVERY 6 HOURS PRN
Status: DISCONTINUED | OUTPATIENT
Start: 2022-02-19 | End: 2022-02-21 | Stop reason: HOSPADM

## 2022-02-19 RX ORDER — SODIUM CHLORIDE 0.9 % (FLUSH) 0.9 %
5-40 SYRINGE (ML) INJECTION PRN
Status: DISCONTINUED | OUTPATIENT
Start: 2022-02-19 | End: 2022-02-21 | Stop reason: HOSPADM

## 2022-02-19 RX ORDER — INSULIN LISPRO 100 [IU]/ML
0-12 INJECTION, SOLUTION INTRAVENOUS; SUBCUTANEOUS
Status: DISCONTINUED | OUTPATIENT
Start: 2022-02-19 | End: 2022-02-21 | Stop reason: HOSPADM

## 2022-02-19 RX ORDER — ONDANSETRON 2 MG/ML
4 INJECTION INTRAMUSCULAR; INTRAVENOUS EVERY 6 HOURS PRN
Status: DISCONTINUED | OUTPATIENT
Start: 2022-02-19 | End: 2022-02-21 | Stop reason: HOSPADM

## 2022-02-19 RX ORDER — HYDROCHLOROTHIAZIDE 25 MG/1
25 TABLET ORAL DAILY
Status: DISCONTINUED | OUTPATIENT
Start: 2022-02-19 | End: 2022-02-21 | Stop reason: HOSPADM

## 2022-02-19 RX ORDER — DEXTROSE MONOHYDRATE 25 G/50ML
12.5 INJECTION, SOLUTION INTRAVENOUS PRN
Status: DISCONTINUED | OUTPATIENT
Start: 2022-02-19 | End: 2022-02-21 | Stop reason: HOSPADM

## 2022-02-19 RX ORDER — PANTOPRAZOLE SODIUM 40 MG/1
40 TABLET, DELAYED RELEASE ORAL
Status: DISCONTINUED | OUTPATIENT
Start: 2022-02-20 | End: 2022-02-21 | Stop reason: HOSPADM

## 2022-02-19 RX ORDER — SODIUM CHLORIDE, SODIUM LACTATE, POTASSIUM CHLORIDE, CALCIUM CHLORIDE 600; 310; 30; 20 MG/100ML; MG/100ML; MG/100ML; MG/100ML
INJECTION, SOLUTION INTRAVENOUS CONTINUOUS
Status: DISCONTINUED | OUTPATIENT
Start: 2022-02-19 | End: 2022-02-21 | Stop reason: HOSPADM

## 2022-02-19 RX ORDER — LOSARTAN POTASSIUM 100 MG/1
100 TABLET ORAL DAILY
Status: DISCONTINUED | OUTPATIENT
Start: 2022-02-19 | End: 2022-02-21 | Stop reason: HOSPADM

## 2022-02-19 RX ADMIN — KETOROLAC TROMETHAMINE 30 MG: 30 INJECTION, SOLUTION INTRAMUSCULAR; INTRAVENOUS at 09:51

## 2022-02-19 RX ADMIN — HYDROMORPHONE HYDROCHLORIDE 1 MG: 1 INJECTION, SOLUTION INTRAMUSCULAR; INTRAVENOUS; SUBCUTANEOUS at 11:37

## 2022-02-19 RX ADMIN — IOPAMIDOL 75 ML: 755 INJECTION, SOLUTION INTRAVENOUS at 10:39

## 2022-02-19 RX ADMIN — SODIUM CHLORIDE, PRESERVATIVE FREE 10 ML: 5 INJECTION INTRAVENOUS at 21:00

## 2022-02-19 RX ADMIN — SODIUM CHLORIDE, POTASSIUM CHLORIDE, SODIUM LACTATE AND CALCIUM CHLORIDE: 600; 310; 30; 20 INJECTION, SOLUTION INTRAVENOUS at 13:28

## 2022-02-19 RX ADMIN — ONDANSETRON 4 MG: 2 INJECTION INTRAMUSCULAR; INTRAVENOUS at 20:54

## 2022-02-19 RX ADMIN — SODIUM CHLORIDE 1000 ML: 9 INJECTION, SOLUTION INTRAVENOUS at 09:50

## 2022-02-19 RX ADMIN — ONDANSETRON 4 MG: 2 INJECTION INTRAMUSCULAR; INTRAVENOUS at 11:37

## 2022-02-19 RX ADMIN — HYDROMORPHONE HYDROCHLORIDE 0.5 MG: 1 INJECTION, SOLUTION INTRAMUSCULAR; INTRAVENOUS; SUBCUTANEOUS at 16:23

## 2022-02-19 RX ADMIN — HYDROMORPHONE HYDROCHLORIDE 1 MG: 1 INJECTION, SOLUTION INTRAMUSCULAR; INTRAVENOUS; SUBCUTANEOUS at 09:52

## 2022-02-19 RX ADMIN — ONDANSETRON 4 MG: 2 INJECTION INTRAMUSCULAR; INTRAVENOUS at 10:02

## 2022-02-19 RX ADMIN — SODIUM CHLORIDE 1000 ML: 9 INJECTION, SOLUTION INTRAVENOUS at 11:38

## 2022-02-19 RX ADMIN — HYDROMORPHONE HYDROCHLORIDE 0.5 MG: 1 INJECTION, SOLUTION INTRAMUSCULAR; INTRAVENOUS; SUBCUTANEOUS at 20:54

## 2022-02-19 RX ADMIN — HYDRALAZINE HYDROCHLORIDE 5 MG: 20 INJECTION INTRAMUSCULAR; INTRAVENOUS at 16:27

## 2022-02-19 RX ADMIN — PROMETHAZINE HYDROCHLORIDE 6.25 MG: 25 INJECTION INTRAMUSCULAR; INTRAVENOUS at 14:19

## 2022-02-19 ASSESSMENT — PAIN SCALES - GENERAL
PAINLEVEL_OUTOF10: 6
PAINLEVEL_OUTOF10: 8
PAINLEVEL_OUTOF10: 6
PAINLEVEL_OUTOF10: 3
PAINLEVEL_OUTOF10: 10
PAINLEVEL_OUTOF10: 8

## 2022-02-19 ASSESSMENT — PAIN DESCRIPTION - PROGRESSION
CLINICAL_PROGRESSION: GRADUALLY IMPROVING

## 2022-02-19 ASSESSMENT — PAIN DESCRIPTION - ORIENTATION
ORIENTATION: LOWER
ORIENTATION: ANTERIOR;LOWER

## 2022-02-19 ASSESSMENT — PAIN DESCRIPTION - DESCRIPTORS
DESCRIPTORS: ACHING

## 2022-02-19 ASSESSMENT — PAIN DESCRIPTION - LOCATION
LOCATION: ABDOMEN

## 2022-02-19 ASSESSMENT — PAIN DESCRIPTION - ONSET
ONSET: ON-GOING

## 2022-02-19 ASSESSMENT — PAIN DESCRIPTION - FREQUENCY
FREQUENCY: CONTINUOUS

## 2022-02-19 ASSESSMENT — PAIN DESCRIPTION - DIRECTION
RADIATING_TOWARDS: BACK
RADIATING_TOWARDS: BACK

## 2022-02-19 ASSESSMENT — PAIN DESCRIPTION - PAIN TYPE
TYPE: ACUTE PAIN

## 2022-02-19 NOTE — ED NOTES
Bedside report given to [facility] RN. No questions or concerns at this time. Patient transported via wheelchair to [facility] by LIGIA Edouard.      Ra Bustamante RN  02/19/22 7195

## 2022-02-19 NOTE — ED NOTES
Report given to [employer], RN. All questions answered.      Ivin Sandhoff, RN  02/19/22 9074

## 2022-02-19 NOTE — ED PROVIDER NOTES
[address]  [address] [id number]  Dept: [phone]  Loc: [phone]    EMERGENCY DEPARTMENT ENCOUNTER        This patient was not seen or evaluated by the attending physician. I evaluated this patient, the attending physician was available for consultation. CHIEF COMPLAINT    Chief Complaint   Patient presents with    Abdominal Pain     Pt reports lower abdominal pain X2 days, burning with urination, HX kidney stones and Pancreatitis        HPI    Blessing Joseph is a 49 y.o. male who presents to the emergency department with all over abdominal pain for the last 2 days. He states he has been urinating more frequently. For me he denies dysuria. He has a history of kidney stones and pancreatitis and he cannot tell if the pain that he is experiencing is pain that he has had in the past from the stones or pancreatitis. He is a diabetic. He has been vomiting. No diarrhea. No body aches, fever, chills, lightheadedness or dizziness. He is in pain management for chronic pain syndrome. He does not have a pancreas doctor. REVIEW OF SYSTEMS    GI: see HPI, + vomiting, no diarrhea, no hematochezia  Cardiac: No chest pain, shortness of breath, palpitations or syncope  Pulmonary: No difficulty breathing or new cough  General: No fevers  : No dysuria, No hematuria  See HPI for further details. All other systems reviewed and are negative.     PAST MEDICAL & SURGICAL HISTORY    Past Medical History:   Diagnosis Date    Anxiety state, unspecified     Calculus of kidney     Deaf     RIGHT EAR    Diabetes mellitus ([address]     Diverticula     GERD (gastroesophageal reflux disease)     Hordeolum externum     Hyperlipidemia     Hypertension     Insomnia, unspecified     Kidney stones     MRSA carrier     Pancreatitis, chronic ([address]     Psychiatric problem     Unspecified hypertrophic and atrophic condition of skin Past Surgical History:   Procedure Laterality Date    ABSCESS DRAINAGE Left 1/5/14    incision and drainage of an abcess on left calf    APPENDECTOMY      CHOLECYSTECTOMY      COLOSTOMY      INNER EAR SURGERY      RIGHT    PANCREAS SURGERY      duct stent    REVISION COLOSTOMY      SUBTOTAL COLECTOMY      Had wound dehiscence, mesh    TRACHEOSTOMY      TRACHEOSTOMY CLOSURE         CURRENT MEDICATIONS  (may include discharge medications prescribed in the ED)  Current Outpatient Rx   Medication Sig Dispense Refill    JARDIANCE 25 MG tablet TAKE 1 TABLET BY MOUTH DAILY 90 tablet 3    ondansetron (ZOFRAN) 4 MG tablet TAKE 1 TABLET BY MOUTH EVERY 8 HOURS AS NEEDED FOR NAUSEA OR VOMITING 30 tablet 1    diazePAM (VALIUM) 5 MG tablet TAKE 1 TABLET BY MOUTH EVERY 12 HOURS AS NEEDED FOR ANXIETY 30 tablet 0    fluticasone (FLONASE) 50 MCG/ACT nasal spray USE 2 SPRAYS IN EACH NOSTRIL DAILY 48 g 3    insulin lispro, 1 Unit Dial, (HUMALOG KWIKPEN) 100 UNIT/ML SOPN <100 none, 100-150 4 units, 151-200 6 units, 201-250 8 units, 251-300 10 units 5 pen 3    Insulin Pen Needle (B-D UF III MINI PEN NEEDLES) 31G X 5 MM MISC USE ONCE DAILY AS DIRECTED 100 each 12    simvastatin (ZOCOR) 40 MG tablet TAKE 1 TABLET BY MOUTH NIGHTLY 90 tablet 3    losartan-hydroCHLOROthiazide (HYZAAR) 100-25 MG per tablet TAKE 1 TABLET BY MOUTH DAILY 90 tablet 3    insulin detemir (LEVEMIR FLEXTOUCH) 100 UNIT/ML injection pen Inject 55 Units into the skin nightly (Patient taking differently: Inject 70 Units into the skin nightly ) 15 pen 11    oxyCODONE-acetaminophen (PERCOCET)  MG per tablet TAKE 1 TABLET BY MOUTH EVERY 8 HOURS      ibuprofen (ADVIL;MOTRIN) 800 MG tablet TAKE 1 TABLET BY MOUTH THREE TIMES DAILY WITH MEALS 90 tablet 12    esomeprazole (NEXIUM) 40 MG delayed release capsule Take 1 capsule by mouth every morning (before breakfast) 90 capsule 3    metFORMIN (GLUCOPHAGE-XR) 500 MG extended release tablet Take 2 tablets by mouth 2 times daily 360 tablet 3    glimepiride (AMARYL) 4 MG tablet Take 1 tablet by mouth every morning (before breakfast) 90 tablet 3    blood glucose test strips (ONE TOUCH ULTRA TEST) strip USE TO TEST THREE TIMES DAILY AS NEEDED 300 strip 12    clobetasol (OLUX) 0.05 % foam APPLY EXTERNALLY TO THE SCALP TWICE DAILY AS NEEDED 50 g 3    Blood Glucose Monitoring Suppl (ONE TOUCH ULTRA 2) w/Device KIT 1 kit by Does not apply route daily 1 kit 0    ONETOUCH DELICA LANCETS FINE MISC USE TO TEST THREE TIMES DAILY 300 each 12    nystatin (MYCOSTATIN) 536629 UNIT/GM cream APPLY TOPICALLY TWICE DAILY TO FOUR TIMES DAILY 60 g 0    MICROLET LANCETS MISC TEST 3 TO 4 TIMES DAILY 300 each 10       ALLERGIES    Allergies   Allergen Reactions    Amoxicillin      Rash    Morphine      RASH, hallucinations oral and IV       SOCIAL AND FAMILY HISTORY    Social History     Socioeconomic History    Marital status:      Spouse name: None    Number of children: None    Years of education: None    Highest education level: None   Occupational History    Occupation: Disabled     Comment: 2000   Tobacco Use    Smoking status: Never Smoker    Smokeless tobacco: Never Used   Vaping Use    Vaping Use: Never used   Substance and Sexual Activity    Alcohol use: No     Alcohol/week: 0.0 standard drinks    Drug use: No    Sexual activity: Yes     Partners: Female   Other Topics Concern    None   Social History Narrative     but . Social Determinants of Health     Financial Resource Strain:     Difficulty of Paying Living Expenses: Not on file   Food Insecurity:     Worried About Running Out of Food in the Last Year: Not on file    Tong of Food in the Last Year: Not on file   Transportation Needs:     Lack of Transportation (Medical): Not on file    Lack of Transportation (Non-Medical):  Not on file   Physical Activity:     Days of Exercise per Week: Not on file    Minutes of Exercise per Session: Not on file   Stress:     Feeling of Stress : Not on file   Social Connections:     Frequency of Communication with Friends and Family: Not on file    Frequency of Social Gatherings with Friends and Family: Not on file    Attends Quaker Services: Not on file    Active Member of [address] [place] [address] or Organizations: Not on file    Attends Club or Organization Meetings: Not on file    Marital Status: Not on file   Intimate Partner Violence:     Fear of Current or Ex-Partner: Not on file    Emotionally Abused: Not on file    Physically Abused: Not on file    Sexually Abused: Not on file   Housing Stability:     Unable to Pay for Housing in the Last Year: Not on file    Number of [place] in the Last Year: Not on file    Unstable Housing in the Last Year: Not on file     Family History   Problem Relation Age of Onset    Diabetes Mother     Sudden Death Mother         suicide    Alcohol Abuse Father     Alcohol Abuse Brother        PHYSICAL EXAM    VITAL SIGNS: BP (!) 164/93   Pulse 68   Temp 98 °F (36.7 °C)   Resp 19   Wt (!) 300 lb 6.4 oz (136.3 kg)   SpO2 94%   BMI 39.63 kg/m²   Constitutional:  Well developed, well nourished, ill-appearing  Eyes:  Sclera nonicteric, conjunctiva dry  HENT:  Atraumatic, nose normal  Neck: no JVD  Respiratory:  No retractions, no accessory muscle use, normal breath sounds   Cardiovascular: Regular rhythm and rate, S1-S2. No murmur   GI: Morbidly obese abdomen with diffuse tenderness. No rebound or guarding. Back: no CVA tenderness  Musculoskeletal:  No edema, no acute deformities  Vascular: DP pulses 2+ and equal bilaterally  Integument: No rashes, skin warm and clammy  Neurologic:  Alert & oriented x3, no slurred speech  Psychiatric: Cooperative, pleasant affect       RADIOLOGY/PROCEDURES    CT ABDOMEN PELVIS W IV CONTRAST Additional Contrast? None   Preliminary Result   1.  A chronic changes in the pancreatic body and tail consistent with chronic   pancreatitis with small pseudocysts and calcification. No evidence of acute   inflammation to suggest acute pancreatitis. 2. Fatty liver but no focal disease. 3. Constipation. Postsurgical changes in the sigmoid. No evidence of bowel   obstruction or perforation. 4. Bilateral punctate nephrolithiasis but no obstructive uropathy.            Labs Reviewed   LIPASE - Abnormal; Notable for the following components:       Result Value    Lipase 254.0 (*)     All other components within normal limits    Narrative:     Performed at:  [facility] [address]   Phone [phone]   COMPREHENSIVE METABOLIC PANEL - Abnormal; Notable for the following components:    Glucose 137 (*)     CREATININE 0.6 (*)     ALT 45 (*)     All other components within normal limits    Narrative:     Performed at:  [facility] [address]   Phone [phone]   [address] [place] [address] - Abnormal; Notable for the following components:    Glucose, Ur >=1000 (*)     Ketones, Urine TRACE (*)     All other components within normal limits    Narrative:     Performed at:  [facility] [address] [employer]   Phone [phone]   BLOOD GAS, VENOUS - Abnormal; Notable for the following components:    pO2, Ellis 62.5 (*)     Carboxyhemoglobin 3.4 (*)     All other components within normal limits    Narrative:     Performed at:  [facility] [address]   Phone [phone]   CBC WITH AUTO DIFFERENTIAL    Narrative:     Performed at:  [facility] [address]   Phone [phone]   BETA-HYDROXYBUTYRATE    Narrative:     Performed at:  [facility] [address] [employer]   Phone [phone] ED COURSE & MEDICAL DECISION MAKING    Pertinent Labs & Imaging studies reviewed and interpreted. (See chart for details)     See chart for details of medications given during the ED stay. Vitals:    02/19/22 0914 02/19/22 1015 02/19/22 1100   BP: (!) 151/92 (!) 153/97 (!) 164/93   Pulse: 69 68 68   Resp: 18  19   Temp: 98 °F (36.7 °C)     SpO2: 98% 94% 94%   Weight: (!) 300 lb 6.4 oz (136.3 kg)       Medications   0.9 % sodium chloride bolus (1,000 mLs IntraVENous New Bag 2/19/22 1138)   0.9 % sodium chloride bolus (1,000 mLs IntraVENous New Bag 2/19/22 0950)   ketorolac (TORADOL) injection 30 mg (30 mg IntraVENous Given 2/19/22 0951)   HYDROmorphone HCl PF (DILAUDID) injection 1 mg (1 mg IntraVENous Given 2/19/22 0952)   iopamidol (ISOVUE-370) 76 % injection 75 mL (75 mLs IntraVENous Given 2/19/22 1039)   ondansetron (ZOFRAN) injection 4 mg (4 mg IntraVENous Given 2/19/22 1002)   ondansetron (ZOFRAN) injection 4 mg (4 mg IntraVENous Given 2/19/22 1137)   HYDROmorphone HCl PF (DILAUDID) injection 1 mg (1 mg IntraVENous Given 2/19/22 1137)     I have seen and evaluated this patient. My attending physician was available for consultation. Differential diagnosis includes was not limited to dehydration, electrolyte derangement, pancreatitis, nephrolithiasis, ureterolithiasis, DKA, other. This is an ill-appearing male who has had 2 days of abdominal pain and vomiting. He is actively vomiting on arrival.  He is diaphoretic. He states he has a history of pancreatitis. He does not take any enzyme supplements. He has never seen a pancreas doctor. He is also a diabetic. He is in pain management for chronic pain syndrome. CBC is unremarkable. Chemistry panel is unremarkable with a serum glucose of 137. No anion gap. Lipase is 254. Beta hydroxybutyrate is 0.20. Trace ketones in his urine. Glucosuria. Urine specific gravity greater than 1.030. Negative for nitrates and leukocytes.   Venous pH 7.366.    CT scan interpreted by radiology reveals chronic pancreas changes. On reevaluation of the patient after receiving Toradol, Dilaudid, Zofran and a liter of fluids patient still continues to vomit. I ordered for him to have another round of antiemetic and pain medications with fluids. I will put him up for admission to the hospitalist service. The patient agrees with the plan of care. FINAL IMPRESSION    1. Acute pancreatitis, unspecified complication status, unspecified pancreatitis type    2. Intractable vomiting with nausea, unspecified vomiting type    3.  Intractable abdominal pain        PLAN  Admission    (Please note that this note was completed with a voice recognition program.  Every attempt was made to edit the dictations, but inevitably there remain words that are mis-transcribed.)        Estephanie Shaikh, JOELLE - CNP  02/19/22 9410

## 2022-02-19 NOTE — H&P
HOSPITALISTS HISTORY AND PHYSICAL    2/19/2022 1:56 PM    Patient Information:  Pierre Hartley is a 49 y.o. male [id number]  PCP:  Gio Mitchell MD (Tel: [phone] )    Chief complaint:    Chief Complaint   Patient presents with    Abdominal Pain     Pt reports lower abdominal pain X2 days, burning with urination, HX kidney stones and Pancreatitis         History of Present Illness:  Janae Maier is a 49 y.o. male with a history of pancreatitis. The patient states for the last 2 days been having 10 out of 10 sharp epigastric pain that radiates to her his back sharp feels like a knife is going to his stomach associate with some nausea and couple episodes of vomiting no fevers no chills no diarrhea patient does not drink does not smoke no new drug use and no recent travel no fevers or  \      REVIEW OF SYSTEMS:   Constitutional: Negative for fever,chills or night sweats  ENT: Negative for rhinorrhea, epistaxis, hoarseness, sore throat. Respiratory: Negative for shortness of breath,wheezing  Cardiovascular: Negative for chest pain, palpitations   Gastrointestinal:see above  Genitourinary: Negative for polyuria, dysuria   Hematologic/Lymphatic: Negative for bleeding tendency, easy bruising  Musculoskeletal: Negative for myalgias and arthralgias  Neurologic: Negative for confusion,dysarthria. Skin: Negative for itching,rash  Psychiatric: Negative for depression,anxiety, agitation. Endocrine: Negative for polydipsia,polyuria,heat /cold intolerance.     Past Medical History:   has a past medical history of Anxiety state, unspecified, Calculus of kidney, Deaf, Diabetes mellitus ([address] Diverticula, GERD (gastroesophageal reflux disease), Hordeolum externum, Hyperlipidemia, Hypertension, Insomnia, unspecified, Kidney stones, MRSA carrier, Pancreatitis, chronic ([address] Psychiatric problem, and Unspecified hypertrophic and atrophic condition of skin. Past Surgical History:   has a past surgical history that includes colostomy; Revision Colostomy; Cholecystectomy; Pancreas surgery; Appendectomy; Inner ear surgery; tracheostomy; tracheostomy closure; subtotal colectomy; and Abscess Drainage (Left, 1/5/14). Medications:  No current facility-administered medications on file prior to encounter.      Current Outpatient Medications on File Prior to Encounter   Medication Sig Dispense Refill    JARDIANCE 25 MG tablet TAKE 1 TABLET BY MOUTH DAILY 90 tablet 3    ondansetron (ZOFRAN) 4 MG tablet TAKE 1 TABLET BY MOUTH EVERY 8 HOURS AS NEEDED FOR NAUSEA OR VOMITING 30 tablet 1    diazePAM (VALIUM) 5 MG tablet TAKE 1 TABLET BY MOUTH EVERY 12 HOURS AS NEEDED FOR ANXIETY 30 tablet 0    fluticasone (FLONASE) 50 MCG/ACT nasal spray USE 2 SPRAYS IN EACH NOSTRIL DAILY 48 g 3    insulin lispro, 1 Unit Dial, (HUMALOG KWIKPEN) 100 UNIT/ML SOPN <100 none, 100-150 4 units, 151-200 6 units, 201-250 8 units, 251-300 10 units 5 pen 3    Insulin Pen Needle (B-D UF III MINI PEN NEEDLES) 31G X 5 MM MISC USE ONCE DAILY AS DIRECTED 100 each 12    simvastatin (ZOCOR) 40 MG tablet TAKE 1 TABLET BY MOUTH NIGHTLY 90 tablet 3    losartan-hydroCHLOROthiazide (HYZAAR) 100-25 MG per tablet TAKE 1 TABLET BY MOUTH DAILY 90 tablet 3    insulin detemir (LEVEMIR FLEXTOUCH) 100 UNIT/ML injection pen Inject 55 Units into the skin nightly (Patient taking differently: Inject 70 Units into the skin nightly ) 15 pen 11    oxyCODONE-acetaminophen (PERCOCET)  MG per tablet TAKE 1 TABLET BY MOUTH EVERY 8 HOURS      ibuprofen (ADVIL;MOTRIN) 800 MG tablet TAKE 1 TABLET BY MOUTH THREE TIMES DAILY WITH MEALS 90 tablet 12    esomeprazole (NEXIUM) 40 MG delayed release capsule Take 1 capsule by mouth every morning (before breakfast) 90 capsule 3    metFORMIN (GLUCOPHAGE-XR) 500 MG extended release tablet Take 2 tablets by mouth 2 times daily 360 tablet 3    glimepiride (AMARYL) 4 MG tablet Take 1 tablet by mouth every morning (before breakfast) 90 tablet 3    blood glucose test strips (ONE TOUCH ULTRA TEST) strip USE TO TEST THREE TIMES DAILY AS NEEDED 300 strip 12    clobetasol (OLUX) 0.05 % foam APPLY EXTERNALLY TO THE SCALP TWICE DAILY AS NEEDED 50 g 3    Blood Glucose Monitoring Suppl (ONE TOUCH ULTRA 2) w/Device KIT 1 kit by Does not apply route daily 1 kit 0    ONETOUCH DELICA LANCETS FINE MISC USE TO TEST THREE TIMES DAILY 300 each 12    nystatin (MYCOSTATIN) 632917 UNIT/GM cream APPLY TOPICALLY TWICE DAILY TO FOUR TIMES DAILY 60 g 0    MICROLET LANCETS MISC TEST 3 TO 4 TIMES DAILY 300 each 10       Allergies: Allergies   Allergen Reactions    Amoxicillin      Rash    Morphine      RASH, hallucinations oral and IV        Social History:  Patient Lives at home   reports that he has never smoked. He has never used smokeless tobacco. He reports that he does not drink alcohol and does not use drugs. Family History:  family history includes Alcohol Abuse in his brother and father; Diabetes in his mother; Sudden Death in his mother. ,     Physical Exam:  BP (!) 178/165   Pulse 71   Temp 98 °F (36.7 °C)   Resp 18   Wt (!) 300 lb 6.4 oz (136.3 kg)   SpO2 94%   BMI 39.63 kg/m²     General appearance:  Appearsun comfortable. AAOx3  HEENT: atraumatic, Pupils equal, muscous membranes moist, no masses appreciated  Cardiovascular: Regular rate and rhythm no murmurs appreciated  Respiratory: CTAB no wheezing  Gastrointestinal: epigastric tenderness to palpation no guarding or ridigity  EXT: no edema  Neurology: no gross focal deficts  Psychiatry: Appropriate affect.  Not agitated  Skin: Warm, dry, no rashes appreciated    Labs:  CBC:   Lab Results   Component Value Date    WBC 7.2 02/19/2022    RBC 5.67 02/19/2022    HGB 15.8 02/19/2022    HCT 47.6 02/19/2022    MCV 83.8 02/19/2022    MCH 27.8 02/19/2022    MCHC 33.2 02/19/2022    RDW

## 2022-02-19 NOTE — ACP (ADVANCE CARE PLANNING)
Advanced Care Planning Note.     Purpose of Encounter: Advanced care planning in light of hospitalization  Parties In Attendance: Patient,    Decisional Capacity: Yes  Subjective: Patient  understand that this conversation is to address long term care goal  Objective: to hospital with acute on chronic pancreatitis  Goals of Care Determination: Patient would pursue CPR and intubation if required would consider tracheostomy long-term ventilation if required  Code Status: full code  Time spent on Advanced care Plannin minutes  Advanced Care Planning Documents: documented patient's wishes, would like St[facility] to make medical decisions if unable to make decisions    Leander Roy MD  2022 2:00 PM

## 2022-02-19 NOTE — ED NOTES
IV site intact. Respirations are easy and unlabored. Fluids running on pump. Medicated per MAR. Call light in reach.      Chrissie Munoz RN  02/19/22 1002

## 2022-02-20 LAB
A/G RATIO: 1.2 (ref 1.1–2.2)
ALBUMIN SERPL-MCNC: 3.6 G/DL (ref 3.4–5)
ALP BLD-CCNC: 64 U/L (ref 40–129)
ALT SERPL-CCNC: 38 U/L (ref 10–40)
ANION GAP SERPL CALCULATED.3IONS-SCNC: 11 MMOL/L (ref 3–16)
AST SERPL-CCNC: 21 U/L (ref 15–37)
BASOPHILS ABSOLUTE: 0 K/UL (ref 0–0.2)
BASOPHILS RELATIVE PERCENT: 0.2 %
BILIRUB SERPL-MCNC: 0.6 MG/DL (ref 0–1)
BUN BLDV-MCNC: 13 MG/DL (ref 7–20)
CALCIUM SERPL-MCNC: 8.4 MG/DL (ref 8.3–10.6)
CHLORIDE BLD-SCNC: 105 MMOL/L (ref 99–110)
CO2: 21 MMOL/L (ref 21–32)
CREAT SERPL-MCNC: <0.5 MG/DL (ref 0.9–1.3)
EOSINOPHILS ABSOLUTE: 0.1 K/UL (ref 0–0.6)
EOSINOPHILS RELATIVE PERCENT: 1.2 %
GFR AFRICAN AMERICAN: >60
GFR NON-AFRICAN AMERICAN: >60
GLUCOSE BLD-MCNC: 114 MG/DL (ref 70–99)
GLUCOSE BLD-MCNC: 117 MG/DL (ref 70–99)
GLUCOSE BLD-MCNC: 125 MG/DL (ref 70–99)
GLUCOSE BLD-MCNC: 129 MG/DL (ref 70–99)
GLUCOSE BLD-MCNC: 148 MG/DL (ref 70–99)
HCT VFR BLD CALC: 43.3 % (ref 40.5–52.5)
HEMOGLOBIN: 14.2 G/DL (ref 13.5–17.5)
LIPASE: 14 U/L (ref 13–60)
LYMPHOCYTES ABSOLUTE: 1.8 K/UL (ref 1–5.1)
LYMPHOCYTES RELATIVE PERCENT: 18.7 %
MCH RBC QN AUTO: 27.3 PG (ref 26–34)
MCHC RBC AUTO-ENTMCNC: 32.7 G/DL (ref 31–36)
MCV RBC AUTO: 83.3 FL (ref 80–100)
MONOCYTES ABSOLUTE: 0.9 K/UL (ref 0–1.3)
MONOCYTES RELATIVE PERCENT: 9.3 %
NEUTROPHILS ABSOLUTE: 6.7 K/UL (ref 1.7–7.7)
NEUTROPHILS RELATIVE PERCENT: 70.6 %
PDW BLD-RTO: 14.6 % (ref 12.4–15.4)
PERFORMED ON: ABNORMAL
PLATELET # BLD: 181 K/UL (ref 135–450)
PMV BLD AUTO: 9.5 FL (ref 5–10.5)
POTASSIUM REFLEX MAGNESIUM: 3.8 MMOL/L (ref 3.5–5.1)
RBC # BLD: 5.2 M/UL (ref 4.2–5.9)
SODIUM BLD-SCNC: 137 MMOL/L (ref 136–145)
TOTAL PROTEIN: 6.5 G/DL (ref 6.4–8.2)
TRIGL SERPL-MCNC: 175 MG/DL (ref 0–150)
WBC # BLD: 9.5 K/UL (ref 4–11)

## 2022-02-20 PROCEDURE — 85025 COMPLETE CBC W/AUTO DIFF WBC: CPT

## 2022-02-20 PROCEDURE — 80053 COMPREHEN METABOLIC PANEL: CPT

## 2022-02-20 PROCEDURE — 6360000002 HC RX W HCPCS: Performed by: INTERNAL MEDICINE

## 2022-02-20 PROCEDURE — 6370000000 HC RX 637 (ALT 250 FOR IP): Performed by: INTERNAL MEDICINE

## 2022-02-20 PROCEDURE — 1200000000 HC SEMI PRIVATE

## 2022-02-20 PROCEDURE — 83690 ASSAY OF LIPASE: CPT

## 2022-02-20 PROCEDURE — 94760 N-INVAS EAR/PLS OXIMETRY 1: CPT

## 2022-02-20 RX ORDER — HYDROMORPHONE HYDROCHLORIDE 1 MG/ML
0.5 INJECTION, SOLUTION INTRAMUSCULAR; INTRAVENOUS; SUBCUTANEOUS
Status: DISCONTINUED | OUTPATIENT
Start: 2022-02-20 | End: 2022-02-21 | Stop reason: HOSPADM

## 2022-02-20 RX ADMIN — PANCRELIPASE 24000 UNITS: 24000; 76000; 120000 CAPSULE, DELAYED RELEASE PELLETS ORAL at 16:19

## 2022-02-20 RX ADMIN — ENOXAPARIN SODIUM 40 MG: 40 INJECTION SUBCUTANEOUS at 08:44

## 2022-02-20 RX ADMIN — HYDROMORPHONE HYDROCHLORIDE 0.5 MG: 1 INJECTION, SOLUTION INTRAMUSCULAR; INTRAVENOUS; SUBCUTANEOUS at 13:25

## 2022-02-20 RX ADMIN — ONDANSETRON 4 MG: 2 INJECTION INTRAMUSCULAR; INTRAVENOUS at 16:25

## 2022-02-20 RX ADMIN — PANTOPRAZOLE SODIUM 40 MG: 40 TABLET, DELAYED RELEASE ORAL at 07:38

## 2022-02-20 RX ADMIN — ONDANSETRON 4 MG: 2 INJECTION INTRAMUSCULAR; INTRAVENOUS at 20:35

## 2022-02-20 RX ADMIN — HYDROMORPHONE HYDROCHLORIDE 0.5 MG: 1 INJECTION, SOLUTION INTRAMUSCULAR; INTRAVENOUS; SUBCUTANEOUS at 00:52

## 2022-02-20 RX ADMIN — ONDANSETRON 4 MG: 2 INJECTION INTRAMUSCULAR; INTRAVENOUS at 05:14

## 2022-02-20 RX ADMIN — HYDROMORPHONE HYDROCHLORIDE 0.5 MG: 1 INJECTION, SOLUTION INTRAMUSCULAR; INTRAVENOUS; SUBCUTANEOUS at 09:26

## 2022-02-20 RX ADMIN — HYDROMORPHONE HYDROCHLORIDE 0.5 MG: 1 INJECTION, SOLUTION INTRAMUSCULAR; INTRAVENOUS; SUBCUTANEOUS at 17:39

## 2022-02-20 RX ADMIN — INSULIN GLARGINE 55 UNITS: 100 INJECTION, SOLUTION SUBCUTANEOUS at 21:49

## 2022-02-20 RX ADMIN — INSULIN LISPRO 2 UNITS: 100 INJECTION, SOLUTION INTRAVENOUS; SUBCUTANEOUS at 16:20

## 2022-02-20 RX ADMIN — ACETAMINOPHEN 325MG 650 MG: 325 TABLET ORAL at 09:01

## 2022-02-20 RX ADMIN — HYDROMORPHONE HYDROCHLORIDE 0.5 MG: 1 INJECTION, SOLUTION INTRAMUSCULAR; INTRAVENOUS; SUBCUTANEOUS at 20:34

## 2022-02-20 RX ADMIN — LOSARTAN POTASSIUM 100 MG: 100 TABLET, FILM COATED ORAL at 08:44

## 2022-02-20 RX ADMIN — HYDROCHLOROTHIAZIDE 25 MG: 25 TABLET ORAL at 08:44

## 2022-02-20 RX ADMIN — HYDROMORPHONE HYDROCHLORIDE 0.5 MG: 1 INJECTION, SOLUTION INTRAMUSCULAR; INTRAVENOUS; SUBCUTANEOUS at 05:14

## 2022-02-20 ASSESSMENT — PAIN DESCRIPTION - DIRECTION
RADIATING_TOWARDS: BACK

## 2022-02-20 ASSESSMENT — PAIN DESCRIPTION - LOCATION
LOCATION: BACK;ABDOMEN
LOCATION: BACK;ABDOMEN
LOCATION: ABDOMEN
LOCATION: BACK
LOCATION: ABDOMEN
LOCATION: ABDOMEN
LOCATION: BACK
LOCATION: ABDOMEN

## 2022-02-20 ASSESSMENT — PAIN DESCRIPTION - FREQUENCY
FREQUENCY: CONTINUOUS

## 2022-02-20 ASSESSMENT — PAIN SCALES - GENERAL
PAINLEVEL_OUTOF10: 7
PAINLEVEL_OUTOF10: 5
PAINLEVEL_OUTOF10: 6
PAINLEVEL_OUTOF10: 6
PAINLEVEL_OUTOF10: 7
PAINLEVEL_OUTOF10: 6
PAINLEVEL_OUTOF10: 7
PAINLEVEL_OUTOF10: 8
PAINLEVEL_OUTOF10: 7

## 2022-02-20 ASSESSMENT — PAIN DESCRIPTION - DESCRIPTORS
DESCRIPTORS: ACHING

## 2022-02-20 ASSESSMENT — PAIN DESCRIPTION - PROGRESSION

## 2022-02-20 ASSESSMENT — PAIN DESCRIPTION - ORIENTATION
ORIENTATION: ANTERIOR;LOWER
ORIENTATION: LOWER
ORIENTATION: LOWER
ORIENTATION: ANTERIOR;LOWER

## 2022-02-20 ASSESSMENT — PAIN DESCRIPTION - PAIN TYPE
TYPE: ACUTE PAIN

## 2022-02-20 ASSESSMENT — PAIN DESCRIPTION - ONSET
ONSET: ON-GOING

## 2022-02-20 NOTE — PROGRESS NOTES
[facility]    2/20/2022 12:20 PM        Name: Joy Rowley .               Admitted: 2/19/2022  Primary Care Provider: Pastora Shaw MD (Tel: [phone])      Subjective:    Lying in bed no chest pain still having abodminal pain but imrpoved nausea improved    Reviewed interval ancillary notes    Current Medications  HYDROmorphone HCl PF (DILAUDID) injection 0.5 mg, Q2H PRN  pantoprazole (PROTONIX) tablet 40 mg, QAM AC  fluticasone (FLONASE) 50 MCG/ACT nasal spray 1 spray, Daily PRN  insulin glargine (LANTUS;BASAGLAR) injection pen 55 Units, Nightly  insulin lispro (1 Unit Dial) 0-12 Units, TID WC  insulin lispro (1 Unit Dial) 0-6 Units, Nightly  sodium chloride flush 0.9 % injection 5-40 mL, 2 times per day  sodium chloride flush 0.9 % injection 5-40 mL, PRN  0.9 % sodium chloride infusion, PRN  enoxaparin (LOVENOX) injection 40 mg, Daily  ondansetron (ZOFRAN-ODT) disintegrating tablet 4 mg, Q8H PRN   Or  ondansetron (ZOFRAN) injection 4 mg, Q6H PRN  polyethylene glycol (GLYCOLAX) packet 17 g, Daily PRN  acetaminophen (TYLENOL) tablet 650 mg, Q6H PRN   Or  acetaminophen (TYLENOL) suppository 650 mg, Q6H PRN  lactated ringers infusion, Continuous  losartan (COZAAR) tablet 100 mg, Daily   And  hydroCHLOROthiazide (HYDRODIURIL) tablet 25 mg, Daily  glucose (GLUTOSE) 40 % oral gel 15 g, PRN  dextrose 50 % IV solution, PRN  glucagon (rDNA) injection 1 mg, PRN  dextrose 5 % solution, PRN        Objective:  /77   Pulse 72   Temp 97.7 °F (36.5 °C) (Oral)   Resp 16   Ht 6' (1.829 m)   Wt 300 lb (136.1 kg)   SpO2 93%   BMI 40.69 kg/m²     Intake/Output Summary (Last 24 hours) at 2/20/2022 1220  Last data filed at 2/20/2022 0830  Gross per 24 hour   Intake 2400 ml   Output 450 ml   Net 1950 ml      Wt Readings from Last 3 Encounters:   02/19/22 300 lb (136.1 kg)   02/07/22 (!) 301 lb (136.5 kg)   10/18/21 (!) 300 lb 9.6 oz (136.4 kg)       General appearance:  Appears comfortable. AAOx3  HEENT: atraumatic, Pupils equal, muscous membranes moist, no masses appreciated  Cardiovascular: Regular rate and rhythm no murmurs appreciated  Respiratory: CTAB no wheezing  Gastrointestinal: Abdomen soft, non-tender, BS+  EXT: no edema  Neurology: no gross focal deficts  Psychiatry: Appropriate affect. Not agitated  Skin: Warm, dry, no rashes appreciated    Labs and Tests:  CBC:   Recent Labs     02/19/22  0930 02/20/22  0603   WBC 7.2 9.5   HGB 15.8 14.2    181     BMP:    Recent Labs     02/19/22  0930 02/20/22  0603    137   K 3.9 3.8    105   CO2 25 21   BUN 16 13   CREATININE 0.6* <0.5*   GLUCOSE 137* 117*     Hepatic:   Recent Labs     02/19/22  0930 02/20/22  0603   AST 23 21   ALT 45* 38   BILITOT 0.3 0.6   ALKPHOS 96 64     CT ABDOMEN PELVIS W IV CONTRAST Additional Contrast? None   Final Result   1. Chronic changes in the pancreatic body and tail consistent with chronic   pancreatitis with small pseudocysts and calcification. No evidence of acute   inflammation to suggest acute pancreatitis. 2. Fatty liver but no focal disease. 3. Constipation. Postsurgical changes in the sigmoid. No evidence of bowel   obstruction or perforation. 4. Bilateral punctate nephrolithiasis but no obstructive uropathy. Recent imaging reviewed    Problem List  Principal Problem:    Acute pancreatitis without necrosis or infection, unspecified  Resolved Problems:    * No resolved hospital problems.  *       Assessment/Plan:   Acute on chronic pancreatitis  - ivf  - iv dilaudid prn increase to q2hrs  - check triglerides pending  - start trial of clears  - gi consult pending     Dm2: lantus and lispro     Htn: home meds        DVT prophylaxis lovenox  Code status full code         Oswaldo Merlos MD   2/20/2022 12:20 PM

## 2022-02-20 NOTE — CONSULTS
[address] [place] [address]  GI Consultation                                                                 Patient: Janae Maier  : 1965       Date:  2022    Subjective:       History of Present Illness  Patient is a 49 y.o.  male admitted with Intractable abdominal pain [R10.9]  Acute pancreatitis without necrosis or infection, unspecified [K85.90]  Intractable vomiting with nausea, unspecified vomiting type [R11.2]  Acute pancreatitis, unspecified complication status, unspecified pancreatitis type [K85.90] who is seen in consult for pancreatitis. a history of chronic pancreatitis diagnosed in . He has had what sounds like an ERCP many years ago but has not seen a GI doctor for many years for this problem. He sees Dr. Edita Ariza for his colonoscopies. For the last 2 days been having 10 out of 10 sharp epigastric pain that radiates to his back sharp feels like a knife is going to his stomach associate with some nausea and couple episodes of vomiting no fevers no chills no diarrhea patient does not drink does not smoke no new drug use and no recent travel no fevers. He usually goes to pain management. Never been on pancreatic enzymes.   Lipase is normal.    Past Medical History:   Diagnosis Date    Anxiety state, unspecified     Calculus of kidney     Deaf     RIGHT EAR    Diabetes mellitus ([address]     Diverticula     GERD (gastroesophageal reflux disease)     Hordeolum externum     Hyperlipidemia     Hypertension     Insomnia, unspecified     Kidney stones     MRSA carrier     Pancreatitis, chronic ([address]     Psychiatric problem     Unspecified hypertrophic and atrophic condition of skin       Past Surgical History:   Procedure Laterality Date    ABSCESS DRAINAGE Left 14    incision and drainage of an abcess on left calf    APPENDECTOMY      CHOLECYSTECTOMY      COLOSTOMY      INNER EAR SURGERY      RIGHT    PANCREAS SURGERY      duct stent    Patient resting in stretcher in NAD, VSS. Family member at bedside call bell in reach. REVISION COLOSTOMY      SUBTOTAL COLECTOMY      Had wound dehiscence, mesh    TRACHEOSTOMY      TRACHEOSTOMY CLOSURE        Past Endoscopic History Colonoscopy 2-3 years ago, per him. Admission Meds  No current facility-administered medications on file prior to encounter.      Current Outpatient Medications on File Prior to Encounter   Medication Sig Dispense Refill    JARDIANCE 25 MG tablet TAKE 1 TABLET BY MOUTH DAILY 90 tablet 3    ondansetron (ZOFRAN) 4 MG tablet TAKE 1 TABLET BY MOUTH EVERY 8 HOURS AS NEEDED FOR NAUSEA OR VOMITING 30 tablet 1    diazePAM (VALIUM) 5 MG tablet TAKE 1 TABLET BY MOUTH EVERY 12 HOURS AS NEEDED FOR ANXIETY 30 tablet 0    fluticasone (FLONASE) 50 MCG/ACT nasal spray USE 2 SPRAYS IN EACH NOSTRIL DAILY 48 g 3    simvastatin (ZOCOR) 40 MG tablet TAKE 1 TABLET BY MOUTH NIGHTLY 90 tablet 3    losartan-hydroCHLOROthiazide (HYZAAR) 100-25 MG per tablet TAKE 1 TABLET BY MOUTH DAILY 90 tablet 3    insulin detemir (LEVEMIR FLEXTOUCH) 100 UNIT/ML injection pen Inject 55 Units into the skin nightly (Patient taking differently: Inject 70 Units into the skin nightly ) 15 pen 11    oxyCODONE-acetaminophen (PERCOCET)  MG per tablet TAKE 1 TABLET BY MOUTH EVERY 8 HOURS      ibuprofen (ADVIL;MOTRIN) 800 MG tablet TAKE 1 TABLET BY MOUTH THREE TIMES DAILY WITH MEALS 90 tablet 12    esomeprazole (NEXIUM) 40 MG delayed release capsule Take 1 capsule by mouth every morning (before breakfast) 90 capsule 3    metFORMIN (GLUCOPHAGE-XR) 500 MG extended release tablet Take 2 tablets by mouth 2 times daily 360 tablet 3    glimepiride (AMARYL) 4 MG tablet Take 1 tablet by mouth every morning (before breakfast) 90 tablet 3    clobetasol (OLUX) 0.05 % foam APPLY EXTERNALLY TO THE SCALP TWICE DAILY AS NEEDED 50 g 3    nystatin (MYCOSTATIN) 146285 UNIT/GM cream APPLY TOPICALLY TWICE DAILY TO FOUR TIMES DAILY 60 g 0    insulin lispro, 1 Unit Dial, (HUMALOG KWIKPEN) 100 UNIT/ML SOPN <100 none, 100-150 4 units, 151-200 6 units, 201-250 8 units, 251-300 10 units 5 pen 3    Insulin Pen Needle (B-D UF III MINI PEN NEEDLES) 31G X 5 MM MISC USE ONCE DAILY AS DIRECTED 100 each 12    blood glucose test strips (ONE TOUCH ULTRA TEST) strip USE TO TEST THREE TIMES DAILY AS NEEDED 300 strip 12    Blood Glucose Monitoring Suppl (ONE TOUCH ULTRA 2) w/Device KIT 1 kit by Does not apply route daily 1 kit 0    ONETOUCH DELICA LANCETS FINE MISC USE TO TEST THREE TIMES DAILY 300 each 12    MICROLET LANCETS MISC TEST 3 TO 4 TIMES DAILY 300 each 10       Patient denies ASA, NSAID use. Allergies  Allergies   Allergen Reactions    Amoxicillin      Rash    Morphine      RASH, hallucinations oral and IV      Social   Social History     Tobacco Use    Smoking status: Never Smoker    Smokeless tobacco: Never Used   Substance Use Topics    Alcohol use: No     Alcohol/week: 0.0 standard drinks        Family History   Problem Relation Age of Onset    Diabetes Mother     Sudden Death Mother         suicide    Alcohol Abuse Father     Alcohol Abuse Brother       No family history of colon cancer, Crohn's disease, or ulcerative colitis. Review of Systems  Pertinent items are noted in HPI.        Physical Exam    /74   Pulse 67   Temp 97.8 °F (36.6 °C) (Oral)   Resp 16   Ht 6' (1.829 m)   Wt 300 lb (136.1 kg)   SpO2 92%   BMI 40.69 kg/m²   General appearance: alert, cooperative, no distress, appears stated age  Anicteric, No Jaundice  Head: Normocephalic, without obvious abnormality  Lungs: clear to auscultation bilaterally, Normal Effort  Heart: regular rate and rhythm, normal S1 and S2, no murmurs or rubs  Abdomen: abnormal findings:  tenderness moderate in the entire abdomen  Extremities: atraumatic, no cyanosis or edema  Skin: warm and dry  Neuro: intact  AAOX3      Data Review:    Recent Labs     02/19/22  0930 02/20/22  0603   WBC 7.2 9.5   HGB 15.8 14.2   HCT 47.6 43.3   MCV 83.8 83.3    181 Recent Labs     02/19/22  0930 02/20/22  0603    137   K 3.9 3.8    105   CO2 25 21   BUN 16 13   CREATININE 0.6* <0.5*     Recent Labs     02/19/22 0930 02/20/22  0603   AST 23 21   ALT 45* 38   BILITOT 0.3 0.6   ALKPHOS 96 64     Recent Labs     02/19/22 0930 02/20/22  0603   LIPASE 254.0* 14.0     No results for input(s): PROTIME, INR in the last 72 hours. No results for input(s): PTT in the last 72 hours. No results for input(s): OCCULTBLD in the last 72 hours. Imaging Studies:                             CT-scan of abdomen and pelvis:  Chronic changes in the pancreatic body and tail consistent with chronic   pancreatitis with small pseudocysts and calcification.  No evidence of acute   inflammation to suggest acute pancreatitis. 2. Fatty liver but no focal disease. 3. Constipation.  Postsurgical changes in the sigmoid.  No evidence of bowel   obstruction or perforation. 4. Bilateral punctate nephrolithiasis but no obstructive uropathy                        Assessment:     Principal Problem:    Acute pancreatitis without necrosis or infection, unspecified  Resolved Problems:    * No resolved hospital problems. *      Chronic pancreatitis, idiopathic. No ETOH use. Chronic pain. No evidence of acute pancreatitis. Recommendations:     I will start him on Creon to see if that helps. He may benefit from an EUS and alcohol injection. I encouraged him to re-establish with Dr. Sharan Mantilla. Thank you for the opportunity to participate in [address].     Jasmine Pa MD

## 2022-02-20 NOTE — PROGRESS NOTES
0940: Shift assessment completed. Alert and oriented. VSS. Call light within reach. The care plan and education has been reviewed and mutually agreed upon with the patient.

## 2022-02-21 VITALS
TEMPERATURE: 98 F | WEIGHT: 300 LBS | BODY MASS INDEX: 40.63 KG/M2 | HEIGHT: 72 IN | RESPIRATION RATE: 16 BRPM | DIASTOLIC BLOOD PRESSURE: 84 MMHG | HEART RATE: 78 BPM | SYSTOLIC BLOOD PRESSURE: 142 MMHG | OXYGEN SATURATION: 94 %

## 2022-02-21 LAB
A/G RATIO: 1.1 (ref 1.1–2.2)
ALBUMIN SERPL-MCNC: 3.6 G/DL (ref 3.4–5)
ALP BLD-CCNC: 72 U/L (ref 40–129)
ALT SERPL-CCNC: 32 U/L (ref 10–40)
ANION GAP SERPL CALCULATED.3IONS-SCNC: 11 MMOL/L (ref 3–16)
AST SERPL-CCNC: 16 U/L (ref 15–37)
BASOPHILS ABSOLUTE: 0 K/UL (ref 0–0.2)
BASOPHILS RELATIVE PERCENT: 0.2 %
BILIRUB SERPL-MCNC: 0.4 MG/DL (ref 0–1)
BUN BLDV-MCNC: 10 MG/DL (ref 7–20)
CALCIUM SERPL-MCNC: 8.3 MG/DL (ref 8.3–10.6)
CHLORIDE BLD-SCNC: 101 MMOL/L (ref 99–110)
CO2: 24 MMOL/L (ref 21–32)
CREAT SERPL-MCNC: 0.5 MG/DL (ref 0.9–1.3)
EOSINOPHILS ABSOLUTE: 0.2 K/UL (ref 0–0.6)
EOSINOPHILS RELATIVE PERCENT: 2.3 %
GFR AFRICAN AMERICAN: >60
GFR NON-AFRICAN AMERICAN: >60
GLUCOSE BLD-MCNC: 137 MG/DL (ref 70–99)
GLUCOSE BLD-MCNC: 147 MG/DL (ref 70–99)
GLUCOSE BLD-MCNC: 158 MG/DL (ref 70–99)
HCT VFR BLD CALC: 43.4 % (ref 40.5–52.5)
HEMOGLOBIN: 14.4 G/DL (ref 13.5–17.5)
LIPASE: 18 U/L (ref 13–60)
LYMPHOCYTES ABSOLUTE: 1.4 K/UL (ref 1–5.1)
LYMPHOCYTES RELATIVE PERCENT: 15.2 %
MAGNESIUM: 1.9 MG/DL (ref 1.8–2.4)
MCH RBC QN AUTO: 27.5 PG (ref 26–34)
MCHC RBC AUTO-ENTMCNC: 33.2 G/DL (ref 31–36)
MCV RBC AUTO: 82.8 FL (ref 80–100)
MONOCYTES ABSOLUTE: 0.8 K/UL (ref 0–1.3)
MONOCYTES RELATIVE PERCENT: 9.2 %
NEUTROPHILS ABSOLUTE: 6.7 K/UL (ref 1.7–7.7)
NEUTROPHILS RELATIVE PERCENT: 73.1 %
PDW BLD-RTO: 14.6 % (ref 12.4–15.4)
PERFORMED ON: ABNORMAL
PERFORMED ON: ABNORMAL
PLATELET # BLD: 187 K/UL (ref 135–450)
PMV BLD AUTO: 9.7 FL (ref 5–10.5)
POTASSIUM REFLEX MAGNESIUM: 3.2 MMOL/L (ref 3.5–5.1)
RBC # BLD: 5.24 M/UL (ref 4.2–5.9)
SODIUM BLD-SCNC: 136 MMOL/L (ref 136–145)
TOTAL PROTEIN: 7 G/DL (ref 6.4–8.2)
WBC # BLD: 9.2 K/UL (ref 4–11)

## 2022-02-21 PROCEDURE — 6370000000 HC RX 637 (ALT 250 FOR IP): Performed by: INTERNAL MEDICINE

## 2022-02-21 PROCEDURE — 94760 N-INVAS EAR/PLS OXIMETRY 1: CPT

## 2022-02-21 PROCEDURE — 85025 COMPLETE CBC W/AUTO DIFF WBC: CPT

## 2022-02-21 PROCEDURE — 83690 ASSAY OF LIPASE: CPT

## 2022-02-21 PROCEDURE — 80053 COMPREHEN METABOLIC PANEL: CPT

## 2022-02-21 PROCEDURE — 6360000002 HC RX W HCPCS: Performed by: INTERNAL MEDICINE

## 2022-02-21 PROCEDURE — 83735 ASSAY OF MAGNESIUM: CPT

## 2022-02-21 RX ORDER — OXYCODONE AND ACETAMINOPHEN 10; 325 MG/1; MG/1
1 TABLET ORAL EVERY 6 HOURS PRN
Qty: 12 TABLET | Refills: 0 | Status: SHIPPED | OUTPATIENT
Start: 2022-02-21 | End: 2022-02-24

## 2022-02-21 RX ORDER — POTASSIUM CHLORIDE 20 MEQ/1
40 TABLET, EXTENDED RELEASE ORAL ONCE
Status: COMPLETED | OUTPATIENT
Start: 2022-02-21 | End: 2022-02-21

## 2022-02-21 RX ADMIN — PANTOPRAZOLE SODIUM 40 MG: 40 TABLET, DELAYED RELEASE ORAL at 04:54

## 2022-02-21 RX ADMIN — ENOXAPARIN SODIUM 40 MG: 40 INJECTION SUBCUTANEOUS at 08:27

## 2022-02-21 RX ADMIN — HYDROMORPHONE HYDROCHLORIDE 0.5 MG: 1 INJECTION, SOLUTION INTRAMUSCULAR; INTRAVENOUS; SUBCUTANEOUS at 08:28

## 2022-02-21 RX ADMIN — POTASSIUM CHLORIDE 40 MEQ: 20 TABLET, EXTENDED RELEASE ORAL at 08:28

## 2022-02-21 RX ADMIN — HYDROMORPHONE HYDROCHLORIDE 0.5 MG: 1 INJECTION, SOLUTION INTRAMUSCULAR; INTRAVENOUS; SUBCUTANEOUS at 04:54

## 2022-02-21 RX ADMIN — PANCRELIPASE 24000 UNITS: 24000; 76000; 120000 CAPSULE, DELAYED RELEASE PELLETS ORAL at 12:22

## 2022-02-21 RX ADMIN — HYDROCHLOROTHIAZIDE 25 MG: 25 TABLET ORAL at 08:28

## 2022-02-21 RX ADMIN — PANCRELIPASE 24000 UNITS: 24000; 76000; 120000 CAPSULE, DELAYED RELEASE PELLETS ORAL at 08:27

## 2022-02-21 RX ADMIN — LOSARTAN POTASSIUM 100 MG: 100 TABLET, FILM COATED ORAL at 08:28

## 2022-02-21 RX ADMIN — HYDROMORPHONE HYDROCHLORIDE 0.5 MG: 1 INJECTION, SOLUTION INTRAMUSCULAR; INTRAVENOUS; SUBCUTANEOUS at 14:16

## 2022-02-21 RX ADMIN — HYDROMORPHONE HYDROCHLORIDE 0.5 MG: 1 INJECTION, SOLUTION INTRAMUSCULAR; INTRAVENOUS; SUBCUTANEOUS at 00:06

## 2022-02-21 RX ADMIN — INSULIN LISPRO 2 UNITS: 100 INJECTION, SOLUTION INTRAVENOUS; SUBCUTANEOUS at 08:30

## 2022-02-21 ASSESSMENT — PAIN DESCRIPTION - DESCRIPTORS
DESCRIPTORS: ACHING
DESCRIPTORS: ACHING

## 2022-02-21 ASSESSMENT — PAIN - FUNCTIONAL ASSESSMENT: PAIN_FUNCTIONAL_ASSESSMENT: ACTIVITIES ARE NOT PREVENTED

## 2022-02-21 ASSESSMENT — PAIN DESCRIPTION - PAIN TYPE
TYPE: ACUTE PAIN

## 2022-02-21 ASSESSMENT — PAIN DESCRIPTION - ONSET: ONSET: ON-GOING

## 2022-02-21 ASSESSMENT — PAIN DESCRIPTION - LOCATION
LOCATION: BACK;ABDOMEN
LOCATION: BACK;ABDOMEN

## 2022-02-21 ASSESSMENT — PAIN SCALES - GENERAL
PAINLEVEL_OUTOF10: 7
PAINLEVEL_OUTOF10: 5
PAINLEVEL_OUTOF10: 7

## 2022-02-21 ASSESSMENT — PAIN DESCRIPTION - PROGRESSION: CLINICAL_PROGRESSION: GRADUALLY IMPROVING

## 2022-02-21 ASSESSMENT — PAIN DESCRIPTION - FREQUENCY: FREQUENCY: INTERMITTENT

## 2022-02-21 ASSESSMENT — PAIN DESCRIPTION - ORIENTATION
ORIENTATION: LOWER
ORIENTATION: LOWER

## 2022-02-21 NOTE — DISCHARGE SUMMARY
Hospital Medicine Discharge Summary    Patient: Wild Recio     Gender: male  : 1965   Age: 49 y.o. MRN: [id number]    Admitting Physician: Oswaldo Merlos MD  Discharge Physician: Oswaldo Merlos MD     Code Status: Full Code     Admit Date: 2022   Discharge Date:   22    Disposition:  Home  Time spent arranging discharge: 35 minutes    Discharge Diagnoses:    Acute on chronic pancreatitis      Condition at Discharge:  Stable    Hospital Course:   Was started n.p.o. IV fluids and IV Dilaudid as needed patient pain greatly improved tolerating clears then regular diet was seen by GI started on Creon will follow up with GI as outpatient patient discharged home with follow-up with GI and PCP    Discharge Exam:    /83   Pulse 82   Temp 98.2 °F (36.8 °C) (Oral)   Resp 16   Ht 6' (1.829 m)   Wt 300 lb (136.1 kg)   SpO2 94%   BMI 40.69 kg/m²   General appearance:  Appears comfortable. AAOx3  HEENT: atraumatic, Pupils equal, muscous membranes moist, no masses appreciated  Cardiovascular: Regular rate and rhythm no murmurs appreciated  Respiratory: CTAB no wheezing  Gastrointestinal: Abdomen soft, non-tender, BS+  EXT: no edema  Neurology: no gross focal deficts  Psychiatry: Appropriate affect. Not agitated  Skin: Warm, dry, no rashes appreciated    Discharge Medications:   Current Discharge Medication List      START taking these medications    Details   lipase-protease-amylase (CREON) 68779-62889 units delayed release capsule Take by mouth 3 times daily (with meals)  Qty: 180 capsule, Refills: 0           Current Discharge Medication List      CONTINUE these medications which have CHANGED    Details   oxyCODONE-acetaminophen (PERCOCET)  MG per tablet Take 1 tablet by mouth every 6 hours as needed for Pain for up to 3 days.   Qty: 12 tablet, Refills: 0    Comments: Reduce doses taken as pain becomes manageable  Associated Diagnoses: Idiopathic acute pancreatitis without infection or necrosis           Current Discharge Medication List      CONTINUE these medications which have NOT CHANGED    Details   JARDIANCE 25 MG tablet TAKE 1 TABLET BY MOUTH DAILY  Qty: 90 tablet, Refills: 3      ondansetron (ZOFRAN) 4 MG tablet TAKE 1 TABLET BY MOUTH EVERY 8 HOURS AS NEEDED FOR NAUSEA OR VOMITING  Qty: 30 tablet, Refills: 1      diazePAM (VALIUM) 5 MG tablet TAKE 1 TABLET BY MOUTH EVERY 12 HOURS AS NEEDED FOR ANXIETY  Qty: 30 tablet, Refills: 0    Associated Diagnoses: Meniere's disease, unspecified laterality      fluticasone (FLONASE) 50 MCG/ACT nasal spray USE 2 SPRAYS IN EACH NOSTRIL DAILY  Qty: 48 g, Refills: 3      simvastatin (ZOCOR) 40 MG tablet TAKE 1 TABLET BY MOUTH NIGHTLY  Qty: 90 tablet, Refills: 3    Comments: Patient due for a follow-up appointment.       losartan-hydroCHLOROthiazide (HYZAAR) 100-25 MG per tablet TAKE 1 TABLET BY MOUTH DAILY  Qty: 90 tablet, Refills: 3      insulin detemir (LEVEMIR FLEXTOUCH) 100 UNIT/ML injection pen Inject 55 Units into the skin nightly  Qty: 15 pen, Refills: 11    Associated Diagnoses: Diabetes mellitus with nephropathy (HCC)      ibuprofen (ADVIL;MOTRIN) 800 MG tablet TAKE 1 TABLET BY MOUTH THREE TIMES DAILY WITH MEALS  Qty: 90 tablet, Refills: 12      esomeprazole (NEXIUM) 40 MG delayed release capsule Take 1 capsule by mouth every morning (before breakfast)  Qty: 90 capsule, Refills: 3      metFORMIN (GLUCOPHAGE-XR) 500 MG extended release tablet Take 2 tablets by mouth 2 times daily  Qty: 360 tablet, Refills: 3      glimepiride (AMARYL) 4 MG tablet Take 1 tablet by mouth every morning (before breakfast)  Qty: 90 tablet, Refills: 3      clobetasol (OLUX) 0.05 % foam APPLY EXTERNALLY TO THE SCALP TWICE DAILY AS NEEDED  Qty: 50 g, Refills: 3      nystatin (MYCOSTATIN) 889343 UNIT/GM cream APPLY TOPICALLY TWICE DAILY TO FOUR TIMES DAILY  Qty: 60 g, Refills: 0      insulin lispro, 1 Unit Dial, (HUMALOG KWIKPEN) 100 UNIT/ML SOPN <100 none, 100-150 4 units, 151-200 6 units, 201-250 8 units, 251-300 10 units  Qty: 5 pen, Refills: 3      Insulin Pen Needle (B-D UF III MINI PEN NEEDLES) 31G X 5 MM MISC USE ONCE DAILY AS DIRECTED  Qty: 100 each, Refills: 12    Associated Diagnoses: Type 2 diabetes mellitus with diabetic nephropathy, with long-term current use of insulin ([facility])      blood glucose test strips (ONE TOUCH ULTRA TEST) strip USE TO TEST THREE TIMES DAILY AS NEEDED  Qty: 300 strip, Refills: 12    Associated Diagnoses: Type 2 diabetes mellitus with diabetic nephropathy, with long-term current use of insulin ([facility])      Blood Glucose Monitoring Suppl (ONE TOUCH ULTRA 2) w/Device KIT 1 kit by Does not apply route daily  Qty: 1 kit, Refills: 0    Associated Diagnoses: Type 2 diabetes mellitus with diabetic nephropathy, with long-term current use of insulin ([facility])      !! ONETOUCH DELICA LANCETS FINE MISC USE TO TEST THREE TIMES DAILY  Qty: 300 each, Refills: 12    Comments: E11.65  Associated Diagnoses: Type 2 diabetes mellitus with diabetic nephropathy, with long-term current use of insulin ([facility]      ! ! MICROLET LANCETS MISC TEST 3 TO 4 TIMES DAILY  Qty: 300 each, Refills: 10    Comments: **Patient requests 90 day supply**       !! - Potential duplicate medications found. Please discuss with provider. Current Discharge Medication List          Labs:  For convenience and continuity at follow-up the following most recent labs are provided:    Lab Results   Component Value Date    WBC 9.2 02/21/2022    HGB 14.4 02/21/2022    HCT 43.4 02/21/2022    MCV 82.8 02/21/2022     02/21/2022     02/21/2022    K 3.2 02/21/2022     02/21/2022    CO2 24 02/21/2022    BUN 10 02/21/2022    CREATININE 0.5 02/21/2022    CALCIUM 8.3 02/21/2022    PHOS 3.2 01/04/2014    ALKPHOS 72 02/21/2022    ALT 32 02/21/2022    AST 16 02/21/2022    BILITOT 0.4 02/21/2022    BILIDIR 0.10 02/16/2013    LABALBU 3.6 02/21/2022    LDLCALC 95 01/24/2022    TRIG 175 02/19/2022     No results found for: INR    Radiology:  CT ABDOMEN PELVIS W IV CONTRAST Additional Contrast? None    Result Date: 2/19/2022  EXAMINATION: CT OF THE ABDOMEN AND PELVIS WITH CONTRAST 2/19/2022 9:11 am TECHNIQUE: CT of the abdomen and pelvis was performed with the administration of intravenous contrast. Multiplanar reformatted images are provided for review. Dose modulation, iterative reconstruction, and/or weight based adjustment of the mA/kV was utilized to reduce the radiation dose to as low as reasonably achievable. COMPARISON: December 16, 2018 HISTORY: ORDERING SYSTEM PROVIDED HISTORY: lower abdominal pain TECHNOLOGIST PROVIDED HISTORY: Reason for exam:->lower abdominal pain Additional Contrast?->None Decision Support Exception - unselect if not a suspected or confirmed emergency medical condition->Emergency Medical Condition (MA) Reason for Exam: lower abd pain, nausea FINDINGS: Lower Chest: No acute abnormality. Organs: The liver demonstrates fatty infiltration but no focal disease. Status post cholecystectomy. Pancreas demonstrates multiple cystic areas and calcification in the region of body and tail also previously seen. This has the appearance of possible chronic pancreatitis with chronic small pseudocyst.  There is no change compared to the previous evaluation. The spleen, adrenals, aorta and IVC appear stable. Evidence of bilateral nephrolithiasis but no obstructive uropathy on either side. Stable right renal cyst measures 2.3 cm. GI/Bowel: No evidence of bowel obstruction or perforation. Postsurgical changes in the sigmoid. Changes of constipation. Pelvis: Urinary bladder, prostate and seminal vesicles appear stable. Peritoneum/Retroperitoneum: No evidence of retroperitoneal or mesenteric lymphadenopathy. Bones/Soft Tissues: No acute abnormality. 1. Chronic changes in the pancreatic body and tail consistent with chronic pancreatitis with small pseudocysts and calcification.   No evidence of acute inflammation to suggest acute pancreatitis. 2. Fatty liver but no focal disease. 3. Constipation. Postsurgical changes in the sigmoid. No evidence of bowel obstruction or perforation. 4. Bilateral punctate nephrolithiasis but no obstructive uropathy.          Signed:    Oc Escobedo MD   2/21/2022

## 2022-02-21 NOTE — PROGRESS NOTES
Gastroenterology Progress Note      Edmund Bryant is a 49 y.o. male patient. 1. Acute pancreatitis, unspecified complication status, unspecified pancreatitis type    2. Intractable vomiting with nausea, unspecified vomiting type    3. Intractable abdominal pain    4. Idiopathic acute pancreatitis without infection or necrosis        SUBJECTIVE:  Feels much better. Able to tolerate solid food today. ROS:  Cardiovascular ROS: no chest pain or dyspnea on exertion  Gastrointestinal ROS: see hpi  Respiratory ROS: no cough, shortness of breath, or wheezing    Physical    VITALS:  BP (!) 142/84   Pulse 78   Temp 98 °F (36.7 °C) (Oral)   Resp 16   Ht 6' (1.829 m)   Wt 300 lb (136.1 kg)   SpO2 94%   BMI 40.69 kg/m²   TEMPERATURE:  Current - Temp: 98 °F (36.7 °C); Max - Temp  Av.9 °F (36.6 °C)  Min: 97.5 °F (36.4 °C)  Max: 98.3 °F (36.8 °C)    NAD  RRR, Nl s1s2  Lungs CTA Bilaterally, normal effort  Abdomen soft, ND, NT, no HSM, Bowel sounds normal      Data    Data Review:    Recent Labs     22  0930 22  0603 22  0531   WBC 7.2 9.5 9.2   HGB 15.8 14.2 14.4   HCT 47.6 43.3 43.4   MCV 83.8 83.3 82.8    181 187     Recent Labs     22  0930 22  0603 22  0531    137 136   K 3.9 3.8 3.2*    105 101   CO2 25 21 24   BUN 16 13 10   CREATININE 0.6* <0.5* 0.5*     Recent Labs     22  0930 22  0603 22  0531   AST 23 21 16   ALT 45* 38 32   BILITOT 0.3 0.6 0.4   ALKPHOS 96 64 72     Recent Labs     22  0930 22  0603 22  0531   LIPASE 254.0* 14.0 18.0     No results for input(s): PROTIME, INR in the last 72 hours. No results for input(s): PTT in the last 72 hours. ASSESSMENT     Chronic pancreatitis - idiopathic. Dx in . Has seen Dr Mavis Gtz in the past. Had acute on chronic pain. CT at admission c/w chronic pancreatitis. Feeling better today after addition of pancreatic enzymes.      PLAN    - continue Creon  - f/u with Dr Leigh Ann Verma. May benefit from EUS with celiac plexus block    Ok for d/c from GI perspective with outpatient GI follow up. Discussed with Dr. Michi Rodrigues, PA-C  [facility] attending addendum:     I have personally performed a face-to-face diagnostic evaluation on this patient. I have reviewed and agreed with the plan of care as documented by nurse practitioner. History and exam by me shows:     Admitted with abdominal pain radiating to the back. . History for chronic pancreatitis confirmed on CT scan. Abdominal exam benign, no tenderness except for large midline scar related to prior surgeries. Creon was started yesterday and patient tolerated it well. Tolerated diet this afternoon. He would like to go home. Advised him to follow-up with Dr. Leigh Ann Verma who we had established in the past to discuss about EUS with celiac plexus block.         Liliana Gregory MD,   Attending Gracie Pringle

## 2022-02-21 NOTE — PLAN OF CARE
Problem: Pain:  Goal: Pain level will decrease  Description: Pain level will decrease  2/20/2022 2231 by Brennan Holm RN  Outcome: Ongoing  2/20/2022 1005 by Pako Anne RN  Outcome: Ongoing  Goal: Control of acute pain  Description: Control of acute pain  2/20/2022 2231 by Brennan Holm RN  Outcome: Ongoing  2/20/2022 1005 by Pako Anne RN  Outcome: Ongoing  Goal: Control of chronic pain  Description: Control of chronic pain  2/20/2022 2231 by Brennan Holm RN  Outcome: Ongoing  2/20/2022 1005 by Pako Anne RN  Outcome: Ongoing     Problem: Nausea/Vomiting:  Goal: Absence of nausea/vomiting  Description: Absence of nausea/vomiting  2/20/2022 2231 by Brennan Holm RN  Outcome: Ongoing  2/20/2022 1005 by Pako Anne RN  Outcome: Ongoing  Goal: Able to drink  Description: Able to drink  2/20/2022 2231 by Brennan Holm RN  Outcome: Met This Shift  2/20/2022 1005 by Pako Anne RN  Outcome: Ongoing  Goal: Able to eat  Description: Able to eat  2/20/2022 2231 by Brennan Holm RN  Outcome: Met This Shift  2/20/2022 1005 by Pako Anne RN  Outcome: Ongoing  Goal: Ability to achieve adequate nutritional intake will improve  Description: Ability to achieve adequate nutritional intake will improve  2/20/2022 2231 by Brennan Holm RN  Outcome: Met This Shift  2/20/2022 1005 by Pako Anne RN  Outcome: Ongoing

## 2022-02-21 NOTE — PROGRESS NOTES
Discharge instructions gone over, all questions answered. IV removed. Patient taken out for discharge via wheelchair.

## 2022-02-21 NOTE — PLAN OF CARE
Problem: Pain:  Goal: Pain level will decrease  Description: Pain level will decrease  2/21/2022 0855 by Bobby Guo RN  Outcome: Ongoing     Problem: Pain:  Goal: Control of acute pain  Description: Control of acute pain  2/21/2022 0855 by Bobby Guo RN  Outcome: Ongoing     Problem: Pain:  Goal: Control of chronic pain  Description: Control of chronic pain  2/21/2022 0855 by Bobby Guo RN  Outcome: Ongoing     Problem: Nausea/Vomiting:  Goal: Absence of nausea/vomiting  Description: Absence of nausea/vomiting  2/21/2022 0855 by Bobby Guo RN  Outcome: Ongoing     Problem: Nausea/Vomiting:  Goal: Able to drink  Description: Able to drink  2/21/2022 0855 by Bobby Guo RN  Outcome: Ongoing     Problem: Nausea/Vomiting:  Goal: Able to eat  Description: Able to eat  2/21/2022 0855 by Bobby Guo RN  Outcome: Ongoing     Problem: Nausea/Vomiting:  Goal: Ability to achieve adequate nutritional intake will improve  Description: Ability to achieve adequate nutritional intake will improve  2/21/2022 0855 by Bobby Guo RN  Outcome: Ongoing

## 2022-02-21 NOTE — PLAN OF CARE
Problem: Pain:  Goal: Pain level will decrease  Description: Pain level will decrease  2/21/2022 1601 by Nagi Pitt RN  Outcome: Completed     Problem: Pain:  Goal: Control of acute pain  Description: Control of acute pain  2/21/2022 1601 by Nagi Pitt RN  Outcome: Completed     Problem: Pain:  Goal: Control of chronic pain  Description: Control of chronic pain  2/21/2022 1601 by Nagi Pitt RN  Outcome: Completed     Problem: Nausea/Vomiting:  Goal: Absence of nausea/vomiting  Description: Absence of nausea/vomiting  2/21/2022 1601 by Nagi Pitt RN  Outcome: Completed     Problem: Nausea/Vomiting:  Goal: Able to drink  Description: Able to drink  2/21/2022 1601 by Nagi Pitt RN  Outcome: Completed     Problem: Nausea/Vomiting:  Goal: Able to eat  Description: Able to eat  2/21/2022 1601 by Nagi Pitt RN  Outcome: Completed     Problem: Nausea/Vomiting:  Goal: Ability to achieve adequate nutritional intake will improve  Description: Ability to achieve adequate nutritional intake will improve  2/21/2022 1601 by Nagi Pitt RN  Outcome: Completed

## 2022-02-22 ENCOUNTER — CARE COORDINATION (OUTPATIENT)
Dept: CASE MANAGEMENT | Age: 57
End: 2022-02-22

## 2022-02-22 DIAGNOSIS — K85.90 ACUTE PANCREATITIS WITHOUT INFECTION OR NECROSIS, UNSPECIFIED PANCREATITIS TYPE: Primary | ICD-10-CM

## 2022-02-22 PROCEDURE — 1111F DSCHRG MED/CURRENT MED MERGE: CPT | Performed by: FAMILY MEDICINE

## 2022-02-22 NOTE — ADT AUTH CERT
Pancreatitis - Care Day 2 (2/21/2022) by Jose Palomo RN       Review Status Review Entered   Completed 2/22/2022 09:32      Criteria Review      Care Day: 2 Care Date: 2/21/2022 Level of Care: Telemetry    Guideline Day 2    Level Of Care    (X) ICU or floor    2/22/2022 9:32 AM EST by Radha Gregory      MS w/ tele    Clinical Status    (X) * Hemodynamic stability    (X) * Pain absent or reduced    2/22/2022 9:32 AM EST by Radha Gregory      Feels much better. Able to tolerate solid food today. Activity    (X) Activity as tolerated    Routes    (X) Possible oral hydration    (X) Possible oral medications    (X) Oral diet as tolerated    2/22/2022 9:32 AM EST by Radha Gregory      ADULT DIET; Regular; Low Fat (less than or equal to 50 gm/day)    Interventions    (X) * NG tube absent    (X) Laboratory tests    Medications    (X) Parenteral analgesics    2/22/2022 9:32 AM EST by Radha Gregory      HYDROmorphone HCl PF (DILAUDID) injection 0.5 mg IV x3    * Milestone   Additional Notes   DATE: 2/21/22         Pertinent Updates:Feels much better. Able to tolerate solid food today.        Vitals:VITALS:  BP (!) 142/84   Pulse 78   Temp 98 °F (36.7 °C) (Oral)   Resp 16   Ht 6' (1.829 m)   Wt 300 lb (136.1 kg)   SpO2 94%   BMI 40.69 kg/m²          Abnl/Pertinent Labs/Radiology/Diagnostic Studies:      2/21/2022 05:31   Potassium: 3.2 (L)   Creatinine: 0.5 (L)   Glucose: 158 (H)         2/21/2022 07:09   POC Glucose: 147 (H)      2/21/2022 11:12   POC Glucose: 137 (H)            Physical Exam:   NAD   RRR, Nl s1s2   Lungs CTA Bilaterally, normal effort   Abdomen soft, ND, NT, no HSM, Bowel sounds normal         MD Consults/Assessments & Plans:      GI       Chronic pancreatitis - idiopathic. Dx in 2001. Has seen Dr Krishan Hadley in the past. Had acute on chronic pain. CT at admission c/w chronic pancreatitis.  Feeling better today after addition of pancreatic enzymes.        PLAN     - continue Creon   - f/u with Dr Leigh Ann Verma. May benefit from EUS with celiac plexus block       Ok for d/c from GI perspective with outpatient GI follow up.   -------------------   Medications:   enoxaparin (LOVENOX) injection 40 mg sc qd   losartan (COZAAR) tablet 100 mg PO QD   hydroCHLOROthiazide (HYDRODIURIL) tablet 25 mg po qd   insulin glargine (LANTUS;BASAGLAR) injection pen 55 Units sc hs   insulin lispro (1 Unit Dial) 0-12 Units sc TID w/ meals   insulin lispro (1 Unit Dial) 0-6 Units sc hs   lipase-protease-amylase (CREON) delayed release capsule 24,000 Units PO TID w/ meals   pantoprazole (PROTONIX) tablet 40 mg PO QAM      Infusion   lactated ringers infusion  Rate: 125 mL/hr End: 02/21/22 1851      PRN   HYDROmorphone HCl PF (DILAUDID) injection 0.5 mg IV x4      Orders:   Discharge                             Pancreatitis - Care Day 1 (2/20/2022) by Rinku Sepulveda RN       Review Status Review Entered   Completed 2/22/2022 09:32      Criteria Review      Care Day: 1 Care Date: 2/20/2022 Level of Care: Telemetry    Guideline Day 1    Level Of Care    (X) ICU or floor    2/22/2022 9:32 AM EST by Ranjeet Wilkins MS w/ tele    Clinical Status    (X) * Clinical Indications met    2/22/2022 9:32 AM EST by Ranjeet Wilkins      see above    (X) Pain, fever, or vomiting    Routes    ( ) NPO, enteral, or oral feeds    2/22/2022 9:32 AM EST by Ranjeet Wilkins      ADULT DIET; Clear Liquid; 4 carb choices  > ADULT DIET;  Regular; Low Fat (less than or equal to 50 gm/day)    (X) IV fluids    2/22/2022 9:32 AM EST by Ranjeet Wilkins      lactated ringers infusion  Rate: 125 mL/hr    ( ) IV medications    2/22/2022 9:32 AM EST by Ranjeet Wilkins      meds noted above    Interventions    (X) Laboratory tests    ( ) Possible oxygen and pulse oximetry    2/22/2022 9:32 AM EST by Ranjeet Wilkins      RA sats 92-95%    Medications    (X) Parenteral analgesics    2/22/2022 9:32 AM EST by Ranjeet Wilkins      HYDROmorphone HCl PF (DILAUDID) injection 0.5 mg IV x6    * Milestone   Additional Notes   DATE: 2/20/22         Pertinent Updates:   Lying in bed no chest pain still having abodminal pain but imrpoved nausea improved      Vitals:/77   Pulse 72   Temp 97.7 °F (36.5 °C) (Oral)   Resp 16   Ht 6' (1.829 m)   Wt 300 lb (136.1 kg)   SpO2 93%   BMI 40.69 kg/m²       Abnl/Pertinent Labs/Radiology/Diagnostic Studies:      2/20/2022 06:03   Creatinine: <0.5 (L)   Glucose: 117 (H)      2/20/2022 06:57   POC Glucose: 114 (H)      2/20/2022 11:51   POC Glucose: 129 (H)      2/20/2022 15:57   POC Glucose: 148 (H)      2/20/2022 20:32   POC Glucose: 125 (H)            Physical Exam:       General appearance:  Appears comfortable. AAOx3   HEENT: atraumatic, Pupils equal, muscous membranes moist, no masses appreciated   Cardiovascular: Regular rate and rhythm no murmurs appreciated   Respiratory: CTAB no wheezing   Gastrointestinal: Abdomen soft, non-tender, BS+   EXT: no edema   Neurology: no gross focal deficts   Psychiatry: Appropriate affect. Not agitated   Skin: Warm, dry, no rashes appreciated      MD Consults/Assessments & Plans:      IM   Assessment/Plan:    Acute on chronic pancreatitis   - ivf   - iv dilaudid prn increase to q2hrs   - check triglerides pending   - start trial of clears   - gi consult pending       Dm2: lantus and lispro       Htn: home meds           DVT prophylaxis lovenox   Code status full code   --------------   GI   Chronic pancreatitis, idiopathic. No ETOH use. Chronic pain. No evidence of acute pancreatitis.       Recommendations:       I will start him on Creon to see if that helps. He may benefit from an EUS and alcohol injection.    I encouraged him to re-establish with Dr. Ewing Galeazzi.   -------------------   Medications:   enoxaparin (LOVENOX) injection 40 mg sc qd   losartan (COZAAR) tablet 100 mg PO QD   hydroCHLOROthiazide (HYDRODIURIL) tablet 25 mg po qd   insulin glargine (LANTUS;BASAGLAR) injection pen 55 Units sc hs   insulin lispro (1 Unit Dial) 0-12 Units sc TID w/ meals   insulin lispro (1 Unit Dial) 0-6 Units sc hs   lipase-protease-amylase (CREON) delayed release capsule 24,000 Units PO TID w/ meals   pantoprazole (PROTONIX) tablet 40 mg PO QAM      IVF   lactated ringers infusion Rate: 125 mL/hr       PRN   acetaminophen (TYLENOL) tablet 650 mg PO x1   HYDROmorphone HCl PF (DILAUDID) injection 0.5 mg IV x6   ondansetron (ZOFRAN) injection 4 mg IV x3

## 2022-02-22 NOTE — CARE COORDINATION
[facility] Transitions Initial Follow Up Call    Call within 2 business days of discharge: Yes    Patient: Abhilash Kirkpatrick Patient : 1965   MRN: [id number]  Reason for Admission:   Discharge Date: 22 RARS: Readmission Risk Score: 7.9 ( )      Last Discharge [address] [address]       Complaint Diagnosis Description Type Department Provider    22 Abdominal Pain Acute pancreatitis, unspecified complication status, unspecified pancreatitis type . .. ED to Hosp-Admission (Discharged) (ADMITTED) Lisa Cleveland MD             Non-face-to-face services provided:  Obtained and reviewed discharge summary and/or continuity of care documents  1111F completed     Transitions of Care Initial Call    Was this an external facility discharge? No Discharge Facility:     Challenges to be reviewed by the provider   Additional needs identified to be addressed with provider: No  none             Method of communication with provider : none      Advance Care Planning:   Does patient have an Advance Directive: reviewed and current. Was this a readmission? no  Patient stated reason for admission: abd pain  Patients top risk factors for readmission: medical condition-pancreatitis    Care Transition Nurse (CTN) contacted the patient by telephone to perform post hospital discharge assessment. Verified name and  with patient as identifiers. Provided introduction to self, and explanation of the CTN role. CTN reviewed discharge instructions, medical action plan and red flags with patient who verbalized understanding. Patient given an opportunity to ask questions and does not have any further questions or concerns at this time. Were discharge instructions available to patient? Yes. Reviewed appropriate site of care based on symptoms and resources available to patient including: When to call 911. The patient agrees to contact the PCP office for questions related to their healthcare.      Medication reconciliation was performed with patient, who verbalizes understanding of administration of home medications. Advised obtaining a 90-day supply of all daily and as-needed medications. Reviewed and educated patient on any new and changed medications related to discharge diagnosis. Was patient discharged with a pulse oximeter? No Discussed and confirmed pulse oximeter discharge instructions and when to notify provider or seek emergency care. Pt states doing ok,continues to have some pain, much improved. Taking pain med and new medication as prescribed. Agreed to more CTC f/u calls. CTN provided contact information. Plan for follow-up call in 5-7 days based on severity of symptoms and risk factors.   Plan for next call: self management-pacreatitis, f/u appts    Care Transitions 24 Hour Call    Do you have any ongoing symptoms?: Yes  Patient-reported symptoms: Abdominal Pain  Interventions for patient-reported symptoms: Other (Comment: expected with pancreatitis)  Do you have a copy of your discharge instructions?: Yes  Do you have all of your prescriptions and are they filled?: Yes  Have you been contacted by a [address]?: No  Have you scheduled your follow up appointment?: No  Were you discharged with any Home Care or Post Acute Services: No  Do you feel like you have everything you need to keep you well at home?: Yes  Care Transitions Interventions  No Identified Needs         Follow Up  Future Appointments   Date Time Provider Chau Drake   5/12/2022  1:00 PM MD Regan Sorto RN

## 2022-03-01 ENCOUNTER — CARE COORDINATION (OUTPATIENT)
Dept: CASE MANAGEMENT | Age: 57
End: 2022-03-01

## 2022-03-01 NOTE — CARE COORDINATION
[facility] Transitions Follow Up Call    3/1/2022    Patient: Judit Velez  Patient : 1965   MRN: [id number]  Reason for Admission: pancreatitis  Discharge Date: 22 RARS: Readmission Risk Score: 7.9 ( )         Spoke with: [address] Transitions Subsequent and Final Call    Subsequent and Final Calls  Care Transitions Interventions  Other Interventions:           Care Transitions Follow Up Call    Needs to be reviewed by the provider   Additional needs identified to be addressed with provider: No  none             Method of communication with provider : none      Care Transition Nurse (CTN) contacted the patient by telephone to follow up after admission. Verified name and  with patient as identifiers. Addressed changes since last contact: none  Discussed follow-up appointments. If no appointment was previously scheduled, appointment scheduling offered: Yes. Is follow up appointment scheduled within 7 days of discharge? Yes. Advance Care Planning:   Does patient have an Advance Directive: not on file. CTN reviewed discharge instructions, medical action plan and red flags with patient and discussed any barriers to care and/or understanding of plan of care after discharge. Discussed appropriate site of care based on symptoms and resources available to patient including: PCP and Specialist. The patient agrees to contact the PCP office for questions related to their healthcare. Patients top risk factors for readmission: medical condition-pancreatitis  Interventions to address risk factors: Education of patient/family/caregiver/guardian to support self-management-symptom management, f/u and Assessment and support for treatment adherence and medication management-denied new or changed      Non-[facility] follow up appointment(s): 3/28 GI    States he's doing ok. Having a lot of pain still. Trying to improve diet, but says it's slow as he isn't used to eating better. Taking Creon as instructed. Feels it isn't helping. Sees pain management next week. Appetite somewhat diminished. F/u 3/28 with specialist. At this time patient ended call. CTN provided contact information for future needs. Plan for follow-up call in 7-10 days based on severity of symptoms and risk factors. Plan for next call: symptom management-pain, appetite, elimination  follow up appointment-GI & pain management  medication management-new or changed         Follow Up  Future Appointments   Date Time Provider Chau Drake   2022  1:00 PM Reshma Miller MD [address], Lio Mclaughlin Transitions Follow Up Call    3/1/2022    Patient: Teodora Lyon  Patient : 1965   MRN: [id number]  Reason for Admission: pancreatitis  Discharge Date: 22 RARS: Readmission Risk Score: 7.9 ( )         Spoke with: NA    Attempted to reach patient via phone for transition call. VM left stating purpose of call along with my contact information requesting a return call. Jin Vanegas LPN [facility]  Care Transitions  [phone]    Care Transitions Subsequent and Final Call    Subsequent and Final Calls  Care Transitions Interventions  Other Interventions:            Follow Up  Future Appointments   Date Time Provider Chau Drake   2022  1:00 PM Reshma Miller MD [address], LPN

## 2022-03-08 ENCOUNTER — CARE COORDINATION (OUTPATIENT)
Dept: CASE MANAGEMENT | Age: 57
End: 2022-03-08

## 2022-03-08 NOTE — CARE COORDINATION
Care Transitions Outreach Attempt    Call within 2 business days of discharge: Yes   Attempted to reach patient for transitions of care follow up. Unable to reach patient. Left HIPPA Compliant VM. Angel Sanchez LPN, [facility]: [phone]      Patient: Jessica Coelho Patient : 1965 MRN: <[id number]>    Last Discharge [facility]       Complaint Diagnosis Description Type Department Provider    22 Abdominal Pain Acute pancreatitis, unspecified complication status, unspecified pancreatitis type . .. ED to Hosp-Admission (Discharged) (ADMITTED) Lucila Tom MD            Was this an external facility discharge?  No Discharge Facility: [facility]    Noted following upcoming appointments from discharge chart review:   [facility] follow up appointment(s):   Future Appointments   Date Time Provider Chau Drake   2022  1:00 PM Abiel Huang MD [address]     Non-[facility] follow up appointment(s):

## 2022-03-10 DIAGNOSIS — E11.21 TYPE 2 DIABETES MELLITUS WITH DIABETIC NEPHROPATHY, WITH LONG-TERM CURRENT USE OF INSULIN (HCC): ICD-10-CM

## 2022-03-10 DIAGNOSIS — Z79.4 TYPE 2 DIABETES MELLITUS WITH DIABETIC NEPHROPATHY, WITH LONG-TERM CURRENT USE OF INSULIN (HCC): ICD-10-CM

## 2022-03-10 RX ORDER — BLOOD SUGAR DIAGNOSTIC
STRIP MISCELLANEOUS
Qty: 300 STRIP | Refills: 12 | Status: SHIPPED | OUTPATIENT
Start: 2022-03-10

## 2022-03-10 NOTE — TELEPHONE ENCOUNTER
Medication:   Requested Prescriptions     Pending Prescriptions Disp Refills    blood glucose test strips (ONETOUCH ULTRA) strip [Pharmacy Med Name: [address]105 Semaj Luevano TESTST(NEW)100] 300 strip 12     Sig: USE TO TEST THREE TIMES DAILY AS NEEDED     Last Filled:      Patient Phone Number: [phone] (home) [phone] (work)    Last appt: 2/7/2022   Next appt: 5/12/2022    Last OARRS:   RX Monitoring 2/7/2022   Attestation -   Periodic Controlled Substance Monitoring No signs of potential drug abuse or diversion identified.    Chronic Pain > 50 MEDD -   Chronic Pain > 80 MEDD -     PDMP Monitoring:    Last PDMP [place] as Reviewed [facility]):  Review User Review Instant Review Result   Gale Desai 2/7/2022  2:48 PM Reviewed PDMP [1]     Preferred Pharmacy:       [address] [phone]  [address] [place] [place] [id number] [facility] [address]  Phone: [phone] Fax: [phone]

## 2022-03-11 ENCOUNTER — CARE COORDINATION (OUTPATIENT)
Dept: CASE MANAGEMENT | Age: 57
End: 2022-03-11

## 2022-03-11 NOTE — CARE COORDINATION
[facility] Transitions Follow Up Call    3/11/2022    Patient: Maki Verma  Patient : 1965   MRN: [id number]  Reason for Admission:   Discharge Date: 22 RARS: Readmission Risk Score: 7.9 ( )    2nd and final attempt at a Follow up call, left contact info on . This nurse will reach out to PCP regarding a hospital f/u appt.       Follow Up  Future Appointments   Date Time Provider Chau Drake   2022  1:00 PM Favian Gonzalez MD [address]       Cherri Crabtree RN

## 2022-03-21 RX ORDER — ONDANSETRON 4 MG/1
TABLET, FILM COATED ORAL
Qty: 30 TABLET | Refills: 1 | Status: SHIPPED | OUTPATIENT
Start: 2022-03-21 | End: 2022-05-04

## 2022-03-21 NOTE — TELEPHONE ENCOUNTER
Medication:   Requested Prescriptions     Pending Prescriptions Disp Refills    ondansetron (ZOFRAN) 4 MG tablet [Pharmacy Med Name: ONDANSETRON 4MG TABLETS] 30 tablet 1     Sig: TAKE 1 TABLET BY MOUTH EVERY 8 HOURS AS NEEDED FOR NAUSEA OR VOMITING      Last Filled:      Patient Phone Number: [phone] (home) [phone] (work)    Last appt: 2/7/2022   Next appt: 5/12/2022    Last OARRS:   RX Monitoring 2/7/2022   Attestation -   Periodic Controlled Substance Monitoring No signs of potential drug abuse or diversion identified.    Chronic Pain > 50 MEDD -   Chronic Pain > 80 MEDD -     PDMP Monitoring:    Last PDMP [place] as Reviewed [facility]):  Review User Review Instant Review Result   Julia Fragoso 2/7/2022  2:48 PM Reviewed PDMP [1]     Preferred Pharmacy:       [facility] [address] [place] [address] [phone]  [address] [place] [id number] [address] [facility] [address]  Phone: [phone] Fax: [phone]

## 2022-03-29 ENCOUNTER — TELEPHONE (OUTPATIENT)
Dept: FAMILY MEDICINE CLINIC | Age: 57
End: 2022-03-29

## 2022-04-07 RX ORDER — DIAZEPAM 5 MG/1
5 TABLET ORAL PRN
COMMUNITY
End: 2022-05-04

## 2022-04-07 RX ORDER — OXYCODONE AND ACETAMINOPHEN 10; 325 MG/1; MG/1
1 TABLET ORAL 3 TIMES DAILY
COMMUNITY

## 2022-04-07 NOTE — PROGRESS NOTES
Patient reached _X___ yes  _____ no   VM instructions left ____ yes   phone number ________                                ____ no-office notified          Date 4/14/22_________  Time _0830______  Arrival __0700  hosp-endo____    Nothing to eat or drink after midnight-follow your doctors prep instructions-this may include taking a second dose of your prep after midnight  Responsible adult 25 or older to stay on site while you are here-drive you home-stay with you after  Follow any instructions your doctors office has given you  Bring a complete list of all your medications and supplements including name,dose,how often taken the day of your procedure  If you normally take the following medications in the morning please do so the AM of your procedure with a small sip of water       Heart,blood pressure,seizure,thyroid or breathing medications-use your inhalers       DO NOT take blood pressure medications ending in \"memo\" or \"pril\" the AM of procedure or evening prior  Take half or your normal dose of any long acting insulins the night before your procedure-do not take any diabetic medications the AM of procedure  Follow your doctors instructions regarding stopping or taking  any blood thinners-if you do not have instructions-call them  Any questions call your doctor  Other ___________oxycodone prn am of procedure__________________________________________________      [facility] POLICY(subject to change)             The current policy is 2 visitors per patient. There are no children allowed. Everyone must mask. Visiting hours are 8a-8p. Overnight visitors will be at the discretion of the nurse.

## 2022-04-12 ENCOUNTER — HOSPITAL ENCOUNTER (OUTPATIENT)
Age: 57
Discharge: HOME | End: 2022-04-12
Payer: MEDICARE

## 2022-04-12 PROCEDURE — U0003 INFECTIOUS AGENT DETECTION BY NUCLEIC ACID (DNA OR RNA); SEVERE ACUTE RESPIRATORY SYNDROME CORONAVIRUS 2 (SARS-COV-2) (CORONAVIRUS DISEASE [COVID-19]), AMPLIFIED PROBE TECHNIQUE, MAKING USE OF HIGH THROUGHPUT TECHNOLOGIES AS DESCRIBED BY CMS-2020-01-R: HCPCS

## 2022-04-12 PROCEDURE — U0005 INFEC AGEN DETEC AMPLI PROBE: HCPCS

## 2022-04-13 LAB — SARS-COV-2: NOT DETECTED

## 2022-04-14 ENCOUNTER — ANESTHESIA EVENT (OUTPATIENT)
Dept: ENDOSCOPY | Age: 57
End: 2022-04-14
Payer: MEDICARE

## 2022-04-14 ENCOUNTER — HOSPITAL ENCOUNTER (OUTPATIENT)
Age: 57
Setting detail: OUTPATIENT SURGERY
Discharge: HOME | End: 2022-04-14
Attending: INTERNAL MEDICINE | Admitting: INTERNAL MEDICINE
Payer: MEDICARE

## 2022-04-14 ENCOUNTER — ANESTHESIA (OUTPATIENT)
Dept: ENDOSCOPY | Age: 57
End: 2022-04-14
Payer: MEDICARE

## 2022-04-14 VITALS
BODY MASS INDEX: 39.28 KG/M2 | OXYGEN SATURATION: 95 % | TEMPERATURE: 98 F | HEART RATE: 94 BPM | HEIGHT: 72 IN | SYSTOLIC BLOOD PRESSURE: 134 MMHG | WEIGHT: 290 LBS | RESPIRATION RATE: 16 BRPM | DIASTOLIC BLOOD PRESSURE: 79 MMHG

## 2022-04-14 VITALS — DIASTOLIC BLOOD PRESSURE: 70 MMHG | SYSTOLIC BLOOD PRESSURE: 157 MMHG | OXYGEN SATURATION: 96 %

## 2022-04-14 LAB
GLUCOSE BLD-MCNC: 144 MG/DL (ref 70–99)
GLUCOSE BLD-MCNC: 150 MG/DL (ref 70–99)
PERFORMED ON: ABNORMAL
PERFORMED ON: ABNORMAL

## 2022-04-14 PROCEDURE — 2709999900 HC NON-CHARGEABLE SUPPLY: Performed by: INTERNAL MEDICINE

## 2022-04-14 PROCEDURE — C1753 CATH, INTRAVAS ULTRASOUND: HCPCS | Performed by: INTERNAL MEDICINE

## 2022-04-14 PROCEDURE — 6360000002 HC RX W HCPCS: Performed by: INTERNAL MEDICINE

## 2022-04-14 PROCEDURE — 3700000000 HC ANESTHESIA ATTENDED CARE: Performed by: INTERNAL MEDICINE

## 2022-04-14 PROCEDURE — 2580000003 HC RX 258: Performed by: ANESTHESIOLOGY

## 2022-04-14 PROCEDURE — 7100000010 HC PHASE II RECOVERY - FIRST 15 MIN: Performed by: INTERNAL MEDICINE

## 2022-04-14 PROCEDURE — 2500000003 HC RX 250 WO HCPCS: Performed by: INTERNAL MEDICINE

## 2022-04-14 PROCEDURE — 7100000001 HC PACU RECOVERY - ADDTL 15 MIN: Performed by: INTERNAL MEDICINE

## 2022-04-14 PROCEDURE — 64530 N BLOCK INJ CELIAC PELUS: CPT | Performed by: INTERNAL MEDICINE

## 2022-04-14 PROCEDURE — 7100000000 HC PACU RECOVERY - FIRST 15 MIN: Performed by: INTERNAL MEDICINE

## 2022-04-14 PROCEDURE — 7100000011 HC PHASE II RECOVERY - ADDTL 15 MIN: Performed by: INTERNAL MEDICINE

## 2022-04-14 PROCEDURE — 2500000003 HC RX 250 WO HCPCS: Performed by: NURSE ANESTHETIST, CERTIFIED REGISTERED

## 2022-04-14 PROCEDURE — 3609018500 HC EGD US SCOPE W/ADJACENT STRUCTURES: Performed by: INTERNAL MEDICINE

## 2022-04-14 PROCEDURE — 3700000001 HC ADD 15 MINUTES (ANESTHESIA): Performed by: INTERNAL MEDICINE

## 2022-04-14 PROCEDURE — 6360000002 HC RX W HCPCS: Performed by: NURSE ANESTHETIST, CERTIFIED REGISTERED

## 2022-04-14 RX ORDER — MIDAZOLAM HYDROCHLORIDE 1 MG/ML
INJECTION INTRAMUSCULAR; INTRAVENOUS PRN
Status: DISCONTINUED | OUTPATIENT
Start: 2022-04-14 | End: 2022-04-14 | Stop reason: SDUPTHER

## 2022-04-14 RX ORDER — BUPIVACAINE HYDROCHLORIDE 5 MG/ML
INJECTION, SOLUTION EPIDURAL; INTRACAUDAL PRN
Status: DISCONTINUED | OUTPATIENT
Start: 2022-04-14 | End: 2022-04-14 | Stop reason: ALTCHOICE

## 2022-04-14 RX ORDER — METHYLPREDNISOLONE ACETATE 40 MG/ML
INJECTION, SUSPENSION INTRA-ARTICULAR; INTRALESIONAL; INTRAMUSCULAR; SOFT TISSUE PRN
Status: DISCONTINUED | OUTPATIENT
Start: 2022-04-14 | End: 2022-04-14 | Stop reason: ALTCHOICE

## 2022-04-14 RX ORDER — MAGNESIUM SULFATE HEPTAHYDRATE 500 MG/ML
INJECTION, SOLUTION INTRAMUSCULAR; INTRAVENOUS PRN
Status: DISCONTINUED | OUTPATIENT
Start: 2022-04-14 | End: 2022-04-14 | Stop reason: SDUPTHER

## 2022-04-14 RX ORDER — PROPOFOL 10 MG/ML
INJECTION, EMULSION INTRAVENOUS CONTINUOUS PRN
Status: DISCONTINUED | OUTPATIENT
Start: 2022-04-14 | End: 2022-04-14 | Stop reason: SDUPTHER

## 2022-04-14 RX ORDER — KETAMINE HYDROCHLORIDE 10 MG/ML
INJECTION, SOLUTION INTRAMUSCULAR; INTRAVENOUS PRN
Status: DISCONTINUED | OUTPATIENT
Start: 2022-04-14 | End: 2022-04-14 | Stop reason: SDUPTHER

## 2022-04-14 RX ORDER — METHYLPREDNISOLONE ACETATE 40 MG/ML
40 INJECTION, SUSPENSION INTRA-ARTICULAR; INTRALESIONAL; INTRAMUSCULAR; SOFT TISSUE ONCE
Status: DISCONTINUED | OUTPATIENT
Start: 2022-04-14 | End: 2022-04-14 | Stop reason: HOSPADM

## 2022-04-14 RX ORDER — ONDANSETRON 2 MG/ML
INJECTION INTRAMUSCULAR; INTRAVENOUS PRN
Status: DISCONTINUED | OUTPATIENT
Start: 2022-04-14 | End: 2022-04-14 | Stop reason: SDUPTHER

## 2022-04-14 RX ORDER — PROPOFOL 10 MG/ML
INJECTION, EMULSION INTRAVENOUS PRN
Status: DISCONTINUED | OUTPATIENT
Start: 2022-04-14 | End: 2022-04-14 | Stop reason: SDUPTHER

## 2022-04-14 RX ORDER — SODIUM CHLORIDE 9 MG/ML
INJECTION, SOLUTION INTRAVENOUS CONTINUOUS
Status: DISCONTINUED | OUTPATIENT
Start: 2022-04-14 | End: 2022-04-14 | Stop reason: HOSPADM

## 2022-04-14 RX ADMIN — PROPOFOL 50 MG: 10 INJECTION, EMULSION INTRAVENOUS at 09:51

## 2022-04-14 RX ADMIN — PROPOFOL 30 MG: 10 INJECTION, EMULSION INTRAVENOUS at 10:05

## 2022-04-14 RX ADMIN — PROPOFOL 50 MG: 10 INJECTION, EMULSION INTRAVENOUS at 09:53

## 2022-04-14 RX ADMIN — PROPOFOL 140 MCG/KG/MIN: 10 INJECTION, EMULSION INTRAVENOUS at 09:51

## 2022-04-14 RX ADMIN — SODIUM CHLORIDE: 9 INJECTION, SOLUTION INTRAVENOUS at 10:19

## 2022-04-14 RX ADMIN — MIDAZOLAM 2 MG: 1 INJECTION INTRAMUSCULAR; INTRAVENOUS at 09:51

## 2022-04-14 RX ADMIN — PROPOFOL 20 MG: 10 INJECTION, EMULSION INTRAVENOUS at 10:08

## 2022-04-14 RX ADMIN — SODIUM CHLORIDE: 9 INJECTION, SOLUTION INTRAVENOUS at 08:27

## 2022-04-14 RX ADMIN — ONDANSETRON 4 MG: 2 INJECTION INTRAMUSCULAR; INTRAVENOUS at 10:19

## 2022-04-14 RX ADMIN — PROPOFOL 50 MG: 10 INJECTION, EMULSION INTRAVENOUS at 10:02

## 2022-04-14 RX ADMIN — PROPOFOL 50 MG: 10 INJECTION, EMULSION INTRAVENOUS at 09:59

## 2022-04-14 RX ADMIN — KETAMINE HYDROCHLORIDE 30 MG: 10 INJECTION, SOLUTION INTRAMUSCULAR; INTRAVENOUS at 09:51

## 2022-04-14 RX ADMIN — MAGNESIUM SULFATE HEPTAHYDRATE 1 G: 500 INJECTION, SOLUTION INTRAMUSCULAR; INTRAVENOUS at 10:11

## 2022-04-14 RX ADMIN — PROPOFOL 50 MG: 10 INJECTION, EMULSION INTRAVENOUS at 09:56

## 2022-04-14 ASSESSMENT — PULMONARY FUNCTION TESTS
PIF_VALUE: 1

## 2022-04-14 ASSESSMENT — PAIN - FUNCTIONAL ASSESSMENT
PAIN_FUNCTIONAL_ASSESSMENT: PREVENTS OR INTERFERES SOME ACTIVE ACTIVITIES AND ADLS
PAIN_FUNCTIONAL_ASSESSMENT: 0-10

## 2022-04-14 ASSESSMENT — PAIN DESCRIPTION - DESCRIPTORS: DESCRIPTORS: STABBING;DISCOMFORT

## 2022-04-14 NOTE — PROGRESS NOTES
Discharge instructions review with patient and ex wife. All home medications have been reviewed, pt v/u. Discharge instructions signed. Pt discharged via wheelchair. Pt discharged with all belongings. Ex wife taking stable pt home.

## 2022-04-14 NOTE — PROGRESS NOTES
Reviewed patient's medical and surgical history in electronic record and with patient at the bedside. All questions regarding procedure answered. Scope number and equipment verified using a two person system.      Electronically signed by Lord Eddi RN on 4/14/2022 at 9:47 AM

## 2022-04-14 NOTE — ANESTHESIA PRE PROCEDURE
Department of Anesthesiology  Preprocedure Note       Name:  Geremias Bah   Age:  49 y.o.  :  1965                                          MRN:  [id number]         Date:  2022      Surgeon: Janel Lo):  Sully Brown MD    Procedure: Procedure(s):  EGD ESOPHAGOGASTRODUODENOSCOPY ULTRASOUND    Medications prior to admission:   Prior to Admission medications    Medication Sig Start Date End Date Taking? Authorizing Provider   oxyCODONE-acetaminophen (PERCOCET)  MG per tablet Take 1 tablet by mouth 3 times daily. Yes Historical Provider, MD   diazePAM (VALIUM) 5 MG tablet Take 5 mg by mouth as needed for Anxiety.    Yes Historical Provider, MD   blood glucose test strips (ONETOUCH ULTRA) strip USE TO TEST THREE TIMES DAILY AS NEEDED 3/10/22   Storm Councilman, MD   ondansetron (ZOFRAN) 4 MG tablet TAKE 1 TABLET BY MOUTH EVERY 8 HOURS AS NEEDED FOR NAUSEA OR VOMITING 3/21/22   Storm Councilman, MD   lipase-protease-amylase (CREON) 16043-62431 units delayed release capsule Take by mouth 3 times daily (with meals) 22  Yakov Alvarado MD   JARDIANCE 25 MG tablet TAKE 1 TABLET BY MOUTH DAILY 22   Storm Councilman, MD   fluticasone (FLONASE) 50 MCG/ACT nasal spray USE 2 SPRAYS IN EACH NOSTRIL DAILY 10/18/21   Storm Councilman, MD   insulin lispro, 1 Unit Dial, (HUMALOG KWIKPEN) 100 UNIT/ML SOPN <100 none, 100-150 4 units, 151-200 6 units, 201-250 8 units, 251-300 10 units 10/18/21   Storm Councilman, MD   Insulin Pen Needle (B-D UF III MINI PEN NEEDLES) 31G X 5 MM MISC USE ONCE DAILY AS DIRECTED 10/4/21   Leanna Krause MD   simvastatin (ZOCOR) 40 MG tablet TAKE 1 TABLET BY MOUTH NIGHTLY 10/1/21   JOELLE Caldwell - THERESA   losartan-hydroCHLOROthiazide (HYZAAR) 100-25 MG per tablet TAKE 1 TABLET BY MOUTH DAILY 21   Storm Councilman, MD   insulin detemir (LEVEMIR FLEXTOUCH) 100 UNIT/ML injection pen Inject 55 Units into the skin nightly  Patient taking differently: Inject 70 Units into the skin nightly  9/9/21   Kg Guzman MD   ibuprofen (ADVIL;MOTRIN) 800 MG tablet TAKE 1 TABLET BY MOUTH THREE TIMES DAILY WITH MEALS  Patient taking differently: 3 times daily as needed  6/28/21   Kg Guzman MD   esomeprazole ([address]) 40 MG delayed release capsule Take 1 capsule by mouth every morning (before breakfast) 6/28/21   Kg Guzman MD   metFORMIN (GLUCOPHAGE-XR) 500 MG extended release tablet Take 2 tablets by mouth 2 times daily 5/18/21   Kg Guzman MD   glimepiride (AMARYL) 4 MG tablet Take 1 tablet by mouth every morning (before breakfast) 5/13/21   Kg Guzman MD   clobetasol (OLUX) 0.05 % foam APPLY EXTERNALLY TO THE SCALP TWICE DAILY AS NEEDED 12/3/20   Kg Guzman MD   Blood Glucose Monitoring Suppl (ONE TOUCH ULTRA 2) w/Device KIT 1 kit by Does not apply route daily 12/3/19   Kg Guzman MD   [facility] LANCETS FINE MISC USE TO TEST THREE TIMES DAILY 12/3/19   Kg Guzman MD   nystatin (MYCOSTATIN) 360662 UNIT/GM cream APPLY TOPICALLY TWICE DAILY TO FOUR TIMES DAILY 11/5/18   Kg Guzman MD   MICROLET LANCETS [address] [facility] TEST 3 TO 4 TIMES DAILY 9/17/13   Kg Guzman MD       Current medications:    Current Facility-Administered Medications   Medication Dose Route Frequency Provider Last Rate Last Admin    0.9 % sodium chloride infusion   IntraVENous Continuous Shiloh Cole MD           Allergies:     Allergies   Allergen Reactions    Amoxicillin      Rash    Morphine      RASH, hallucinations oral and IV       Problem List:    Patient Active Problem List   Diagnosis Code    Mixed hyperlipidemia E78.2    Essential hypertension I10    Chronic pancreatitis K86.1    Chronic pain syndrome G89.4    Esophageal reflux K21.9    Sleep apnea G47.30    Allergic rhinitis J30.9    Depressive disorder, not elsewhere classified F32.9    Erectile dysfunction, non organic F52.8    Meniere's disease H81.09    Insomnia G47.00    Morbid obesity ([facility]) E66.01    Type 2 diabetes mellitus with renal manifestations (HCC) E11.29    Microalbuminuria R80.9    Chronic generalized abdominal pain R10.84, G89.29    Anxiety F41.9    Chronic, continuous use of opioids F11.90    Pain medication agreement Z02.89    Pain disorder with psychological factors F45.42    Type 2 diabetes mellitus with hyperglycemia, without long-term current use of insulin (HCC) E11.65    Acute pancreatitis without necrosis or infection, unspecified K85.90       Past Medical History:        Diagnosis Date    Anxiety state, unspecified     Calculus of kidney     Deaf     RIGHT EAR    Diabetes mellitus ([address]     Diverticula     GERD (gastroesophageal reflux disease)     Hordeolum externum     Hyperlipidemia     Hypertension     Insomnia, unspecified     Kidney stones     MRSA carrier     Pancreatitis, chronic ([facility])     PONV (postoperative nausea and vomiting)     Psychiatric problem     Sleep apnea     uses CPAP    Unspecified hypertrophic and atrophic condition of skin        Past Surgical History:        Procedure Laterality Date    ABSCESS DRAINAGE Left 1/5/14    incision and drainage of an abcess on left calf    APPENDECTOMY      CHOLECYSTECTOMY      COLOSTOMY      INNER EAR SURGERY      RIGHT    PANCREAS SURGERY      duct stent    REVISION COLOSTOMY      SUBTOTAL COLECTOMY      Had wound dehiscence, mesh    TRACHEOSTOMY      TRACHEOSTOMY CLOSURE         Social History:    Social History     Tobacco Use    Smoking status: Never Smoker    Smokeless tobacco: Never Used   Substance Use Topics    Alcohol use: No     Alcohol/week: 0.0 standard drinks                                Counseling given: Not Answered      Vital Signs (Current):   Vitals:    04/07/22 1114   Weight: 290 lb (131.5 kg)   Height: 6' (1.829 m)                                              BP Readings from Last 3 Encounters:   02/21/22 (!) 142/84   02/07/22 138/78   10/18/21 134/80 NPO Status:                                                                                 BMI:   Wt Readings from Last 3 Encounters:   04/07/22 290 lb (131.5 kg)   02/19/22 300 lb (136.1 kg)   02/07/22 (!) 301 lb (136.5 kg)     Body mass index is 39.33 kg/m². CBC:   Lab Results   Component Value Date    WBC 9.2 02/21/2022    RBC 5.24 02/21/2022    HGB 14.4 02/21/2022    HCT 43.4 02/21/2022    MCV 82.8 02/21/2022    RDW 14.6 02/21/2022     02/21/2022       CMP:   Lab Results   Component Value Date     02/21/2022    K 3.2 02/21/2022     02/21/2022    CO2 24 02/21/2022    BUN 10 02/21/2022    CREATININE 0.5 02/21/2022    GFRAA >60 02/21/2022    GFRAA >60 05/08/2013    AGRATIO 1.1 02/21/2022    LABGLOM >60 02/21/2022    GLUCOSE 158 02/21/2022    PROT 7.0 02/21/2022    PROT 7.8 02/16/2013    CALCIUM 8.3 02/21/2022    BILITOT 0.4 02/21/2022    ALKPHOS 72 02/21/2022    AST 16 02/21/2022    ALT 32 02/21/2022       POC Tests: No results for input(s): POCGLU, POCNA, POCK, POCCL, POCBUN, POCHEMO, POCHCT in the last 72 hours. Coags: No results found for: PROTIME, INR, APTT    HCG (If Applicable): No results found for: PREGTESTUR, PREGSERUM, HCG, HCGQUANT     ABGs: No results found for: PHART, PO2ART, NWM5UHG, ABS8TRD, BEART, I4ZUFBTC     Type & Screen (If Applicable):  No results found for: LABABO, LABRH    Drug/Infectious Status (If Applicable):  No results found for: HIV, HEPCAB    COVID-19 Screening (If Applicable):   Lab Results   Component Value Date    COVID19 Not Detected 04/12/2022           Anesthesia Evaluation  Patient summary reviewed and Nursing notes reviewed   history of anesthetic complications: PONV.   Airway: Mallampati: II  TM distance: >3 FB   Neck ROM: full  Mouth opening: > = 3 FB Dental:          Pulmonary:   (+) sleep apnea: on CPAP,                             Cardiovascular:  Exercise tolerance: good (>4 METS),   (+) hypertension: moderate,                   Neuro/Psych: (+) psychiatric history: stable with treatmentdepression/anxiety             GI/Hepatic/Renal:   (+) GERD: well controlled, morbid obesity          Endo/Other:    (+) DiabetesType II DM, well controlled, , .                 Abdominal:   (+) obese,           Vascular: Other Findings:             Anesthesia Plan      MAC     ASA 3     (DEAF)  Induction: intravenous. MIPS: Prophylactic antiemetics administered. Anesthetic plan and risks discussed with patient. Plan discussed with CRNA.                   Eloise Talley MD   4/14/2022

## 2022-04-14 NOTE — ANESTHESIA POSTPROCEDURE EVALUATION
Department of Anesthesiology  Postprocedure Note    Patient: Molly Bernabe  MRN: [id number]  YOB: 1965  Date of evaluation: 4/14/2022  Time:  10:41 AM     Procedure Summary     Date: 04/14/22 Room / Location: [address] [place] [address]    Anesthesia Start: 4103 Anesthesia Stop: 9221    Procedure: EGD ESOPHAGOGASTRODUODENOSCOPY ULTRASOUND wiht celiac plexux block (N/A ) Diagnosis: (CHRONIC PANCREATITIS K86.1)    Surgeons: Muna Villafana MD Responsible Provider: Faviola Nair MD    Anesthesia Type: MAC ASA Status: 3          Anesthesia Type: MAC    Hitesh Phase I: Hitesh Score: 10    Hitesh Phase II:      Last vitals: Reviewed and per EMR flowsheets.        Anesthesia Post Evaluation    Patient location during evaluation: PACU  Patient participation: complete - patient participated  Level of consciousness: awake and awake and alert  Pain score: 2  Airway patency: patent  Nausea & Vomiting: no vomiting  Complications: no  Cardiovascular status: blood pressure returned to baseline  Respiratory status: acceptable  Hydration status: euvolemic  Multimodal analgesia pain management approach

## 2022-04-14 NOTE — PROGRESS NOTES
Pt arrived from ENDO to PACU bay 8. Report received from ENDO staff. Pt awake and alert. Pt on 6L simple mask, NSR, and VSS. Will continue to monitor.

## 2022-04-14 NOTE — PROCEDURES
Endoscopy Note    Patient: Kaylyn Garcia  : 1965  CSN:     Procedure: Esophagogastroduodenoscopy with scopic ultrasound and celiac plexus block    Date:  2022     Surgeon:  Lesvia Peace MD     Referring Physician: Shannen Aden    Preoperative Diagnosis: Chronic pancreatitis with continued severe abdominal pain    Postoperative Diagnosis: #1 mildly dilated common bile duct #2 slightly irregular head of pancreas #3 evidence of previous previous dose surgery #4 successful celiac block    Anesthesia: Monitored anesthesia care    EBL: <5 mL    Indications: This is a 49y.o. year old male who comes in because he is having this severe pain ever since the first part of the year this is a gentleman who in  he had perforated diverticulitis he needed a colostomy at that time a tracheostomy was on a ventilator and then he developed pancreatitis from all these things that were going on for some reason he been seen and evaluated by Dr. Zainab Diaz many years ago he came in earlier this year with severe unrelenting abdominal pain he has a CT scan which shows some calcifications throughout the pancreas it appeared that he mentioned in his notes that he did have a previous pancreatic surgery were doing an endoscopic ultrasound to see if we get his pain under control    Description of Procedure:  Informed consent was obtained from the patient after explanation of indications, benefits and possible risks and complications of the procedure. The patient was then taken to the endoscopy suite, placed in the left lateral decubitus position and the above IV sedation was administrered.     The Olympus linear EUS  We found abnormalities of the esophagus no other males of the stomach and no abnormalities of the duodenum    Ultrasound    The uncinate and the head of the pancreas were disorganized and do have some calcifications but it was not like he had a lot of overt chronic calcific pancreatitis the body of the tail the best I could tell because there was a piece of bowel in the way very bright suggesting fatty infiltration but I found no evidence of a tumor and what I could see    I was able to trace the common bile duct my tracing of the common bile duct did reveal that it is mildly dilated it measures 6 mm I found no stones in the common bile duct pancreatic duct itself appeared to be normal I did not detect any train tracking in the pancreatic duct    It was very difficult but we were able to find the adrenal gland we then were able to find what appeared to be a celiac ganglion next of the celiac axis    With color flow on we were able to direct a 22-gauge needle as close as we could to the celiac ganglion withdraw we found no blood injected saline felt we were in good position and here we put in 30 mL of bupivacaine Depo-Medrol Medrol mixture    We looked at the liver the liver itself and the areas that we saw were normal    We then terminated the procedure  Gastric or Duodenal ulcer present: No      The patient tolerated the procedure well and was taken to the post anesthesia care unit in good condition. Impression: Chronic pancreatitis  Previous pancreatic surgery  Successful celiac block      Recommendations:  Today we were able to do a successful celiac block  If he continues to have pain I would like to see him back in my office  He can continue to follow-up with Libia Munoz he may benefit from a ERCP  I have seen patients like this to even if he had previous posterior operations have some type of a stenosis and they do benefit from sometimes stenting  Also he may benefit from being put on medications which could include Lyrica gabapentin in some instances these can help however sometimes they do not    You for this very kind referral    Sadia Lott MD, MD  [facility]  [phone]

## 2022-04-14 NOTE — PROGRESS NOTES
Pt awake and alert at this time. Pt on RA, and VSS. Pt deniespain and nausea, tolerating PO. Pt meets criteria to be discharged from Phase 1.

## 2022-04-14 NOTE — H&P
Gastroenterology Note             Pre-operative History and Physical    Patient: Socrates Dougherty  : 1965  CSN:     History Obtained From:  patient and/or guardian. HISTORY OF PRESENT ILLNESS:    The patient is a 49 y.o. male  here for EGD and endoscopic ultrasound with celiac plexus block  Patient is someone who has known chronic pancreatitis first time ever met him workmen of the evaluating him for chronic pancreatitis and doing a celiac plexus    Past Medical History:    Past Medical History:   Diagnosis Date    Anxiety state, unspecified     Calculus of kidney     Deaf     RIGHT EAR    Diabetes mellitus ([address]     Diverticula     GERD (gastroesophageal reflux disease)     Hordeolum externum     Hyperlipidemia     Hypertension     Insomnia, unspecified     Kidney stones     MRSA carrier     Pancreatitis, chronic ([address]     PONV (postoperative nausea and vomiting)     Psychiatric problem     Sleep apnea     uses CPAP    Unspecified hypertrophic and atrophic condition of skin      Past Surgical History:    Past Surgical History:   Procedure Laterality Date    ABSCESS DRAINAGE Left 14    incision and drainage of an abcess on left calf    APPENDECTOMY      CHOLECYSTECTOMY      COLOSTOMY      INNER EAR SURGERY      RIGHT    PANCREAS SURGERY      duct stent    REVISION COLOSTOMY      SUBTOTAL COLECTOMY      Had wound dehiscence, mesh    TRACHEOSTOMY      TRACHEOSTOMY CLOSURE       Medications Prior to Admission:   No current facility-administered medications on file prior to encounter. Current Outpatient Medications on File Prior to Encounter   Medication Sig Dispense Refill    oxyCODONE-acetaminophen (PERCOCET)  MG per tablet Take 1 tablet by mouth 3 times daily.  diazePAM (VALIUM) 5 MG tablet Take 5 mg by mouth as needed for Anxiety.       blood glucose test strips (ONETOUCH ULTRA) strip USE TO TEST THREE TIMES DAILY AS NEEDED 300 strip 12    ondansetron (ZOFRAN) 4 MG tablet TAKE 1 TABLET BY MOUTH EVERY 8 HOURS AS NEEDED FOR NAUSEA OR VOMITING 30 tablet 1    lipase-protease-amylase (CREON) 55923-20144 units delayed release capsule Take by mouth 3 times daily (with meals) 180 capsule 0    JARDIANCE 25 MG tablet TAKE 1 TABLET BY MOUTH DAILY 90 tablet 3    fluticasone (FLONASE) 50 MCG/ACT nasal spray USE 2 SPRAYS IN EACH NOSTRIL DAILY 48 g 3    insulin lispro, 1 Unit Dial, (HUMALOG KWIKPEN) 100 UNIT/ML SOPN <100 none, 100-150 4 units, 151-200 6 units, 201-250 8 units, 251-300 10 units 5 pen 3    Insulin Pen Needle (B-D UF III MINI PEN NEEDLES) 31G X 5 MM MISC USE ONCE DAILY AS DIRECTED 100 each 12    simvastatin (ZOCOR) 40 MG tablet TAKE 1 TABLET BY MOUTH NIGHTLY 90 tablet 3    losartan-hydroCHLOROthiazide (HYZAAR) 100-25 MG per tablet TAKE 1 TABLET BY MOUTH DAILY 90 tablet 3    insulin detemir (LEVEMIR FLEXTOUCH) 100 UNIT/ML injection pen Inject 55 Units into the skin nightly (Patient taking differently: Inject 70 Units into the skin nightly ) 15 pen 11    ibuprofen (ADVIL;MOTRIN) 800 MG tablet TAKE 1 TABLET BY MOUTH THREE TIMES DAILY WITH MEALS (Patient taking differently: 3 times daily as needed ) 90 tablet 12    esomeprazole (NEXIUM) 40 MG delayed release capsule Take 1 capsule by mouth every morning (before breakfast) 90 capsule 3    metFORMIN (GLUCOPHAGE-XR) 500 MG extended release tablet Take 2 tablets by mouth 2 times daily 360 tablet 3    glimepiride (AMARYL) 4 MG tablet Take 1 tablet by mouth every morning (before breakfast) 90 tablet 3    clobetasol (OLUX) 0.05 % foam APPLY EXTERNALLY TO THE SCALP TWICE DAILY AS NEEDED 50 g 3    Blood Glucose Monitoring Suppl (ONE TOUCH ULTRA 2) w/Device KIT 1 kit by Does not apply route daily 1 kit 0    ONETOUCH DELICA LANCETS FINE MISC USE TO TEST THREE TIMES DAILY 300 each 12    nystatin (MYCOSTATIN) 865430 UNIT/GM cream APPLY TOPICALLY TWICE DAILY TO FOUR TIMES DAILY 60 g 0    MICROLET LANCETS MISC TEST 3 TO 4 TIMES DAILY 300 each 10        Allergies:  Amoxicillin and Morphine      Social History:   Social History     Tobacco Use    Smoking status: Never Smoker    Smokeless tobacco: Never Used   Substance Use Topics    Alcohol use: No     Alcohol/week: 0.0 standard drinks     Family History:   Family History   Problem Relation Age of Onset    Diabetes Mother     Sudden Death Mother         suicide    Alcohol Abuse Father     Alcohol Abuse Brother        PHYSICAL EXAM:      /72   Pulse 79   Temp 96.9 °F (36.1 °C) (Temporal)   Resp 18   Ht 6' (1.829 m)   Wt 290 lb (131.5 kg)   SpO2 95%   BMI 39.33 kg/m²  I        Heart:   RRR, normal s1s2    Lungs:  CTA bilat,  Normal effort    Abdomen:   NT, ND      ASA Grade:  ASA 3 - Patient with moderate systemic disease with functional limitations    Mallampati Class: 2          ASSESSMENT AND PLAN:    1. Patient is a 49 y.o. male here for EGD/EUS with MAC.   2.  Procedure options, risks and benefits reviewed with patient. Patient expresses understanding.     Lesvia Peace MD,   Kellie Peers  4/14/2022

## 2022-05-04 DIAGNOSIS — F41.9 ANXIETY: Primary | ICD-10-CM

## 2022-05-04 RX ORDER — DIAZEPAM 5 MG/1
TABLET ORAL
Qty: 30 TABLET | Refills: 0 | Status: SHIPPED | OUTPATIENT
Start: 2022-05-04 | End: 2022-07-25

## 2022-05-04 RX ORDER — ONDANSETRON 4 MG/1
TABLET, FILM COATED ORAL
Qty: 30 TABLET | Refills: 1 | Status: SHIPPED | OUTPATIENT
Start: 2022-05-04 | End: 2022-05-12 | Stop reason: SDUPTHER

## 2022-05-04 NOTE — TELEPHONE ENCOUNTER
Medication:   Requested Prescriptions     Pending Prescriptions Disp Refills    diazePAM (VALIUM) 5 MG tablet [Pharmacy Med Name: DIAZEPAM 5MG TABLETS] 30 tablet      Sig: TAKE 1 TABLET BY MOUTH EVERY 12 HOURS AS NEEDED FOR ANXIETY    ondansetron (ZOFRAN) 4 MG tablet [Pharmacy Med Name: ONDANSETRON 4MG TABLETS] 30 tablet 1     Sig: TAKE 1 TABLET BY MOUTH EVERY 8 HOURS AS NEEDED FOR NAUSEA OR VOMITING      Last Filled:  2/7/2022    Patient Phone Number: [phone] (home)     Last appt: 2/7/2022   Next appt: 5/12/2022    Last OARRS:   RX Monitoring 2/7/2022   Attestation -   Periodic Controlled Substance Monitoring No signs of potential drug abuse or diversion identified.    Chronic Pain > 50 MEDD -   Chronic Pain > 80 MEDD -     PDMP Monitoring:    Last PDMP Hung as Reviewed [facility]):  Review User Review Instant Review Result   HERMELINDA Miranda 2/7/2022  2:48 PM Reviewed PDMP [1]     Preferred Pharmacy:   [facility] [address] [facility] [phone] - F [phone]  [address] [place] [address],[facility] [facility] [address]  Phone: [phone] Fax: [phone]    [address] Carley Hiro [phone]  [facility] [address] [facility] [address]  Phone: [phone] Fax: [phone]    [facility] [address] [facility],4Th Floor, [address] [address] [phone] Edilma Butcher [phone]  [address] [place], [place] [address]e  [address] [place] [address]  Phone: [phone] Fax: [phone]

## 2022-05-05 NOTE — TELEPHONE ENCOUNTER
Medication:   Requested Prescriptions     Pending Prescriptions Disp Refills    lipase-protease-amylase (CREON) 49[address] units delayed release capsule 180 capsule 0     Sig: Take by mouth 3 times daily (with meals)          Patient Phone Number: [phone] (home)     Last appt: 2/7/2022   Next appt: 5/12/2022    Last OARRS:   RX Monitoring 2/7/2022   Attestation -   Periodic Controlled Substance Monitoring No signs of potential drug abuse or diversion identified.    Chronic Pain > 50 MEDD -   Chronic Pain > 80 MEDD -     PDMP Monitoring:    Last PDMP Hung as Reviewed [facility]):  Review User Review Instant Review Result   Mateo SY 5/4/2022  4:22 PM Reviewed PDMP [1]     Preferred Pharmacy:   [id number] [phone] - F [phone]  [facility] [address] [place] [address],[address] [facility] [address]  Phone: [phone] Fax: [phone]    [address] [facility] [phone]  [facility]iff [facility] [place] [address] [address] [facility] [address]  Phone: [phone] Fax: [phone]    [facility] [address] [facility],4Th Floor, [address] [place] [address] [id number] [place] [address] [phone]  [address] [place] [address]  Phone: [phone] Fax: [phone]

## 2022-05-10 NOTE — PROGRESS NOTES
[id number] VISIT    Patient is here for their Medicare Annual Wellness Visit and f/u DM, mood, pain. Last eye exam: due next month  Last dental exam: a year ago  Exercise: none right now due to pain. Does cut grass and plays with grandkids but limited due to pain  Diet: not great    How would you rate your overall health? : Poor        Fall Risk 5/12/2022   2 or more falls in past year? no   Fall with injury in past year? no       PHQ Scores 5/12/2022 2/7/2022 6/28/2021 1/21/2021 7/3/2019 6/5/2019 10/8/2018   PHQ2 Score 4 1 0 1 2 4 6   PHQ9 Score 19 1 0 1 10 15 6   no SI, states parents both attempted and he would never do that to his family. Do you always wear a seat belt in the car?: Yes      Have you noted any problems with hearing?: Yes - deaf in right ear, has hearing aid but doesn't wear  Have you noted any vision problems?: No  Do you have concerns about your sexual health?: yes - no libido but not an issue currently  In the past month how much has pain been an issue for you?:  Very Much  In the past month have you had issues with anxiety, loneliness, irritability or fatigue:  Very Much    Do you take opioid medications even sometimes? Yes: sees Dr. Jaz Jules for pain mgmt, getting procedures to help with pain. Living Will: No,   Additional information provided      Healthcare Decision Maker:    Primary Decision Maker: Manuelito Teran - [phone]  Click here to complete Healthcare Decision Makers including selection of the Healthcare Decision Maker Relationship (ie \"Primary\"). Today we documented Decision Maker(s). The patient will provide ACP documents. Who lives at home with you: no one  Do you have any services coming to your home (meals on wheels, home health, etc) ? : no      Do you need help with:  Using the phone:  No  Bathing: No  Dressing:  No  Toileting: No  Transportation:  No  Shopping: No  Preparing meals: No  Housework/Laundry: No  Medications: No  Money management: No    Does your home have:  Unsecured throw rugs: No  Grab bars in bathroom: No  Walk in shower: Yes  Seat in shower: No  Lit pathways for night (nightlights): Yes    Memory:  Have you or anyone close to you expressed concerns about your memory: Yes: when in pain    Knows:  Month: Yes  Day: Yes  Year: Yes  Day of Week: Yes  Able to Recall (tree, fork, ball) : Yes    Patient history:   Patient's medications, allergies, past medical, surgical, social and family histories were reviewed and updated in the EHR under History. Social History     Substance and Sexual Activity   Alcohol Use No    Alcohol/week: 0.0 standard drinks       Social History     Substance and Sexual Activity   Drug Use No       Social History     Tobacco Use   Smoking Status Never Smoker   Smokeless Tobacco Never Used           Care Team:  Patient's list of care team members was updated in EHR under the [employer]. Immunizations: Reviewed with patient. Health Maintenance Due   Topic Date Due    HIV screen  Never done    Hepatitis C screen  Never done    Shingles vaccine (1 of 2) Never done    Colorectal Cancer Screen  04/19/2021    DTaP/Tdap/Td vaccine (2 - Td or Tdap) 04/28/2021    Diabetic retinal exam  06/16/2022       CHRONIC CONDITIONS / ACUTE COMPLAINTS     Sugars running better, has gotten low a couple times in past month, usually when first wakes up, 70-80's. Depression lot worse due to pain. We have given him meds for depression in past but didn't take it, doesn't want to take more meds. States if the procedure gets pain better feels mood will improve. Not sleeping well due to pain. Has trazodone at home but just makes groggy. Peg Romero in past but didn't help. Has also been on doxepin. Using diazepam for menieres a few times a month and will use diazepam sometimes at night if very restless. Needing zofran more, taking 2-3 times a day for nausea.      States not eating as much, still eating healthy but eating less. Hates veggies. Has been having more pancreas issues, admitted in Feb with pancreatitis. Had nerve block in April but didn't help much. Also started on creon. Getting pancreatic stent in few weeks. States pain has really taken a toll on him. Still seeing pain mgmt and meds increased but still lot of pain. Physical Exam:    Body mass index is 40.55 kg/m². Vitals:    05/12/22 1300   BP: 130/74   Pulse: 79   Temp: 98.1 °F (36.7 °C)   Weight: 299 lb (135.6 kg)   Height: 6' (1.829 m)     Wt Readings from Last 3 Encounters:   05/12/22 299 lb (135.6 kg)   04/14/22 290 lb (131.5 kg)   02/19/22 300 lb (136.1 kg)       GENERAL:Alert and oriented x 4 NAD, affect appropriate and obese, well hydrated, well developed. LUNG:clear to auscultation bilaterally with normal respiratory effort  CV: Normal heart sounds, regular rate and rhythm without murmurs  EXTREMETY: no loss of hair, no edema, normal pedal pulses bilaterally    Was the timed get up and go unsteady or longer than 20 seconds: No       Assessment/Plan:    Dorinda Crowder was seen today for medicare awv. Diagnoses and all orders for this visit:    Well adult exam  Recommended screenings discussed and ordered if patient agreed  Recommended vaccinations discussed and ordered if patient agreed  Encouraged healthy diet   Encouraged regular exercise and maintaining a healthy weight    Medicare Safety Interventions: Home safety tips provided  Individualized  [address] included in patient instructions and AVS    Type 2 diabetes mellitus with hyperglycemia, without long-term current use of insulin (HCC)  -     Hemoglobin A1C; Future  -     Basic Metabolic Panel; Future      -A1C Improving and Uncontrolled  -continue current meds  -Reviewed pre-visit labs with patient. (BMP, A1C and Magnesium) Review of these labs contributed to MDM.      Lab Results   Component Value Date    LABA1C 7.3 05/11/2022       -lab slip given for next visit (ordered above, did not contribute to MDM)  -patient to check sugars 2-3 times daily  -encouraged a healthy diet, regular exercise and maintaining a healthy weight. Discussed the importance of TLC in controlling diabetes and preventing long term complications of diabetes including CV, renal, neuropathic and ocular. -RTO  3 months  -eye form given:  Yes      Moderate episode of recurrent major depressive disorder (HCC)  Reasonable control, discussed meds but not interested at this time, monitor      Meniere's disease, unspecified laterality  -Stable, continue current medications. Anxiety  -Stable, continue current medications. Controlled Substances Monitoring:   [place] report reviewed  Periodic Controlled Substance Monitoring: No signs of potential drug abuse or diversion identified. Betsy Mai MD)        Need for vaccination  -     Pneumococcal conjugate vaccine 13-valent  -     Hep B Vaccine Adult (ENGERIX B)    Other orders  -     ondansetron (ZOFRAN) 4 MG tablet; Take 1 tablet by mouth every 8 hours as needed for Nausea or Vomiting            Return in about 3 months (around 8/12/2022) for Diabetes, 30 min.        Portions of Note per  Mary Irvin CMA AAMA with corrections and edits per Betsy Mai MD.  I agree with entirety of note and was present and performed history and physical.  I also confirm that the note above accurately reflects all work, treatment, procedures, and medical decision making performed by me, Betsy Mai MD

## 2022-05-11 ENCOUNTER — HOSPITAL ENCOUNTER (OUTPATIENT)
Age: 57
Discharge: HOME | End: 2022-05-11
Payer: MEDICARE

## 2022-05-11 DIAGNOSIS — K21.9 GASTROESOPHAGEAL REFLUX DISEASE, UNSPECIFIED WHETHER ESOPHAGITIS PRESENT: ICD-10-CM

## 2022-05-11 DIAGNOSIS — E11.65 TYPE 2 DIABETES MELLITUS WITH HYPERGLYCEMIA, WITHOUT LONG-TERM CURRENT USE OF INSULIN (HCC): ICD-10-CM

## 2022-05-11 DIAGNOSIS — I10 ESSENTIAL HYPERTENSION: ICD-10-CM

## 2022-05-11 LAB
A/G RATIO: 1.1 (ref 1.1–2.2)
ALBUMIN SERPL-MCNC: 4 G/DL (ref 3.4–5)
ALP BLD-CCNC: 82 U/L (ref 40–129)
ALT SERPL-CCNC: 49 U/L (ref 10–40)
ANION GAP SERPL CALCULATED.3IONS-SCNC: 12 MMOL/L (ref 3–16)
AST SERPL-CCNC: 26 U/L (ref 15–37)
BILIRUB SERPL-MCNC: 0.6 MG/DL (ref 0–1)
BUN BLDV-MCNC: 13 MG/DL (ref 7–20)
CALCIUM SERPL-MCNC: 9.1 MG/DL (ref 8.3–10.6)
CHLORIDE BLD-SCNC: 105 MMOL/L (ref 99–110)
CO2: 23 MMOL/L (ref 21–32)
CREAT SERPL-MCNC: 0.6 MG/DL (ref 0.9–1.3)
GFR AFRICAN AMERICAN: >60
GFR NON-AFRICAN AMERICAN: >60
GLUCOSE BLD-MCNC: 116 MG/DL (ref 70–99)
MAGNESIUM: 1.8 MG/DL (ref 1.8–2.4)
POTASSIUM SERPL-SCNC: 3.9 MMOL/L (ref 3.5–5.1)
SODIUM BLD-SCNC: 140 MMOL/L (ref 136–145)
TOTAL PROTEIN: 7.6 G/DL (ref 6.4–8.2)

## 2022-05-11 PROCEDURE — 80053 COMPREHEN METABOLIC PANEL: CPT

## 2022-05-11 PROCEDURE — 83735 ASSAY OF MAGNESIUM: CPT

## 2022-05-11 PROCEDURE — 83036 HEMOGLOBIN GLYCOSYLATED A1C: CPT

## 2022-05-11 PROCEDURE — 36415 COLL VENOUS BLD VENIPUNCTURE: CPT

## 2022-05-12 ENCOUNTER — OFFICE VISIT (OUTPATIENT)
Dept: FAMILY MEDICINE CLINIC | Age: 57
End: 2022-05-12
Payer: MEDICARE

## 2022-05-12 VITALS
HEIGHT: 72 IN | DIASTOLIC BLOOD PRESSURE: 74 MMHG | WEIGHT: 299 LBS | BODY MASS INDEX: 40.5 KG/M2 | SYSTOLIC BLOOD PRESSURE: 130 MMHG | HEART RATE: 79 BPM | TEMPERATURE: 98.1 F

## 2022-05-12 DIAGNOSIS — Z23 NEED FOR VACCINATION: ICD-10-CM

## 2022-05-12 DIAGNOSIS — E11.65 TYPE 2 DIABETES MELLITUS WITH HYPERGLYCEMIA, WITHOUT LONG-TERM CURRENT USE OF INSULIN (HCC): ICD-10-CM

## 2022-05-12 DIAGNOSIS — F33.1 MODERATE EPISODE OF RECURRENT MAJOR DEPRESSIVE DISORDER (HCC): ICD-10-CM

## 2022-05-12 DIAGNOSIS — Z00.00 WELL ADULT EXAM: Primary | ICD-10-CM

## 2022-05-12 DIAGNOSIS — F41.9 ANXIETY: ICD-10-CM

## 2022-05-12 DIAGNOSIS — H81.09 MENIERE'S DISEASE, UNSPECIFIED LATERALITY: ICD-10-CM

## 2022-05-12 PROBLEM — Z02.89 PAIN MEDICATION AGREEMENT: Status: RESOLVED | Noted: 2019-07-31 | Resolved: 2022-05-12

## 2022-05-12 PROBLEM — K85.90 ACUTE PANCREATITIS WITHOUT NECROSIS OR INFECTION, UNSPECIFIED: Status: RESOLVED | Noted: 2022-02-19 | Resolved: 2022-05-12

## 2022-05-12 PROBLEM — F45.42 PAIN DISORDER WITH PSYCHOLOGICAL FACTORS: Status: RESOLVED | Noted: 2019-09-03 | Resolved: 2022-05-12

## 2022-05-12 LAB
ESTIMATED AVERAGE GLUCOSE: 162.8 MG/DL
HBA1C MFR BLD: 7.3 %

## 2022-05-12 PROCEDURE — G0009 ADMIN PNEUMOCOCCAL VACCINE: HCPCS | Performed by: FAMILY MEDICINE

## 2022-05-12 PROCEDURE — G0438 PPPS, INITIAL VISIT: HCPCS | Performed by: FAMILY MEDICINE

## 2022-05-12 PROCEDURE — G0010 ADMIN HEPATITIS B VACCINE: HCPCS | Performed by: FAMILY MEDICINE

## 2022-05-12 PROCEDURE — 99214 OFFICE O/P EST MOD 30 MIN: CPT | Performed by: FAMILY MEDICINE

## 2022-05-12 PROCEDURE — 90670 PCV13 VACCINE IM: CPT | Performed by: FAMILY MEDICINE

## 2022-05-12 PROCEDURE — 90746 HEPB VACCINE 3 DOSE ADULT IM: CPT | Performed by: FAMILY MEDICINE

## 2022-05-12 PROCEDURE — 3051F HG A1C>EQUAL 7.0%<8.0%: CPT | Performed by: FAMILY MEDICINE

## 2022-05-12 RX ORDER — ONDANSETRON 4 MG/1
4 TABLET, FILM COATED ORAL EVERY 8 HOURS PRN
Qty: 90 TABLET | Refills: 3 | Status: SHIPPED | OUTPATIENT
Start: 2022-05-12

## 2022-05-12 ASSESSMENT — PATIENT HEALTH QUESTIONNAIRE - PHQ9
SUM OF ALL RESPONSES TO PHQ QUESTIONS 1-9: 19
SUM OF ALL RESPONSES TO PHQ QUESTIONS 1-9: 19
2. FEELING DOWN, DEPRESSED OR HOPELESS: 3
5. POOR APPETITE OR OVEREATING: 3
7. TROUBLE CONCENTRATING ON THINGS, SUCH AS READING THE NEWSPAPER OR WATCHING TELEVISION: 3
10. IF YOU CHECKED OFF ANY PROBLEMS, HOW DIFFICULT HAVE THESE PROBLEMS MADE IT FOR YOU TO DO YOUR WORK, TAKE CARE OF THINGS AT HOME, OR GET ALONG WITH OTHER PEOPLE: 0
SUM OF ALL RESPONSES TO PHQ9 QUESTIONS 1 & 2: 4
1. LITTLE INTEREST OR PLEASURE IN DOING THINGS: 1
6. FEELING BAD ABOUT YOURSELF - OR THAT YOU ARE A FAILURE OR HAVE LET YOURSELF OR YOUR FAMILY DOWN: 3
SUM OF ALL RESPONSES TO PHQ QUESTIONS 1-9: 19
SUM OF ALL RESPONSES TO PHQ QUESTIONS 1-9: 19
8. MOVING OR SPEAKING SO SLOWLY THAT OTHER PEOPLE COULD HAVE NOTICED. OR THE OPPOSITE, BEING SO FIGETY OR RESTLESS THAT YOU HAVE BEEN MOVING AROUND A LOT MORE THAN USUAL: 0
4. FEELING TIRED OR HAVING LITTLE ENERGY: 3
9. THOUGHTS THAT YOU WOULD BE BETTER OFF DEAD, OR OF HURTING YOURSELF: 0
3. TROUBLE FALLING OR STAYING ASLEEP: 3

## 2022-05-12 NOTE — PROGRESS NOTES
Immunization(s) given during visit:     Immunizations Administered     Name Date Dose Route    Hepatitis B Adult (Engerix-B) 5/12/2022 1 mL Intramuscular    Site: Deltoid- Left    Lot:     NDC: 99105-547-32    Pneumococcal Conjugate 13-valent (Wetchlt00) 5/12/2022 0.5 mL Intramuscular    Site: Deltoid- Right    Lot: [id number]    NDC: 3030-3487-69           Patient instructed to remain in clinic for 20 minutes after injection and was advised to report any adverse reaction to me immediately.

## 2022-05-18 RX ORDER — GLIMEPIRIDE 4 MG/1
TABLET ORAL
Qty: 90 TABLET | Refills: 3 | Status: SHIPPED | OUTPATIENT
Start: 2022-05-18

## 2022-05-18 NOTE — PROGRESS NOTES
Patient reached ____ yes  ___X__ no   VM instructions left __X__ yes   phone number _[phone]_______                                ____ no-office notified          Date _5/26/22________  Time __1315_____  Arrival ___1145  hosp-endo___    Nothing to eat or drink after midnight-follow your doctors prep instructions-this may include taking a second dose of your prep after midnight  Responsible adult 25 or older to stay on site while you are here-drive you home-stay with you after  Follow any instructions your doctors office has given you  Bring a complete list of all your medications and supplements including name,dose,how often taken the day of your procedure  If you normally take the following medications in the morning please do so the AM of your procedure with a small sip of water       Heart,blood pressure,seizure,thyroid or breathing medications-use your inhalers       DO NOT take blood pressure medications ending in \"memo\" or \"pril\" the AM of procedure or evening prior  Take half or your normal dose of any long acting insulins the night before your procedure-do not take any diabetic medications the AM of procedure  Follow your doctors instructions regarding stopping or taking  any blood thinners-if you do not have instructions-call them  Any questions call your doctor  Other ______________________________________________________________    [facility] POLICY(subject to change)             The current policy is 2 visitors per patient. There are no children allowed. Everyone must mask. Visiting hours are 8a-8p. Overnight visitors will be at the discretion of the nurse.

## 2022-05-24 ENCOUNTER — HOSPITAL ENCOUNTER (OUTPATIENT)
Age: 57
Discharge: HOME | End: 2022-05-24
Payer: MEDICARE

## 2022-05-24 PROCEDURE — U0005 INFEC AGEN DETEC AMPLI PROBE: HCPCS

## 2022-05-24 PROCEDURE — U0003 INFECTIOUS AGENT DETECTION BY NUCLEIC ACID (DNA OR RNA); SEVERE ACUTE RESPIRATORY SYNDROME CORONAVIRUS 2 (SARS-COV-2) (CORONAVIRUS DISEASE [COVID-19]), AMPLIFIED PROBE TECHNIQUE, MAKING USE OF HIGH THROUGHPUT TECHNOLOGIES AS DESCRIBED BY CMS-2020-01-R: HCPCS

## 2022-05-25 LAB — SARS-COV-2: NOT DETECTED

## 2022-05-26 ENCOUNTER — ANESTHESIA (OUTPATIENT)
Dept: ENDOSCOPY | Age: 57
End: 2022-05-26
Payer: MEDICARE

## 2022-05-26 ENCOUNTER — APPOINTMENT (OUTPATIENT)
Dept: GENERAL RADIOLOGY | Age: 57
End: 2022-05-26
Attending: INTERNAL MEDICINE
Payer: MEDICARE

## 2022-05-26 ENCOUNTER — HOSPITAL ENCOUNTER (OUTPATIENT)
Age: 57
Setting detail: OUTPATIENT SURGERY
Discharge: HOME | End: 2022-05-26
Attending: INTERNAL MEDICINE | Admitting: INTERNAL MEDICINE
Payer: MEDICARE

## 2022-05-26 ENCOUNTER — ANESTHESIA EVENT (OUTPATIENT)
Dept: ENDOSCOPY | Age: 57
End: 2022-05-26
Payer: MEDICARE

## 2022-05-26 VITALS
OXYGEN SATURATION: 95 % | DIASTOLIC BLOOD PRESSURE: 68 MMHG | TEMPERATURE: 97 F | SYSTOLIC BLOOD PRESSURE: 126 MMHG | WEIGHT: 299 LBS | HEIGHT: 72 IN | BODY MASS INDEX: 40.5 KG/M2 | HEART RATE: 64 BPM | RESPIRATION RATE: 18 BRPM

## 2022-05-26 DIAGNOSIS — K86.1 IDIOPATHIC CHRONIC PANCREATITIS (HCC): Primary | ICD-10-CM

## 2022-05-26 LAB
GLUCOSE BLD-MCNC: 123 MG/DL (ref 70–99)
GLUCOSE BLD-MCNC: 135 MG/DL (ref 70–99)
PERFORMED ON: ABNORMAL
PERFORMED ON: ABNORMAL

## 2022-05-26 PROCEDURE — 3700000001 HC ADD 15 MINUTES (ANESTHESIA): Performed by: INTERNAL MEDICINE

## 2022-05-26 PROCEDURE — 3609015100 HC ERCP STENT PLACEMENT BILIARY/PANCREATIC DUCT: Performed by: INTERNAL MEDICINE

## 2022-05-26 PROCEDURE — 2720000010 HC SURG SUPPLY STERILE: Performed by: INTERNAL MEDICINE

## 2022-05-26 PROCEDURE — C2625 STENT, NON-COR, TEM W/DEL SY: HCPCS | Performed by: INTERNAL MEDICINE

## 2022-05-26 PROCEDURE — C2617 STENT, NON-COR, TEM W/O DEL: HCPCS | Performed by: INTERNAL MEDICINE

## 2022-05-26 PROCEDURE — 7100000011 HC PHASE II RECOVERY - ADDTL 15 MIN: Performed by: INTERNAL MEDICINE

## 2022-05-26 PROCEDURE — 6360000002 HC RX W HCPCS: Performed by: NURSE ANESTHETIST, CERTIFIED REGISTERED

## 2022-05-26 PROCEDURE — 74330 X-RAY BILE/PANC ENDOSCOPY: CPT

## 2022-05-26 PROCEDURE — 3700000000 HC ANESTHESIA ATTENDED CARE: Performed by: INTERNAL MEDICINE

## 2022-05-26 PROCEDURE — 2500000003 HC RX 250 WO HCPCS: Performed by: NURSE ANESTHETIST, CERTIFIED REGISTERED

## 2022-05-26 PROCEDURE — 2580000003 HC RX 258: Performed by: ANESTHESIOLOGY

## 2022-05-26 PROCEDURE — 7100000000 HC PACU RECOVERY - FIRST 15 MIN: Performed by: INTERNAL MEDICINE

## 2022-05-26 PROCEDURE — 2709999900 HC NON-CHARGEABLE SUPPLY: Performed by: INTERNAL MEDICINE

## 2022-05-26 PROCEDURE — 7100000010 HC PHASE II RECOVERY - FIRST 15 MIN: Performed by: INTERNAL MEDICINE

## 2022-05-26 PROCEDURE — 7100000001 HC PACU RECOVERY - ADDTL 15 MIN: Performed by: INTERNAL MEDICINE

## 2022-05-26 DEVICE — PANCREATIC STENT
Type: IMPLANTABLE DEVICE | Status: NON-FUNCTIONAL
Brand: ADVANIX™ PANCREATIC STENT
Removed: 2022-09-27

## 2022-05-26 DEVICE — OASIS ONE ACTION STENT INTRODUCTION SYSTEM, WITH PRE-LOADED COTTON LEUNG BILIARY STENT
Type: IMPLANTABLE DEVICE | Status: NON-FUNCTIONAL
Brand: OASIS
Removed: 2022-09-27

## 2022-05-26 RX ORDER — GLYCOPYRROLATE 0.2 MG/ML
INJECTION INTRAMUSCULAR; INTRAVENOUS PRN
Status: DISCONTINUED | OUTPATIENT
Start: 2022-05-26 | End: 2022-05-26 | Stop reason: SDUPTHER

## 2022-05-26 RX ORDER — SODIUM CHLORIDE 9 MG/ML
INJECTION, SOLUTION INTRAVENOUS CONTINUOUS
Status: DISCONTINUED | OUTPATIENT
Start: 2022-05-26 | End: 2022-05-26 | Stop reason: HOSPADM

## 2022-05-26 RX ORDER — OXYCODONE HYDROCHLORIDE AND ACETAMINOPHEN 5; 325 MG/1; MG/1
1 TABLET ORAL EVERY 8 HOURS PRN
Qty: 21 TABLET | Refills: 0 | Status: SHIPPED | OUTPATIENT
Start: 2022-05-26 | End: 2022-06-02

## 2022-05-26 RX ORDER — ONDANSETRON 2 MG/ML
4 INJECTION INTRAMUSCULAR; INTRAVENOUS EVERY 6 HOURS PRN
Status: DISCONTINUED | OUTPATIENT
Start: 2022-05-26 | End: 2022-05-26 | Stop reason: HOSPADM

## 2022-05-26 RX ORDER — MAGNESIUM SULFATE HEPTAHYDRATE 500 MG/ML
INJECTION, SOLUTION INTRAMUSCULAR; INTRAVENOUS PRN
Status: DISCONTINUED | OUTPATIENT
Start: 2022-05-26 | End: 2022-05-26 | Stop reason: SDUPTHER

## 2022-05-26 RX ORDER — LIDOCAINE HYDROCHLORIDE 20 MG/ML
INJECTION, SOLUTION EPIDURAL; INFILTRATION; INTRACAUDAL; PERINEURAL PRN
Status: DISCONTINUED | OUTPATIENT
Start: 2022-05-26 | End: 2022-05-26 | Stop reason: SDUPTHER

## 2022-05-26 RX ORDER — FENTANYL CITRATE 50 UG/ML
INJECTION, SOLUTION INTRAMUSCULAR; INTRAVENOUS PRN
Status: DISCONTINUED | OUTPATIENT
Start: 2022-05-26 | End: 2022-05-26 | Stop reason: SDUPTHER

## 2022-05-26 RX ORDER — MIDAZOLAM HYDROCHLORIDE 1 MG/ML
INJECTION INTRAMUSCULAR; INTRAVENOUS PRN
Status: DISCONTINUED | OUTPATIENT
Start: 2022-05-26 | End: 2022-05-26 | Stop reason: SDUPTHER

## 2022-05-26 RX ORDER — CIPROFLOXACIN 2 MG/ML
INJECTION, SOLUTION INTRAVENOUS PRN
Status: DISCONTINUED | OUTPATIENT
Start: 2022-05-26 | End: 2022-05-26 | Stop reason: SDUPTHER

## 2022-05-26 RX ORDER — DEXAMETHASONE SODIUM PHOSPHATE 4 MG/ML
INJECTION, SOLUTION INTRA-ARTICULAR; INTRALESIONAL; INTRAMUSCULAR; INTRAVENOUS; SOFT TISSUE PRN
Status: DISCONTINUED | OUTPATIENT
Start: 2022-05-26 | End: 2022-05-26 | Stop reason: SDUPTHER

## 2022-05-26 RX ORDER — ONDANSETRON 2 MG/ML
INJECTION INTRAMUSCULAR; INTRAVENOUS PRN
Status: DISCONTINUED | OUTPATIENT
Start: 2022-05-26 | End: 2022-05-26 | Stop reason: SDUPTHER

## 2022-05-26 RX ORDER — SUCCINYLCHOLINE/SOD CL,ISO/PF 200MG/10ML
SYRINGE (ML) INTRAVENOUS PRN
Status: DISCONTINUED | OUTPATIENT
Start: 2022-05-26 | End: 2022-05-26 | Stop reason: SDUPTHER

## 2022-05-26 RX ORDER — KETAMINE HCL IN NACL, ISO-OSM 100MG/10ML
SYRINGE (ML) INJECTION PRN
Status: DISCONTINUED | OUTPATIENT
Start: 2022-05-26 | End: 2022-05-26 | Stop reason: SDUPTHER

## 2022-05-26 RX ORDER — PROPOFOL 10 MG/ML
INJECTION, EMULSION INTRAVENOUS PRN
Status: DISCONTINUED | OUTPATIENT
Start: 2022-05-26 | End: 2022-05-26 | Stop reason: SDUPTHER

## 2022-05-26 RX ORDER — HYDROMORPHONE HCL 110MG/55ML
1 PATIENT CONTROLLED ANALGESIA SYRINGE INTRAVENOUS EVERY 4 HOURS PRN
Status: DISCONTINUED | OUTPATIENT
Start: 2022-05-26 | End: 2022-05-26 | Stop reason: HOSPADM

## 2022-05-26 RX ADMIN — Medication 50 MG: at 14:11

## 2022-05-26 RX ADMIN — SODIUM CHLORIDE: 9 INJECTION, SOLUTION INTRAVENOUS at 12:39

## 2022-05-26 RX ADMIN — LIDOCAINE HYDROCHLORIDE 100 MG: 20 INJECTION, SOLUTION EPIDURAL; INFILTRATION; INTRACAUDAL at 14:09

## 2022-05-26 RX ADMIN — CIPROFLOXACIN 400 MG: 2 INJECTION, SOLUTION INTRAVENOUS at 14:11

## 2022-05-26 RX ADMIN — GLYCOPYRROLATE 0.2 MG: 0.2 INJECTION, SOLUTION INTRAMUSCULAR; INTRAVENOUS at 14:28

## 2022-05-26 RX ADMIN — MAGNESIUM SULFATE HEPTAHYDRATE 1 G: 500 INJECTION, SOLUTION INTRAMUSCULAR; INTRAVENOUS at 14:19

## 2022-05-26 RX ADMIN — MIDAZOLAM 2 MG: 1 INJECTION INTRAMUSCULAR; INTRAVENOUS at 14:06

## 2022-05-26 RX ADMIN — FENTANYL CITRATE 100 MCG: 50 INJECTION, SOLUTION INTRAMUSCULAR; INTRAVENOUS at 14:09

## 2022-05-26 RX ADMIN — PROPOFOL 300 MG: 10 INJECTION, EMULSION INTRAVENOUS at 14:09

## 2022-05-26 RX ADMIN — Medication 160 MG: at 14:09

## 2022-05-26 RX ADMIN — DEXAMETHASONE SODIUM PHOSPHATE 4 MG: 4 INJECTION, SOLUTION INTRAMUSCULAR; INTRAVENOUS at 14:21

## 2022-05-26 RX ADMIN — ONDANSETRON 4 MG: 2 INJECTION INTRAMUSCULAR; INTRAVENOUS at 14:21

## 2022-05-26 ASSESSMENT — PAIN - FUNCTIONAL ASSESSMENT: PAIN_FUNCTIONAL_ASSESSMENT: 0-10

## 2022-05-26 ASSESSMENT — ENCOUNTER SYMPTOMS: SHORTNESS OF BREATH: 0

## 2022-05-26 NOTE — ANESTHESIA POSTPROCEDURE EVALUATION
Department of Anesthesiology  Postprocedure Note    Patient: Leonardo Barrow  MRN: [id number]  YOB: 1965  Date of evaluation: 5/26/2022  Time:  3:09 PM     Procedure Summary     Date: 05/26/22 Room / Location: [address] [place] [address]    Anesthesia Start: 8384 Anesthesia Stop: 1594    Procedure: ERCP STENT INSERTION (N/A ) Diagnosis: (CHRONIC PANCREATITIS K86.1)    Surgeons: Popeye Sheikh MD Responsible Provider: Yvette Crowe MD    Anesthesia Type: general ASA Status: 3          Anesthesia Type: No value filed. Hitesh Phase I: Hitesh Score: 8    Hitesh Phase II:      Last vitals: Reviewed and per EMR flowsheets.        Anesthesia Post Evaluation    Patient location during evaluation: PACU  Patient participation: complete - patient participated  Level of consciousness: awake and alert  Airway patency: patent  Nausea & Vomiting: no nausea and no vomiting  Complications: no  Cardiovascular status: hemodynamically stable  Respiratory status: acceptable  Hydration status: stable  Multimodal analgesia pain management approach

## 2022-05-26 NOTE — PROCEDURES
[address] [place] [address]  Endoscopy Note    Patient: Cynthia Villaseñor   : 1965  CSN:     Procedure: Endoscopic retrograde cholangiopancreatography  Biliary sphincterotomy placement of a biliary stent placement of a pancreatic ductal stent  Date: 2022    Surgeon:  Tyree Parra MD, MD    Referring Physician: Cassia Mcmullen,     Preoperative Diagnosis: Persistent abdominal pain persistent nausea EUS which showed a dilated common bile duct history of pancreatitis    Postoperative Diagnosis: #1 slightly irregular pancreatic duct in the tail of the pancreas #2 dilated common bile duct with a tight biliary ampulla    Anesthesia:  General per Anesthesia. EBL: <50 mL    Indications: This is a 49y.o. year old male who came to see me in the office because he was having persistent nausea persistent abdominal pain he has history of pancreatitis approximately 3 to 4 weeks ago we did an endoscopic ultrasound found to the irregular area in the pancreatic duct we found the common bile duct was dilated the patient had a celiac block which did not help so as he and I discussed over the phone 1 day there he felt that an ERCP would be indicated to see if we can control his nausea and his pain    Procedure: Informed consent was obtained from the patient after explanation of indications, benefits, possible risks and complications of the procedure. The patient was then taken to the endoscopy suite. A time-out was performed. The patient and staff were in agreement as to the correct patient and procedure. The above anesthesia was administered by the anesthesia department. The patient was placed in the left lateral prone position. Vital signs and heart rhythm were monitored prior to and throughout the entire procedure. The Olympus [id number] Video therapeutic duodenoscope was placed in the patient's mouth and blindly advanced into the esophagus.   The scope was then advanced through the esophagus, stomach and into the second portion of the duodenum where the major papilla was visualized. The major papilla was appeared to be normal    Cholangiogram: Once the pancreatic situation was under control we then were able to use the pancreatic stent and this allowed us To cannulate the common bile duct although the common bile duct did appear somewhat easy to cannulate injection of dye does reveal that the duct is dilated at more dilated at the proximal end and the distal end but it was dilated it was definitely bigger than the scope itself so it is over 11 mm in size we then went ahead and made a biliary sphincterotomy he was draining bile nicely felt just not faster or and often there could be some sphincter left so we decided to go ahead given his symptoms and place a biliary stent    Therefore we went ahead and placed an 8.5 Thai 5 cm stent without any difficulty his duct then decompressed quite quickly after the stent was placed and we terminated the procedure  Pancreatogram: Using a guidewire this duct was cannulated first injection of contrast reveals a nice smooth duct in the head in the body when he got to the tail or at the level of the spine you could see that there was more of a irregular appearance or more of a of a switch back appearance we did not see any other abnormalities there is no evidence of a pseudocyst we then were able to leave a guidewire and over the guidewire we placed a 5 Thai 9 cm single pigtail stent with a flange left . Patient did receive indomethacin suppositories and ciprofloxacin    The duodenum and stomach were decompressed and the scope was withdrawn from the patient. The patient tolerated the procedure well and was taken to the post anesthesia care unit in good condition.     Radiological Interpretation:  All fluoroscopic images and  still x-ray films were carefullly examined and interpreted by the endoscopist on the spot during the procedure in the absence of radiologist.  These interpretations guided the course of the procedure and the interventions mentioned above and below. Impression: Dilated common bile duct possible source of the persistent nausea  Irregularity of the pancreatic duct possible source of pain      Recommendations:  At this time we are giving the patient a trial of biliary and pancreatic ductal stents to see if this #1 decreases his pain #2 decreases his nausea we have made a nice biliary sphincterotomy today    And postop we will hydrate him well  We will control his pain  We will keep him n.p.o. for the next 4 hours and then he can be on a clear liquid diet the rest of today    I would like him to come back and see me in the office in 1 month    Thank you for this kind referral      Lesvia Peace MD  [address]  5/26/2022

## 2022-05-26 NOTE — PROGRESS NOTES
Patient continues to feel well, minimal pain and denies nausea. All personal belongings with patient.  Daughter taking patient home in stable condition

## 2022-05-26 NOTE — ANESTHESIA PRE PROCEDURE
Department of Anesthesiology  Preprocedure Note       Name:  Kurtis Crabtree   Age:  49 y.o.  :  1965                                          MRN:  [id number]         Date:  2022      Surgeon: Ginger Butcher):  Fred Skelton MD    Procedure: Procedure(s):  ERCP ENDOSCOPIC RETROGRADE CHOLANGIOPANCREATOGRAPHY    Medications prior to admission:   Prior to Admission medications    Medication Sig Start Date End Date Taking? Authorizing Provider   glimepiride (AMARYL) 4 MG tablet TAKE 1 TABLET BY MOUTH EVERY MORNING BEFORE BREAKFAST 22   Pj Godinez MD   ondansetron [facility]) 4 MG tablet Take 1 tablet by mouth every 8 hours as needed for Nausea or Vomiting 22   Pj Godinez MD   lipase-protease-amylase (CREON) [address]-33[address] units delayed release capsule Take by mouth 3 times daily (with meals) 5/5/22 8/3/22  Pj Godinez MD   diazePAM (VALIUM) 5 MG tablet TAKE 1 TABLET BY MOUTH EVERY 12 HOURS AS NEEDED FOR ANXIETY 5/4/22 6/3/22  JOELLE Muse - CNP   oxyCODONE-acetaminophen (PERCOCET)  MG per tablet Take 1 tablet by mouth 3 times daily.     Historical Provider, MD   blood glucose test strips (ONETOUCH ULTRA) strip USE TO TEST THREE TIMES DAILY AS NEEDED 3/10/22   Pj Godinez MD   JARDIANCE 25 MG tablet TAKE 1 TABLET BY MOUTH DAILY 22   Pj Godinez MD   fluticasone [facility]) 50 MCG/ACT nasal spray USE 2 SPRAYS IN [facility] NOSTRIL DAILY 10/18/21   Pj Godinez MD   insulin lispro, 1 Unit Dial, (HUMALOG KWIKPEN) 100 UNIT/ML SOPN <100 none, 100-150 4 units, 151-200 6 units, 201-250 8 units, 251-300 10 units 10/18/21   Pj Godinez MD   Insulin Pen Needle (B-D UF III MINI PEN NEEDLES) 31G X 5 MM MISC USE ONCE DAILY AS DIRECTED 10/4/21   Kerrie Alex MD   simvastatin (ZOCOR) 40 MG tablet TAKE 1 TABLET BY MOUTH NIGHTLY 10/1/21   JOELLE Muse - CNP   losartan-hydroCHLOROthiazide (HYZAAR) 100-25 MG per tablet TAKE 1 TABLET BY MOUTH DAILY 21   Calvert Siemens Augie Harman MD   insulin detemir (LEVEMIR FLEXTOUCH) 100 UNIT/ML injection pen Inject 55 Units into the skin nightly  Patient taking differently: Inject 70 Units into the skin nightly  9/9/21   Kg Guzman MD   ibuprofen (ADVIL;MOTRIN) 800 MG tablet TAKE 1 TABLET BY MOUTH THREE TIMES DAILY WITH MEALS  Patient taking differently: 3 times daily as needed  6/28/21   Kg Guzman MD   esomeprazole ([address]) 40 MG delayed release capsule Take 1 capsule by mouth every morning (before breakfast) 6/28/21   Kg Guzman MD   metFORMIN (GLUCOPHAGE-XR) 500 MG extended release tablet Take 2 tablets by mouth 2 times daily 5/18/21   Kg Guzman MD   clobetasol (OLUX) 0.05 % foam APPLY EXTERNALLY TO THE SCALP TWICE DAILY AS NEEDED 12/3/20   Kg Guzman MD   Blood Glucose Monitoring Suppl (ONE TOUCH ULTRA 2) w/Device KIT 1 kit by Does not apply route daily 12/3/19   Kg Guzman MD   [facility] LANCETS FINE MISC USE TO TEST THREE TIMES DAILY 12/3/19   Kg Guzman MD   nystatin (MYCOSTATIN) 852868 UNIT/GM cream APPLY TOPICALLY TWICE DAILY TO FOUR TIMES DAILY 11/5/18   Kg Guzman MD   MICROLET LANCETS [facility] TEST 3 TO 4 TIMES DAILY 9/17/13   Kg Guzman MD       Current medications:    No current outpatient medications on file. No current facility-administered medications for this visit. 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) E11.29    Microalbuminuria R80.9    Chronic generalized abdominal pain R10.84, G89.29    Anxiety F41.9    Chronic, continuous use of opioids F11.90    Type 2 diabetes mellitus with hyperglycemia, without long-term current use of insulin (HCC) E11.65       Past Medical History:        Diagnosis Date    Anxiety state, unspecified     Calculus of kidney     Deaf     RIGHT EAR    Diabetes mellitus ([address]     Diverticula     GERD (gastroesophageal reflux disease)     Hordeolum externum     Hyperlipidemia     Hypertension     Insomnia, unspecified     Kidney stones     MRSA carrier     Pancreatitis, chronic (HCC)     PONV (postoperative nausea and vomiting)     Psychiatric problem     Sleep apnea     uses CPAP    Unspecified hypertrophic and atrophic condition of skin        Past Surgical History:        Procedure Laterality Date    ABSCESS DRAINAGE Left 1/5/14    incision and drainage of an abcess on left calf    APPENDECTOMY      CHOLECYSTECTOMY      COLOSTOMY      INNER EAR SURGERY      RIGHT    NERVE SURGERY  4/14/2022    CELIAC PLEXUS BLOCK performed by Francisca Roberts MD at [address] [place] [address]      duct stent    REVISION COLOSTOMY      SUBTOTAL COLECTOMY      Had wound dehiscence, mesh    TRACHEOSTOMY      TRACHEOSTOMY CLOSURE      UPPER GASTROINTESTINAL ENDOSCOPY N/A 4/14/2022    EGD ESOPHAGOGASTRODUODENOSCOPY ULTRASOUND performed by Francisca Roberts MD at [address] [place] [address]       Social History:    Social History     Tobacco Use    Smoking status: Never Smoker    Smokeless tobacco: Never Used   Substance Use Topics    Alcohol use: No     Alcohol/week: 0.0 standard drinks                                Counseling given: Not Answered      Vital Signs (Current): There were no vitals filed for this visit.                                            BP Readings from Last 3 Encounters:   05/12/22 130/74   04/14/22 134/79 04/14/22 (!) 157/70       NPO Status:                                                                                 BMI:   Wt Readings from Last 3 Encounters:   05/12/22 299 lb (135.6 kg)   04/14/22 290 lb (131.5 kg)   02/19/22 300 lb (136.1 kg)     There is no height or weight on file to calculate BMI.    CBC:   Lab Results   Component Value Date    WBC 9.2 02/21/2022    RBC 5.24 02/21/2022    HGB 14.4 02/21/2022    HCT 43.4 02/21/2022    MCV 82.8 02/21/2022    RDW 14.6 02/21/2022     02/21/2022       CMP:   Lab Results   Component Value Date     05/11/2022    K 3.9 05/11/2022    K 3.2 02/21/2022     05/11/2022    CO2 23 05/11/2022    BUN 13 05/11/2022    CREATININE 0.6 05/11/2022    GFRAA >60 05/11/2022    GFRAA >60 05/08/2013    AGRATIO 1.1 05/11/2022    LABGLOM >60 05/11/2022    GLUCOSE 116 05/11/2022    PROT 7.6 05/11/2022    PROT 7.8 02/16/2013    CALCIUM 9.1 05/11/2022    BILITOT 0.6 05/11/2022    ALKPHOS 82 05/11/2022    AST 26 05/11/2022    ALT 49 05/11/2022       POC Tests: No results for input(s): POCGLU, POCNA, POCK, POCCL, POCBUN, POCHEMO, POCHCT in the last 72 hours. Coags: No results found for: PROTIME, INR, APTT    HCG (If Applicable): No results found for: PREGTESTUR, PREGSERUM, HCG, HCGQUANT     ABGs: No results found for: PHART, PO2ART, OVK1BFF, NTE1TZE, BEART, A9OPSQNL     Type & Screen (If Applicable):  No results found for: LABABO, LABRH    Drug/Infectious Status (If Applicable):  No results found for: HIV, HEPCAB    COVID-19 Screening (If Applicable):   Lab Results   Component Value Date    COVID19 Not Detected 05/24/2022           Anesthesia Evaluation  Patient summary reviewed and Nursing notes reviewed   history of anesthetic complications: PONV.   Airway: Mallampati: II  TM distance: >3 FB   Neck ROM: full  Mouth opening: > = 3 FB   Dental: normal exam         Pulmonary:   (+) sleep apnea: on CPAP,      (-) asthma and shortness of breath Cardiovascular:  Exercise tolerance: good (>4 METS),   (+) hypertension: moderate,     (-)  angina                Neuro/Psych:   (+) psychiatric history: stable with treatmentdepression/anxiety    (-) CVA           GI/Hepatic/Renal:   (+) GERD: well controlled, morbid obesity     (-) liver disease       Endo/Other:    (+) DiabetesType II DM, well controlled, , .    (-) hypothyroidism               Abdominal:   (+) obese,           Vascular:     - PVD. Other Findings:             Anesthesia Plan      general     ASA 3       Induction: intravenous. MIPS: Postoperative opioids intended and Prophylactic antiemetics administered. Anesthetic plan and risks discussed with patient. Plan discussed with CRNA.                     Alessia Mackenzie MD   5/26/2022

## 2022-05-26 NOTE — H&P
Gastroenterology Note             Pre-operative History and Physical    Patient: Tameka Delgado  : 1965  CSN:     History Obtained From:  patient and/or guardian. HISTORY OF PRESENT ILLNESS:    The patient is a 49 y.o. male  here for ERCP today the patient has had problems with some chronic pancreatitis we have tried nearly EUS with celiac block and he still continues to have abdominal pain he also has a lot of nausea therefore were going to do a biliary and pancreatic evaluation today    Past Medical History:    Past Medical History:   Diagnosis Date    Anxiety state, unspecified     Calculus of kidney     Deaf     RIGHT EAR    Diabetes mellitus ([address]     Diverticula     GERD (gastroesophageal reflux disease)     Hordeolum externum     Hyperlipidemia     Hypertension     Insomnia, unspecified     Kidney stones     MRSA carrier     Pancreatitis, chronic ([address]     PONV (postoperative nausea and vomiting)     Psychiatric problem     Sleep apnea     uses CPAP    Unspecified hypertrophic and atrophic condition of skin      Past Surgical History:    Past Surgical History:   Procedure Laterality Date    ABSCESS DRAINAGE Left 14    incision and drainage of an abcess on left calf    APPENDECTOMY      CHOLECYSTECTOMY      COLOSTOMY      INNER EAR SURGERY      RIGHT    NERVE SURGERY  2022    CELIAC PLEXUS BLOCK performed by Sadia Lott MD at [address] [place] [address]      duct stent    REVISION COLOSTOMY      SUBTOTAL COLECTOMY      Had wound dehiscence, mesh    TRACHEOSTOMY      TRACHEOSTOMY CLOSURE      UPPER GASTROINTESTINAL ENDOSCOPY N/A 2022    EGD ESOPHAGOGASTRODUODENOSCOPY ULTRASOUND performed by Sadia Lott MD at [address] [place] [address]     Medications Prior to Admission:   No current facility-administered medications on file prior to encounter.      Current Outpatient Medications on File Prior to Encounter   Medication Sig Dispense Refill    diazePAM (VALIUM) 5 MG tablet TAKE 1 TABLET BY MOUTH EVERY 12 HOURS AS NEEDED FOR ANXIETY 30 tablet 0    oxyCODONE-acetaminophen (PERCOCET)  MG per tablet Take 1 tablet by mouth 3 times daily.       blood glucose test strips (ONETOUCH ULTRA) strip USE TO TEST THREE TIMES DAILY AS NEEDED 300 strip 12    JARDIANCE 25 MG tablet TAKE 1 TABLET BY MOUTH DAILY 90 tablet 3    fluticasone (FLONASE) 50 MCG/ACT nasal spray USE 2 SPRAYS IN EACH NOSTRIL DAILY 48 g 3    insulin lispro, 1 Unit Dial, (HUMALOG KWIKPEN) 100 UNIT/ML SOPN <100 none, 100-150 4 units, 151-200 6 units, 201-250 8 units, 251-300 10 units 5 pen 3    Insulin Pen Needle (B-D UF III MINI PEN NEEDLES) 31G X 5 MM MISC USE ONCE DAILY AS DIRECTED 100 each 12    simvastatin (ZOCOR) 40 MG tablet TAKE 1 TABLET BY MOUTH NIGHTLY 90 tablet 3    losartan-hydroCHLOROthiazide (HYZAAR) 100-25 MG per tablet TAKE 1 TABLET BY MOUTH DAILY 90 tablet 3    insulin detemir (LEVEMIR FLEXTOUCH) 100 UNIT/ML injection pen Inject 55 Units into the skin nightly (Patient taking differently: Inject 70 Units into the skin nightly ) 15 pen 11    ibuprofen (ADVIL;MOTRIN) 800 MG tablet TAKE 1 TABLET BY MOUTH THREE TIMES DAILY WITH MEALS (Patient taking differently: 3 times daily as needed ) 90 tablet 12    esomeprazole (NEXIUM) 40 MG delayed release capsule Take 1 capsule by mouth every morning (before breakfast) 90 capsule 3    metFORMIN (GLUCOPHAGE-XR) 500 MG extended release tablet Take 2 tablets by mouth 2 times daily 360 tablet 3    clobetasol (OLUX) 0.05 % foam APPLY EXTERNALLY TO THE SCALP TWICE DAILY AS NEEDED 50 g 3    Blood Glucose Monitoring Suppl (ONE TOUCH ULTRA 2) w/Device KIT 1 kit by Does not apply route daily 1 kit 0    ONETOUCH DELICA LANCETS FINE MISC USE TO TEST THREE TIMES DAILY 300 each 12    nystatin (MYCOSTATIN) 304929 UNIT/GM cream APPLY TOPICALLY TWICE DAILY TO FOUR TIMES DAILY 60 g 0    MICROLET LANCETS MISC TEST 3 TO 4 TIMES DAILY 300 each 10 Allergies:  Amoxicillin and Morphine      Social History:   Social History     Tobacco Use    Smoking status: Never Smoker    Smokeless tobacco: Never Used   Substance Use Topics    Alcohol use: No     Alcohol/week: 0.0 standard drinks     Family History:   Family History   Problem Relation Age of Onset    Diabetes Mother     Sudden Death Mother         suicide    Alcohol Abuse Father     Alcohol Abuse Brother     No Known Problems Brother     No Known Problems Son     No Known Problems Daughter     No Known Problems Daughter        PHYSICAL EXAM:      BP (!) 150/90   Pulse 66   Temp 97.5 °F (36.4 °C) (Temporal)   Resp 20   Ht 6' (1.829 m)   Wt 299 lb (135.6 kg)   SpO2 96%   BMI 40.55 kg/m²  I        Heart:   RRR, normal s1s2    Lungs:  CTA bilat,  Normal effort    Abdomen:   NT, ND      ASA Grade:  ASA 3 - Patient with moderate systemic disease with functional limitations    Mallampati Class: 2          ASSESSMENT AND PLAN:    1. Patient is a 49 y.o. male here for ERCP with general anesthesia. 2.  Procedure options, risks and benefits reviewed with patient. Patient expresses understanding.     Cash Joe MD,   [address]   5/26/2022

## 2022-05-26 NOTE — PROGRESS NOTES
Reviewed patient's medical and surgical history in electronic record and with patient at the bedside. All questions regarding procedure answered. Scope number and equipment verified using a two person system.     Electronically signed by Connie Martínez RN on 5/26/2022 at 2:07 PM

## 2022-06-02 RX ORDER — METFORMIN HYDROCHLORIDE 500 MG/1
1000 TABLET, EXTENDED RELEASE ORAL 2 TIMES DAILY
Qty: 360 TABLET | Refills: 3 | Status: SHIPPED | OUTPATIENT
Start: 2022-06-02

## 2022-06-02 NOTE — TELEPHONE ENCOUNTER
Medication:   Requested Prescriptions      No prescriptions requested or ordered in this encounter          Patient Phone Number: [phone] (home)     Last appt: 5/12/2022   Next appt: 8/18/2022    Last OARRS:   RX Monitoring 5/12/2022   Attestation -   Periodic Controlled Substance Monitoring No signs of potential drug abuse or diversion identified.    Chronic Pain > 50 MEDD -   Chronic Pain > 80 MEDD -     PDMP Monitoring:    Last PDMP Hung as Reviewed [facility]):  Review User Review Instant Review Result   Joslyn JOAQUIN 1874 5/12/2022  1:28 PM Reviewed PDMP [1]     Preferred Pharmacy:   [facility] [address] [place] [address] [phone] - F [phone]  [address] [place] [address],[address] [facility] [address]  Phone: [phone] Fax: [phone]    [address] [facility] Hiro [phone]  Tiff Hutchison [facility] [address] [facility] [address]  Phone: [phone] Fax: [phone]    [facility] [address] [facility],4Th Floor, [address] [place] [address] [place] DIALLO [phone] Domingo Briones [phone]  [address] [place] [phone]  Phone: [phone] Fax: [phone]

## 2022-06-03 ENCOUNTER — HOSPITAL ENCOUNTER (OUTPATIENT)
Age: 57
Discharge: HOME | End: 2022-06-03
Payer: MEDICARE

## 2022-06-03 LAB
A/G RATIO: 1 (ref 1.1–2.2)
ALBUMIN SERPL-MCNC: 3.9 G/DL (ref 3.4–5)
ALP BLD-CCNC: 92 U/L (ref 40–129)
ALT SERPL-CCNC: 38 U/L (ref 10–40)
ANION GAP SERPL CALCULATED.3IONS-SCNC: 16 MMOL/L (ref 3–16)
AST SERPL-CCNC: 21 U/L (ref 15–37)
BILIRUB SERPL-MCNC: 0.4 MG/DL (ref 0–1)
BUN BLDV-MCNC: 14 MG/DL (ref 7–20)
CALCIUM SERPL-MCNC: 9.1 MG/DL (ref 8.3–10.6)
CHLORIDE BLD-SCNC: 104 MMOL/L (ref 99–110)
CO2: 23 MMOL/L (ref 21–32)
CREAT SERPL-MCNC: 0.6 MG/DL (ref 0.9–1.3)
GFR AFRICAN AMERICAN: >60
GFR NON-AFRICAN AMERICAN: >60
GLUCOSE BLD-MCNC: 142 MG/DL (ref 70–99)
LIPASE: 22 U/L (ref 13–60)
POTASSIUM SERPL-SCNC: 4.2 MMOL/L (ref 3.5–5.1)
SODIUM BLD-SCNC: 143 MMOL/L (ref 136–145)
TOTAL PROTEIN: 7.7 G/DL (ref 6.4–8.2)

## 2022-06-03 PROCEDURE — 80053 COMPREHEN METABOLIC PANEL: CPT

## 2022-06-03 PROCEDURE — 36415 COLL VENOUS BLD VENIPUNCTURE: CPT

## 2022-06-03 PROCEDURE — 83690 ASSAY OF LIPASE: CPT

## 2022-06-20 ENCOUNTER — HOSPITAL ENCOUNTER (OUTPATIENT)
Dept: MRI IMAGING | Age: 57
Discharge: HOME | End: 2022-06-20
Payer: MEDICARE

## 2022-06-20 DIAGNOSIS — K85.90 ACUTE PANCREATITIS, UNSPECIFIED COMPLICATION STATUS, UNSPECIFIED PANCREATITIS TYPE: ICD-10-CM

## 2022-06-20 PROCEDURE — 2580000003 HC RX 258: Performed by: INTERNAL MEDICINE

## 2022-06-20 PROCEDURE — 6360000004 HC RX CONTRAST MEDICATION: Performed by: INTERNAL MEDICINE

## 2022-06-20 PROCEDURE — A9577 INJ MULTIHANCE: HCPCS | Performed by: INTERNAL MEDICINE

## 2022-06-20 PROCEDURE — 74183 MRI ABD W/O CNTR FLWD CNTR: CPT

## 2022-06-20 RX ORDER — SODIUM CHLORIDE 9 MG/ML
INJECTION, SOLUTION INTRAVENOUS ONCE
Status: COMPLETED | OUTPATIENT
Start: 2022-06-20 | End: 2022-06-20

## 2022-06-20 RX ADMIN — SODIUM CHLORIDE: 9 INJECTION, SOLUTION INTRAVENOUS at 14:57

## 2022-06-20 RX ADMIN — GADOBENATE DIMEGLUMINE 20 ML: 529 INJECTION, SOLUTION INTRAVENOUS at 14:57

## 2022-07-12 RX ORDER — PANCRELIPASE 24000; 76000; 120000 [USP'U]/1; [USP'U]/1; [USP'U]/1
CAPSULE, DELAYED RELEASE PELLETS ORAL
Qty: 180 CAPSULE | Refills: 5 | Status: SHIPPED | OUTPATIENT
Start: 2022-07-12

## 2022-07-12 NOTE — TELEPHONE ENCOUNTER
Medication:   Requested Prescriptions     Pending Prescriptions Disp Refills    CREON [address] units delayed release capsule [Pharmacy Med Name: CREON 24,000U CAPSULES] 180 capsule 0     Sig: TAKE 1 CAPSULE BY MOUTH THREE TIMES DAILY WITH MEALS          Patient Phone Number: [phone] (home)     Last appt: 5/12/2022   Next appt: 8/18/2022    Last OARRS:   RX Monitoring 5/12/2022   Attestation -   Periodic Controlled Substance Monitoring No signs of potential drug abuse or diversion identified.    Chronic Pain > 50 MEDD -   Chronic Pain > 80 MEDD -     PDMP Monitoring:    Last PDMP Hung as Reviewed [facility]):  Review User Review Instant Review Result   Joslyn JOAQUIN 1874 5/12/2022  1:28 PM Reviewed PDMP [1]     Preferred Pharmacy:   [facility] [address] [place] [address] [phone] - F [phone]  [address] [place] [address],[address]Th [facility] [address]  Phone: [phone] Fax: [phone]    [address] Naresh Bosch [phone]  Tiff Hutchison [facility] [address] [facility] [address]  Phone: [phone] Fax: [phone]    [facility] [address] [facility],4Th Floor, [address] [place] [address] [phone] Donnice Frankel [phone]  [address] [place] [address]  [address] [place] [address]  Phone: [phone] Fax: [phone]

## 2022-07-14 RX ORDER — ESOMEPRAZOLE MAGNESIUM 40 MG/1
CAPSULE, DELAYED RELEASE ORAL
Qty: 90 CAPSULE | Refills: 3 | Status: SHIPPED | OUTPATIENT
Start: 2022-07-14

## 2022-07-20 RX ORDER — INSULIN LISPRO 100 [IU]/ML
INJECTION, SOLUTION INTRAVENOUS; SUBCUTANEOUS
Qty: 15 ML | Refills: 1 | Status: SHIPPED | OUTPATIENT
Start: 2022-07-20 | End: 2022-10-17

## 2022-07-25 DIAGNOSIS — F41.9 ANXIETY: ICD-10-CM

## 2022-07-25 RX ORDER — DIAZEPAM 5 MG/1
TABLET ORAL
Qty: 30 TABLET | Refills: 0 | Status: SHIPPED | OUTPATIENT
Start: 2022-07-25 | End: 2022-10-10

## 2022-07-25 NOTE — TELEPHONE ENCOUNTER
Medication:   Requested Prescriptions     Pending Prescriptions Disp Refills    diazePAM (VALIUM) 5 MG tablet [Pharmacy Med Name: DIAZEPAM 5MG TABLETS] 30 tablet      Sig: TAKE 1 TABLET BY MOUTH EVERY 12 HOURS AS NEEDED FOR ANXIETY      Last Filled:  5/4/22    Patient Phone Number: [phone] (home)     Last appt: 5/12/2022   Next appt: 8/18/2022    Last OARRS:   RX Monitoring 5/12/2022   Attestation -   Periodic Controlled Substance Monitoring No signs of potential drug abuse or diversion identified.    Chronic Pain > 50 MEDD -   Chronic Pain > 80 MEDD -     PDMP Monitoring:    Last PDMP Hung as Reviewed [facility]):  Review User Review Instant Review Result   Joslyn JOAQUIN 1874 5/12/2022  1:28 PM Reviewed PDMP [1]     Preferred Pharmacy:   [place] [address] [address] [facility] [phone] - F [phone]  [address] [place] [address],[facility] [address]  Phone: [phone] Fax: [phone]    [address] Naresh Bosch [phone]  [place] [address] [address] [facility] [address]  Phone: [phone] Fax: [phone]    [place] [address] [address] [facility],4Th Floor, [address] [place] [address] [facility] [phone] [facility] [phone]  [address] [place] [address]  [address] [place] [address]  Phone: [phone] Fax: [phone]

## 2022-07-27 ENCOUNTER — HOSPITAL ENCOUNTER (OUTPATIENT)
Age: 57
Discharge: HOME | End: 2022-07-27
Payer: MEDICARE

## 2022-07-27 PROCEDURE — U0005 INFEC AGEN DETEC AMPLI PROBE: HCPCS

## 2022-07-27 PROCEDURE — U0003 INFECTIOUS AGENT DETECTION BY NUCLEIC ACID (DNA OR RNA); SEVERE ACUTE RESPIRATORY SYNDROME CORONAVIRUS 2 (SARS-COV-2) (CORONAVIRUS DISEASE [COVID-19]), AMPLIFIED PROBE TECHNIQUE, MAKING USE OF HIGH THROUGHPUT TECHNOLOGIES AS DESCRIBED BY CMS-2020-01-R: HCPCS

## 2022-07-28 LAB — SARS-COV-2: NOT DETECTED

## 2022-07-28 NOTE — PROGRESS NOTES
ENDOSCOPY PREOP INSTRUCTIONS      You are scheduled for a procedure at [facility] on 7-29 @ 900. You will need to arrival by: 730 (at least an hour & a half prior to planned start time)  Report to the MAIN entrance on [address] and register at the information desk on the left-hand side of the lobby  You will need your insurance & photo ID with you. For your procedure:     PLEASE FOLLOW ALL INSTRUCTIONS & PREPS GIVEN TO YOU FROM YOUR DOCTOR'S OFFICE. If you have not received these instructions yet, please call the office immediately. Make sure to read them as soon as received. Bring an accurate list of any medications, including the dose/ frequency, with you on the day of the procedure. Make sure to include over the counter medications. If you are taking blood thinners, Aspirin or diabetic medication, make sure to call your doctor as soon as possible for instructions prior to your procedure. Please dress comfortably and do not wear any lotion, powders or jewelry  Arrange for someone to be with you and sign you out & drive you home after your procedure. We allow 2 visitors with you in the hospital & both of you are required to be masked.     WOMEN ONLY OF CHILDBEARING AGE: Please make sure to be able to give a urine sample on arrival      If you have further questions, you may contact your Endoscopist's office or Pre Admission Testing staff at [phone]  [facility] YOSSI Jorge.7/28/2022 .11:12 AM

## 2022-07-29 ENCOUNTER — ANESTHESIA (OUTPATIENT)
Dept: ENDOSCOPY | Age: 57
End: 2022-07-29
Payer: MEDICARE

## 2022-07-29 ENCOUNTER — HOSPITAL ENCOUNTER (OUTPATIENT)
Age: 57
Setting detail: OUTPATIENT SURGERY
Discharge: HOME | End: 2022-07-29
Attending: INTERNAL MEDICINE | Admitting: INTERNAL MEDICINE
Payer: MEDICARE

## 2022-07-29 ENCOUNTER — ANESTHESIA EVENT (OUTPATIENT)
Dept: ENDOSCOPY | Age: 57
End: 2022-07-29
Payer: MEDICARE

## 2022-07-29 VITALS
SYSTOLIC BLOOD PRESSURE: 127 MMHG | OXYGEN SATURATION: 96 % | TEMPERATURE: 97 F | HEIGHT: 72 IN | WEIGHT: 295 LBS | HEART RATE: 58 BPM | BODY MASS INDEX: 39.96 KG/M2 | DIASTOLIC BLOOD PRESSURE: 82 MMHG | RESPIRATION RATE: 18 BRPM

## 2022-07-29 LAB
GLUCOSE BLD-MCNC: 123 MG/DL (ref 70–99)
PERFORMED ON: ABNORMAL

## 2022-07-29 PROCEDURE — 3700000000 HC ANESTHESIA ATTENDED CARE: Performed by: INTERNAL MEDICINE

## 2022-07-29 PROCEDURE — 6360000002 HC RX W HCPCS: Performed by: NURSE ANESTHETIST, CERTIFIED REGISTERED

## 2022-07-29 PROCEDURE — 88173 CYTOPATH EVAL FNA REPORT: CPT

## 2022-07-29 PROCEDURE — 3700000001 HC ADD 15 MINUTES (ANESTHESIA): Performed by: INTERNAL MEDICINE

## 2022-07-29 PROCEDURE — 2580000003 HC RX 258: Performed by: ANESTHESIOLOGY

## 2022-07-29 PROCEDURE — 6370000000 HC RX 637 (ALT 250 FOR IP): Performed by: INTERNAL MEDICINE

## 2022-07-29 PROCEDURE — 7100000011 HC PHASE II RECOVERY - ADDTL 15 MIN: Performed by: INTERNAL MEDICINE

## 2022-07-29 PROCEDURE — 2709999900 HC NON-CHARGEABLE SUPPLY: Performed by: INTERNAL MEDICINE

## 2022-07-29 PROCEDURE — 88172 CYTP DX EVAL FNA 1ST EA SITE: CPT

## 2022-07-29 PROCEDURE — 3609020800 HC EGD W/EUS FNA: Performed by: INTERNAL MEDICINE

## 2022-07-29 PROCEDURE — 88305 TISSUE EXAM BY PATHOLOGIST: CPT

## 2022-07-29 PROCEDURE — 7100000010 HC PHASE II RECOVERY - FIRST 15 MIN: Performed by: INTERNAL MEDICINE

## 2022-07-29 PROCEDURE — 2720000010 HC SURG SUPPLY STERILE: Performed by: INTERNAL MEDICINE

## 2022-07-29 PROCEDURE — 3609018500 HC EGD US SCOPE W/ADJACENT STRUCTURES: Performed by: INTERNAL MEDICINE

## 2022-07-29 RX ORDER — METHYLPREDNISOLONE ACETATE 40 MG/ML
40 INJECTION, SUSPENSION INTRA-ARTICULAR; INTRALESIONAL; INTRAMUSCULAR; SOFT TISSUE ONCE
Status: DISCONTINUED | OUTPATIENT
Start: 2022-07-29 | End: 2022-07-29 | Stop reason: HOSPADM

## 2022-07-29 RX ORDER — LIDOCAINE HYDROCHLORIDE 20 MG/ML
INJECTION, SOLUTION INTRAVENOUS PRN
Status: DISCONTINUED | OUTPATIENT
Start: 2022-07-29 | End: 2022-07-29 | Stop reason: SDUPTHER

## 2022-07-29 RX ORDER — CIPROFLOXACIN 500 MG/1
500 TABLET, FILM COATED ORAL 2 TIMES DAILY
Qty: 6 TABLET | Refills: 0 | Status: SHIPPED | OUTPATIENT
Start: 2022-07-29 | End: 2022-08-01

## 2022-07-29 RX ORDER — CIPROFLOXACIN 2 MG/ML
INJECTION, SOLUTION INTRAVENOUS PRN
Status: DISCONTINUED | OUTPATIENT
Start: 2022-07-29 | End: 2022-07-29 | Stop reason: SDUPTHER

## 2022-07-29 RX ORDER — PROPOFOL 10 MG/ML
INJECTION, EMULSION INTRAVENOUS PRN
Status: DISCONTINUED | OUTPATIENT
Start: 2022-07-29 | End: 2022-07-29 | Stop reason: SDUPTHER

## 2022-07-29 RX ORDER — SODIUM CHLORIDE, SODIUM LACTATE, POTASSIUM CHLORIDE, CALCIUM CHLORIDE 600; 310; 30; 20 MG/100ML; MG/100ML; MG/100ML; MG/100ML
INJECTION, SOLUTION INTRAVENOUS CONTINUOUS
Status: DISCONTINUED | OUTPATIENT
Start: 2022-07-29 | End: 2022-07-29 | Stop reason: HOSPADM

## 2022-07-29 RX ADMIN — PROPOFOL 30 MG: 10 INJECTION, EMULSION INTRAVENOUS at 09:56

## 2022-07-29 RX ADMIN — PROPOFOL 30 MG: 10 INJECTION, EMULSION INTRAVENOUS at 09:35

## 2022-07-29 RX ADMIN — PROPOFOL 30 MG: 10 INJECTION, EMULSION INTRAVENOUS at 10:06

## 2022-07-29 RX ADMIN — PROPOFOL 30 MG: 10 INJECTION, EMULSION INTRAVENOUS at 09:47

## 2022-07-29 RX ADMIN — LIDOCAINE HYDROCHLORIDE 100 MG: 20 INJECTION, SOLUTION INTRAVENOUS at 09:20

## 2022-07-29 RX ADMIN — PROPOFOL 20 MG: 10 INJECTION, EMULSION INTRAVENOUS at 09:26

## 2022-07-29 RX ADMIN — PROPOFOL 30 MG: 10 INJECTION, EMULSION INTRAVENOUS at 09:52

## 2022-07-29 RX ADMIN — PROPOFOL 30 MG: 10 INJECTION, EMULSION INTRAVENOUS at 09:53

## 2022-07-29 RX ADMIN — PROPOFOL 30 MG: 10 INJECTION, EMULSION INTRAVENOUS at 10:11

## 2022-07-29 RX ADMIN — PROPOFOL 30 MG: 10 INJECTION, EMULSION INTRAVENOUS at 09:45

## 2022-07-29 RX ADMIN — PROPOFOL 30 MG: 10 INJECTION, EMULSION INTRAVENOUS at 10:04

## 2022-07-29 RX ADMIN — PROPOFOL 30 MG: 10 INJECTION, EMULSION INTRAVENOUS at 10:10

## 2022-07-29 RX ADMIN — PROPOFOL 20 MG: 10 INJECTION, EMULSION INTRAVENOUS at 09:22

## 2022-07-29 RX ADMIN — PROPOFOL 50 MG: 10 INJECTION, EMULSION INTRAVENOUS at 09:20

## 2022-07-29 RX ADMIN — SODIUM CHLORIDE, POTASSIUM CHLORIDE, SODIUM LACTATE AND CALCIUM CHLORIDE: 600; 310; 30; 20 INJECTION, SOLUTION INTRAVENOUS at 08:46

## 2022-07-29 RX ADMIN — PROPOFOL 30 MG: 10 INJECTION, EMULSION INTRAVENOUS at 09:30

## 2022-07-29 RX ADMIN — CIPROFLOXACIN 400 MG: 2 INJECTION, SOLUTION INTRAVENOUS at 09:38

## 2022-07-29 RX ADMIN — PROPOFOL 30 MG: 10 INJECTION, EMULSION INTRAVENOUS at 09:58

## 2022-07-29 RX ADMIN — PROPOFOL 30 MG: 10 INJECTION, EMULSION INTRAVENOUS at 10:16

## 2022-07-29 RX ADMIN — PROPOFOL 30 MG: 10 INJECTION, EMULSION INTRAVENOUS at 10:13

## 2022-07-29 RX ADMIN — PROPOFOL 20 MG: 10 INJECTION, EMULSION INTRAVENOUS at 09:21

## 2022-07-29 RX ADMIN — INDOMETHACIN 100 MG: 50 SUPPOSITORY RECTAL at 10:23

## 2022-07-29 RX ADMIN — PROPOFOL 30 MG: 10 INJECTION, EMULSION INTRAVENOUS at 09:37

## 2022-07-29 RX ADMIN — PROPOFOL 30 MG: 10 INJECTION, EMULSION INTRAVENOUS at 09:54

## 2022-07-29 RX ADMIN — PROPOFOL 30 MG: 10 INJECTION, EMULSION INTRAVENOUS at 09:41

## 2022-07-29 RX ADMIN — PROPOFOL 30 MG: 10 INJECTION, EMULSION INTRAVENOUS at 10:14

## 2022-07-29 RX ADMIN — PROPOFOL 30 MG: 10 INJECTION, EMULSION INTRAVENOUS at 09:43

## 2022-07-29 RX ADMIN — PROPOFOL 30 MG: 10 INJECTION, EMULSION INTRAVENOUS at 09:46

## 2022-07-29 RX ADMIN — PROPOFOL 20 MG: 10 INJECTION, EMULSION INTRAVENOUS at 09:23

## 2022-07-29 RX ADMIN — PROPOFOL 30 MG: 10 INJECTION, EMULSION INTRAVENOUS at 09:38

## 2022-07-29 RX ADMIN — PROPOFOL 30 MG: 10 INJECTION, EMULSION INTRAVENOUS at 09:42

## 2022-07-29 RX ADMIN — PROPOFOL 30 MG: 10 INJECTION, EMULSION INTRAVENOUS at 09:50

## 2022-07-29 RX ADMIN — PROPOFOL 30 MG: 10 INJECTION, EMULSION INTRAVENOUS at 10:09

## 2022-07-29 RX ADMIN — PROPOFOL 30 MG: 10 INJECTION, EMULSION INTRAVENOUS at 09:28

## 2022-07-29 RX ADMIN — PROPOFOL 30 MG: 10 INJECTION, EMULSION INTRAVENOUS at 09:51

## 2022-07-29 RX ADMIN — PROPOFOL 20 MG: 10 INJECTION, EMULSION INTRAVENOUS at 09:24

## 2022-07-29 RX ADMIN — PROPOFOL 30 MG: 10 INJECTION, EMULSION INTRAVENOUS at 09:31

## 2022-07-29 RX ADMIN — PROPOFOL 30 MG: 10 INJECTION, EMULSION INTRAVENOUS at 09:48

## 2022-07-29 RX ADMIN — PROPOFOL 30 MG: 10 INJECTION, EMULSION INTRAVENOUS at 10:15

## 2022-07-29 RX ADMIN — PROPOFOL 30 MG: 10 INJECTION, EMULSION INTRAVENOUS at 10:00

## 2022-07-29 RX ADMIN — PROPOFOL 30 MG: 10 INJECTION, EMULSION INTRAVENOUS at 10:02

## 2022-07-29 RX ADMIN — PROPOFOL 30 MG: 10 INJECTION, EMULSION INTRAVENOUS at 10:17

## 2022-07-29 RX ADMIN — PROPOFOL 30 MG: 10 INJECTION, EMULSION INTRAVENOUS at 09:55

## 2022-07-29 RX ADMIN — PROPOFOL 30 MG: 10 INJECTION, EMULSION INTRAVENOUS at 10:08

## 2022-07-29 RX ADMIN — PROPOFOL 30 MG: 10 INJECTION, EMULSION INTRAVENOUS at 10:03

## 2022-07-29 RX ADMIN — PROPOFOL 30 MG: 10 INJECTION, EMULSION INTRAVENOUS at 09:39

## 2022-07-29 RX ADMIN — PROPOFOL 30 MG: 10 INJECTION, EMULSION INTRAVENOUS at 09:36

## 2022-07-29 RX ADMIN — PROPOFOL 30 MG: 10 INJECTION, EMULSION INTRAVENOUS at 09:27

## 2022-07-29 RX ADMIN — PROPOFOL 20 MG: 10 INJECTION, EMULSION INTRAVENOUS at 09:25

## 2022-07-29 RX ADMIN — PROPOFOL 30 MG: 10 INJECTION, EMULSION INTRAVENOUS at 09:29

## 2022-07-29 RX ADMIN — PROPOFOL 30 MG: 10 INJECTION, EMULSION INTRAVENOUS at 09:33

## 2022-07-29 RX ADMIN — PROPOFOL 30 MG: 10 INJECTION, EMULSION INTRAVENOUS at 09:49

## 2022-07-29 ASSESSMENT — PAIN DESCRIPTION - FREQUENCY: FREQUENCY: CONTINUOUS

## 2022-07-29 ASSESSMENT — PAIN DESCRIPTION - LOCATION: LOCATION: ABDOMEN

## 2022-07-29 ASSESSMENT — ENCOUNTER SYMPTOMS: SHORTNESS OF BREATH: 0

## 2022-07-29 ASSESSMENT — PAIN DESCRIPTION - PAIN TYPE: TYPE: ACUTE PAIN;CHRONIC PAIN

## 2022-07-29 ASSESSMENT — PAIN DESCRIPTION - ONSET: ONSET: ON-GOING

## 2022-07-29 ASSESSMENT — PAIN DESCRIPTION - DESCRIPTORS
DESCRIPTORS: DISCOMFORT
DESCRIPTORS: ACHING;STABBING

## 2022-07-29 ASSESSMENT — PAIN SCALES - GENERAL: PAINLEVEL_OUTOF10: 6

## 2022-07-29 ASSESSMENT — PAIN - FUNCTIONAL ASSESSMENT: PAIN_FUNCTIONAL_ASSESSMENT: 0-10

## 2022-07-29 NOTE — ANESTHESIA PRE PROCEDURE
Department of Anesthesiology  Preprocedure Note       Name:  Robby Blackman   Age:  49 y.o.  :  1965                                          MRN:  [id number]         Date:  2022      Surgeon: Harpal Rodriguez):  Abhinav Cadet MD    Procedure: Procedure(s):  ESOPHAGOGASTRODUODENOSCOPY WITH ENDOSCOPIC ULTRASOUND WITH CELIAC PLEXUS BLOCK    Medications prior to admission:   Prior to Admission medications    Medication Sig Start Date End Date Taking? Authorizing Provider   diazePAM (VALIUM) 5 MG tablet TAKE 1 TABLET BY MOUTH EVERY 12 HOURS AS NEEDED FOR ANXIETY 22  Lisa Jeter MD   insulin lispro, 1 Unit Dial, 100 UNIT/ML SOPN IF LESS THAN 100 INJECT NONE, 100-150 INJECT 4 UNITS UNDER THE SKIN, 151-200 6 UNITS, 201-250 8 UNITS, 251-300 10 UNITS THREE TIMES DAILY 22   Lisa Jeter MD   esomeprazole (NEXIUM) 40 MG delayed release capsule TAKE 1 CAPSULE BY MOUTH EVERY MORNING BEFORE BREAKFAST 22   Lisa Jeter MD   CREON 59033-70091 units delayed release capsule TAKE 1 CAPSULE BY MOUTH THREE TIMES DAILY WITH MEALS 22   Lisa Jeter MD   metFORMIN (GLUCOPHAGE-XR) 500 mg extended release tablet Take 2 tablets by mouth 2 times daily 22   Lisa Jeter MD   glimepiride (AMARYL) 4 MG tablet TAKE 1 TABLET BY MOUTH EVERY MORNING BEFORE BREAKFAST 22   Lisa Jeter MD   ondansetron (ZOFRAN) 4 MG tablet Take 1 tablet by mouth every 8 hours as needed for Nausea or Vomiting 22   Lisa Jeter MD   oxyCODONE-acetaminophen (PERCOCET)  MG per tablet Take 1 tablet by mouth 3 times daily.     Historical Provider, MD   blood glucose test strips (ONETOUCH ULTRA) strip USE TO TEST THREE TIMES DAILY AS NEEDED 3/10/22   Lisa Jeter MD   JARDIANCE 25 MG tablet TAKE 1 TABLET BY MOUTH DAILY 22   Lisa Jeter MD   fluticasone [facility]) 50 MCG/ACT nasal spray USE 2 SPRAYS IN [facility] NOSTRIL DAILY 10/18/21   Lisa Jeter MD   Insulin Pen Needle (B-D UF III MINI PEN NEEDLES) 31G X 5 MM MISC USE ONCE DAILY AS DIRECTED 10/4/21   Caryle Husky, MD   simvastatin (ZOCOR) 40 MG tablet TAKE 1 TABLET BY MOUTH NIGHTLY 10/1/21   JOELLE Pop - THERESA   losartan-hydroCHLOROthiazide (HYZAAR) 100-25 MG per tablet TAKE 1 TABLET BY MOUTH DAILY 9/9/21   Karl Steven MD   insulin detemir (LEVEMIR FLEXTOUCH) 100 UNIT/ML injection pen Inject 55 Units into the skin nightly  Patient taking differently: Inject 70 Units into the skin nightly 9/9/21   Karl Steven MD   ibuprofen (ADVIL;MOTRIN) 800 MG tablet TAKE 1 TABLET BY MOUTH THREE TIMES DAILY WITH MEALS  Patient taking differently: 3 times daily as needed 6/28/21   Karl Steven MD   clobetasol (OLUX) 0.05 % foam APPLY EXTERNALLY TO THE SCALP TWICE DAILY AS NEEDED 12/3/20   Karl Steven MD   Blood Glucose Monitoring Suppl (ONE TOUCH ULTRA 2) w/Device KIT 1 kit by Does not apply route daily 12/3/19   Karl Steven MD   [facility] LANCETS FINE MISC USE TO TEST THREE TIMES DAILY 12/3/19   Karl Steven MD   nystatin (MYCOSTATIN) 455006 UNIT/GM cream APPLY TOPICALLY TWICE DAILY TO FOUR TIMES DAILY 11/5/18   Karl Steven MD   MICROLET LANCETS [address] [facility] TEST 3 TO 4 TIMES DAILY 9/17/13   Karl Steven MD       Current medications:    No current facility-administered medications for this visit. No current outpatient medications on file. Facility-Administered Medications Ordered in Other Visits   Medication Dose Route Frequency Provider Last Rate Last Admin    lactated ringers infusion   IntraVENous Continuous Sherolyn Short, DO           Allergies:     Allergies   Allergen Reactions    Amoxicillin      Rash    Morphine      RASH, hallucinations oral and IV       Problem List:    Patient Active Problem List   Diagnosis Code    Mixed hyperlipidemia E78.2    Essential hypertension I10    Chronic pancreatitis K86.1    Chronic pain syndrome G89.4    Esophageal reflux K21.9    Sleep apnea G47.30    Allergic rhinitis J30.9    Moderate episode of recurrent major depressive disorder ([facility] F33.1    Erectile dysfunction, non organic F52.8    Meniere's disease H81.09    Insomnia G47.00    Morbid obesity (HCC) E66.01    Type 2 diabetes mellitus with renal manifestations ([facility]) E11.29    Microalbuminuria R80.9    Chronic generalized abdominal pain R10.84, G89.29    Anxiety F41.9    Chronic, continuous use of opioids F11.90    Type 2 diabetes mellitus with hyperglycemia, without long-term current use of insulin ([facility]) E11.65       Past Medical History:        Diagnosis Date    Anxiety state, unspecified     Calculus of kidney     Deaf     RIGHT EAR    Diabetes mellitus ([facility]     Diverticula     GERD (gastroesophageal reflux disease)     Hordeolum externum     Hyperlipidemia     Hypertension     Insomnia, unspecified     Kidney stones     MRSA carrier     Pancreatitis, chronic (HCC)     PONV (postoperative nausea and vomiting)     Psychiatric problem     Sleep apnea     uses CPAP    Unspecified hypertrophic and atrophic condition of skin        Past Surgical History:        Procedure Laterality Date    ABSCESS DRAINAGE Left 1/5/14    incision and drainage of an abcess on left calf    APPENDECTOMY      CHOLECYSTECTOMY      COLOSTOMY      ERCP N/A 5/26/2022    ERCP STENT INSERTION performed by Surya Worthy MD at [place] [address]  4/14/2022    CELIAC PLEXUS BLOCK performed by Surya Worthy MD at [address] [place] [address]      duct stent    REVISION COLOSTOMY      SUBTOTAL COLECTOMY      Had wound dehiscence, mesh    TRACHEOSTOMY      TRACHEOSTOMY CLOSURE      UPPER GASTROINTESTINAL ENDOSCOPY N/A 4/14/2022    EGD ESOPHAGOGASTRODUODENOSCOPY ULTRASOUND performed by Surya Worthy MD at [address] [place] [address]       Social History:    Social History     Tobacco Use    Smoking status: Never    Smokeless tobacco: Never Substance Use Topics    Alcohol use: No     Alcohol/week: 0.0 standard drinks                                Counseling given: Not Answered      Vital Signs (Current): There were no vitals filed for this visit. BP Readings from Last 3 Encounters:   07/29/22 (!) 145/91   05/26/22 126/68   05/12/22 130/74       NPO Status:                                                                                 BMI:   Wt Readings from Last 3 Encounters:   07/29/22 295 lb (133.8 kg)   05/26/22 299 lb (135.6 kg)   05/12/22 299 lb (135.6 kg)     There is no height or weight on file to calculate BMI.    CBC:   Lab Results   Component Value Date/Time    WBC 9.2 02/21/2022 05:31 AM    RBC 5.24 02/21/2022 05:31 AM    HGB 14.4 02/21/2022 05:31 AM    HCT 43.4 02/21/2022 05:31 AM    MCV 82.8 02/21/2022 05:31 AM    RDW 14.6 02/21/2022 05:31 AM     02/21/2022 05:31 AM       CMP:   Lab Results   Component Value Date/Time     06/03/2022 11:30 AM    K 4.2 06/03/2022 11:30 AM    K 3.2 02/21/2022 05:31 AM     06/03/2022 11:30 AM    CO2 23 06/03/2022 11:30 AM    BUN 14 06/03/2022 11:30 AM    CREATININE 0.6 06/03/2022 11:30 AM    GFRAA >60 06/03/2022 11:30 AM    GFRAA >60 05/08/2013 01:39 PM    AGRATIO 1.0 06/03/2022 11:30 AM    LABGLOM >60 06/03/2022 11:30 AM    GLUCOSE 142 06/03/2022 11:30 AM    PROT 7.7 06/03/2022 11:30 AM    PROT 7.8 02/16/2013 05:10 AM    CALCIUM 9.1 06/03/2022 11:30 AM    BILITOT 0.4 06/03/2022 11:30 AM    ALKPHOS 92 06/03/2022 11:30 AM    AST 21 06/03/2022 11:30 AM    ALT 38 06/03/2022 11:30 AM       POC Tests: No results for input(s): POCGLU, POCNA, POCK, POCCL, POCBUN, POCHEMO, POCHCT in the last 72 hours. Coags: No results found for: PROTIME, INR, APTT    HCG (If Applicable): No results found for: PREGTESTUR, PREGSERUM, HCG, HCGQUANT     ABGs: No results found for: PHART, PO2ART, UTL8DGH, YJO4PJX, BEART, I3GCYQXW     Type & Screen (If Applicable):   No results found for: Tiffanie Certain    Drug/Infectious Status (If Applicable):  No results found for: HIV, HEPCAB    COVID-19 Screening (If Applicable):   Lab Results   Component Value Date/Time    COVID19 Not Detected 07/27/2022 11:27 AM           Anesthesia Evaluation  Patient summary reviewed and Nursing notes reviewed   history of anesthetic complications: PONV. Airway: Mallampati: II  TM distance: >3 FB   Neck ROM: full  Mouth opening: > = 3 FB   Dental: normal exam         Pulmonary:   (+) sleep apnea: on CPAP,      (-) asthma and shortness of breath                           Cardiovascular:  Exercise tolerance: good (>4 METS),   (+) hypertension: moderate,     (-)  angina                Neuro/Psych:   (+) psychiatric history: stable with treatmentdepression/anxiety    (-) CVA           GI/Hepatic/Renal:   (+) GERD: well controlled, morbid obesity     (-) liver disease       Endo/Other:    (+) DiabetesType II DM, well controlled, , .    (-) hypothyroidism               Abdominal:   (+) obese,           Vascular:     - PVD. Other Findings:             Anesthesia Plan      MAC     ASA 3       Induction: intravenous. Anesthetic plan and risks discussed with patient. Plan discussed with CRNA.                     Dena Jean DO   7/29/2022

## 2022-07-29 NOTE — ANESTHESIA POSTPROCEDURE EVALUATION
Department of Anesthesiology  Postprocedure Note    Patient: Arlene Leong  MRN: [id number]  YOB: 1965  Date of evaluation: 7/29/2022      Procedure Summary     Date: 07/29/22 Room / Location: [address] [place] [address] Dane Moe  / [facility]    Anesthesia Start: 0878 Anesthesia Stop: 1419    Procedures:       ESOPHAGOGASTRODUODENOSCOPY WITH ENDOSCOPIC ULTRASOUND WITH CELIAC PLEXUS BLOCK      EGD W/EUS FNA Diagnosis:       Chronic pancreatitis, unspecified pancreatitis type ([address]      (Chronic pancreatitis, unspecified pancreatitis type ([facility] [K86.1])    Surgeons: Tricia Jasmine MD Responsible Provider: Medardo Rodarte DO    Anesthesia Type: MAC ASA Status: 3          Anesthesia Type: No value filed.     Hitesh Phase I: Hitesh Score: 10    Hitesh Phase II: Hitesh Score: 9      Anesthesia Post Evaluation    Patient location during evaluation: PACU  Patient participation: complete - patient participated  Level of consciousness: awake and alert  Airway patency: patent  Nausea & Vomiting: no nausea and no vomiting  Cardiovascular status: blood pressure returned to baseline  Respiratory status: acceptable  Hydration status: euvolemic

## 2022-07-29 NOTE — PROGRESS NOTES
Ambulatory Surgery/Procedure Discharge Note    Vitals:    07/29/22 1040   BP: 127/82   Pulse: 58   Resp: 18   Temp:    SpO2: 96%       No intake/output data recorded. Pt drank a glass of water; voided X 1 in toilet    Restroom use offered before discharge. Yes -- voided X 1    Pain assessment:  location, entire abdomen, acute on chronic;   Pain Level: 6    Pt returned to Endo recovery s/p EGD and related procedures. Pt arrived alert; speech clear; breathing easily on 2 L NC, and then additional VS on RA and breathing easily on same. Pt stated he had pain in his abdomen of 6/10, and had pain in abd prior to procedure of 7/10. Pt denies any nausea. Dr. Barbara Hayward came to bedside to talk with pt and his wife Jenn Johnson. Pt drank water, and tolerated well. Discharge instructions discussed with pt and wife, and both verbalized understanding. IV removed, and dressing applied. Pt walked to bathroom with steady gait. Patient discharged to home/self care.  Patient discharged via wheel chair by this RN to waiting family/S.O.       7/29/2022 11:39 AM

## 2022-07-29 NOTE — PROCEDURES
Endoscopy Note    Patient: Alanna Olsen  : 1965  CSN:     Procedure: Esophagogastroduodenoscopy with removal of foreign body and endoscopic ultrasound with biopsy of mass lesion and celiac plexus block    Date:  2022     Surgeon:  Michelle Meza MD     Referring Physician: Royer Ruiz    Preoperative Diagnosis: Pancreatitis chronic pancreatitis abnormal CT abnormal MRI    Postoperative Diagnosis: #1 common bile duct stent had fallen out was in the duodenum #2 successful movable pancreatic ductal stent #3 dilated common bile duct #4 to centimeter mass lesion next to the body of the pancreas biopsied most likely old solidified pseudocyst    Anesthesia: Monitored anesthesia care    EBL: <5 mL    Indications: This is a 49y.o. year old male who we have been working with because he has had problems with pancreatitis this all happened after he had an operation for his diverticulitis we did a previous EUS and there were several things on EUS that were interesting we did an MRI but the MRI really did not show a 1 we want to make sure that we were not missing a tumor we did an ERCP which showed that he had an irregular pancreatic duct dilated common bile duct we stented both he is continuing to have this pain so redoing a repeat EUS today and celiac plexus block    Description of Procedure:  Informed consent was obtained from the patient after explanation of indications, benefits and possible risks and complications of the procedure. The patient was then taken to the endoscopy suite, placed in the left lateral decubitus position and the above IV sedation was administrered.     The Olympus linear EUS scope was gently placed over his tongue and into the esophagus with abnormalities down to the stomach we found no advance down to the duodenum without a common bile duct stent in the duodenum which were using a snare we were excessively removed and then there was the stent that was coming out of the ampulla    During on the ultrasound portion here    Head of the pancreas found no overt abnormalities but we could trace the pancreatic duct this tracing of the pancreatic duct showed that he definitely had fatty infiltration of the body and the tail the pancreas there is no stricture we found him mass lesion but we we did find was that there was some type of a mass lesion which was pressing right on the neck of the pancreas and and/or body of the pancreas so we went ahead and with a 25gauge acquire needle we were able to do 4 biopsies of this one of our biopsies we did send to the pathology lab they found lots of debris so most likely this is an old calcified or solidified pseudocyst    We were able to look at the common bile duct evaluate the bile duct definitely was dilated we found no other overt abnormalities with the bile duct I did measure the bile duct to be just a little over 7 mm and then it goes all the way up to 9 mm    We then were able to find the adrenal gland from the adrenal gland we found the celiac access we then were able to place a 22-gauge needle and here which we felt we were in good position we then put 30 mL of a bupivacaine Depo-Medrol mixture of the bupivacaine was 0.5% Depo-Medrol 40 mg    We were able to get a look at the liver the liver definitely had some irregularity and some redness to it suggesting that there could be some fatty infiltration here also    Before we terminated the procedure went back to the duodenum and with the snare we gently removed the pancreatic ductal stent      And then we terminated the procedure we gave the patient 400 mg of ciprofloxacin we also gave him 100 mg of indomethacin we terminated the procedure    Gastric or Duodenal ulcer present: No      The patient tolerated the procedure well and was taken to the post anesthesia care unit in good condition.       Impression: #1 2 cm mass lesion most likely also notified pseudocyst #2 dilated common bile duct #3 no evidence of a tumor found in the in the pancreas #4 fatty infiltration of the pancreas #5 fatty infiltration of the liver #6 successful celiac plexus block      Recommendations:  At this time we need to continue to watch the patient    We need to follow-up on the biopsies but most likely this is an old solidified pseudocyst    I will send the patient home with 3 days of ciprofloxacin    Make the patient a follow-up appointment in 2 to 3 weeks to see how he is doing with his pain and will continue to work and see what we can do to improve his pain situation    Thank you for this kind referral    Alma Acuna MD, MD [facility]  [phone]

## 2022-07-29 NOTE — DISCHARGE INSTRUCTIONS
ENDOSCOPY DISCHARGE INSTRUCTIONS:    Call the physician that did your procedure for any questions or concern:    [facility]: [phone]  DR. BUNNY CRAWLEY       ACTIVITY:    There are potential side effects to the medications used for sedation and anesthesia during your procedure. These include:  Dizziness or light-headedness, confusion or memory loss, delayed reaction times, loss of coordination, nausea and vomiting. Because of your increased risk for injury, we ask that you observe the following precautions: For the next 24 hours,  DO NOT operate an automobile, bicycle, motorcycle, , power tools or large equipment of any kind. Do not drink alcohol, sign any legal documents or make any legal decisions for 24 hours. Do not bend your head over lower than your heart. DO sit on the side of bed/couch awhile before getting up. Plan on bedrest or quiet relaxation today. You may resume normal activities in 24 hours. DIET:    Your first meal today should be light, avoiding spicy and fatty foods. If you tolerate this first meal, then you may advance to your regular diet unless otherwise advised by your physician. NORMAL SYMPTOMS:  -Mild sore throat if youve had an EGD   -Gaseous discomfort    NOTIFY YOUR PHYSICIAN IF THESE SYMPTOMS OCCUR:  1. Fever (greater than 100)  5. Increased abdominal bloating  2. Severe pain    6. Excessive bleeding  3. Nausea and vomiting  7. Chest pain                                                                    4. Chills    8. Shortness of breath    ADDITIONAL INSTRUCTIONS:    Biopsy results: Call [address],[facility] for biopsy results in 1 week    Educational Information:    Impression: #1 2 cm mass lesion most likely also notified pseudocyst #2 dilated common bile duct #3 no evidence of a tumor found in the in the pancreas #4 fatty infiltration of the pancreas #5 fatty infiltration of the liver #6 successful celiac plexus block        Recommendations:  At this time we need to continue to watch the patient     We need to follow-up on the biopsies but most likely this is an old solidified pseudocyst     I will send the patient home with 3 days of ciprofloxacin     Make the patient a follow-up appointment in 2 to 3 weeks to see how he is doing with his pain and will continue to work and see what we can do to improve his pain situation     Thank you for this kind referral     Shaneka Jain MD, MD  [facility]  [phone]        Please review these discharge instructions this evening or tomorrow for  information you may have forgotten. We want to thank you for choosing the [facility] as your health care provider. We always strive to provide you with excellent care while you are here. You may receive a survey in the mail regarding your care. We would appreciate you taking a few minutes of your time to complete this survey.

## 2022-07-30 NOTE — H&P
Gastroenterology Note             Pre-operative History and Physical    Patient: Mac Becerra  : 1965  CSN:     History Obtained From:  patient and/or guardian.      HISTORY OF PRESENT ILLNESS:    The patient is a 49 y.o. male  here for EGD and eus and celiac pain block   He has had mri and ct and ercp and still has pain     Past Medical History:    Past Medical History:   Diagnosis Date    Anxiety state, unspecified     Calculus of kidney     Deaf     RIGHT EAR    Diabetes mellitus ([address]     Diverticula     GERD (gastroesophageal reflux disease)     Hordeolum externum     Hyperlipidemia     Hypertension     Insomnia, unspecified     Kidney stones     MRSA carrier     Pancreatitis, chronic (HCC)     PONV (postoperative nausea and vomiting)     Psychiatric problem     Sleep apnea     uses CPAP    Unspecified hypertrophic and atrophic condition of skin      Past Surgical History:    Past Surgical History:   Procedure Laterality Date    ABSCESS DRAINAGE Left 14    incision and drainage of an abcess on left calf    APPENDECTOMY      CHOLECYSTECTOMY      COLOSTOMY      ERCP N/A 2022    ERCP STENT INSERTION performed by Amy Henderson MD at [address] [place] [address]  2022    CELIAC PLEXUS BLOCK performed by Amy Henderson MD at [address]      duct stent    REVISION COLOSTOMY      SUBTOTAL COLECTOMY      Had wound dehiscence, mesh    TRACHEOSTOMY      TRACHEOSTOMY CLOSURE      UPPER GASTROINTESTINAL ENDOSCOPY N/A 2022    EGD ESOPHAGOGASTRODUODENOSCOPY ULTRASOUND performed by Amy Henderson MD at [facility] N/A 2022    ESOPHAGOGASTRODUODENOSCOPY WITH ENDOSCOPIC ULTRASOUND WITH CELIAC PLEXUS BLOCK performed by Amy Henderson MD at [address] [place] [address] 2022    EGD W/EUS FNA performed by Amy Henderson MD at [facility] ENDOSCOPY     Medications Prior to Admission:   No current facility-administered medications on file prior to encounter. Current Outpatient Medications on File Prior to Encounter   Medication Sig Dispense Refill    metFORMIN (GLUCOPHAGE-XR) 500 mg extended release tablet Take 2 tablets by mouth 2 times daily 360 tablet 3    glimepiride (AMARYL) 4 MG tablet TAKE 1 TABLET BY MOUTH EVERY MORNING BEFORE BREAKFAST 90 tablet 3    ondansetron (ZOFRAN) 4 MG tablet Take 1 tablet by mouth every 8 hours as needed for Nausea or Vomiting 90 tablet 3    oxyCODONE-acetaminophen (PERCOCET)  MG per tablet Take 1 tablet by mouth 3 times daily.       blood glucose test strips (ONETOUCH ULTRA) strip USE TO TEST THREE TIMES DAILY AS NEEDED 300 strip 12    JARDIANCE 25 MG tablet TAKE 1 TABLET BY MOUTH DAILY 90 tablet 3    fluticasone (FLONASE) 50 MCG/ACT nasal spray USE 2 SPRAYS IN EACH NOSTRIL DAILY 48 g 3    Insulin Pen Needle (B-D UF III MINI PEN NEEDLES) 31G X 5 MM MISC USE ONCE DAILY AS DIRECTED 100 each 12    simvastatin (ZOCOR) 40 MG tablet TAKE 1 TABLET BY MOUTH NIGHTLY 90 tablet 3    losartan-hydroCHLOROthiazide (HYZAAR) 100-25 MG per tablet TAKE 1 TABLET BY MOUTH DAILY 90 tablet 3    insulin detemir (LEVEMIR FLEXTOUCH) 100 UNIT/ML injection pen Inject 55 Units into the skin nightly (Patient taking differently: Inject 70 Units into the skin nightly) 15 pen 11    ibuprofen (ADVIL;MOTRIN) 800 MG tablet TAKE 1 TABLET BY MOUTH THREE TIMES DAILY WITH MEALS (Patient taking differently: 3 times daily as needed) 90 tablet 12    clobetasol (OLUX) 0.05 % foam APPLY EXTERNALLY TO THE SCALP TWICE DAILY AS NEEDED 50 g 3    Blood Glucose Monitoring Suppl (ONE TOUCH ULTRA 2) w/Device KIT 1 kit by Does not apply route daily 1 kit 0    ONETOUCH DELICA LANCETS FINE MISC USE TO TEST THREE TIMES DAILY 300 each 12    nystatin (MYCOSTATIN) 042176 UNIT/GM cream APPLY TOPICALLY TWICE DAILY TO FOUR TIMES DAILY 60 g 0    MICROLET LANCETS MISC TEST 3 TO 4 TIMES DAILY 300 each 10 Allergies:  Amoxicillin and Morphine      Social History:   Social History     Tobacco Use    Smoking status: Never    Smokeless tobacco: Never   Substance Use Topics    Alcohol use: No     Alcohol/week: 0.0 standard drinks     Family History:   Family History   Problem Relation Age of Onset    Diabetes Mother     Sudden Death Mother         suicide    Alcohol Abuse Father     Alcohol Abuse Brother     No Known Problems Brother     No Known Problems Son     No Known Problems Daughter     No Known Problems Daughter        PHYSICAL EXAM:      /82   Pulse 58   Temp 97 °F (36.1 °C) (Temporal)   Resp 18   Ht 6' (1.829 m)   Wt 295 lb (133.8 kg)   SpO2 96%   BMI 40.01 kg/m²  I        Heart:   RRR, normal s1s2    Lungs:  CTA bilat,  Normal effort    Abdomen:   NT, ND      ASA Grade:  ASA 3 - Patient with moderate systemic disease with functional limitations    Mallampati Class: 2          ASSESSMENT AND PLAN:    1. Patient is a 49 y.o. male here for EGD/eus with MAC.   2.  Procedure options, risks and benefits reviewed with patient. Patient expresses understanding.     Juliana Deshpande MD,   [facility]  7/30/2022

## 2022-08-07 DIAGNOSIS — K86.1 IDIOPATHIC CHRONIC PANCREATITIS (HCC): Primary | ICD-10-CM

## 2022-08-07 RX ORDER — PREGABALIN 50 MG/1
50 CAPSULE ORAL 3 TIMES DAILY
Qty: 90 CAPSULE | Refills: 2 | Status: SHIPPED | OUTPATIENT
Start: 2022-08-07 | End: 2022-09-27

## 2022-08-12 ENCOUNTER — HOSPITAL ENCOUNTER (OUTPATIENT)
Age: 57
Discharge: HOME | End: 2022-08-12
Payer: MEDICARE

## 2022-08-12 DIAGNOSIS — E11.65 TYPE 2 DIABETES MELLITUS WITH HYPERGLYCEMIA, WITHOUT LONG-TERM CURRENT USE OF INSULIN (HCC): ICD-10-CM

## 2022-08-12 LAB
ANION GAP SERPL CALCULATED.3IONS-SCNC: 11 MMOL/L (ref 3–16)
BUN BLDV-MCNC: 15 MG/DL (ref 7–20)
CALCIUM SERPL-MCNC: 9 MG/DL (ref 8.3–10.6)
CHLORIDE BLD-SCNC: 104 MMOL/L (ref 99–110)
CO2: 26 MMOL/L (ref 21–32)
CREAT SERPL-MCNC: 0.7 MG/DL (ref 0.9–1.3)
GFR AFRICAN AMERICAN: >60
GFR NON-AFRICAN AMERICAN: >60
GLUCOSE BLD-MCNC: 115 MG/DL (ref 70–99)
POTASSIUM SERPL-SCNC: 4.1 MMOL/L (ref 3.5–5.1)
SODIUM BLD-SCNC: 141 MMOL/L (ref 136–145)

## 2022-08-12 PROCEDURE — 83036 HEMOGLOBIN GLYCOSYLATED A1C: CPT

## 2022-08-12 PROCEDURE — 80048 BASIC METABOLIC PNL TOTAL CA: CPT

## 2022-08-12 PROCEDURE — 36415 COLL VENOUS BLD VENIPUNCTURE: CPT

## 2022-08-13 LAB
ESTIMATED AVERAGE GLUCOSE: 174.3 MG/DL
HBA1C MFR BLD: 7.7 %

## 2022-08-15 RX ORDER — FLUTICASONE PROPIONATE 50 MCG
SPRAY, SUSPENSION (ML) NASAL
Qty: 48 G | Refills: 3 | Status: SHIPPED | OUTPATIENT
Start: 2022-08-15

## 2022-08-15 NOTE — TELEPHONE ENCOUNTER
Medication:   Requested Prescriptions     Pending Prescriptions Disp Refills    fluticasone (FLONASE) 50 MCG/ACT nasal spray 48 g 3     Sig: USE 2 SPRAYS IN [facility] NOSTRIL DAILY       Patient Phone Number: [phone] (home)     Last appt: 5/12/2022   Next appt: 8/18/2022    Last OARRS:   RX Monitoring 5/12/2022   Attestation -   Periodic Controlled Substance Monitoring No signs of potential drug abuse or diversion identified.    Chronic Pain > 50 MEDD -   Chronic Pain > 80 MEDD -     PDMP Monitoring:    Last PDMP Hung as Reviewed [facility]):  Review User Review Instant Review Result   Joslyn JOAQUIN 1874 5/12/2022  1:28 PM Reviewed PDMP [1]     Preferred Pharmacy:   [place] [address] [address] [facility] [phone] - F [phone]  [address] [place] [address],[address] [facility] [address]  Phone: [phone] Fax: [phone]    [address] [phone]  [place] [id number] [facility] [address]  Phone: [phone] Fax: [phone]    [place] [address] [address] [facility],[address], [address] [place] [address] [facility] [phone] [facility] [phone]  0 [facility]  [address] [place] [address]  Phone: [phone] Fax: [phone]

## 2022-08-16 NOTE — PROGRESS NOTES
for diabetes. Diagnoses and all orders for this visit:    Type 2 diabetes mellitus with hyperglycemia, without long-term current use of insulin (HCC)  -     Hemoglobin A1C; Future  -     Comprehensive Metabolic Panel; Future  -     Lipid Panel; Future  -     Microalbumin / Creatinine Urine Ratio; Future  -     Hep B, ENGERIX-B, (age 21 yrs+), IM, 1mL, 3-dose      -A1C Worsening and Uncontrolled  -continue current meds, work on diet  -Reviewed pre-visit labs with patient. (BMP and A1C) Review of these labs contributed to MDM. Lab Results   Component Value Date    LABA1C 7.7 08/12/2022       -lab slip given for next visit (ordered above, did not contribute to MDM)  -patient to check sugars as needed  -encouraged a healthy diet, regular exercise and maintaining a healthy weight. Discussed the importance of TLC in controlling diabetes and preventing long term complications of diabetes including CV, renal, neuropathic and ocular. -RTO  3 months  -eye form given:  Yes    Type 2 diabetes mellitus with microalbuminuria, with long-term current use of insulin (HCC)  -     Hep B, ENGERIX-B, (age 21 yrs+), IM, 1mL, 3-dose  On ARB    Meniere's disease, unspecified laterality  -Stable, continue current medications. Controlled Substances Monitoring:   OARRS report reviewed  Periodic Controlled Substance Monitoring: No signs of potential drug abuse or diversion identified. Josselyn Saunders MD)      Primary insomnia    Screening PSA (prostate specific antigen)  -     PSA Screening; Future    Need for vaccination  -     Hep B, ENGERIX-B, (age 21 yrs+), IM, 1mL, 3-dose            Return in about 3 months (around 11/18/2022) for Diabetes, 30 min.          Portions of Note per  Estefanía Deras CMA AAMA with corrections and edits per Josselyn Saunders MD.  I agree with entirety of note and was present and performed history and physical.  I also confirm that the note above accurately reflects all work, treatment, procedures, and

## 2022-08-18 ENCOUNTER — OFFICE VISIT (OUTPATIENT)
Dept: FAMILY MEDICINE CLINIC | Age: 57
End: 2022-08-18
Payer: MEDICARE

## 2022-08-18 VITALS
SYSTOLIC BLOOD PRESSURE: 134 MMHG | OXYGEN SATURATION: 98 % | WEIGHT: 293 LBS | BODY MASS INDEX: 39.74 KG/M2 | HEART RATE: 95 BPM | TEMPERATURE: 97.6 F | DIASTOLIC BLOOD PRESSURE: 86 MMHG

## 2022-08-18 DIAGNOSIS — Z23 NEED FOR VACCINATION: ICD-10-CM

## 2022-08-18 DIAGNOSIS — R80.9 TYPE 2 DIABETES MELLITUS WITH MICROALBUMINURIA, WITH LONG-TERM CURRENT USE OF INSULIN (HCC): ICD-10-CM

## 2022-08-18 DIAGNOSIS — E11.65 TYPE 2 DIABETES MELLITUS WITH HYPERGLYCEMIA, WITHOUT LONG-TERM CURRENT USE OF INSULIN (HCC): Primary | ICD-10-CM

## 2022-08-18 DIAGNOSIS — E11.29 TYPE 2 DIABETES MELLITUS WITH MICROALBUMINURIA, WITH LONG-TERM CURRENT USE OF INSULIN (HCC): ICD-10-CM

## 2022-08-18 DIAGNOSIS — Z12.5 SCREENING PSA (PROSTATE SPECIFIC ANTIGEN): ICD-10-CM

## 2022-08-18 DIAGNOSIS — Z79.4 TYPE 2 DIABETES MELLITUS WITH MICROALBUMINURIA, WITH LONG-TERM CURRENT USE OF INSULIN (HCC): ICD-10-CM

## 2022-08-18 DIAGNOSIS — F51.01 PRIMARY INSOMNIA: ICD-10-CM

## 2022-08-18 DIAGNOSIS — H81.09 MENIERE'S DISEASE, UNSPECIFIED LATERALITY: ICD-10-CM

## 2022-08-18 PROCEDURE — 90746 HEPB VACCINE 3 DOSE ADULT IM: CPT | Performed by: FAMILY MEDICINE

## 2022-08-18 PROCEDURE — G0010 ADMIN HEPATITIS B VACCINE: HCPCS | Performed by: FAMILY MEDICINE

## 2022-08-18 PROCEDURE — 99214 OFFICE O/P EST MOD 30 MIN: CPT | Performed by: FAMILY MEDICINE

## 2022-08-18 PROCEDURE — 3051F HG A1C>EQUAL 7.0%<8.0%: CPT | Performed by: FAMILY MEDICINE

## 2022-08-18 ASSESSMENT — PATIENT HEALTH QUESTIONNAIRE - PHQ9
SUM OF ALL RESPONSES TO PHQ QUESTIONS 1-9: 2
2. FEELING DOWN, DEPRESSED OR HOPELESS: 1
SUM OF ALL RESPONSES TO PHQ9 QUESTIONS 1 & 2: 2
SUM OF ALL RESPONSES TO PHQ QUESTIONS 1-9: 2
1. LITTLE INTEREST OR PLEASURE IN DOING THINGS: 1

## 2022-08-18 NOTE — PROGRESS NOTES
Immunization(s) given during visit:     Immunizations Administered       Name Date Dose Route    Hepatitis B Adult (Engerix-B) 8/18/2022 1 mL Intramuscular    Site: Deltoid- Left    Lot:     NDC: 43770-441-44             Patient instructed to remain in clinic for 20 minutes after injection and was advised to report any adverse reaction to me immediately.

## 2022-09-13 RX ORDER — LOSARTAN POTASSIUM AND HYDROCHLOROTHIAZIDE 25; 100 MG/1; MG/1
TABLET ORAL
Qty: 90 TABLET | Refills: 3 | Status: SHIPPED | OUTPATIENT
Start: 2022-09-13

## 2022-09-16 ENCOUNTER — HOSPITAL ENCOUNTER (OUTPATIENT)
Age: 57
Discharge: HOME | End: 2022-09-16
Payer: MEDICARE

## 2022-09-16 LAB — LIPASE: 21 U/L (ref 13–60)

## 2022-09-16 PROCEDURE — 36415 COLL VENOUS BLD VENIPUNCTURE: CPT

## 2022-09-16 PROCEDURE — 83690 ASSAY OF LIPASE: CPT

## 2022-09-27 ENCOUNTER — ANESTHESIA (OUTPATIENT)
Dept: ENDOSCOPY | Age: 57
End: 2022-09-27
Payer: MEDICARE

## 2022-09-27 ENCOUNTER — HOSPITAL ENCOUNTER (OUTPATIENT)
Age: 57
Setting detail: OUTPATIENT SURGERY
Discharge: HOME | End: 2022-09-27
Attending: INTERNAL MEDICINE | Admitting: INTERNAL MEDICINE
Payer: MEDICARE

## 2022-09-27 ENCOUNTER — ANESTHESIA EVENT (OUTPATIENT)
Dept: ENDOSCOPY | Age: 57
End: 2022-09-27
Payer: MEDICARE

## 2022-09-27 ENCOUNTER — APPOINTMENT (OUTPATIENT)
Dept: GENERAL RADIOLOGY | Age: 57
End: 2022-09-27
Attending: INTERNAL MEDICINE
Payer: MEDICARE

## 2022-09-27 VITALS
HEART RATE: 67 BPM | OXYGEN SATURATION: 94 % | TEMPERATURE: 97.9 F | DIASTOLIC BLOOD PRESSURE: 85 MMHG | RESPIRATION RATE: 15 BRPM | HEIGHT: 72 IN | BODY MASS INDEX: 40.63 KG/M2 | SYSTOLIC BLOOD PRESSURE: 145 MMHG | WEIGHT: 300 LBS

## 2022-09-27 LAB
GLUCOSE BLD-MCNC: 115 MG/DL (ref 70–99)
GLUCOSE BLD-MCNC: 123 MG/DL (ref 70–99)
PERFORMED ON: ABNORMAL
PERFORMED ON: ABNORMAL

## 2022-09-27 PROCEDURE — 6360000002 HC RX W HCPCS: Performed by: NURSE ANESTHETIST, CERTIFIED REGISTERED

## 2022-09-27 PROCEDURE — 7100000011 HC PHASE II RECOVERY - ADDTL 15 MIN: Performed by: INTERNAL MEDICINE

## 2022-09-27 PROCEDURE — 3609015100 HC ERCP STENT PLACEMENT BILIARY/PANCREATIC DUCT: Performed by: INTERNAL MEDICINE

## 2022-09-27 PROCEDURE — 2580000003 HC RX 258: Performed by: ANESTHESIOLOGY

## 2022-09-27 PROCEDURE — 7100000010 HC PHASE II RECOVERY - FIRST 15 MIN: Performed by: INTERNAL MEDICINE

## 2022-09-27 PROCEDURE — 7100000000 HC PACU RECOVERY - FIRST 15 MIN: Performed by: INTERNAL MEDICINE

## 2022-09-27 PROCEDURE — 2580000003 HC RX 258: Performed by: NURSE ANESTHETIST, CERTIFIED REGISTERED

## 2022-09-27 PROCEDURE — 2500000003 HC RX 250 WO HCPCS: Performed by: NURSE ANESTHETIST, CERTIFIED REGISTERED

## 2022-09-27 PROCEDURE — 2500000003 HC RX 250 WO HCPCS: Performed by: ANESTHESIOLOGY

## 2022-09-27 PROCEDURE — 3700000000 HC ANESTHESIA ATTENDED CARE: Performed by: INTERNAL MEDICINE

## 2022-09-27 PROCEDURE — 74330 X-RAY BILE/PANC ENDOSCOPY: CPT

## 2022-09-27 PROCEDURE — 2709999900 HC NON-CHARGEABLE SUPPLY: Performed by: INTERNAL MEDICINE

## 2022-09-27 PROCEDURE — 6360000004 HC RX CONTRAST MEDICATION: Performed by: INTERNAL MEDICINE

## 2022-09-27 PROCEDURE — 6360000002 HC RX W HCPCS: Performed by: INTERNAL MEDICINE

## 2022-09-27 PROCEDURE — 3700000001 HC ADD 15 MINUTES (ANESTHESIA): Performed by: INTERNAL MEDICINE

## 2022-09-27 PROCEDURE — 2720000010 HC SURG SUPPLY STERILE: Performed by: INTERNAL MEDICINE

## 2022-09-27 PROCEDURE — 6360000002 HC RX W HCPCS: Performed by: ANESTHESIOLOGY

## 2022-09-27 PROCEDURE — 6370000000 HC RX 637 (ALT 250 FOR IP): Performed by: INTERNAL MEDICINE

## 2022-09-27 PROCEDURE — C2625 STENT, NON-COR, TEM W/DEL SY: HCPCS | Performed by: INTERNAL MEDICINE

## 2022-09-27 PROCEDURE — C2617 STENT, NON-COR, TEM W/O DEL: HCPCS | Performed by: INTERNAL MEDICINE

## 2022-09-27 PROCEDURE — 7100000001 HC PACU RECOVERY - ADDTL 15 MIN: Performed by: INTERNAL MEDICINE

## 2022-09-27 DEVICE — PANCREATIC STENT
Type: IMPLANTABLE DEVICE | Status: FUNCTIONAL
Brand: ADVANIX™ PANCREATIC STENT

## 2022-09-27 DEVICE — OASIS ONE ACTION STENT INTRODUCTION SYSTEM, WITH PRE-LOADED COTTON LEUNG BILIARY STENT
Type: IMPLANTABLE DEVICE | Status: FUNCTIONAL
Brand: OASIS

## 2022-09-27 RX ORDER — LABETALOL HYDROCHLORIDE 5 MG/ML
5 INJECTION, SOLUTION INTRAVENOUS
Status: DISCONTINUED | OUTPATIENT
Start: 2022-09-27 | End: 2022-09-27 | Stop reason: HOSPADM

## 2022-09-27 RX ORDER — SODIUM CHLORIDE 9 MG/ML
INJECTION, SOLUTION INTRAVENOUS CONTINUOUS
Status: DISCONTINUED | OUTPATIENT
Start: 2022-09-27 | End: 2022-09-27

## 2022-09-27 RX ORDER — ROCURONIUM BROMIDE 10 MG/ML
INJECTION, SOLUTION INTRAVENOUS PRN
Status: DISCONTINUED | OUTPATIENT
Start: 2022-09-27 | End: 2022-09-27 | Stop reason: SDUPTHER

## 2022-09-27 RX ORDER — ONDANSETRON 2 MG/ML
INJECTION INTRAMUSCULAR; INTRAVENOUS PRN
Status: DISCONTINUED | OUTPATIENT
Start: 2022-09-27 | End: 2022-09-27 | Stop reason: SDUPTHER

## 2022-09-27 RX ORDER — ONDANSETRON 2 MG/ML
4 INJECTION INTRAMUSCULAR; INTRAVENOUS EVERY 6 HOURS PRN
Status: DISCONTINUED | OUTPATIENT
Start: 2022-09-27 | End: 2022-09-27 | Stop reason: HOSPADM

## 2022-09-27 RX ORDER — DEXAMETHASONE SODIUM PHOSPHATE 4 MG/ML
INJECTION, SOLUTION INTRA-ARTICULAR; INTRALESIONAL; INTRAMUSCULAR; INTRAVENOUS; SOFT TISSUE PRN
Status: DISCONTINUED | OUTPATIENT
Start: 2022-09-27 | End: 2022-09-27 | Stop reason: SDUPTHER

## 2022-09-27 RX ORDER — SODIUM CHLORIDE 9 MG/ML
INJECTION, SOLUTION INTRAVENOUS CONTINUOUS
Status: DISCONTINUED | OUTPATIENT
Start: 2022-09-27 | End: 2022-09-27 | Stop reason: HOSPADM

## 2022-09-27 RX ORDER — MIDAZOLAM HYDROCHLORIDE 1 MG/ML
INJECTION INTRAMUSCULAR; INTRAVENOUS PRN
Status: DISCONTINUED | OUTPATIENT
Start: 2022-09-27 | End: 2022-09-27 | Stop reason: SDUPTHER

## 2022-09-27 RX ORDER — HYDROMORPHONE HCL 110MG/55ML
0.5 PATIENT CONTROLLED ANALGESIA SYRINGE INTRAVENOUS EVERY 5 MIN PRN
Status: DISCONTINUED | OUTPATIENT
Start: 2022-09-27 | End: 2022-09-27 | Stop reason: HOSPADM

## 2022-09-27 RX ORDER — SUCCINYLCHOLINE/SOD CL,ISO/PF 200MG/10ML
SYRINGE (ML) INTRAVENOUS PRN
Status: DISCONTINUED | OUTPATIENT
Start: 2022-09-27 | End: 2022-09-27 | Stop reason: SDUPTHER

## 2022-09-27 RX ORDER — OXYCODONE HYDROCHLORIDE 5 MG/1
5 TABLET ORAL
Status: DISCONTINUED | OUTPATIENT
Start: 2022-09-27 | End: 2022-09-27 | Stop reason: HOSPADM

## 2022-09-27 RX ORDER — PROPOFOL 10 MG/ML
INJECTION, EMULSION INTRAVENOUS PRN
Status: DISCONTINUED | OUTPATIENT
Start: 2022-09-27 | End: 2022-09-27 | Stop reason: SDUPTHER

## 2022-09-27 RX ORDER — HYDROMORPHONE HCL 110MG/55ML
1 PATIENT CONTROLLED ANALGESIA SYRINGE INTRAVENOUS
Status: DISCONTINUED | OUTPATIENT
Start: 2022-09-27 | End: 2022-09-27 | Stop reason: HOSPADM

## 2022-09-27 RX ORDER — SODIUM CHLORIDE 9 MG/ML
INJECTION, SOLUTION INTRAVENOUS CONTINUOUS PRN
Status: DISCONTINUED | OUTPATIENT
Start: 2022-09-27 | End: 2022-09-27 | Stop reason: SDUPTHER

## 2022-09-27 RX ORDER — CIPROFLOXACIN 2 MG/ML
INJECTION, SOLUTION INTRAVENOUS PRN
Status: DISCONTINUED | OUTPATIENT
Start: 2022-09-27 | End: 2022-09-27 | Stop reason: SDUPTHER

## 2022-09-27 RX ORDER — FAMOTIDINE 10 MG/ML
20 INJECTION, SOLUTION INTRAVENOUS ONCE
Status: COMPLETED | OUTPATIENT
Start: 2022-09-27 | End: 2022-09-27

## 2022-09-27 RX ORDER — ONDANSETRON 2 MG/ML
4 INJECTION INTRAMUSCULAR; INTRAVENOUS
Status: COMPLETED | OUTPATIENT
Start: 2022-09-27 | End: 2022-09-27

## 2022-09-27 RX ORDER — FENTANYL CITRATE 50 UG/ML
INJECTION, SOLUTION INTRAMUSCULAR; INTRAVENOUS PRN
Status: DISCONTINUED | OUTPATIENT
Start: 2022-09-27 | End: 2022-09-27 | Stop reason: SDUPTHER

## 2022-09-27 RX ORDER — LIDOCAINE HYDROCHLORIDE 10 MG/ML
1 INJECTION, SOLUTION EPIDURAL; INFILTRATION; INTRACAUDAL; PERINEURAL
Status: DISCONTINUED | OUTPATIENT
Start: 2022-09-27 | End: 2022-09-27 | Stop reason: HOSPADM

## 2022-09-27 RX ORDER — HYDROMORPHONE HCL 110MG/55ML
0.25 PATIENT CONTROLLED ANALGESIA SYRINGE INTRAVENOUS EVERY 5 MIN PRN
Status: DISCONTINUED | OUTPATIENT
Start: 2022-09-27 | End: 2022-09-27 | Stop reason: HOSPADM

## 2022-09-27 RX ADMIN — Medication 200 MG: at 07:49

## 2022-09-27 RX ADMIN — SODIUM CHLORIDE: 9 INJECTION, SOLUTION INTRAVENOUS at 06:44

## 2022-09-27 RX ADMIN — PROPOFOL 200 MG: 10 INJECTION, EMULSION INTRAVENOUS at 07:49

## 2022-09-27 RX ADMIN — HYDROMORPHONE HYDROCHLORIDE 1 MG: 2 INJECTION, SOLUTION INTRAMUSCULAR; INTRAVENOUS; SUBCUTANEOUS at 08:38

## 2022-09-27 RX ADMIN — ONDANSETRON 4 MG: 2 INJECTION INTRAMUSCULAR; INTRAVENOUS at 08:58

## 2022-09-27 RX ADMIN — ONDANSETRON 4 MG: 2 INJECTION INTRAMUSCULAR; INTRAVENOUS at 08:04

## 2022-09-27 RX ADMIN — CIPROFLOXACIN 400 MG: 2 INJECTION, SOLUTION INTRAVENOUS at 07:56

## 2022-09-27 RX ADMIN — DEXAMETHASONE SODIUM PHOSPHATE 4 MG: 4 INJECTION, SOLUTION INTRAMUSCULAR; INTRAVENOUS at 08:04

## 2022-09-27 RX ADMIN — ROCURONIUM BROMIDE 10 MG: 10 INJECTION, SOLUTION INTRAVENOUS at 07:49

## 2022-09-27 RX ADMIN — FAMOTIDINE 20 MG: 10 INJECTION INTRAVENOUS at 07:34

## 2022-09-27 RX ADMIN — MIDAZOLAM 2 MG: 1 INJECTION INTRAMUSCULAR; INTRAVENOUS at 07:44

## 2022-09-27 RX ADMIN — FENTANYL CITRATE 100 MCG: 50 INJECTION, SOLUTION INTRAMUSCULAR; INTRAVENOUS at 07:49

## 2022-09-27 RX ADMIN — SODIUM CHLORIDE: 9 INJECTION, SOLUTION INTRAVENOUS at 07:45

## 2022-09-27 ASSESSMENT — PAIN - FUNCTIONAL ASSESSMENT: PAIN_FUNCTIONAL_ASSESSMENT: 0-10

## 2022-09-27 ASSESSMENT — PAIN DESCRIPTION - ORIENTATION: ORIENTATION: UPPER;MID

## 2022-09-27 ASSESSMENT — PAIN DESCRIPTION - DESCRIPTORS: DESCRIPTORS: STABBING

## 2022-09-27 ASSESSMENT — PAIN SCALES - GENERAL
PAINLEVEL_OUTOF10: 7
PAINLEVEL_OUTOF10: 4

## 2022-09-27 ASSESSMENT — PAIN DESCRIPTION - LOCATION: LOCATION: ABDOMEN

## 2022-09-27 NOTE — PROCEDURES
[address] [place] [address]  Endoscopy Note    Patient: Kofi Stein   : 1965  CSN:     Procedure: Endoscopic retrograde cholangiopancreatography biliary sphincterotomy placement of a biliary stent placement of pancreatic ductal    Date: 2022    Surgeon:  Karissa Edmonds MD, MD    Referring Physician:  Mayco Valdez    Preoperative Diagnosis: Increasing abdominal pain despite EUS with biopsy of mass lesion and celiac plexus block history of having an irregular pancreatic duct and    Postoperative Diagnosis: #1 irregular pancreatic duct towards the tail  2 dilated common bile    Anesthesia:  General per Anesthesia. EBL: <50 mL    Indications: This is a 56y.o. year old male who has continued to have problems with severe abdominal pain we been working with Mr. Ayers and his abdominal pain he has had previous pancreatitis he does have an irregular area that we biopsied about 4 to 5 weeks ago and this was most likely an old resolving pseudocyst and not a cancerous process but his pain continues to be at a steady 7 out of 8    In May of this year we did an ERCP and stented him he felt better but now he is continuing to feel worse since we removed all of his stents so her back to the point were going to place some stents and see if he does well with a trial of pancreatic and and common bile duct stenting        Procedure: Informed consent was obtained from the patient after explanation of indications, benefits, possible risks and complications of the procedure. The patient was then taken to the endoscopy suite. A time-out was performed. The patient and staff were in agreement as to the correct patient and procedure. The above anesthesia was administered by the anesthesia department. The patient was placed in the left lateral prone position. Vital signs and heart rhythm were monitored prior to and throughout the entire procedure.       The Olympus [id number] Video therapeutic duodenoscope was placed in the patient's mouth and blindly advanced into the esophagus. The scope was then advanced through the esophagus, stomach and into the second portion of the duodenum where the major papilla was visualized. The major papilla was appeared normal today with the small biliary sphincterotomy and no other abnormality. Cholangiogram: We then moved our focus to the common bile duct cannulation again with a guidewire went smooth injection of contrast revealed that the duct was quite dilated even though we made a previous biliary stent durotomy I went ahead and extended this by a good 5 mm today he was draining but still did not drain the doctor very well there still may be some sphincter left but we went ahead and placed a 8.5 Dominican 7 cm biliary stent and is soon as we did this his duct decompressed quite nice    We then terminated the procedure at this time    Patient was given ciprofloxacin during the  End of the procedure he was given indomethacin spots      Pancreatogram: This was the first duct recannulated we were able to smoothly get a guidewire and injection of contrast revealed a normal-appearing duct with no evidence of overt dilation no filling defects but towards the tail there was a stairstepping or jagged appearance to the duct we were able to maneuver our 035 guidewire out that way    And then over the guidewire we easily placed a 5 Dominican 12 cm stent with a internal flange left    When we did this nice clear pancreatic fluid was flowing out of the pancreatic duct without difficulty  The cannulas were then withdrawn. The duodenum and stomach were decompressed and the scope was withdrawn from the patient. The patient tolerated the procedure well and was taken to the post anesthesia care unit in good condition.     Radiological Interpretation:  All fluoroscopic images and  still x-ray films were carefullly examined and interpreted by the endoscopist on the spot during the procedure in the absence of radiologist.  These interpretations guided the course of the procedure and the interventions mentioned above and below.         Impression: #1 dilated common bile  #2 irregular tail of the pain          Recommendations: N.p.o. for the next 4 hours  Hydrate patient well and postop  Control any pain and nausea and postop  Clear liquid diet for today  Follow-up office visit in 6 weeks  Thank you for this very kind referral      Anselmo Beauchamp MD  [address]  9/27/2022

## 2022-09-27 NOTE — ANESTHESIA POSTPROCEDURE EVALUATION
Department of Anesthesiology  Postprocedure Note    Patient: Thea Kimbrough  MRN: [id number]  YOB: 1965  Date of evaluation: 9/27/2022      Procedure Summary     Date: 09/27/22 Room / Location: [address] [place] [address]    Anesthesia Start: 0458 Anesthesia Stop: 6055    Procedure: ERCP STENT INSERTION Diagnosis:       Acute pancreatitis, unspecified complication status, unspecified pancreatitis type      (Acute pancreatitis, unspecified complication status, unspecified pancreatitis type [K85.90])    Surgeons: James Núñez MD Responsible Provider: Rodrigue Smith MD    Anesthesia Type: general ASA Status: 3          Anesthesia Type: No value filed.     Hitesh Phase I: Hitesh Score: 10    Hitesh Phase II:        Anesthesia Post Evaluation    Patient location during evaluation: PACU  Patient participation: complete - patient participated  Level of consciousness: awake  Airway patency: patent  Nausea & Vomiting: no vomiting  Complications: no  Cardiovascular status: hemodynamically stable  Respiratory status: acceptable  Hydration status: euvolemic  Multimodal analgesia pain management approach

## 2022-09-27 NOTE — H&P
Gastroenterology Note             Pre-operative History and Physical    Patient: Rere Moody  : 1965  CSN:     History Obtained From:  patient and/or guardian. HISTORY OF PRESENT ILLNESS:    The patient is a 56 y.o. male  here for ERCP today Mr. Leyla Robertson is a gentleman who has had an irregular area that we biopsied it actually been doing well he is back today because he is having more pain    Past Medical History:    Past Medical History:   Diagnosis Date    Anxiety state, unspecified     Calculus of kidney     Deaf     RIGHT EAR    Diabetes mellitus ([address]     Diverticula     GERD (gastroesophageal reflux disease)     Hordeolum externum     Hyperlipidemia     Hypertension     Insomnia, unspecified     Kidney stones     MRSA carrier     Pancreatitis, chronic (HCC)     PONV (postoperative nausea and vomiting)     Psychiatric problem     Sleep apnea     uses CPAP    Unspecified hypertrophic and atrophic condition of skin      Past Surgical History:    Past Surgical History:   Procedure Laterality Date    ABSCESS DRAINAGE Left 14    incision and drainage of an abcess on left calf    APPENDECTOMY      CHOLECYSTECTOMY      COLOSTOMY      ERCP N/A 2022    ERCP STENT INSERTION performed by Anselmo Beauchamp MD at [address] [facility]  2022    CELIAC PLEXUS BLOCK performed by Anselmo Beauchamp MD at [address]      duct stent    [address]      Had wound dehiscence, mesh    TRACHEOSTOMY      TRACHEOSTOMY CLOSURE      UPPER GASTROINTESTINAL ENDOSCOPY N/A 2022    EGD ESOPHAGOGASTRODUODENOSCOPY ULTRASOUND performed by Anselmo Beauchamp MD at [facility] N/A 2022    ESOPHAGOGASTRODUODENOSCOPY WITH ENDOSCOPIC ULTRASOUND WITH CELIAC PLEXUS BLOCK performed by Anselmo Beauchamp MD at [address] [place] [address] 2022    EGD W/EUS FNA performed by Bayron Lo MD at [facility] ENDOSCOPY     Medications Prior to Admission:   No current facility-administered medications on file prior to encounter. Current Outpatient Medications on File Prior to Encounter   Medication Sig Dispense Refill    losartan-hydroCHLOROthiazide (HYZAAR) 100-25 MG per tablet TAKE 1 TABLET BY MOUTH DAILY 90 tablet 3    fluticasone (FLONASE) 50 MCG/ACT nasal spray USE 2 SPRAYS IN EACH NOSTRIL DAILY 48 g 3    pregabalin (LYRICA) 50 MG capsule Take 1 capsule by mouth in the morning and 1 capsule at noon and 1 capsule before bedtime. Do all this for 30 days. 90 capsule 2    insulin lispro, 1 Unit Dial, 100 UNIT/ML SOPN IF LESS THAN 100 INJECT NONE, 100-150 INJECT 4 UNITS UNDER THE SKIN, 151-200 6 UNITS, 201-250 8 UNITS, 251-300 10 UNITS THREE TIMES DAILY 15 mL 1    esomeprazole (NEXIUM) 40 MG delayed release capsule TAKE 1 CAPSULE BY MOUTH EVERY MORNING BEFORE BREAKFAST 90 capsule 3    CREON 16495-36727 units delayed release capsule TAKE 1 CAPSULE BY MOUTH THREE TIMES DAILY WITH MEALS 180 capsule 5    metFORMIN (GLUCOPHAGE-XR) 500 mg extended release tablet Take 2 tablets by mouth 2 times daily 360 tablet 3    glimepiride (AMARYL) 4 MG tablet TAKE 1 TABLET BY MOUTH EVERY MORNING BEFORE BREAKFAST 90 tablet 3    ondansetron (ZOFRAN) 4 MG tablet Take 1 tablet by mouth every 8 hours as needed for Nausea or Vomiting 90 tablet 3    oxyCODONE-acetaminophen (PERCOCET)  MG per tablet Take 1 tablet by mouth 3 times daily.       blood glucose test strips (ONETOUCH ULTRA) strip USE TO TEST THREE TIMES DAILY AS NEEDED 300 strip 12    JARDIANCE 25 MG tablet TAKE 1 TABLET BY MOUTH DAILY 90 tablet 3    Insulin Pen Needle (B-D UF III MINI PEN NEEDLES) 31G X 5 MM MISC USE ONCE DAILY AS DIRECTED 100 each 12    simvastatin (ZOCOR) 40 MG tablet TAKE 1 TABLET BY MOUTH NIGHTLY 90 tablet 3    insulin detemir (LEVEMIR FLEXTOUCH) 100 UNIT/ML injection pen Inject 55 Units into the skin nightly (Patient taking differently: Inject 70 Units into the skin nightly) 15 pen 11    ibuprofen (ADVIL;MOTRIN) 800 MG tablet TAKE 1 TABLET BY MOUTH THREE TIMES DAILY WITH MEALS (Patient taking differently: 3 times daily as needed) 90 tablet 12    clobetasol (OLUX) 0.05 % foam APPLY EXTERNALLY TO THE SCALP TWICE DAILY AS NEEDED 50 g 3    Blood Glucose Monitoring Suppl (ONE TOUCH ULTRA 2) w/Device KIT 1 kit by Does not apply route daily 1 kit 0    ONETOUCH DELICA LANCETS FINE MISC USE TO TEST THREE TIMES DAILY 300 each 12    nystatin (MYCOSTATIN) 963722 UNIT/GM cream APPLY TOPICALLY TWICE DAILY TO FOUR TIMES DAILY 60 g 0    MICROLET LANCETS MISC TEST 3 TO 4 TIMES DAILY 300 each 10        Allergies:  Amoxicillin and Morphine      Social History:   Social History     Tobacco Use    Smoking status: Never    Smokeless tobacco: Never   Substance Use Topics    Alcohol use: No     Alcohol/week: 0.0 standard drinks     Family History:   Family History   Problem Relation Age of Onset    Diabetes Mother     Sudden Death Mother         suicide    Alcohol Abuse Father     Alcohol Abuse Brother     No Known Problems Brother     No Known Problems Son     No Known Problems Daughter     No Known Problems Daughter        PHYSICAL EXAM:      /81   Pulse 65   Temp 97.9 °F (36.6 °C) (Temporal)   Resp 16   Ht 6' (1.829 m)   Wt 300 lb (136.1 kg)   SpO2 96%   BMI 40.69 kg/m²  I        Heart:   RRR, normal s1s2    Lungs:  CTA bilat,  Normal effort    Abdomen:   NT, ND      ASA Grade:  ASA 3 - Patient with moderate systemic disease with functional limitations    Mallampati Class: 3          ASSESSMENT AND PLAN:    1. Patient is a 56 y.o. male here for ERCP. 2.  Procedure options, risks and benefits reviewed with patient. Patient expresses understanding.     Tricia Jasmine MD,   [facility] Jan 9/27/2022

## 2022-09-27 NOTE — PROGRESS NOTES
Awake alert & oriented. Respirations easy & symmetrical. Abdomen soft. States pain is tolerable. Patient discharged per wheelchair to the care of responsible party. No additional questions voiced related to discharge information. Patient discharged with all personal items.

## 2022-09-27 NOTE — ANESTHESIA PRE PROCEDURE
Department of Anesthesiology  Preprocedure Note       Name:  Venus Wheatley   Age:  56 y.o.  :  1965                                          MRN:  [id number]         Date:  2022      Surgeon: Kristina Query):  Kayley Bowman MD    Procedure: Procedure(s):  ERCP ENDOSCOPIC RETROGRADE CHOLANGIOPANCREATOGRAPHY    Medications prior to admission:   Prior to Admission medications    Medication Sig Start Date End Date Taking? Authorizing Provider   losartan-hydroCHLOROthiazide (HYZAAR) 100-25 MG per tablet TAKE 1 TABLET BY MOUTH DAILY 22   Melia Mariano MD   fluticasone [facility]) 50 MCG/ACT nasal spray USE 2 SPRAYS IN [facility] NOSTRIL DAILY 8/15/22   Melia Mariano MD   pregabalin (LYRICA) 50 MG capsule Take 1 capsule by mouth in the morning and 1 capsule at noon and 1 capsule before bedtime. Do all this for 30 days. 22  Kayley Bowman MD   insulin lispro, 1 Unit Dial, 100 UNIT/ML SOPN IF LESS THAN 100 INJECT NONE, 100-150 INJECT 4 UNITS UNDER THE SKIN, 151-200 6 UNITS, 201-250 8 UNITS, 251-300 10 UNITS THREE TIMES DAILY 22   Melia Mariano MD   esomeprazole (NEXIUM) 40 MG delayed release capsule TAKE 1 CAPSULE BY MOUTH EVERY MORNING BEFORE BREAKFAST 22   Melia Mariano MD   CREON 54413-99431 units delayed release capsule TAKE 1 CAPSULE BY MOUTH THREE TIMES DAILY WITH MEALS 22   Melia Mariano MD   metFORMIN (GLUCOPHAGE-XR) 500 mg extended release tablet Take 2 tablets by mouth 2 times daily 22   Melia Mariano MD   glimepiride (AMARYL) 4 MG tablet TAKE 1 TABLET BY MOUTH EVERY MORNING BEFORE BREAKFAST 22   Melia Mariano MD   ondansetron (ZOFRAN) 4 MG tablet Take 1 tablet by mouth every 8 hours as needed for Nausea or Vomiting 22   Melia Mariano MD   oxyCODONE-acetaminophen (PERCOCET)  MG per tablet Take 1 tablet by mouth 3 times daily.     Historical Provider, MD   blood glucose test strips (ONETOUCH ULTRA) strip USE TO TEST THREE TIMES DAILY AS NEEDED 3/10/22   Sandra Correia MD   JARDIANCE 25 MG tablet TAKE 1 TABLET BY MOUTH DAILY 2/17/22   Sandra Correia MD   Insulin Pen Needle (B-D UF III MINI PEN NEEDLES) 31G X 5 MM MISC USE ONCE DAILY AS DIRECTED 10/4/21   Jennifer Galicia MD   simvastatin (ZOCOR) 40 MG tablet TAKE 1 TABLET BY MOUTH NIGHTLY 10/1/21   JOELLE Haas CNP   insulin detemir (LEVEMIR FLEXTOUCH) 100 UNIT/ML injection pen Inject 55 Units into the skin nightly  Patient taking differently: Inject 70 Units into the skin nightly 9/9/21   Sandra Correia MD   ibuprofen (ADVIL;MOTRIN) 800 MG tablet TAKE 1 TABLET BY MOUTH THREE TIMES DAILY WITH MEALS  Patient taking differently: 3 times daily as needed 6/28/21   Sandra Correia MD   clobetasol (OLUX) 0.05 % foam APPLY EXTERNALLY TO THE SCALP TWICE DAILY AS NEEDED 12/3/20   Sandra Correia MD   Blood Glucose Monitoring Suppl (ONE TOUCH ULTRA 2) w/Device KIT 1 kit by Does not apply route daily 12/3/19   Sandra Correia MD   [facility] LANCETS FINE MISC USE TO TEST THREE TIMES DAILY 12/3/19   Sandra Correia MD   nystatin (MYCOSTATIN) 851336 UNIT/GM cream APPLY TOPICALLY TWICE DAILY TO FOUR TIMES DAILY 11/5/18   Sandra Correia MD   MICROLET LANCETS [address] [facility] TEST 3 TO 4 TIMES DAILY 9/17/13   Sandra Correia MD       Current medications:    Current Facility-Administered Medications   Medication Dose Route Frequency Provider Last Rate Last Admin    0.9 % sodium chloride infusion   IntraVENous Continuous Margaret Eason  mL/hr at 09/27/22 0644 New Bag at 09/27/22 7687       Allergies:     Allergies   Allergen Reactions    Amoxicillin      Rash    Morphine      RASH, hallucinations oral and IV       Problem List:    Patient Active Problem List   Diagnosis Code    Mixed hyperlipidemia E78.2    Essential hypertension I10    Chronic pancreatitis K86.1    Chronic pain syndrome G89.4    Esophageal reflux K21.9    Sleep apnea G47.30    Allergic rhinitis J30.9    Moderate episode of recurrent major depressive disorder ([facility] F33.1    Erectile dysfunction, non organic F52.8    Meniere's disease H81.09    Insomnia G47.00    Morbid obesity (HCC) E66.01    Type 2 diabetes mellitus with renal manifestations ([facility]) E11.29    Microalbuminuria R80.9    Chronic generalized abdominal pain R10.84, G89.29    Anxiety F41.9    Chronic, continuous use of opioids F11.90    Type 2 diabetes mellitus with hyperglycemia, without long-term current use of insulin ([facility]) E11.65       Past Medical History:        Diagnosis Date    Anxiety state, unspecified     Calculus of kidney     Deaf     RIGHT EAR    Diabetes mellitus ([facility]     Diverticula     GERD (gastroesophageal reflux disease)     Hordeolum externum     Hyperlipidemia     Hypertension     Insomnia, unspecified     Kidney stones     MRSA carrier     Pancreatitis, chronic ([facility])     PONV (postoperative nausea and vomiting)     Psychiatric problem     Sleep apnea     uses CPAP    Unspecified hypertrophic and atrophic condition of skin        Past Surgical History:        Procedure Laterality Date    ABSCESS DRAINAGE Left 1/5/14    incision and drainage of an abcess on left calf    APPENDECTOMY      CHOLECYSTECTOMY      COLOSTOMY      ERCP N/A 5/26/2022    ERCP STENT INSERTION performed by Silvia Niño MD at [place] [address]  4/14/2022    CELIAC PLEXUS BLOCK performed by Silvia Niño MD at [address] [place] [address]      duct stent    REVISION COLOSTOMY      SUBTOTAL COLECTOMY      Had wound dehiscence, mesh    TRACHEOSTOMY      TRACHEOSTOMY CLOSURE      UPPER GASTROINTESTINAL ENDOSCOPY N/A 4/14/2022    EGD ESOPHAGOGASTRODUODENOSCOPY ULTRASOUND performed by Silvia Niño MD at [address] [place] [address] N/A 7/29/2022    ESOPHAGOGASTRODUODENOSCOPY WITH ENDOSCOPIC ULTRASOUND WITH CELIAC PLEXUS BLOCK performed by Silvia [facility] MD at [facility] ENDOSCOPY    UPPER GASTROINTESTINAL ENDOSCOPY N/A 7/29/2022    EGD W/EUS FNA performed by Rachana Cruz MD at [address] History:    Social History     Tobacco Use    Smoking status: Never    Smokeless tobacco: Never   Substance Use Topics    Alcohol use: No     Alcohol/week: 0.0 standard drinks                                Counseling given: Not Answered      Vital Signs (Current):   Vitals:    09/27/22 0620   BP: 132/81   Pulse: 65   Resp: 16   Temp: 97.9 °F (36.6 °C)   TempSrc: Temporal   SpO2: 96%   Weight: 300 lb (136.1 kg)   Height: 6' (1.829 m)                                              BP Readings from Last 3 Encounters:   09/27/22 132/81   08/18/22 134/86   07/29/22 127/82       NPO Status: Time of last liquid consumption: 2300                        Time of last solid consumption: 2200                        Date of last liquid consumption: 09/26/22                        Date of last solid food consumption: 09/26/22    BMI:   Wt Readings from Last 3 Encounters:   09/27/22 300 lb (136.1 kg)   08/18/22 293 lb (132.9 kg)   07/29/22 295 lb (133.8 kg)     Body mass index is 40.69 kg/m².     CBC:   Lab Results   Component Value Date/Time    WBC 9.2 02/21/2022 05:31 AM    RBC 5.24 02/21/2022 05:31 AM    HGB 14.4 02/21/2022 05:31 AM    HCT 43.4 02/21/2022 05:31 AM    MCV 82.8 02/21/2022 05:31 AM    RDW 14.6 02/21/2022 05:31 AM     02/21/2022 05:31 AM       CMP:   Lab Results   Component Value Date/Time     08/12/2022 11:36 AM    K 4.1 08/12/2022 11:36 AM    K 3.2 02/21/2022 05:31 AM     08/12/2022 11:36 AM    CO2 26 08/12/2022 11:36 AM    BUN 15 08/12/2022 11:36 AM    CREATININE 0.7 08/12/2022 11:36 AM    GFRAA >60 08/12/2022 11:36 AM    GFRAA >60 05/08/2013 01:39 PM    AGRATIO 1.0 06/03/2022 11:30 AM    LABGLOM >60 08/12/2022 11:36 AM    GLUCOSE 115 08/12/2022 11:36 AM    PROT 7.7 06/03/2022 11:30 AM    PROT 7.8 02/16/2013 05:10 AM    CALCIUM 9.0 08/12/2022 11:36 AM BILITOT 0.4 06/03/2022 11:30 AM    ALKPHOS 92 06/03/2022 11:30 AM    AST 21 06/03/2022 11:30 AM    ALT 38 06/03/2022 11:30 AM       POC Tests:   Recent Labs     09/27/22  0638   POCGLU 115*       Coags: No results found for: PROTIME, INR, APTT    HCG (If Applicable): No results found for: PREGTESTUR, PREGSERUM, HCG, HCGQUANT     ABGs: No results found for: PHART, PO2ART, SNX4XRD, QKL9HRA, BEART, X7TXQWFY     Type & Screen (If Applicable):  No results found for: LABABO, LABRH    Drug/Infectious Status (If Applicable):  No results found for: HIV, HEPCAB    COVID-19 Screening (If Applicable):   Lab Results   Component Value Date/Time    COVID19 Not Detected 07/27/2022 11:27 AM           Anesthesia Evaluation  Patient summary reviewed and Nursing notes reviewed   history of anesthetic complications: PONV. Airway: Mallampati: III  TM distance: >3 FB   Neck ROM: full  Comment: Full neck, large tongue  Mouth opening: > = 3 FB   Dental: normal exam         Pulmonary: breath sounds clear to auscultation  (+) sleep apnea: on CPAP,                            ROS comment: H/o tracheostomy 2001   Cardiovascular:    (+) hypertension:,     (-) CABG/stent, dysrhythmias and  angina      Rhythm: regular  Rate: normal                    Neuro/Psych:   (+) psychiatric history:   (-) TIA and CVA           GI/Hepatic/Renal:   (+) GERD:,           Endo/Other:    (+) Diabetes, . Abdominal:             Vascular: negative vascular ROS. Other Findings:           Anesthesia Plan      general     ASA 3       Induction: intravenous and rapid sequence. MIPS: Postoperative opioids intended and Prophylactic antiemetics administered. Anesthetic plan and risks discussed with patient. Plan discussed with CRNA.                     Shelley Pérez MD   9/27/2022

## 2022-09-27 NOTE — DISCHARGE INSTRUCTIONS
Keep for 90 minutes   Clear liquid diet after npo for the 4 hours   Office follow up 6 weeks  Nothing by mouth for 4 hours following procedure. May resume clear liquids @ 12:30pm. Low fat diet tomorrow. Then advance as tolerated to normal diet. Notify DrPaul For any persistent severe abdominal pain, nausea, vomiting, fever. For any questions, concerns, or needs please call Dr Marco Santoyo office @ [phone]. Or go to the nearest emergency room. CLEAR LIQUID DIET    Your doctor has prescribed a clear liquid diet for you. This temporary diet is often prescribed right before or after surgery or medical testing. The clear liquid diet is easy to digest and helps the body gradually get used to food again. FOODS ON CLEAR LIQUID DIET INCLUDE:    Water  Fruit juices without pulp, such as filtered juices or pulp free lemonade  Fruit punch or fruit drinks without pulp or pieces  Hot or cold coffee or tea (do not add milk or creamers of any type)  Sodas such as lemon-lime, cola, ginger ale  Sports drinks  Clear soup- bouillion  Plain or flavored gelatin (do not add fruits or toppings)  Frozen juice bars made from clear juices (no fruit pieces)  Popsicles    ENDOSCOPY DISCHARGE INSTRUCTIONS    You may experience some lightheadedness for the next several hours. Plan on quiet relaxation for the rest of today. A responsible adult needs to stay with you today. Because of the medications you received today-do not drive,operate machinery,or sign any contractual agreement for the next 24 hours. Do not drink any alcoholic beverages or take any unprescribed medications tonight. Eat bland food and avoid anything greasy or spicy initially-progress to your normal diet gradually. Diet restrictions as instructed. You may resume home medications as instructed. It is not unusual to experience some mild cramping or gas pains.   If you have any of the following problems, notify your physician or return to the hospital emergency room : fever, chills, excessive bleeding, excessive vomiting, difficulty swallowing, uncontrolled pain, increased abdominal distention, shortness of breath or any other problems. If you have a sore throat, you may use lozenges or salt water gargles. ANESTHESIA DISCHARGE INSTRUCTIONS    Wear your seatbelt home. You are under the influence of drugs-do not drink alcohol,drive,operate machinery,or make any important decisions or sign any legal documentsfor 24 hours  A responsible adult needs to be with you for 24 hours. You may experience lightheadedness,dizziness,or sleepiness following surgery. Rest at home today- increase activity as tolerated. Progress slowly to a regular diet unless your physician has instructed you otherwise. Drink plenty of water. If nausea becomes a problem call your physician. Coughing,sore throat,and muscle aches are other side effects of anesthesia,and should improve with time. Do not drive,operate machinery while taking narcotics.

## 2022-09-27 NOTE — PROGRESS NOTES
Discharge instructions reviewed with pt at bedside & wife Kaylie Wise over telephone. Understanding verbaized. Written copies given to pt.

## 2022-10-03 DIAGNOSIS — E11.21 DIABETES MELLITUS WITH NEPHROPATHY (HCC): ICD-10-CM

## 2022-10-03 RX ORDER — INSULIN DETEMIR 100 [IU]/ML
70 INJECTION, SOLUTION SUBCUTANEOUS NIGHTLY
Qty: 7 ADJUSTABLE DOSE PRE-FILLED PEN SYRINGE | Refills: 12 | Status: SHIPPED | OUTPATIENT
Start: 2022-10-03 | End: 2022-10-04 | Stop reason: SDUPTHER

## 2022-10-03 RX ORDER — SIMVASTATIN 40 MG
TABLET ORAL
Qty: 90 TABLET | Refills: 3 | Status: SHIPPED | OUTPATIENT
Start: 2022-10-03

## 2022-10-03 NOTE — TELEPHONE ENCOUNTER
Medication:   Requested Prescriptions      No prescriptions requested or ordered in this encounter      Directions state 55 units, but patient states they are taking 70 units, please advise. Patient Phone Number: [phone] (home)     Last appt: 8/18/2022   Next appt: 11/21/2022    Last OARRS:   RX Monitoring 8/18/2022   Attestation -   Periodic Controlled Substance Monitoring No signs of potential drug abuse or diversion identified.    Chronic Pain > 50 MEDD -   Chronic Pain > 80 MEDD -     PDMP Monitoring:    Last PDMP Hung as Reviewed [facility]):  Review User Review Instant Review Result   SANTACINDY HERMELINDA 8/18/2022  1:34 PM Reviewed PDMP [1]     Preferred Pharmacy:   [place] [address] [facility][id number] [facility] [phone] - F [phone]  [address] [place] [address]  Phone: [phone] Fax: [phone]    [address] [facility] [phone]  Tiff Hutchison [facility] [address] [facility] [address]  Phone: [phone] Fax: [phone]    [place] [address] [address] [facility],[address], [address] [address] [phone] Soledad Maria [phone]  [address] [place], [place] [address]e  [address] [place] [phone]  Phone: [phone] Fax: [phone]

## 2022-10-03 NOTE — TELEPHONE ENCOUNTER
Medication:   Requested Prescriptions     Pending Prescriptions Disp Refills    simvastatin (ZOCOR) 40 MG tablet [Pharmacy Med Name: SIMVASTATIN 40MG TABLETS] 90 tablet 3     Sig: TAKE 1 TABLET BY MOUTH EVERY NIGHT      Last Filled:     Patient Phone Number: [phone] (home)     Last appt: 8/18/2022   Next appt: 11/21/2022    Last OARRS:   RX Monitoring 8/18/2022   Attestation -   Periodic Controlled Substance Monitoring No signs of potential drug abuse or diversion identified.    Chronic Pain > 50 MEDD -   Chronic Pain > 80 MEDD -     PDMP Monitoring:    Last PDMP Vandana Styles as Reviewed [facility]):  Review User Review Instant Review Result   Ayana Ramsey 8/18/2022  1:34 PM Reviewed PDMP [1]     Preferred Pharmacy:     Ramona Castañeda [address] [place] [address] Roberto Dorman [phone] Daniel Potter [phone]  Tiff Hutchison [place] [id number] [address] [facility] [address]  Phone: [phone] Fax: [phone]

## 2022-10-04 RX ORDER — INSULIN DETEMIR 100 [IU]/ML
70 INJECTION, SOLUTION SUBCUTANEOUS NIGHTLY
Qty: 63 ML | Refills: 3 | Status: SHIPPED | OUTPATIENT
Start: 2022-10-04

## 2022-10-04 NOTE — TELEPHONE ENCOUNTER
Pt called stating he went to pharmacy to  insulin pens, was only given 2 pens. Pt stated this would last him a week, pharmacy told him that he would have to refill this every 8 days because of the way the prescription was written. Pt would like this prescription to be written correctly, pt stated he usually gets a box full for about a month's worth. Pt uses [facility] [address] Please advise.

## 2022-10-07 DIAGNOSIS — F41.9 ANXIETY: ICD-10-CM

## 2022-10-07 NOTE — TELEPHONE ENCOUNTER
Medication:   Requested Prescriptions     Pending Prescriptions Disp Refills    diazePAM (VALIUM) 5 MG tablet [Pharmacy Med Name: DIAZEPAM 5MG TABLETS] 30 tablet      Sig: TAKE 1 TABLET BY MOUTH EVERY 12 HOURS AS NEEDED FOR ANXIETY      Last Filled:  7/25/22    Patient Phone Number: [phone] (home)     Last appt: 8/18/2022   Next appt: 11/21/2022    Last OARRS:   RX Monitoring 8/18/2022   Attestation -   Periodic Controlled Substance Monitoring No signs of potential drug abuse or diversion identified.    Chronic Pain > 50 MEDD -   Chronic Pain > 80 MEDD -     PDMP Monitoring:    Last PDMP Hung as Reviewed [facility]):  Review User Review Instant Review Result   HERMELINDA JOAQUIN 8/18/2022  1:34 PM Reviewed PDMP [1]     Preferred Pharmacy:   [facility] #[id number] - [facility] [phone] - F [phone]  [address] [place], [place] [id number]  Phone: [phone] Fax: [phone]    Rere Ba[place]a [address] [place] [address] [address] [facility] [phone]  Tiff Hutchison [place] [id number] [address] [facility] [address]  Phone: [phone] Fax: [phone]    Rere [facility] [address] [facility],4Th Floor, [address] [address] [phone] Josiah Gilford [phone]  [address] [place] [address]  [address] [place] [phone]  Phone: [phone] Fax: [phone]

## 2022-10-10 RX ORDER — DIAZEPAM 5 MG/1
TABLET ORAL
Qty: 30 TABLET | Refills: 0 | Status: SHIPPED | OUTPATIENT
Start: 2022-10-10 | End: 2022-11-09

## 2022-10-14 ENCOUNTER — TELEPHONE (OUTPATIENT)
Dept: FAMILY MEDICINE CLINIC | Age: 57
End: 2022-10-14

## 2022-10-14 RX ORDER — CLINDAMYCIN HYDROCHLORIDE 300 MG/1
300 CAPSULE ORAL 3 TIMES DAILY
Qty: 21 CAPSULE | Refills: 0 | Status: SHIPPED | OUTPATIENT
Start: 2022-10-14 | End: 2022-10-21

## 2022-10-14 NOTE — TELEPHONE ENCOUNTER
----- Message from Ashtyn Hernandez sent at 10/14/2022 10:31 AM EDT -----  Subject: Message to Provider    QUESTIONS  Information for Provider? Patient has tooth abscess and unable to get into   dentist Tuesday 10/18/22 PM Appt. Would like an antibiotic to tie him over   until he can get into dentist.   ---------------------------------------------------------------------------  --------------  [address] [employer] [facility]  [phone]; OK to leave message on voicemail  ---------------------------------------------------------------------------  --------------  SCRIPT ANSWERS  Relationship to Patient?  Self

## 2022-10-17 RX ORDER — INSULIN LISPRO 100 [IU]/ML
INJECTION, SOLUTION INTRAVENOUS; SUBCUTANEOUS
Qty: 15 ML | Refills: 1 | Status: SHIPPED | OUTPATIENT
Start: 2022-10-17

## 2022-11-21 ENCOUNTER — HOSPITAL ENCOUNTER (OUTPATIENT)
Age: 57
Discharge: HOME | End: 2022-11-21
Payer: MEDICARE

## 2022-11-21 DIAGNOSIS — E11.65 TYPE 2 DIABETES MELLITUS WITH HYPERGLYCEMIA, WITHOUT LONG-TERM CURRENT USE OF INSULIN (HCC): ICD-10-CM

## 2022-11-21 DIAGNOSIS — Z12.5 SCREENING PSA (PROSTATE SPECIFIC ANTIGEN): ICD-10-CM

## 2022-11-21 LAB
A/G RATIO: 0.9 (ref 1.1–2.2)
ALBUMIN SERPL-MCNC: 3.7 G/DL (ref 3.4–5)
ALP BLD-CCNC: 90 U/L (ref 40–129)
ALT SERPL-CCNC: 39 U/L (ref 10–40)
ANION GAP SERPL CALCULATED.3IONS-SCNC: 13 MMOL/L (ref 3–16)
AST SERPL-CCNC: 24 U/L (ref 15–37)
BILIRUB SERPL-MCNC: 0.4 MG/DL (ref 0–1)
BUN BLDV-MCNC: 11 MG/DL (ref 7–20)
CALCIUM SERPL-MCNC: 9.4 MG/DL (ref 8.3–10.6)
CHLORIDE BLD-SCNC: 103 MMOL/L (ref 99–110)
CHOLESTEROL, TOTAL: 183 MG/DL (ref 0–199)
CO2: 22 MMOL/L (ref 21–32)
CREAT SERPL-MCNC: 0.6 MG/DL (ref 0.9–1.3)
CREATININE URINE: 39.5 MG/DL (ref 39–259)
GFR SERPL CREATININE-BSD FRML MDRD: >60 ML/MIN/{1.73_M2}
GLUCOSE BLD-MCNC: 122 MG/DL (ref 70–99)
HDLC SERPL-MCNC: 32 MG/DL (ref 40–60)
LDL CHOLESTEROL CALCULATED: 116 MG/DL
MICROALBUMIN UR-MCNC: <1.2 MG/DL
MICROALBUMIN/CREAT UR-RTO: NORMAL MG/G (ref 0–30)
POTASSIUM SERPL-SCNC: 3.5 MMOL/L (ref 3.5–5.1)
PROSTATE SPECIFIC ANTIGEN: 0.5 NG/ML (ref 0–4)
SODIUM BLD-SCNC: 138 MMOL/L (ref 136–145)
TOTAL PROTEIN: 7.6 G/DL (ref 6.4–8.2)
TRIGL SERPL-MCNC: 176 MG/DL (ref 0–150)
VLDLC SERPL CALC-MCNC: 35 MG/DL

## 2022-11-21 PROCEDURE — 80053 COMPREHEN METABOLIC PANEL: CPT

## 2022-11-21 PROCEDURE — 36415 COLL VENOUS BLD VENIPUNCTURE: CPT

## 2022-11-21 PROCEDURE — 82043 UR ALBUMIN QUANTITATIVE: CPT

## 2022-11-21 PROCEDURE — 83036 HEMOGLOBIN GLYCOSYLATED A1C: CPT

## 2022-11-21 PROCEDURE — 82570 ASSAY OF URINE CREATININE: CPT

## 2022-11-21 PROCEDURE — 84153 ASSAY OF PSA TOTAL: CPT

## 2022-11-21 PROCEDURE — 80061 LIPID PANEL: CPT

## 2022-11-22 LAB
ESTIMATED AVERAGE GLUCOSE: 174.3 MG/DL
HBA1C MFR BLD: 7.7 %

## 2022-12-20 ENCOUNTER — APPOINTMENT (OUTPATIENT)
Dept: CT IMAGING | Age: 57
End: 2022-12-20
Payer: OTHER MISCELLANEOUS

## 2022-12-20 ENCOUNTER — HOSPITAL ENCOUNTER (EMERGENCY)
Age: 57
Discharge: HOME | End: 2022-12-20
Attending: EMERGENCY MEDICINE
Payer: OTHER MISCELLANEOUS

## 2022-12-20 VITALS
TEMPERATURE: 97.6 F | HEART RATE: 64 BPM | SYSTOLIC BLOOD PRESSURE: 156 MMHG | OXYGEN SATURATION: 94 % | WEIGHT: 296.3 LBS | BODY MASS INDEX: 40.13 KG/M2 | HEIGHT: 72 IN | DIASTOLIC BLOOD PRESSURE: 47 MMHG | RESPIRATION RATE: 14 BRPM

## 2022-12-20 DIAGNOSIS — S09.90XA CLOSED HEAD INJURY, INITIAL ENCOUNTER: ICD-10-CM

## 2022-12-20 DIAGNOSIS — S16.1XXA ACUTE STRAIN OF NECK MUSCLE, INITIAL ENCOUNTER: ICD-10-CM

## 2022-12-20 DIAGNOSIS — R10.84 GENERALIZED ABDOMINAL PAIN: ICD-10-CM
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
## 2022-12-20 LAB
A/G RATIO: 1 (ref 1.1–2.2)
ALBUMIN SERPL-MCNC: 3.2 G/DL (ref 3.4–5)
ALP BLD-CCNC: 76 U/L (ref 40–129)
ALT SERPL-CCNC: 33 U/L (ref 10–40)
ANION GAP SERPL CALCULATED.3IONS-SCNC: 11 MMOL/L (ref 3–16)
AST SERPL-CCNC: 39 U/L (ref 15–37)
BASOPHILS ABSOLUTE: 0 K/UL (ref 0–0.2)
BASOPHILS RELATIVE PERCENT: 0.4 %
BILIRUB SERPL-MCNC: 0.3 MG/DL (ref 0–1)
BILIRUBIN URINE: NEGATIVE
BLOOD, URINE: NEGATIVE
BUN BLDV-MCNC: 13 MG/DL (ref 7–20)
CALCIUM SERPL-MCNC: 7.3 MG/DL (ref 8.3–10.6)
CHLORIDE BLD-SCNC: 108 MMOL/L (ref 99–110)
CLARITY: CLEAR
CO2: 20 MMOL/L (ref 21–32)
COLOR: YELLOW
CREAT SERPL-MCNC: 0.6 MG/DL (ref 0.9–1.3)
EOSINOPHILS ABSOLUTE: 0.2 K/UL (ref 0–0.6)
EOSINOPHILS RELATIVE PERCENT: 1.7 %
GFR SERPL CREATININE-BSD FRML MDRD: >60 ML/MIN/{1.73_M2}
GLUCOSE BLD-MCNC: 106 MG/DL (ref 70–99)
GLUCOSE URINE: >=1000 MG/DL
HCT VFR BLD CALC: 50.6 % (ref 40.5–52.5)
HEMOGLOBIN: 17 G/DL (ref 13.5–17.5)
KETONES, URINE: ABNORMAL MG/DL
LEUKOCYTE ESTERASE, URINE: NEGATIVE
LIPASE: 20 U/L (ref 13–60)
LYMPHOCYTES ABSOLUTE: 2.8 K/UL (ref 1–5.1)
LYMPHOCYTES RELATIVE PERCENT: 31.1 %
MCH RBC QN AUTO: 28.3 PG (ref 26–34)
MCHC RBC AUTO-ENTMCNC: 33.7 G/DL (ref 31–36)
MCV RBC AUTO: 84.2 FL (ref 80–100)
MICROSCOPIC EXAMINATION: ABNORMAL
MONOCYTES ABSOLUTE: 0.8 K/UL (ref 0–1.3)
MONOCYTES RELATIVE PERCENT: 9.3 %
NEUTROPHILS ABSOLUTE: 5.2 K/UL (ref 1.7–7.7)
NEUTROPHILS RELATIVE PERCENT: 57.5 %
NITRITE, URINE: NEGATIVE
PDW BLD-RTO: 14.3 % (ref 12.4–15.4)
PH UA: 7.5 (ref 5–8)
PLATELET # BLD: 241 K/UL (ref 135–450)
PLATELET SLIDE REVIEW: ADEQUATE
PMV BLD AUTO: 10.8 FL (ref 5–10.5)
POTASSIUM REFLEX MAGNESIUM: 3.9 MMOL/L (ref 3.5–5.1)
PROTEIN UA: NEGATIVE MG/DL
RBC # BLD: 6.01 M/UL (ref 4.2–5.9)
SLIDE REVIEW: ABNORMAL
SODIUM BLD-SCNC: 139 MMOL/L (ref 136–145)
SPECIFIC GRAVITY UA: 1.04 (ref 1–1.03)
TOTAL PROTEIN: 6.5 G/DL (ref 6.4–8.2)
URINE REFLEX TO CULTURE: ABNORMAL
URINE TYPE: ABNORMAL
UROBILINOGEN, URINE: 0.2 E.U./DL
WBC # BLD: 9 K/UL (ref 4–11)

## 2022-12-20 PROCEDURE — 3209999900 CT THORACIC SPINE TRAUMA RECONSTRUCTION

## 2022-12-20 PROCEDURE — 6360000004 HC RX CONTRAST MEDICATION: Performed by: EMERGENCY MEDICINE

## 2022-12-20 PROCEDURE — 96375 TX/PRO/DX INJ NEW DRUG ADDON: CPT

## 2022-12-20 PROCEDURE — 72125 CT NECK SPINE W/O DYE: CPT

## 2022-12-20 PROCEDURE — 2580000003 HC RX 258: Performed by: PHYSICIAN ASSISTANT

## 2022-12-20 PROCEDURE — 70450 CT HEAD/BRAIN W/O DYE: CPT

## 2022-12-20 PROCEDURE — 99285 EMERGENCY DEPT VISIT HI MDM: CPT

## 2022-12-20 PROCEDURE — 74177 CT ABD & PELVIS W/CONTRAST: CPT

## 2022-12-20 PROCEDURE — 3209999900 CT LUMBAR SPINE TRAUMA RECONSTRUCTION

## 2022-12-20 PROCEDURE — 80053 COMPREHEN METABOLIC PANEL: CPT

## 2022-12-20 PROCEDURE — 96374 THER/PROPH/DIAG INJ IV PUSH: CPT

## 2022-12-20 PROCEDURE — 6360000002 HC RX W HCPCS: Performed by: PHYSICIAN ASSISTANT

## 2022-12-20 PROCEDURE — 85025 COMPLETE CBC W/AUTO DIFF WBC: CPT

## 2022-12-20 PROCEDURE — 81003 URINALYSIS AUTO W/O SCOPE: CPT

## 2022-12-20 PROCEDURE — 96376 TX/PRO/DX INJ SAME DRUG ADON: CPT

## 2022-12-20 PROCEDURE — 83690 ASSAY OF LIPASE: CPT

## 2022-12-20 RX ORDER — 0.9 % SODIUM CHLORIDE 0.9 %
500 INTRAVENOUS SOLUTION INTRAVENOUS ONCE
Status: COMPLETED | OUTPATIENT
Start: 2022-12-20 | End: 2022-12-20

## 2022-12-20 RX ORDER — METHOCARBAMOL 750 MG/1
750 TABLET, FILM COATED ORAL 4 TIMES DAILY
Qty: 40 TABLET | Refills: 0 | Status: SHIPPED | OUTPATIENT
Start: 2022-12-20 | End: 2022-12-30

## 2022-12-20 RX ORDER — ONDANSETRON 2 MG/ML
4 INJECTION INTRAMUSCULAR; INTRAVENOUS ONCE
Status: COMPLETED | OUTPATIENT
Start: 2022-12-20 | End: 2022-12-20

## 2022-12-20 RX ADMIN — HYDROMORPHONE HYDROCHLORIDE 1 MG: 1 INJECTION, SOLUTION INTRAMUSCULAR; INTRAVENOUS; SUBCUTANEOUS at 17:53

## 2022-12-20 RX ADMIN — SODIUM CHLORIDE 500 ML: 9 INJECTION, SOLUTION INTRAVENOUS at 17:50

## 2022-12-20 RX ADMIN — HYDROMORPHONE HYDROCHLORIDE 0.5 MG: 1 INJECTION, SOLUTION INTRAMUSCULAR; INTRAVENOUS; SUBCUTANEOUS at 19:10

## 2022-12-20 RX ADMIN — IOPAMIDOL 75 ML: 755 INJECTION, SOLUTION INTRAVENOUS at 18:09

## 2022-12-20 RX ADMIN — ONDANSETRON 4 MG: 2 INJECTION INTRAMUSCULAR; INTRAVENOUS at 17:53

## 2022-12-20 ASSESSMENT — PAIN DESCRIPTION - LOCATION: LOCATION: HEAD;ABDOMEN

## 2022-12-20 ASSESSMENT — PAIN SCALES - GENERAL
PAINLEVEL_OUTOF10: 3
PAINLEVEL_OUTOF10: 9
PAINLEVEL_OUTOF10: 7

## 2022-12-20 ASSESSMENT — ENCOUNTER SYMPTOMS
SHORTNESS OF BREATH: 0
NAUSEA: 1
COUGH: 0
COLOR CHANGE: 0
ABDOMINAL PAIN: 1
BACK PAIN: 0
VOMITING: 0

## 2022-12-20 ASSESSMENT — PAIN - FUNCTIONAL ASSESSMENT: PAIN_FUNCTIONAL_ASSESSMENT: 0-10
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
## 2022-12-21 NOTE — DISCHARGE INSTRUCTIONS
Current ibuprofen and pain medication as needed. Take the Robaxin every 6 hours as needed for additional pain relief. Ice or cold compresses to the injured areas to help with pain relief. Follow-up with your primary care provider for recheck.   Return at anytime for worsening of symptoms or new symptoms of concern
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
## 2022-12-21 NOTE — ED NOTES
c-collar removed at this time per [facility].  [place] at bedside at this time     Huber Munguia RN  12/20/22 6770

## 2023-01-10 RX ORDER — INSULIN LISPRO 100 [IU]/ML
INJECTION, SOLUTION INTRAVENOUS; SUBCUTANEOUS
Qty: 15 ML | Refills: 1 | OUTPATIENT
Start: 2023-01-10

## 2023-01-11 NOTE — PROGRESS NOTES
Patient ___ reached   ___X__not reached-preop instructions left on voice ixwr___[phone]__________      DATE__1/24/23______ TIME__1300______ARRIVAL___1130  FEC______      Nothing to eat or drink after midnight the night before,except for what the prep instructions call for. If you do not have the instructions or do not understand them please contact your doctors office. Follow any instructions your doctors office has given you including what medications to take the AM of your procedure and which ones to hold. You may use your inhalers - bring rescue inhalers with you DOS. If you take a long acting insulin the zuleika prior please cut the dose in half and take no diabetic medications that AM.Follow specific doctors office instructions regarding blood thinners and if they want you to hold and for how long. If you are on a blood thinner and have no instructions please contact the office and ask. Dress comfortably,bring your insurance card,picture ID,and a complete list of medications, including supplements. You must have a responsible adult to stay with you during the procedure,drive you home and stay with you. [facility] phone number [phone] for any questions. OTHER INSTRUCTIONS(if applicable)_________________________________________________________        VISITOR POLICY(subject to change)    Current visitor policy is 2 visitors per patient. No children allowed. Mask at discretion of facility. Visiting hours are 8a-8p. Overnight visitors will be at the discretion of the nurse. All policies are subject to change.

## 2023-01-12 ENCOUNTER — TELEPHONE (OUTPATIENT)
Dept: FAMILY MEDICINE CLINIC | Age: 58
End: 2023-01-12

## 2023-01-12 RX ORDER — INSULIN LISPRO 100 [IU]/ML
INJECTION, SOLUTION INTRAVENOUS; SUBCUTANEOUS
Qty: 15 ML | Refills: 1 | Status: SHIPPED | OUTPATIENT
Start: 2023-01-12

## 2023-01-12 RX ORDER — INSULIN LISPRO 100 [IU]/ML
INJECTION, SOLUTION INTRAVENOUS; SUBCUTANEOUS
Qty: 15 ML | Refills: 1 | OUTPATIENT
Start: 2023-01-12

## 2023-01-12 NOTE — TELEPHONE ENCOUNTER
Medication:   Requested Prescriptions     Pending Prescriptions Disp Refills    insulin lispro, 1 Unit Dial, (HUMALOG/ADMELOG) 100 UNIT/ML SOPN 15 mL 1     Sig: IF LESS THAN 100 INJECT NONE, 100-150 INJECT 4 UNITS UNDER THE SKIN 151-200 6 UNITS, 201-250 8 UNITS, 251-300 10 UNITS THREE TIMES DAILY      Provider out of office.     Patient Phone Number: [phone] (home)     Last appt: 8/18/2022   Next appt: 1/20/2023    Last OARRS:   RX Monitoring 8/18/2022   Attestation -   Periodic Controlled Substance Monitoring No signs of potential drug abuse or diversion identified.   Chronic Pain > 50 MEDD -   Chronic Pain > 80 MEDD -     PDMP Monitoring:    Last PDMP Hung as Reviewed ([place]):  Review User Review Instant Review Result   HERMELINDA JOAQUIN 8/18/2022  1:34 PM Reviewed PDMP [1]     Preferred Pharmacy:   [id number] [address] - P [phone] - F [phone]  [facility]  [facility] [address]  Phone: [phone] Fax: [phone]    [id number] - [place], [place] - [address] - P [phone] - F [phone]  [address]  [facility] [address]  Phone: [phone] Fax: [phone]    [facility] #[id number] - [place], [place] - [address] - P [phone] - F [phone]  [address]  [facility] [address]  Phone: [phone] Fax: [phone]

## 2023-01-12 NOTE — TELEPHONE ENCOUNTER
Pt called for refill    insulin lispro, 1 Unit Dial, (HUMALOG/ADMELOG) 100 UNIT/ML SOPN [[id number]]     Order Details  Dose, Route, Frequency: As Directed   Dispense Quantity: 15 mL Refills: Marie [address] Roberto Tomlin [phone] Sherly Joshi [phone]   Tiff Hanson [address], [address] [place] [address]   Phone:  [phone]  Fax:  [phone]

## 2023-01-20 ENCOUNTER — TELEPHONE (OUTPATIENT)
Dept: FAMILY MEDICINE CLINIC | Age: 58
End: 2023-01-20

## 2023-01-20 ENCOUNTER — OFFICE VISIT (OUTPATIENT)
Dept: FAMILY MEDICINE CLINIC | Age: 58
End: 2023-01-20
Payer: MEDICARE

## 2023-01-20 VITALS
TEMPERATURE: 96.8 F | OXYGEN SATURATION: 98 % | HEART RATE: 73 BPM | WEIGHT: 297 LBS | BODY MASS INDEX: 40.28 KG/M2 | SYSTOLIC BLOOD PRESSURE: 130 MMHG | DIASTOLIC BLOOD PRESSURE: 70 MMHG

## 2023-01-20 DIAGNOSIS — F41.9 ANXIETY: ICD-10-CM

## 2023-01-20 DIAGNOSIS — Z23 NEED FOR VACCINATION: ICD-10-CM

## 2023-01-20 DIAGNOSIS — Z79.4 TYPE 2 DIABETES MELLITUS WITH MICROALBUMINURIA, WITH LONG-TERM CURRENT USE OF INSULIN (HCC): ICD-10-CM

## 2023-01-20 DIAGNOSIS — Z79.4 TYPE 2 DIABETES MELLITUS WITH HYPERGLYCEMIA, WITH LONG-TERM CURRENT USE OF INSULIN (HCC): Primary | ICD-10-CM

## 2023-01-20 DIAGNOSIS — Z11.4 ENCOUNTER FOR SCREENING FOR HIV: ICD-10-CM

## 2023-01-20 DIAGNOSIS — R80.9 TYPE 2 DIABETES MELLITUS WITH MICROALBUMINURIA, WITH LONG-TERM CURRENT USE OF INSULIN (HCC): ICD-10-CM

## 2023-01-20 DIAGNOSIS — E11.65 TYPE 2 DIABETES MELLITUS WITH HYPERGLYCEMIA, WITH LONG-TERM CURRENT USE OF INSULIN (HCC): Primary | ICD-10-CM

## 2023-01-20 DIAGNOSIS — E11.29 TYPE 2 DIABETES MELLITUS WITH MICROALBUMINURIA, WITH LONG-TERM CURRENT USE OF INSULIN (HCC): ICD-10-CM

## 2023-01-20 DIAGNOSIS — E78.2 MIXED HYPERLIPIDEMIA: ICD-10-CM

## 2023-01-20 DIAGNOSIS — Z11.59 NEED FOR HEPATITIS C SCREENING TEST: ICD-10-CM

## 2023-01-20 DIAGNOSIS — I10 ESSENTIAL HYPERTENSION: ICD-10-CM

## 2023-01-20 DIAGNOSIS — F33.1 MODERATE EPISODE OF RECURRENT MAJOR DEPRESSIVE DISORDER (HCC): ICD-10-CM

## 2023-01-20 DIAGNOSIS — K86.1 CHRONIC PANCREATITIS, UNSPECIFIED PANCREATITIS TYPE (HCC): ICD-10-CM

## 2023-01-20 LAB — HBA1C MFR BLD: 7.5 %

## 2023-01-20 PROCEDURE — 3051F HG A1C>EQUAL 7.0%<8.0%: CPT | Performed by: FAMILY MEDICINE

## 2023-01-20 PROCEDURE — 90746 HEPB VACCINE 3 DOSE ADULT IM: CPT | Performed by: FAMILY MEDICINE

## 2023-01-20 PROCEDURE — 3074F SYST BP LT 130 MM HG: CPT | Performed by: FAMILY MEDICINE

## 2023-01-20 PROCEDURE — 3078F DIAST BP <80 MM HG: CPT | Performed by: FAMILY MEDICINE

## 2023-01-20 PROCEDURE — G0010 ADMIN HEPATITIS B VACCINE: HCPCS | Performed by: FAMILY MEDICINE

## 2023-01-20 PROCEDURE — 99214 OFFICE O/P EST MOD 30 MIN: CPT | Performed by: FAMILY MEDICINE

## 2023-01-20 RX ORDER — INSULIN LISPRO 100 [IU]/ML
INJECTION, SOLUTION INTRAVENOUS; SUBCUTANEOUS
Qty: 6 ADJUSTABLE DOSE PRE-FILLED PEN SYRINGE | Refills: 12 | Status: SHIPPED | OUTPATIENT
Start: 2023-01-20

## 2023-01-20 RX ORDER — FLUTICASONE PROPIONATE 50 MCG
SPRAY, SUSPENSION (ML) NASAL
Qty: 48 G | Refills: 3 | Status: SHIPPED | OUTPATIENT
Start: 2023-01-20

## 2023-01-20 RX ORDER — DIAZEPAM 5 MG/1
5 TABLET ORAL EVERY 12 HOURS PRN
Qty: 30 TABLET | Refills: 0 | Status: SHIPPED | OUTPATIENT
Start: 2023-01-20 | End: 2023-02-19

## 2023-01-20 RX ORDER — AMITRIPTYLINE HYDROCHLORIDE 25 MG/1
TABLET, FILM COATED ORAL
COMMUNITY
Start: 2022-12-05

## 2023-01-20 RX ORDER — FENTANYL 12 UG/H
PATCH TRANSDERMAL
COMMUNITY
Start: 2022-12-23

## 2023-01-20 SDOH — ECONOMIC STABILITY: FOOD INSECURITY: WITHIN THE PAST 12 MONTHS, THE FOOD YOU BOUGHT JUST DIDN'T LAST AND YOU DIDN'T HAVE MONEY TO GET MORE.: NEVER TRUE

## 2023-01-20 SDOH — ECONOMIC STABILITY: TRANSPORTATION INSECURITY
IN THE PAST 12 MONTHS, HAS LACK OF TRANSPORTATION KEPT YOU FROM MEETINGS, WORK, OR FROM GETTING THINGS NEEDED FOR DAILY LIVING?: NO

## 2023-01-20 SDOH — ECONOMIC STABILITY: HOUSING INSECURITY
IN THE LAST 12 MONTHS, WAS THERE A TIME WHEN YOU DID NOT HAVE A STEADY PLACE TO SLEEP OR SLEPT IN A SHELTER (INCLUDING NOW)?: NO

## 2023-01-20 SDOH — ECONOMIC STABILITY: FOOD INSECURITY: WITHIN THE PAST 12 MONTHS, YOU WORRIED THAT YOUR FOOD WOULD RUN OUT BEFORE YOU GOT MONEY TO BUY MORE.: NEVER TRUE

## 2023-01-20 ASSESSMENT — PATIENT HEALTH QUESTIONNAIRE - PHQ9
2. FEELING DOWN, DEPRESSED OR HOPELESS: 1
SUM OF ALL RESPONSES TO PHQ QUESTIONS 1-9: 7
5. POOR APPETITE OR OVEREATING: 1
SUM OF ALL RESPONSES TO PHQ QUESTIONS 1-9: 7
7. TROUBLE CONCENTRATING ON THINGS, SUCH AS READING THE NEWSPAPER OR WATCHING TELEVISION: 1
9. THOUGHTS THAT YOU WOULD BE BETTER OFF DEAD, OR OF HURTING YOURSELF: 0
SUM OF ALL RESPONSES TO PHQ QUESTIONS 1-9: 7
SUM OF ALL RESPONSES TO PHQ9 QUESTIONS 1 & 2: 2
6. FEELING BAD ABOUT YOURSELF - OR THAT YOU ARE A FAILURE OR HAVE LET YOURSELF OR YOUR FAMILY DOWN: 0
3. TROUBLE FALLING OR STAYING ASLEEP: 1
1. LITTLE INTEREST OR PLEASURE IN DOING THINGS: 1
4. FEELING TIRED OR HAVING LITTLE ENERGY: 1
10. IF YOU CHECKED OFF ANY PROBLEMS, HOW DIFFICULT HAVE THESE PROBLEMS MADE IT FOR YOU TO DO YOUR WORK, TAKE CARE OF THINGS AT HOME, OR GET ALONG WITH OTHER PEOPLE: 0
8. MOVING OR SPEAKING SO SLOWLY THAT OTHER PEOPLE COULD HAVE NOTICED. OR THE OPPOSITE, BEING SO FIGETY OR RESTLESS THAT YOU HAVE BEEN MOVING AROUND A LOT MORE THAN USUAL: 1
SUM OF ALL RESPONSES TO PHQ QUESTIONS 1-9: 7

## 2023-01-20 ASSESSMENT — SOCIAL DETERMINANTS OF HEALTH (SDOH): HOW HARD IS IT FOR YOU TO PAY FOR THE VERY BASICS LIKE FOOD, HOUSING, MEDICAL CARE, AND HEATING?: SOMEWHAT HARD

## 2023-01-20 NOTE — PATIENT INSTRUCTIONS
--Check with the pharmacy about getting the Tdap (tetanus, diptheria and pertussis) vaccine.       --Check with pharmacy about getting the shingles vaccine, Shingrix (not Zostavax)      --Make appointment to see the eye doctor
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
## 2023-01-20 NOTE — TELEPHONE ENCOUNTER
Carlos Del Rosario from [employer] called regarding Rx for Insulin Pens, should it be 6 pens or 6 mls    [facility] phone [phone]

## 2023-01-20 NOTE — PROGRESS NOTES
Immunization(s) given during visit:     Immunizations Administered       Name Date Dose Route    Hepatitis B Adult (Engerix-B) 1/20/2023 1 mL Intramuscular    Site: Deltoid- Left    Lot: [id number]    NDC: 94607-627-16             Patient instructed to remain in clinic for 20 minutes after injection and was advised to report any adverse reaction to me immediately.

## 2023-01-24 ENCOUNTER — ANESTHESIA (OUTPATIENT)
Dept: ENDOSCOPY | Age: 58
End: 2023-01-24
Payer: MEDICARE

## 2023-01-24 ENCOUNTER — ANESTHESIA EVENT (OUTPATIENT)
Dept: ENDOSCOPY | Age: 58
End: 2023-01-24
Payer: MEDICARE

## 2023-01-24 ENCOUNTER — HOSPITAL ENCOUNTER (OUTPATIENT)
Age: 58
Setting detail: OUTPATIENT SURGERY
Discharge: HOME HEALTH | End: 2023-01-24
Attending: INTERNAL MEDICINE | Admitting: INTERNAL MEDICINE
Payer: MEDICARE

## 2023-01-24 VITALS
DIASTOLIC BLOOD PRESSURE: 79 MMHG | TEMPERATURE: 97.2 F | RESPIRATION RATE: 20 BRPM | BODY MASS INDEX: 39.55 KG/M2 | WEIGHT: 292 LBS | HEART RATE: 61 BPM | HEIGHT: 72 IN | OXYGEN SATURATION: 98 % | SYSTOLIC BLOOD PRESSURE: 129 MMHG

## 2023-01-24 LAB
GLUCOSE BLD-MCNC: 128 MG/DL (ref 70–99)
GLUCOSE BLD-MCNC: 147 MG/DL (ref 70–99)
PERFORMED ON: ABNORMAL
PERFORMED ON: ABNORMAL

## 2023-01-24 PROCEDURE — C1725 CATH, TRANSLUMIN NON-LASER: HCPCS | Performed by: INTERNAL MEDICINE

## 2023-01-24 PROCEDURE — 2500000003 HC RX 250 WO HCPCS: Performed by: NURSE ANESTHETIST, CERTIFIED REGISTERED

## 2023-01-24 PROCEDURE — 88305 TISSUE EXAM BY PATHOLOGIST: CPT

## 2023-01-24 PROCEDURE — 88173 CYTOPATH EVAL FNA REPORT: CPT

## 2023-01-24 PROCEDURE — 3609020800 HC EGD W/EUS FNA: Performed by: INTERNAL MEDICINE

## 2023-01-24 PROCEDURE — 6360000002 HC RX W HCPCS: Performed by: NURSE ANESTHETIST, CERTIFIED REGISTERED

## 2023-01-24 PROCEDURE — 88172 CYTP DX EVAL FNA 1ST EA SITE: CPT

## 2023-01-24 PROCEDURE — 3700000000 HC ANESTHESIA ATTENDED CARE: Performed by: INTERNAL MEDICINE

## 2023-01-24 PROCEDURE — 3700000001 HC ADD 15 MINUTES (ANESTHESIA): Performed by: INTERNAL MEDICINE

## 2023-01-24 PROCEDURE — 64680 INJECTION TREATMENT OF NERVE: CPT | Performed by: INTERNAL MEDICINE

## 2023-01-24 PROCEDURE — 2709999900 HC NON-CHARGEABLE SUPPLY: Performed by: INTERNAL MEDICINE

## 2023-01-24 PROCEDURE — 2720000010 HC SURG SUPPLY STERILE: Performed by: INTERNAL MEDICINE

## 2023-01-24 PROCEDURE — 3609155700 HC EGD STENT REMOVAL: Performed by: INTERNAL MEDICINE

## 2023-01-24 PROCEDURE — 2580000003 HC RX 258: Performed by: ANESTHESIOLOGY

## 2023-01-24 PROCEDURE — 7100000011 HC PHASE II RECOVERY - ADDTL 15 MIN: Performed by: INTERNAL MEDICINE

## 2023-01-24 PROCEDURE — 7100000010 HC PHASE II RECOVERY - FIRST 15 MIN: Performed by: INTERNAL MEDICINE

## 2023-01-24 PROCEDURE — 6370000000 HC RX 637 (ALT 250 FOR IP): Performed by: INTERNAL MEDICINE

## 2023-01-24 RX ORDER — PROPOFOL 10 MG/ML
INJECTION, EMULSION INTRAVENOUS CONTINUOUS PRN
Status: DISCONTINUED | OUTPATIENT
Start: 2023-01-24 | End: 2023-01-24 | Stop reason: SDUPTHER

## 2023-01-24 RX ORDER — SODIUM CHLORIDE 9 MG/ML
INJECTION, SOLUTION INTRAVENOUS CONTINUOUS
Status: DISCONTINUED | OUTPATIENT
Start: 2023-01-24 | End: 2023-01-24 | Stop reason: HOSPADM

## 2023-01-24 RX ORDER — LIDOCAINE HYDROCHLORIDE 20 MG/ML
INJECTION, SOLUTION INFILTRATION; PERINEURAL PRN
Status: DISCONTINUED | OUTPATIENT
Start: 2023-01-24 | End: 2023-01-24 | Stop reason: SDUPTHER

## 2023-01-24 RX ORDER — PROPOFOL 10 MG/ML
INJECTION, EMULSION INTRAVENOUS PRN
Status: DISCONTINUED | OUTPATIENT
Start: 2023-01-24 | End: 2023-01-24 | Stop reason: SDUPTHER

## 2023-01-24 RX ADMIN — PROPOFOL 100 MG: 10 INJECTION, EMULSION INTRAVENOUS at 13:24

## 2023-01-24 RX ADMIN — PROPOFOL 180 MCG/KG/MIN: 10 INJECTION, EMULSION INTRAVENOUS at 13:24

## 2023-01-24 RX ADMIN — LIDOCAINE HYDROCHLORIDE 100 MG: 20 INJECTION, SOLUTION INFILTRATION; PERINEURAL at 13:24

## 2023-01-24 RX ADMIN — SODIUM CHLORIDE: 9 INJECTION, SOLUTION INTRAVENOUS at 12:02

## 2023-01-24 ASSESSMENT — PAIN - FUNCTIONAL ASSESSMENT: PAIN_FUNCTIONAL_ASSESSMENT: NONE - DENIES PAIN

## 2023-01-24 NOTE — ANESTHESIA POSTPROCEDURE EVALUATION
Department of Anesthesiology  Postprocedure Note    Patient: Robby Blackman  MRN: [id number]  YOB: 1965  Date of evaluation: 1/24/2023      Procedure Summary     Date: 01/24/23 Room / Location: [address] [place] [address]    Anesthesia Start: 1321 Anesthesia Stop: 3904    Procedures:       CELIAC PLEXUS NEUROLYSIS      EGD W/EUS FNA TAIL OF PANCREAS AND REMOVAL OF PANCREATIC STENT Diagnosis:       Acute pancreatitis, unspecified complication status, unspecified pancreatitis type      (Acute pancreatitis, unspecified complication status, unspecified pancreatitis type [K85.90])    Surgeons: Abhinav Cadet MD Responsible Provider: Magdaleno Navarro MD    Anesthesia Type: MAC ASA Status: 3          Anesthesia Type: No value filed. Hitesh Phase I: Hitesh Score: 10    Hitesh Phase II: Hitesh Score: 10      Anesthesia Post Evaluation    Patient location during evaluation: PACU  Level of consciousness: awake  Airway patency: patent  Complications: no  Cardiovascular status: hemodynamically stable  Respiratory status: acceptable  There was medical reason for not using a multimodal analgesia pain management approach.

## 2023-01-24 NOTE — H&P
Gastroenterology Note             Pre-operative History and Physical    Patient: Lainey Long  : 1965  CSN:     History Obtained From:  patient and/or guardian.      HISTORY OF PRESENT ILLNESS:    The patient is a 56 y.o. male  here for EGD and endoscopic ultrasound today prevent a plan to do a celiac plexus block  Mgiuel Novak is a very pleasant gentleman who we have been following he has had a irregular area most likely reactive resolved cyst he continues to have arthritis with pain despite us doing some ERCPs and stenting his duct we are doing an EUS today and celiac plexus block      Past Medical History:    Past Medical History:   Diagnosis Date    Anxiety state, unspecified     Calculus of kidney     Chronic pain     Deaf     RIGHT EAR    Diabetes mellitus ([address]     Diverticula     Diverticulitis     GERD (gastroesophageal reflux disease)     Hordeolum externum     Hyperlipidemia     Hypertension     Insomnia, unspecified     Kidney stones     Meniere disease     MRSA carrier     Pancreatitis, chronic (HCC)     PONV (postoperative nausea and vomiting)     Psychiatric problem     Sleep apnea     uses CPAP    Unspecified hypertrophic and atrophic condition of skin      Past Surgical History:    Past Surgical History:   Procedure Laterality Date    ABSCESS DRAINAGE Left 14    incision and drainage of an abcess on left calf    APPENDECTOMY      CHOLECYSTECTOMY      COLOSTOMY      ERCP N/A 2022    ERCP STENT INSERTION performed by Anoop Sharif MD at [address]    ERCP N/A 2022    ERCP STENT INSERTION performed by Anoop Sharif MD at [address] [facility]  2022    CELIAC PLEXUS BLOCK performed by Anoop Sharif MD at [address]      duct stent    REVISION COLOSTOMY      SUBTOTAL COLECTOMY      Had wound dehiscence, mesh    TRACHEOSTOMY      TRACHEOSTOMY CLOSURE      UPPER GASTROINTESTINAL ENDOSCOPY N/A 4/14/2022    EGD ESOPHAGOGASTRODUODENOSCOPY ULTRASOUND performed by Rachana Cruz MD at [facility] N/A 7/29/2022    ESOPHAGOGASTRODUODENOSCOPY WITH ENDOSCOPIC ULTRASOUND WITH CELIAC PLEXUS BLOCK performed by aRchana Cruz MD at [address] [facility] 7/29/2022    EGD W/EUS FNA performed by Rachana Cruz MD at [facility] ENDOSCOPY     Medications Prior to Admission:   No current facility-administered medications on file prior to encounter. Current Outpatient Medications on File Prior to Encounter   Medication Sig Dispense Refill    insulin detemir (LEVEMIR FLEXTOUCH) 100 UNIT/ML injection pen Inject 70 Units into the skin nightly 63 mL 3    simvastatin (ZOCOR) 40 MG tablet TAKE 1 TABLET BY MOUTH EVERY NIGHT 90 tablet 3    losartan-hydroCHLOROthiazide (HYZAAR) 100-25 MG per tablet TAKE 1 TABLET BY MOUTH DAILY 90 tablet 3    esomeprazole (NEXIUM) 40 MG delayed release capsule TAKE 1 CAPSULE BY MOUTH EVERY MORNING BEFORE BREAKFAST 90 capsule 3    CREON 73817-79417 units delayed release capsule TAKE 1 CAPSULE BY MOUTH THREE TIMES DAILY WITH MEALS 180 capsule 5    metFORMIN (GLUCOPHAGE-XR) 500 mg extended release tablet Take 2 tablets by mouth 2 times daily 360 tablet 3    glimepiride (AMARYL) 4 MG tablet TAKE 1 TABLET BY MOUTH EVERY MORNING BEFORE BREAKFAST 90 tablet 3    ondansetron (ZOFRAN) 4 MG tablet Take 1 tablet by mouth every 8 hours as needed for Nausea or Vomiting 90 tablet 3    oxyCODONE-acetaminophen (PERCOCET)  MG per tablet Take 1 tablet by mouth 3 times daily.       blood glucose test strips (ONETOUCH ULTRA) strip USE TO TEST THREE TIMES DAILY AS NEEDED 300 strip 12    JARDIANCE 25 MG tablet TAKE 1 TABLET BY MOUTH DAILY 90 tablet 3    Insulin Pen Needle (B-D UF III MINI PEN NEEDLES) 31G X 5 MM MISC USE ONCE DAILY AS DIRECTED 100 each 12    ibuprofen (ADVIL;MOTRIN) 800 MG tablet TAKE 1 TABLET BY MOUTH THREE TIMES DAILY WITH MEALS (Patient taking differently: 3 times daily as needed) 90 tablet 12    clobetasol (OLUX) 0.05 % foam APPLY EXTERNALLY TO THE SCALP TWICE DAILY AS NEEDED 50 g 3    Blood Glucose Monitoring Suppl (ONE TOUCH ULTRA 2) w/Device KIT 1 kit by Does not apply route daily 1 kit 0    ONETOUCH DELICA LANCETS FINE MISC USE TO TEST THREE TIMES DAILY 300 each 12    nystatin (MYCOSTATIN) 869554 UNIT/GM cream APPLY TOPICALLY TWICE DAILY TO FOUR TIMES DAILY 60 g 0    MICROLET LANCETS MISC TEST 3 TO 4 TIMES DAILY 300 each 10        Allergies:  Amoxicillin and Morphine      Social History:   Social History     Tobacco Use    Smoking status: Never    Smokeless tobacco: Never   Substance Use Topics    Alcohol use: No     Alcohol/week: 0.0 standard drinks     Family History:   Family History   Problem Relation Age of Onset    Diabetes Mother     Sudden Death Mother         suicide    Alcohol Abuse Father     Alcohol Abuse Brother     No Known Problems Brother     No Known Problems Daughter     No Known Problems Daughter     No Known Problems Son        PHYSICAL EXAM:      BP (!) 149/83   Pulse 71   Temp 97 °F (36.1 °C) (Temporal)   Resp 18   Ht 6' (1.829 m)   Wt 292 lb (132.5 kg)   SpO2 94%   BMI 39.60 kg/m²  I        Heart:   RRR, normal s1s2    Lungs:  CTA bilat,  Normal effort    Abdomen:   NT, ND      ASA Grade:  ASA 3 - Patient with moderate systemic disease with functional limitations    Mallampati Class: 2          ASSESSMENT AND PLAN:    1. Patient is a 56 y.o. male here for EGD and endoscopic ultrasound with celiac block with MAC.   2.  Procedure options, risks and benefits reviewed with patient. Patient expresses understanding.     Carole Barajas MD,   [address]  1/24/2023

## 2023-01-24 NOTE — PROCEDURES
Endoscopy Note    Patient: Shana Glover  : 1965  CSN:     Procedure: Esophagogastroduodenoscopy with scopic ultrasound and with the endoscopic ulcer we did a fine-needle biopsy we did a celiac plexus block and we did remove the pancreatic ductal stent    Date:  2023     Surgeon:  Silvia Niño MD     Referring Physician:  Jewels Gutierrez    Preoperative Diagnosis: Continued chronic pain after having a bout of pancreatitis    Postoperative Diagnosis: #1 1.6 x 1.2 cm hypoechoic area  biopsy in the tail of the pancreas  #2 celiac plexus block General because we could not find the celiac ganglion  #3 successful removal of pancreatic ductal stent      Anesthesia: Monitored anesthesia care     EBL: <5 mL    Indications: This is a 56y.o. year old male who presents because he is having continued pain is a gentleman who I had the pleasure of seeing and he had a bout of pancreatitis and ever since his bout of pancreatitis he has had all this pain and he cannot really feel good    We have done a number of things and has had previous ERCPs MRIs celiac plexus blocks biopsy of area of pancreas and he continues to have pain we have never found a cancer    We have him come back today for repeat celiac plexus block as the last one had been successful    Description of Procedure:  Informed consent was obtained from the patient after explanation of indications, benefits and possible risks and complications of the procedure. The patient was then taken to the endoscopy suite, placed in the left lateral decubitus position and the above IV sedation was administrered.     The Olympus linear EUS scope was placed over his tongue and into his esophagus down to the stomach down to the duodenum    Evaluation did show a pancreatic ductal stent      We then went ahead and evaluated the common bile duct pancreatic duct with the EUS stimulant we did not find any common bile duct stents of the common bile duct stent must of dana out on its own we did see that the pancreatic ductal stent was in    We found the pancreatic ductal stent and there is still continues to be some type of an irregular area surrounding his pancreas and at the level of the tail this area at the level tail was hypoechoic surrounded by a ring this area I did biopsy in the past and he did have some macrophages and I decided because of his continued pain I should rebiopsy this area just to make sure this is not turned into anything cancerous so went ahead and placed a 25-gauge needle biopsy needle from [employer] in this and we did 2 biopsies our pathologist only saw blood so therefore this is most likely not a cancerous process    We then struggled to find the adrenal gland and the celiac nerve itself this because we could not completely find the celiac nerve and we could not get a great picture we decided to do a generalized celiac block so at the kickoff point of the celiac artery here we went ahead and placed a finder needle we are in good position we injected saline felt we are in good position we put 30 mL of bupivacaine Depo-Medrol here I then moved up a little bit was able to find a good position here again I injected 30 mL of bupivacaine Depo-Medrol but I only did after I was completely sure not a blood vessel and then my last injection of 30 mL went back close to the kickoff point    We found that the common bile duct was mildly dilated at approximately 7 mm    Pancreatic duct was normal  The liver had a slight bright echotexture to it suggesting fatty infiltration    We then went back to the duodenal area where we found the ampulla Vater and with the snare we successfully remove the pancreatic ductal stent      We found no other abnormalities and we terminated the procedure    Because of the biopsy and the removal of the pancreatic ductal stent we did give the patient indomethacin suppositories    Gastric or Duodenal ulcer present: No      The patient tolerated the procedure well and was taken to the post anesthesia care unit in good condition. Impression: #1 celiac plexus block done but very difficult  #2 1.6 x 1.2 irregular area biopsied most likely though old residual cyst  #3 pancreatic ductal stent was successfully removed  #4 biliary stent most likely fell out on its own      Recommendations:  At this time the patient just continues to have lots of problems with pain    We have done ERCPs almost have not helped his pain  For we will not pursue any more ERCPs  We have tried celiac plexus blocks he says that they do help so were can I schedule him for 1 in 3 months  We will continue to watch him but if he continues to have pain he may be needing to have to see a pain specialist    For this kind referral    Bayron Lo MD, MD  [address] [facility]  [phone]

## 2023-01-24 NOTE — ANESTHESIA PRE PROCEDURE
Department of Anesthesiology  Preprocedure Note       Name:  Venus Wheatley   Age:  56 y.o.  :  1965                                          MRN:  [id number]         Date:  2023      Surgeon: Kristina Query):  Kayley Bowman MD    Procedure: Procedure(s):  EGD ENDOSCOPIC ULTRASOUNDOF ESOPHAGUS  WITH CELIAC PLEXUS BLOCK    Medications prior to admission:   Prior to Admission medications    Medication Sig Start Date End Date Taking? Authorizing Provider   amitriptyline (ELAVIL) 25 MG tablet TAKE 1 TABLET BY MOUTH AT BEDTIME 22   Historical Provider, MD   fentaNYL (DURAGESIC) 12 MCG/HR APPLY 1 PATCH TOPICALLY TO THE SKIN EVERY 3 DAYS 22   Historical Provider, MD   insulin lispro, 1 Unit Dial, (HUMALOG/ADMELOG) 100 UNIT/ML SOPN IF LESS THAN 100 INJECT NONE, 100-150 INJECT 16 UNITS UNDER THE SKIN 151-200 18 UNITS, 201-250 20 UNITS, 251-300 22 UNITS THREE TIMES DAILY (average 60 units a day) 23   Melia Mariano MD   fluticasone (FLONASE) 50 MCG/ACT nasal spray USE 2 SPRAYS IN EACH NOSTRIL DAILY 23   Melia Mariano MD   diazePAM (VALIUM) 5 MG tablet Take 1 tablet by mouth every 12 hours as needed for Anxiety for up to 30 days.  Max Daily Amount: 10 mg 23  Melia Mariano MD   insulin detemir (LEVEMIR FLEXTOUCH) 100 UNIT/ML injection pen Inject 70 Units into the skin nightly 10/4/22   Melia Mariano MD   simvastatin (ZOCOR) 40 MG tablet TAKE 1 TABLET BY MOUTH EVERY NIGHT 10/3/22   Melia Mariano MD   losartan-hydroCHLOROthiazide [facility]) 100-25 MG per tablet TAKE 1 TABLET BY MOUTH DAILY 22   Melia Mariano MD   esomeprazole (NEXIUM) 40 MG delayed release capsule TAKE 1 CAPSULE BY MOUTH EVERY MORNING BEFORE BREAKFAST 22   Melia Mariano MD   CREON [address] units delayed release capsule TAKE 1 CAPSULE BY MOUTH THREE TIMES DAILY WITH MEALS 22   Melia Mariano MD   metFORMIN (GLUCOPHAGE-XR) 500 mg extended release tablet Take 2 tablets by mouth 2 times daily 6/2/22   Sophia Paredes MD   glimepiride (AMARYL) 4 MG tablet TAKE 1 TABLET BY MOUTH EVERY MORNING BEFORE BREAKFAST 5/18/22   Sophia Paredes MD   ondansetron [facility]) 4 MG tablet Take 1 tablet by mouth every 8 hours as needed for Nausea or Vomiting 5/12/22   Sophia Paredes MD   oxyCODONE-acetaminophen (PERCOCET)  MG per tablet Take 1 tablet by mouth 3 times daily. Historical Provider, MD   blood glucose test strips (ONETOUCH ULTRA) strip USE TO TEST THREE TIMES DAILY AS NEEDED 3/10/22   Sophia Paredes MD   JARDIANCE 25 MG tablet TAKE 1 TABLET BY MOUTH DAILY 2/17/22   Sophia Paredes MD   Insulin Pen Needle (B-D UF III MINI PEN NEEDLES) 31G X 5 MM MISC USE ONCE DAILY AS DIRECTED 10/4/21   Fermín Kellogg MD   ibuprofen (ADVIL;MOTRIN) 800 MG tablet TAKE 1 TABLET BY MOUTH THREE TIMES DAILY WITH MEALS  Patient taking differently: 3 times daily as needed 6/28/21   Sophia Paredes MD   clobetasol (OLUX) 0.05 % foam APPLY EXTERNALLY TO THE SCALP TWICE DAILY AS NEEDED 12/3/20   Sophia Paredes MD   Blood Glucose Monitoring Suppl (ONE TOUCH ULTRA 2) w/Device KIT 1 kit by Does not apply route daily 12/3/19   Sophia Paredes MD   [facility] LANCETS FINE MISC USE TO TEST THREE TIMES DAILY 12/3/19   Sophia Paredes MD   nystatin (MYCOSTATIN) 897187 UNIT/GM cream APPLY TOPICALLY TWICE DAILY TO FOUR TIMES DAILY 11/5/18   Sophia Paredes MD   MICROLET LANCETS MISC TEST 3 TO 4 TIMES DAILY 9/17/13   Sophia Paredes MD       Current medications:    Current Facility-Administered Medications   Medication Dose Route Frequency Provider Last Rate Last Admin    0.9 % sodium chloride infusion   IntraVENous Continuous Julia Nunes MD           Allergies:     Allergies   Allergen Reactions    Amoxicillin      Rash    Morphine      RASH, hallucinations oral and IV       Problem List:    Patient Active Problem List   Diagnosis Code    Mixed hyperlipidemia E78.2    Essential hypertension I10    Chronic pancreatitis K86.1    Chronic pain syndrome G89.4    Esophageal reflux K21.9    Sleep apnea G47.30    Allergic rhinitis J30.9    Moderate episode of recurrent major depressive disorder ([facility]) F33.1    Erectile dysfunction, non organic F52.8    Meniere's disease H81.09    Insomnia G47.00    Morbid obesity ([facility]) E66.01    Type 2 diabetes mellitus with renal manifestations ([facility]) E11.29    Microalbuminuria R80.9    Chronic generalized abdominal pain R10.84, G89.29    Anxiety F41.9    Chronic, continuous use of opioids F11.90    Type 2 diabetes mellitus with hyperglycemia, with long-term current use of insulin ([facility]) E11.65, Z79.4       Past Medical History:        Diagnosis Date    Anxiety state, unspecified     Calculus of kidney     Deaf     RIGHT EAR    Diabetes mellitus ([facility]     Diverticula     GERD (gastroesophageal reflux disease)     Hordeolum externum     Hyperlipidemia     Hypertension     Insomnia, unspecified     Kidney stones     MRSA carrier     Pancreatitis, chronic ([facility])     PONV (postoperative nausea and vomiting)     Psychiatric problem     Sleep apnea     uses CPAP    Unspecified hypertrophic and atrophic condition of skin        Past Surgical History:        Procedure Laterality Date    ABSCESS DRAINAGE Left 1/5/14    incision and drainage of an abcess on left calf    APPENDECTOMY      CHOLECYSTECTOMY      COLOSTOMY      ERCP N/A 5/26/2022    ERCP STENT INSERTION performed by Carole Barajas MD at [address] [place] [address] ERCP N/A 9/27/2022    ERCP STENT INSERTION performed by Carole Barajas MD at [place] [address]  4/14/2022    CELIAC PLEXUS BLOCK performed by Carole Barajas MD at [address] [place] [address]      duct stent    REVISION COLOSTOMY      SUBTOTAL COLECTOMY      Had wound dehiscence, mesh    TRACHEOSTOMY      TRACHEOSTOMY CLOSURE      UPPER GASTROINTESTINAL ENDOSCOPY N/A 4/14/2022    EGD ESOPHAGOGASTRODUODENOSCOPY ULTRASOUND performed by Jemma Alba MD at [facility] N/A 7/29/2022    ESOPHAGOGASTRODUODENOSCOPY WITH ENDOSCOPIC ULTRASOUND WITH CELIAC PLEXUS BLOCK performed by Jemma Alba MD at [address] N/A 7/29/2022    EGD W/EUS FNA performed by Jemma Alba MD at [address] ENDOSCOPY       Social History:    Social History     Tobacco Use    Smoking status: Never    Smokeless tobacco: Never   Substance Use Topics    Alcohol use: No     Alcohol/week: 0.0 standard drinks                                Counseling given: Not Answered      Vital Signs (Current): There were no vitals filed for this visit.                                            BP Readings from Last 3 Encounters:   01/20/23 130/70   12/20/22 (!) 156/47   09/27/22 (!) 145/85       NPO Status:  beore mn                                                                                BMI:   Wt Readings from Last 3 Encounters:   01/20/23 297 lb (134.7 kg)   12/20/22 296 lb 4.8 oz (134.4 kg)   09/27/22 300 lb (136.1 kg)     There is no height or weight on file to calculate BMI.    CBC:   Lab Results   Component Value Date/Time    WBC 9.0 12/20/2022 05:00 PM    RBC 6.01 12/20/2022 05:00 PM    HGB 17.0 12/20/2022 05:00 PM    HCT 50.6 12/20/2022 05:00 PM    MCV 84.2 12/20/2022 05:00 PM    RDW 14.3 12/20/2022 05:00 PM     12/20/2022 05:00 PM       CMP:   Lab Results   Component Value Date/Time     12/20/2022 05:00 PM    K 3.9 12/20/2022 05:00 PM     12/20/2022 05:00 PM    CO2 20 12/20/2022 05:00 PM    BUN 13 12/20/2022 05:00 PM    CREATININE 0.6 12/20/2022 05:00 PM    GFRAA >60 08/12/2022 11:36 AM    GFRAA >60 05/08/2013 01:39 PM    AGRATIO 1.0 12/20/2022 05:00 PM    LABGLOM >60 12/20/2022 05:00 PM    GLUCOSE 106 12/20/2022 05:00 PM    PROT 6.5 12/20/2022 05:00 PM    PROT 7.8 02/16/2013 05:10 AM    CALCIUM 7.3 12/20/2022 05:00 PM    BILITOT 0.3 12/20/2022 05:00 PM    ALKPHOS 76 12/20/2022 05:00 PM    AST 39 12/20/2022 05:00 PM    ALT 33 12/20/2022 05:00 PM       POC Tests: No results for input(s): POCGLU, POCNA, POCK, POCCL, POCBUN, POCHEMO, POCHCT in the last 72 hours. Coags: No results found for: PROTIME, INR, APTT    HCG (If Applicable): No results found for: PREGTESTUR, PREGSERUM, HCG, HCGQUANT     ABGs: No results found for: PHART, PO2ART, NXU5ATU, CBA9FOE, BEART, N8HIKNMO     Type & Screen (If Applicable):  No results found for: LABABO, LABRH    Drug/Infectious Status (If Applicable):  No results found for: HIV, HEPCAB    COVID-19 Screening (If Applicable):   Lab Results   Component Value Date/Time    COVID19 Not Detected 07/27/2022 11:27 AM           Anesthesia Evaluation  Patient summary reviewed and Nursing notes reviewed   history of anesthetic complications: PONV. Airway: Mallampati: II  TM distance: >3 FB   Neck ROM: full  Mouth opening: > = 3 FB   Dental: normal exam         Pulmonary:normal exam  breath sounds clear to auscultation  (+) sleep apnea:                             Cardiovascular:  Exercise tolerance: good (>4 METS),   (+) hypertension:,       ECG reviewed  Rhythm: regular  Rate: normal                    Neuro/Psych:   (+) psychiatric history:            GI/Hepatic/Renal:   (+) GERD:,           Endo/Other:    (+) Diabetes, . Abdominal:             Vascular: Other Findings:           Anesthesia Plan      MAC     ASA 3       Induction: intravenous. Anesthetic plan and risks discussed with patient. Plan discussed with CRNA.     Attending anesthesiologist reviewed and agrees with Cecil Kenney MD   1/24/2023

## 2023-01-24 NOTE — DISCHARGE INSTRUCTIONS
Impression: #1 celiac plexus block done but very difficult  #2 1.6 x 1.2 irregular area biopsied most likely though old residual cyst  #3 pancreatic ductal stent was successfully removed  #4 biliary stent most likely fell out on its own        Recommendations: At this time the patient just continues to have lots of problems with pain     We have done ERCPs almost have not helped his pain  For we will not pursue any more ERCPs  We have tried celiac plexus blocks he says that they do help so were can I schedule him for 1 in 3 months  We will continue to watch him but if he continues to have pain he may be needing to have to see a pain specialist     For this kind referral     Jose Tobar MD, MD  [facility]  [phone]     EGD DISCHARGE INSTRUCTIONS    Use salt water gargle, lozenges, or Chloraseptic spray as needed for sore throat. If you have fever, chills, excessive bleeding, severe chest pain, or abdominal pain, or any other problems, contact your physician's office immediately at [phone]. If you had an esophageal dilatation, and experience fever, chills, excessive bleeding, shortness of breath, chest or abdominal pain, or any other unusual symptom, call the office immediately at [phone]. Continue home medications as directed. Call physician's office in five to seven business days for biopsy results or further instructions. Call your physician at [phone] for a follow up appointment in ______________    You need another EGD in _______________________________    See your physician's report for procedure details and recommendations. ANESTHESIA DISCHARGE INSTRUCTIONS    Wear your seatbelt home. A responsible adult needs to be with you for 24 hours  You are under the influence of drugs-do not drink alcohol, drive ,operate machinery,or make any important decisions or sign any legal documentsfor 24 hours. You may resume normal activities tomorrow.     You may experience lightheadedness,dizziness,or sleepiness following surgery. Rest at home today - increase activity as tolerated. It is recommended to take a four hour nap after procedure. Progress slowly to a regular diet unless your physician has instructed you otherwise. Avoid spicy and greasy food on first meal.  Drink plenty of water. If nausea becomes a problem call your physician. Call your doctor if concerns arise.

## 2023-02-27 RX ORDER — IBUPROFEN 800 MG/1
TABLET ORAL
Qty: 90 TABLET | Refills: 12 | Status: SHIPPED | OUTPATIENT
Start: 2023-02-27

## 2023-02-27 RX ORDER — ONDANSETRON 4 MG/1
4 TABLET, FILM COATED ORAL EVERY 8 HOURS PRN
Qty: 90 TABLET | Refills: 3 | Status: SHIPPED | OUTPATIENT
Start: 2023-02-27

## 2023-02-27 NOTE — TELEPHONE ENCOUNTER
Medication:   Requested Prescriptions     Pending Prescriptions Disp Refills    ibuprofen (ADVIL;MOTRIN) 800 MG tablet 90 tablet 12     Sig: TAKE 1 TABLET BY MOUTH THREE TIMES DAILY WITH MEALS    ondansetron (ZOFRAN) 4 MG tablet 90 tablet 3     Sig: Take 1 tablet by mouth every 8 hours as needed for Nausea or Vomiting          Patient Phone Number: [phone] (home)     Last appt: 1/20/2023   Next appt: 4/21/2023    Last OARRS:   RX Monitoring 1/20/2023   Attestation -   Periodic Controlled Substance Monitoring No signs of potential drug abuse or diversion identified.    Chronic Pain > 50 MEDD -   Chronic Pain > 80 MEDD -     PDMP Monitoring:    Last PDMP Hung as Reviewed [facility]):  Review User Review Instant Review Result   HERMELINDA JOAQUIN 1/20/2023  8:53 AM Reviewed PDMP [1]     Preferred Pharmacy:   Sebastien Branch 4107 [facility] [phone] - F [phone]  [facility] [address] [place] [address]  Phone: [phone] Fax: [phone]    Sebastien Sarina [address] 176 Carleyi Hiro [phone]  Tiff Hanson 1232 [address] [facility] [address]  Phone: [phone] Fax: [phone]    Sebastien Branch 3663 [address] [facility],4Th Floor, [address] [address] [place] - [place] [phone] Argentina Settler [phone]  [address] [place] [address] Av  [address] [place] [address]  Phone: [phone] Fax: [phone]

## 2023-03-03 RX ORDER — TRAZODONE HYDROCHLORIDE 50 MG/1
50 TABLET ORAL NIGHTLY
Qty: 90 TABLET | Refills: 3 | OUTPATIENT
Start: 2023-03-03

## 2023-03-03 NOTE — TELEPHONE ENCOUNTER
Medication:   Requested Prescriptions     Pending Prescriptions Disp Refills    traZODone (DESYREL) 50 MG tablet 90 tablet 3     Sig: Take 1 tablet by mouth nightly       Patient Phone Number: [phone] (home)     Last appt: 1/20/2023   Next appt: 4/21/2023    Last OARRS:   RX Monitoring 1/20/2023   Attestation -   Periodic Controlled Substance Monitoring No signs of potential drug abuse or diversion identified.    Chronic Pain > 50 MEDD -   Chronic Pain > 80 MEDD -     PDMP Monitoring:    Last PDMP Hung as Reviewed [facility]):  Review User Review Instant Review Result   SANTACINDY HERMELINDA 1/20/2023  8:53 AM Reviewed PDMP [1]     Preferred Pharmacy:   Arther Cure 4107 [facility] [phone] - F [phone]  [address] [place] [address]  Phone: [phone] Fax: [phone]    Bre [address] 176 Akti [facility] [phone]  Tiff Hutchison [facility] 4155 [address] [facility] [address]  Phone: [phone] Fax: [phone]    [facility] 3663 [facility],4Th Floor, [address] [address] P [phone] [facility] [phone]  [address] [address]  Phone: [phone] Fax: [phone]

## 2023-03-03 NOTE — TELEPHONE ENCOUNTER
traZODone (DESYREL) 50 MG tablet    [facility] DRUG STORE [address] [place] [address] Roberto Dorman [phone] - F [phone]   Tiff Hanson [address]37 [address], [address] [place] [address]   Phone:  [phone]  Fax:  [phone]

## 2023-03-13 ENCOUNTER — TELEPHONE (OUTPATIENT)
Dept: FAMILY MEDICINE CLINIC | Age: 58
End: 2023-03-13

## 2023-03-13 RX ORDER — POLYMYXIN B SULFATE AND TRIMETHOPRIM 1; 10000 MG/ML; [USP'U]/ML
1 SOLUTION OPHTHALMIC EVERY 4 HOURS
Qty: 10 ML | Refills: 0 | Status: SHIPPED | OUTPATIENT
Start: 2023-03-13 | End: 2023-03-13 | Stop reason: SDUPTHER

## 2023-03-13 RX ORDER — EMPAGLIFLOZIN 25 MG/1
TABLET, FILM COATED ORAL
Qty: 90 TABLET | Refills: 3 | Status: SHIPPED | OUTPATIENT
Start: 2023-03-13

## 2023-03-13 RX ORDER — POLYMYXIN B SULFATE AND TRIMETHOPRIM 1; 10000 MG/ML; [USP'U]/ML
1 SOLUTION OPHTHALMIC EVERY 4 HOURS
Qty: 10 ML | Refills: 0 | Status: SHIPPED | OUTPATIENT
Start: 2023-03-13 | End: 2023-03-23

## 2023-03-13 NOTE — TELEPHONE ENCOUNTER
Medication:   Requested Prescriptions     Pending Prescriptions Disp Refills    JARDIANCE 25 MG tablet [Pharmacy Med Name: Mortimer Grave 25MG TABLETS] 90 tablet 3     Sig: TAKE 1 TABLET BY MOUTH DAILY          Patient Phone Number: [phone] (home)     Last appt: 1/20/2023   Next appt: 4/21/2023    Last OARRS:   RX Monitoring 1/20/2023   Attestation -   Periodic Controlled Substance Monitoring No signs of potential drug abuse or diversion identified.    Chronic Pain > 50 MEDD -   Chronic Pain > 80 MEDD -     PDMP Monitoring:    Last PDMP Hung as Reviewed [facility]):  Review User Review Instant Review Result   HERMELINDA JOAQUIN 1/20/2023  8:53 AM Reviewed PDMP [1]     Preferred Pharmacy:   Ovidio Merlos 4107 [facility] [phone] - F [phone]  [address] [place] [address]  Phone: [phone] Fax: [phone]    Ovidio Merlos [address] [place] [address] 176 [facility] [phone]  Tiff Balesórimecca [facility] 123 [address] [facility] [address]  Phone: [phone] Fax: [phone]    Ovidio Merlos 3667 [address] [facility],4Th Floor, [address] [address] [phone] Ashley Sanches [phone]  [address] [address]  Phone: [phone] Fax: [phone]

## 2023-03-13 NOTE — TELEPHONE ENCOUNTER
Patient called in stated he woke up with eyes crusted shut this am. Also stated his grandchildren have had [place]. At this time he is requesting something for [facility] be called in for him to:     [address], [address] [place] [address] Drive   Tiff Howardo Niiyonathan Hanson [address] [place] [address], [address] [place] [address]   Phone:  [phone]  Fax:  [phone]    Please advise.

## 2023-03-13 NOTE — TELEPHONE ENCOUNTER
trimethoprim-polymyxin b (POLYTRIM) 20714-9.1 UNIT/ML-% ophthalmic solution [[id number]    [facility] and they are unable to fill the above prescription. Contacted patient and he has requested the prescription be sent to     [address] [place] [address] [phone]      Please advise.

## 2023-04-16 DIAGNOSIS — H81.09 MENIERE'S DISEASE, UNSPECIFIED LATERALITY: Primary | ICD-10-CM

## 2023-04-16 DIAGNOSIS — F41.9 ANXIETY: ICD-10-CM

## 2023-04-16 RX ORDER — DIAZEPAM 5 MG/1
5 TABLET ORAL EVERY 12 HOURS PRN
Qty: 30 TABLET | Refills: 0 | Status: SHIPPED | OUTPATIENT
Start: 2023-04-16 | End: 2023-05-16

## 2023-04-20 RX ORDER — INSULIN DETEMIR 100 [IU]/ML
70 INJECTION, SOLUTION SUBCUTANEOUS NIGHTLY
Qty: 15 EACH | Refills: 3 | Status: SHIPPED | OUTPATIENT
Start: 2023-04-20

## 2023-04-20 NOTE — TELEPHONE ENCOUNTER
Received fax from [facility] stating that Levemir flextouch discontinued and needs new Rx for the flexpen. Provider out of office. Medication:   Requested Prescriptions     Pending Prescriptions Disp Refills    insulin detemir (LEVEMIR) 100 UNIT/ML injection vial 15 each 3     Sig: Inject 70 Units into the skin nightly     Signed Prescriptions Disp Refills    diazePAM (VALIUM) 5 MG tablet 30 tablet 0     Sig: Take 1 tablet by mouth every 12 hours as needed for Anxiety (Dizziness) for up to 30 days. Max Daily Amount: 10 mg     Authorizing Provider: Darnell Buckley      Provider out of office. Patient Phone Number: [phone] (home)     Last appt: 1/20/2023   Next appt: 4/24/2023    Last OARRS:   RX Monitoring 1/20/2023   Attestation -   Periodic Controlled Substance Monitoring No signs of potential drug abuse or diversion identified.    Chronic Pain > 50 MEDD -   Chronic Pain > 80 MEDD -     PDMP Monitoring:    Last PDMP Hung as Reviewed [facility]):  Review User Review Instant Review Result   Joslyn Mccurdy 1874 4/16/2023  7:23 PM Reviewed PDMP [1]     Preferred Pharmacy:   [id number] [phone] - F [phone]  [address] [place] [address],[facility] [address]  Phone: [phone] Fax: [phone]    [address] [phone] - F [phone]  Tiff Hutchison [facility] [place] 1237 [address] [facility] [address]  Phone: [phone] Fax: [phone]    [facility] STORE [address] [facility],4Th Floor, [address] [address] [phone] Magy Hendrix [phone]  [address] [place] [address]  Phone: [phone] Fax: [phone]    [facility]pharmacy 8220 [place], [place] - [address] [phone] - F [phone]  [address]  [facility] [address]  Phone: [phone] Fax: [phone]

## 2023-04-21 ENCOUNTER — HOSPITAL ENCOUNTER (OUTPATIENT)
Age: 58
Discharge: HOME | End: 2023-04-21
Payer: MEDICARE

## 2023-04-21 DIAGNOSIS — Z11.4 ENCOUNTER FOR SCREENING FOR HIV: ICD-10-CM

## 2023-04-21 DIAGNOSIS — E11.65 TYPE 2 DIABETES MELLITUS WITH HYPERGLYCEMIA, WITH LONG-TERM CURRENT USE OF INSULIN (HCC): ICD-10-CM

## 2023-04-21 DIAGNOSIS — Z79.4 TYPE 2 DIABETES MELLITUS WITH HYPERGLYCEMIA, WITH LONG-TERM CURRENT USE OF INSULIN (HCC): ICD-10-CM

## 2023-04-21 DIAGNOSIS — Z11.59 NEED FOR HEPATITIS C SCREENING TEST: ICD-10-CM

## 2023-04-21 PROCEDURE — 80048 BASIC METABOLIC PNL TOTAL CA: CPT

## 2023-04-21 PROCEDURE — 83036 HEMOGLOBIN GLYCOSYLATED A1C: CPT

## 2023-04-21 PROCEDURE — 36415 COLL VENOUS BLD VENIPUNCTURE: CPT

## 2023-04-21 PROCEDURE — 87390 HIV-1 AG IA: CPT

## 2023-04-21 PROCEDURE — 86803 HEPATITIS C AB TEST: CPT

## 2023-04-21 PROCEDURE — 86702 HIV-2 ANTIBODY: CPT

## 2023-04-21 PROCEDURE — 86701 HIV-1ANTIBODY: CPT

## 2023-04-22 ENCOUNTER — TELEPHONE (OUTPATIENT)
Dept: FAMILY MEDICINE CLINIC | Age: 58
End: 2023-04-22

## 2023-04-22 LAB
ANION GAP SERPL CALCULATED.3IONS-SCNC: 31 MMOL/L (ref 3–16)
BUN SERPL-MCNC: 14 MG/DL (ref 7–20)
CALCIUM SERPL-MCNC: 10.1 MG/DL (ref 8.3–10.6)
CHLORIDE SERPL-SCNC: 108 MMOL/L (ref 99–110)
CO2 SERPL-SCNC: 9 MMOL/L (ref 21–32)
CREAT SERPL-MCNC: 0.8 MG/DL (ref 0.9–1.3)
EST. AVERAGE GLUCOSE BLD GHB EST-MCNC: 182.9 MG/DL
GFR SERPLBLD CREATININE-BSD FMLA CKD-EPI: >60 ML/MIN/{1.73_M2}
GLUCOSE SERPL-MCNC: 116 MG/DL (ref 70–99)
HBA1C MFR BLD: 8 %
HCV AB SERPL QL IA: NORMAL
HIV 1+2 AB+HIV1 P24 AG SERPL QL IA: NORMAL
HIV 2 AB SERPL QL IA: NORMAL
HIV1 AB SERPL QL IA: NORMAL
HIV1 P24 AG SERPL QL IA: NORMAL
POTASSIUM SERPL-SCNC: 5.1 MMOL/L (ref 3.5–5.1)
SODIUM SERPL-SCNC: 148 MMOL/L (ref 136–145)

## 2023-04-22 NOTE — TELEPHONE ENCOUNTER
Received call from lab, critical CO2, 9, called pt to merle. Denies respiratory concerns. A1c- 8.0, fasting sugar 128. Pt reports chronic pain from pancreatitis, but no new changes. Pt wears Cpap at night. Elevated sodium level, advised hydration. PT reports feeling well for current situation, has upcoming appt with PCP on Monday, will follow up.

## 2023-04-24 ENCOUNTER — OFFICE VISIT (OUTPATIENT)
Dept: FAMILY MEDICINE CLINIC | Age: 58
End: 2023-04-24
Payer: MEDICARE

## 2023-04-24 VITALS
WEIGHT: 298.4 LBS | OXYGEN SATURATION: 97 % | TEMPERATURE: 97.9 F | HEART RATE: 101 BPM | BODY MASS INDEX: 40.47 KG/M2 | SYSTOLIC BLOOD PRESSURE: 120 MMHG | DIASTOLIC BLOOD PRESSURE: 80 MMHG

## 2023-04-24 DIAGNOSIS — I10 ESSENTIAL HYPERTENSION: ICD-10-CM

## 2023-04-24 DIAGNOSIS — Z79.4 TYPE 2 DIABETES MELLITUS WITH HYPERGLYCEMIA, WITH LONG-TERM CURRENT USE OF INSULIN (HCC): Primary | ICD-10-CM

## 2023-04-24 DIAGNOSIS — E87.8 LOW BICARBONATE LEVEL: ICD-10-CM

## 2023-04-24 DIAGNOSIS — E55.9 VITAMIN D DEFICIENCY: ICD-10-CM

## 2023-04-24 DIAGNOSIS — E11.65 TYPE 2 DIABETES MELLITUS WITH HYPERGLYCEMIA, WITH LONG-TERM CURRENT USE OF INSULIN (HCC): Primary | ICD-10-CM

## 2023-04-24 DIAGNOSIS — E78.2 MIXED HYPERLIPIDEMIA: ICD-10-CM

## 2023-04-24 DIAGNOSIS — F41.9 ANXIETY: ICD-10-CM

## 2023-04-24 DIAGNOSIS — E87.1 HYPONATREMIA: ICD-10-CM

## 2023-04-24 PROCEDURE — 3078F DIAST BP <80 MM HG: CPT | Performed by: FAMILY MEDICINE

## 2023-04-24 PROCEDURE — 99214 OFFICE O/P EST MOD 30 MIN: CPT | Performed by: FAMILY MEDICINE

## 2023-04-24 PROCEDURE — 3052F HG A1C>EQUAL 8.0%<EQUAL 9.0%: CPT | Performed by: FAMILY MEDICINE

## 2023-04-24 PROCEDURE — 3074F SYST BP LT 130 MM HG: CPT | Performed by: FAMILY MEDICINE

## 2023-04-24 RX ORDER — INSULIN GLARGINE 100 [IU]/ML
70 INJECTION, SOLUTION SUBCUTANEOUS NIGHTLY
Qty: 7 ADJUSTABLE DOSE PRE-FILLED PEN SYRINGE | Refills: 12 | Status: SHIPPED | OUTPATIENT
Start: 2023-04-24

## 2023-04-24 RX ORDER — ERGOCALCIFEROL 1.25 MG/1
50000 CAPSULE ORAL WEEKLY
Qty: 12 CAPSULE | Refills: 3 | Status: SHIPPED | OUTPATIENT
Start: 2023-04-24

## 2023-04-24 NOTE — PATIENT INSTRUCTIONS
How do I safely store opioids? All opioids should be stored in their original packaging inside a locked cabinet, lockbox, or a location where others cannot easily access them. Carefully note when and how much medicine you take in order to keep track of how much is left. If you think that someone has taken your medicine, contact the police immediately to file a report.

## 2023-04-26 ENCOUNTER — HOSPITAL ENCOUNTER (OUTPATIENT)
Age: 58
Discharge: HOME | End: 2023-04-26
Payer: MEDICARE

## 2023-04-26 DIAGNOSIS — E87.1 HYPONATREMIA: ICD-10-CM

## 2023-04-26 DIAGNOSIS — Z79.4 TYPE 2 DIABETES MELLITUS WITH HYPERGLYCEMIA, WITH LONG-TERM CURRENT USE OF INSULIN (HCC): ICD-10-CM

## 2023-04-26 DIAGNOSIS — E11.65 TYPE 2 DIABETES MELLITUS WITH HYPERGLYCEMIA, WITH LONG-TERM CURRENT USE OF INSULIN (HCC): ICD-10-CM

## 2023-04-26 LAB
ANION GAP SERPL CALCULATED.3IONS-SCNC: 9 MMOL/L (ref 3–16)
BETA-HYDROXYBUTYRATE: 0.32 MMOL/L (ref 0–0.27)
BUN SERPL-MCNC: 13 MG/DL (ref 7–20)
CALCIUM SERPL-MCNC: 9.1 MG/DL (ref 8.3–10.6)
CHLORIDE SERPL-SCNC: 101 MMOL/L (ref 99–110)
CO2 SERPL-SCNC: 27 MMOL/L (ref 21–32)
CREAT SERPL-MCNC: 0.7 MG/DL (ref 0.9–1.3)
GFR SERPLBLD CREATININE-BSD FMLA CKD-EPI: >60 ML/MIN/{1.73_M2}
GLUCOSE SERPL-MCNC: 145 MG/DL (ref 70–99)
POTASSIUM SERPL-SCNC: 4.4 MMOL/L (ref 3.5–5.1)
SODIUM SERPL-SCNC: 137 MMOL/L (ref 136–145)

## 2023-04-26 PROCEDURE — 82010 KETONE BODYS QUAN: CPT

## 2023-04-26 PROCEDURE — 80048 BASIC METABOLIC PNL TOTAL CA: CPT

## 2023-04-26 PROCEDURE — 36415 COLL VENOUS BLD VENIPUNCTURE: CPT

## 2023-04-28 LAB
6MAM UR QL: NOT DETECTED
7AMINOCLONAZEPAM UR QL: NOT DETECTED
A-OH ALPRAZ UR QL: NOT DETECTED
ALPHA-OH-MIDAZOLAM, URINE: NOT DETECTED
ALPRAZ UR QL: NOT DETECTED
AMPHET UR QL SCN: NOT DETECTED
ANNOTATION COMMENT IMP: NORMAL
ANNOTATION COMMENT IMP: NORMAL
BARBITURATES UR QL: NOT DETECTED
BUPRENORPHINE UR QL: NOT DETECTED
BZE UR QL: NOT DETECTED
CARBOXYTHC UR QL: NOT DETECTED
CARISOPRODOL UR QL: NOT DETECTED
CLONAZEPAM UR QL: NOT DETECTED
CODEINE UR QL: NOT DETECTED
CREAT UR-MCNC: 35.3 MG/DL (ref 20–400)
DIAZEPAM UR QL: NOT DETECTED
ETHYL GLUCURONIDE UR QL: NOT DETECTED
FENTANYL UR QL: NOT DETECTED
GABAPENTIN: NOT DETECTED
HYDROCODONE UR QL: NOT DETECTED
HYDROMORPHONE UR QL: NOT DETECTED
LORAZEPAM UR QL: NOT DETECTED
MDA UR QL: NOT DETECTED
MDEA UR QL: NOT DETECTED
MDMA UR QL: NOT DETECTED
MEPERIDINE UR QL: NOT DETECTED
METHADONE UR QL: NOT DETECTED
METHAMPHET UR QL: NOT DETECTED
MIDAZOLAM UR QL SCN: NOT DETECTED
MORPHINE UR QL: NOT DETECTED
NALOXONE: NOT DETECTED
NORBUPRENORPHINE UR QL CFM: NOT DETECTED
NORDIAZEPAM UR QL: NOT DETECTED
NORFENTANYL UR QL: PRESENT
NORHYDROCODONE UR QL CFM: NOT DETECTED
NOROXYCODONE UR QL CFM: PRESENT
NOROXYMORPHONE, URINE: PRESENT
OXAZEPAM UR QL: NOT DETECTED
OXYCODONE UR QL: PRESENT
OXYMORPHONE UR QL: PRESENT
PATHOLOGY STUDY: NORMAL
PCP UR QL: NOT DETECTED
PHENTERMINE UR QL: NOT DETECTED
PPAA UR QL: NOT DETECTED
PREGABALIN: NOT DETECTED
SERVICE CMNT-IMP: NORMAL
TAPENTADOL UR QL SCN: NOT DETECTED
TAPENTADOL-O-SULFATE, URINE: NOT DETECTED
TEMAZEPAM UR QL: NOT DETECTED
TRAMADOL UR QL: NOT DETECTED
ZOLPIDEM UR QL: NOT DETECTED

## 2023-05-26 RX ORDER — GLIMEPIRIDE 4 MG/1
TABLET ORAL
Qty: 90 TABLET | Refills: 0 | Status: SHIPPED | OUTPATIENT
Start: 2023-05-26

## 2023-05-31 DIAGNOSIS — E11.21 TYPE 2 DIABETES MELLITUS WITH DIABETIC NEPHROPATHY, WITH LONG-TERM CURRENT USE OF INSULIN (HCC): ICD-10-CM

## 2023-05-31 DIAGNOSIS — Z79.4 TYPE 2 DIABETES MELLITUS WITH DIABETIC NEPHROPATHY, WITH LONG-TERM CURRENT USE OF INSULIN (HCC): ICD-10-CM

## 2023-05-31 RX ORDER — FLURBIPROFEN SODIUM 0.3 MG/ML
SOLUTION/ DROPS OPHTHALMIC
Qty: 100 EACH | Refills: 12 | Status: SHIPPED | OUTPATIENT
Start: 2023-05-31

## 2023-05-31 NOTE — TELEPHONE ENCOUNTER
Medication:   Requested Prescriptions     Pending Prescriptions Disp Refills    Insulin Pen Needle (B-D UF III MINI PEN NEEDLES) 31G X 5 MM MISC 100 each 12     Sig: USE ONCE DAILY AS DIRECTED      Last Filled:      Patient Phone Number: [phone] (home)     Last appt: 4/24/2023   Next appt: 7/24/2023    Last OARRS:   RX Monitoring 4/24/2023   Attestation -   Periodic Controlled Substance Monitoring No signs of potential drug abuse or diversion identified. ;Random urine drug screen sent today.    Chronic Pain > 50 MEDD -   Chronic Pain > 80 MEDD -     PDMP Monitoring:    Last PDMP Hung as Reviewed [facility]):  Review User Review Instant Review Result   HERMELINDA Joseph 4/24/2023  3:42 PM Reviewed PDMP [1]     Preferred Pharmacy:   [facility] #[id number] - [address] [facility] [phone] - F [phone] [address] [place] [address],[address] [facility] [address]  Phone: [phone] Fax: [phone]    Itzel Heredia [address] [place] [address] Av 176 Akti Pagalou [phone]  Tiff Hutchison [facility] 1237 [address] [facility] [place] [place] [address]  Phone: [phone] Fax: [phone]    Itzel Crockers 3663 [address] [facility],4Th Floor, [address]  [address] [facility] -  [phone] Marcelo Kinney [phone]  [address] [place] [address]  [address] [address]  Phone: [phone] Fax: [phone]    [facility] [address] [place], [place] - [address] [phone] - F [phone]  [address]  [address] [place] [address]  Phone: [phone] Fax: [phone]

## 2023-07-11 RX ORDER — METFORMIN HYDROCHLORIDE 500 MG/1
1000 TABLET, EXTENDED RELEASE ORAL 2 TIMES DAILY
Qty: 360 TABLET | Refills: 3 | Status: SHIPPED | OUTPATIENT
Start: 2023-07-11

## 2023-07-11 NOTE — TELEPHONE ENCOUNTER
Medication:   Requested Prescriptions      No prescriptions requested or ordered in this encounter        Patient Phone Number: [phone] (home)     Last appt: 4/24/2023   Next appt: 7/24/2023    Last OARRS:   RX Monitoring 4/24/2023   Attestation -   Periodic Controlled Substance Monitoring No signs of potential drug abuse or diversion identified. ;Random urine drug screen sent today.    Chronic Pain > 50 MEDD -   Chronic Pain > 80 MEDD -     PDMP Monitoring:    Last PDMP Hung as Reviewed [facility]):  Review User Review Instant Review Result   HERMELINDA Gruber 4/24/2023  3:42 PM Reviewed PDMP [1]     Preferred Pharmacy:   [id number] [address] [facility] [phone] - f [phone]  [address] [place] [address]  Phone: [phone] Fax: [phone]    [facility] [address] [phone] [address] [place] [address] [facility] [address]  Phone: [phone] Fax: [phone]    [facility]itaFormerly [facility] 450 [facility], [address] [place] [address] [phone] [facility] [phone]  [address] [place] [address]  Phone: [phone] Fax: [phone]    [facility] [address] [place], [place] [facility] [phone] - F [phone]  [address] [place] [address] Avenue  [address] [place] [address]  Phone: [phone] Fax: [phone]

## 2023-07-13 RX ORDER — PANCRELIPASE 24000; 76000; 120000 [USP'U]/1; [USP'U]/1; [USP'U]/1
CAPSULE, DELAYED RELEASE PELLETS ORAL
Qty: 180 CAPSULE | Refills: 5 | Status: SHIPPED | OUTPATIENT
Start: 2023-07-13

## 2023-07-13 RX ORDER — PANCRELIPASE 24000; 76000; 120000 [USP'U]/1; [USP'U]/1; [USP'U]/1
CAPSULE, DELAYED RELEASE PELLETS ORAL
Qty: 180 CAPSULE | Refills: 5 | OUTPATIENT
Start: 2023-07-13

## 2023-07-13 NOTE — TELEPHONE ENCOUNTER
Medication:   Requested Prescriptions     Pending Prescriptions Disp Refills    CREON 32649-07260 units delayed release capsule [Pharmacy Med Name: CREON 24,000UNIT CAPSULES] 180 capsule 5     Sig: TAKE 1 CAPSULE BY MOUTH THREE TIMES DAILY WITH MEALS          Patient Phone Number: [phone] (home)     Last appt: 4/24/2023   Next appt: 7/24/2023    Last OARRS:   RX Monitoring 4/24/2023   Attestation -   Periodic Controlled Substance Monitoring No signs of potential drug abuse or diversion identified. ;Random urine drug screen sent today.    Chronic Pain > 50 MEDD -   Chronic Pain > 80 MEDD -     PDMP Monitoring:    Last PDMP Hung as Reviewed [facility]):  Review User Review Instant Review Result   HERMELINDA Cruz 4/24/2023  3:42 PM Reviewed PDMP [1]     Preferred Pharmacy:   [facility] #[id number] \[facility] [place] [facility]\"" [facility] [phone] - F [phone]  [address] [place] [address]  Phone: [phone] Fax: [phone]    [facility] [address] [phone] [address] [address]52 [facility] [address]  Phone: [phone] Fax: [phone]    [address], [address] [facility] [phone] [facility] [phone]  [address] [place] [address]  Phone: [phone] Fax: [phone]    [facility] [place] [place] [facility] [phone] - f [phone]  [address] [place] [address]  Phone: [phone] Fax: [phone]

## 2023-07-13 NOTE — TELEPHONE ENCOUNTER
Medication:   Requested Prescriptions     Refused Prescriptions Disp Refills    CREON [id number] units delayed release capsule [Pharmacy Med Name: CREON 24,000UNIT CAPSULES] 180 capsule 5     Sig: TAKE 1 CAPSULE BY MOUTH THREE TIMES DAILY WITH MEALS     Refused By: Brennen Chahal     Reason for Refusal: Duplicate Request          Patient Phone Number: [phone] (home)     Last appt: 4/24/2023   Next appt: 7/24/2023    Last OARRS:   RX Monitoring 4/24/2023   Attestation -   Periodic Controlled Substance Monitoring No signs of potential drug abuse or diversion identified. ;Random urine drug screen sent today.    Chronic Pain > 50 MEDD -   Chronic Pain > 80 MEDD -     PDMP Monitoring:    Last PDMP Hung as Reviewed [facility]):  Review User Review Instant Review Result   HERMELINDA Reynoso 4/24/2023  3:42 PM Reviewed PDMP [1]     Preferred Pharmacy:   [facility] [phone] - F [phone]  [address] [place] [address]  Phone: [phone] Fax: [phone]    [facility] [address] [address] [phone] [address] [address] [address]  Phone: [phone] Fax: [phone]    [facility] [address] [place] [address] Arielle Christiansen [facility] [phone] [facility] [phone]  [address] [place] [address]  Phone: [phone] Fax: [phone]    [facility] [place], [place] - [facility] [phone] - F [phone]  [address] [place] [address]  Phone: [phone] Fax: [phone]

## 2023-07-21 ENCOUNTER — HOSPITAL ENCOUNTER (OUTPATIENT)
Age: 58
Discharge: HOME | End: 2023-07-21
Payer: MEDICARE

## 2023-07-21 DIAGNOSIS — E11.65 TYPE 2 DIABETES MELLITUS WITH HYPERGLYCEMIA, WITH LONG-TERM CURRENT USE OF INSULIN (HCC): ICD-10-CM

## 2023-07-21 DIAGNOSIS — E55.9 VITAMIN D DEFICIENCY: ICD-10-CM

## 2023-07-21 DIAGNOSIS — Z79.4 TYPE 2 DIABETES MELLITUS WITH HYPERGLYCEMIA, WITH LONG-TERM CURRENT USE OF INSULIN (HCC): ICD-10-CM

## 2023-07-21 LAB
25(OH)D3 SERPL-MCNC: 30.5 NG/ML
ANION GAP SERPL CALCULATED.3IONS-SCNC: 13 MMOL/L (ref 3–16)
BUN SERPL-MCNC: 15 MG/DL (ref 7–20)
CALCIUM SERPL-MCNC: 9.4 MG/DL (ref 8.3–10.6)
CHLORIDE SERPL-SCNC: 105 MMOL/L (ref 99–110)
CO2 SERPL-SCNC: 23 MMOL/L (ref 21–32)
CREAT SERPL-MCNC: 0.7 MG/DL (ref 0.9–1.3)
EST. AVERAGE GLUCOSE BLD GHB EST-MCNC: 171.4 MG/DL
GFR SERPLBLD CREATININE-BSD FMLA CKD-EPI: >60 ML/MIN/{1.73_M2}
GLUCOSE SERPL-MCNC: 136 MG/DL (ref 70–99)
HBA1C MFR BLD: 7.6 %
POTASSIUM SERPL-SCNC: 3.8 MMOL/L (ref 3.5–5.1)
SODIUM SERPL-SCNC: 141 MMOL/L (ref 136–145)

## 2023-07-21 PROCEDURE — 82306 VITAMIN D 25 HYDROXY: CPT

## 2023-07-21 PROCEDURE — 36415 COLL VENOUS BLD VENIPUNCTURE: CPT

## 2023-07-21 PROCEDURE — 83036 HEMOGLOBIN GLYCOSYLATED A1C: CPT

## 2023-07-21 PROCEDURE — 80048 BASIC METABOLIC PNL TOTAL CA: CPT

## 2023-07-21 NOTE — PROGRESS NOTES
activity  -Reviewed pre-visit labs with patient. (BMP and A1C) Review of these labs contributed to MDM. Lab Results   Component Value Date    LABA1C 7.6 07/21/2023       -lab slip given for next visit (ordered above, did not contribute to MDM)  -patient to check sugars daily  -encouraged a healthy diet, regular exercise and maintaining a healthy weight. Discussed the importance of TLC in controlling diabetes and preventing long term complications of diabetes including CV, renal, neuropathic and ocular. -RTO  3 months  -eye form given:  No      Meniere's disease, unspecified laterality  -     diazePAM (VALIUM) 5 MG tablet; Take 1 tablet by mouth every 12 hours as needed for Anxiety (Dizziness) for up to 30 days. Max Daily Amount: 10 mg  -Stable, continue current medications. Controlled Substances Monitoring:   OARRS report reviewed  Periodic Controlled Substance Monitoring: No signs of potential drug abuse or diversion identified. Eve Monge MD)      Class 2 severe obesity due to excess calories with serious comorbidity and body mass index (BMI) of 39.0 to 39.9 in [facility])  -Encourage low carb diet, watching calories, regular exercise and weight loss            Return in about 3 months (around 10/24/2023) for Diabetes, 30 min.              Portions of Note per  Ryan Rodriguez CMA AAMA with corrections and edits per Eve Monge MD.  I agree with entirety of note and was present and performed history and physical.  I also confirm that the note above accurately reflects all work, treatment, procedures, and medical decision making performed by me, Eve Monge MD

## 2023-07-24 ENCOUNTER — OFFICE VISIT (OUTPATIENT)
Dept: FAMILY MEDICINE CLINIC | Age: 58
End: 2023-07-24
Payer: MEDICARE

## 2023-07-24 VITALS
HEART RATE: 69 BPM | OXYGEN SATURATION: 98 % | SYSTOLIC BLOOD PRESSURE: 134 MMHG | BODY MASS INDEX: 40.39 KG/M2 | HEIGHT: 73 IN | TEMPERATURE: 97.9 F | WEIGHT: 304.8 LBS | DIASTOLIC BLOOD PRESSURE: 70 MMHG

## 2023-07-24 DIAGNOSIS — H81.09 MENIERE'S DISEASE, UNSPECIFIED LATERALITY: ICD-10-CM

## 2023-07-24 DIAGNOSIS — E11.65 TYPE 2 DIABETES MELLITUS WITH HYPERGLYCEMIA, WITH LONG-TERM CURRENT USE OF INSULIN (HCC): ICD-10-CM

## 2023-07-24 DIAGNOSIS — Z00.00 WELL ADULT EXAM: Primary | ICD-10-CM

## 2023-07-24 DIAGNOSIS — Z79.4 TYPE 2 DIABETES MELLITUS WITH HYPERGLYCEMIA, WITH LONG-TERM CURRENT USE OF INSULIN (HCC): ICD-10-CM

## 2023-07-24 DIAGNOSIS — E66.01 CLASS 2 SEVERE OBESITY DUE TO EXCESS CALORIES WITH SERIOUS COMORBIDITY AND BODY MASS INDEX (BMI) OF 39.0 TO 39.9 IN ADULT (HCC): ICD-10-CM

## 2023-07-24 PROCEDURE — 3074F SYST BP LT 130 MM HG: CPT | Performed by: FAMILY MEDICINE

## 2023-07-24 PROCEDURE — 3078F DIAST BP <80 MM HG: CPT | Performed by: FAMILY MEDICINE

## 2023-07-24 PROCEDURE — G0439 PPPS, SUBSEQ VISIT: HCPCS | Performed by: FAMILY MEDICINE

## 2023-07-24 PROCEDURE — 99213 OFFICE O/P EST LOW 20 MIN: CPT | Performed by: FAMILY MEDICINE

## 2023-07-24 PROCEDURE — 3051F HG A1C>EQUAL 7.0%<8.0%: CPT | Performed by: FAMILY MEDICINE

## 2023-07-24 RX ORDER — DIAZEPAM 5 MG/1
5 TABLET ORAL EVERY 12 HOURS PRN
Qty: 30 TABLET | Refills: 0 | Status: SHIPPED | OUTPATIENT
Start: 2023-07-24 | End: 2023-08-23

## 2023-07-24 ASSESSMENT — PATIENT HEALTH QUESTIONNAIRE - PHQ9
2. FEELING DOWN, DEPRESSED OR HOPELESS: 1
1. LITTLE INTEREST OR PLEASURE IN DOING THINGS: 1
SUM OF ALL RESPONSES TO PHQ QUESTIONS 1-9: 2
SUM OF ALL RESPONSES TO PHQ QUESTIONS 1-9: 2
SUM OF ALL RESPONSES TO PHQ9 QUESTIONS 1 & 2: 2
SUM OF ALL RESPONSES TO PHQ QUESTIONS 1-9: 2
SUM OF ALL RESPONSES TO PHQ QUESTIONS 1-9: 2

## 2023-07-24 NOTE — PATIENT INSTRUCTIONS
--Check with the pharmacy about getting the Tdap (tetanus, diptheria and pertussis) vaccine. --Check with pharmacy about getting the shingles vaccine, Shingrix (not Zostavax)      --Bring in copy of living will and healthcare power of  for your chart.       --Make appointment to see the dentist

## 2023-07-26 DIAGNOSIS — K85.00 IDIOPATHIC ACUTE PANCREATITIS WITHOUT INFECTION OR NECROSIS: Primary | ICD-10-CM

## 2023-07-27 ENCOUNTER — HOSPITAL ENCOUNTER (OUTPATIENT)
Age: 58
Discharge: HOME | End: 2023-07-27
Payer: MEDICARE

## 2023-07-27 DIAGNOSIS — K85.00 IDIOPATHIC ACUTE PANCREATITIS WITHOUT INFECTION OR NECROSIS: ICD-10-CM

## 2023-07-27 LAB
ALBUMIN SERPL-MCNC: 4.1 G/DL (ref 3.4–5)
ALBUMIN/GLOB SERPL: 1.1 {RATIO} (ref 1.1–2.2)
ALP SERPL-CCNC: 91 U/L (ref 40–129)
ALT SERPL-CCNC: 49 U/L (ref 10–40)
ANION GAP SERPL CALCULATED.3IONS-SCNC: 12 MMOL/L (ref 3–16)
AST SERPL-CCNC: 27 U/L (ref 15–37)
BILIRUB SERPL-MCNC: 0.5 MG/DL (ref 0–1)
BUN SERPL-MCNC: 14 MG/DL (ref 7–20)
CALCIUM SERPL-MCNC: 9.3 MG/DL (ref 8.3–10.6)
CHLORIDE SERPL-SCNC: 102 MMOL/L (ref 99–110)
CO2 SERPL-SCNC: 26 MMOL/L (ref 21–32)
CREAT SERPL-MCNC: 0.7 MG/DL (ref 0.9–1.3)
GFR SERPLBLD CREATININE-BSD FMLA CKD-EPI: >60 ML/MIN/{1.73_M2}
GLUCOSE SERPL-MCNC: 109 MG/DL (ref 70–99)
LIPASE SERPL-CCNC: 14 U/L (ref 13–60)
POTASSIUM SERPL-SCNC: 4.2 MMOL/L (ref 3.5–5.1)
PROT SERPL-MCNC: 7.8 G/DL (ref 6.4–8.2)
SODIUM SERPL-SCNC: 140 MMOL/L (ref 136–145)

## 2023-07-27 PROCEDURE — 83690 ASSAY OF LIPASE: CPT

## 2023-07-27 PROCEDURE — 36415 COLL VENOUS BLD VENIPUNCTURE: CPT

## 2023-07-27 PROCEDURE — 80053 COMPREHEN METABOLIC PANEL: CPT

## 2023-08-01 NOTE — PROGRESS NOTES
Patient reached __X__ yes  _____ no   VM instructions left ____ yes   phone number ________                                ____ no-office notified          Date __8/9/23_______  Time ___1005____  Arrival ___0830  hosp-endo___    Nothing to eat or drink after midnight-follow your doctors prep instructions-this may include taking a second dose of your prep after midnight  Responsible adult 25 or older to stay on site while you are here-drive you home-stay with you after  Follow any instructions your doctors office has given you  Bring a complete list of all your medications and supplements including name,dose,how often taken the day of your procedure  If you normally take the following medications in the morning please do so the AM of your procedure with a small sip of water       Heart,blood pressure,seizure,thyroid or breathing medications-use your inhalers-bring any rescue inhalers with you DOS       DO NOT take blood pressure medications ending in \"memo\" or \"pril\" the AM of procedure or evening prior-DO NOT Litzy Akhtar patients are not to take any medications the AM of surgery  Take half or your normal dose of any long acting insulins the night before your procedure-do not take any diabetic medications the AM of procedure  Follow your doctors instructions regarding stopping or taking  any blood thinners-if you do not have instructions-call them  Any questions call your doctor  Other ____take percocet prn am of procedure__________________________________________________________      Rhonda Finley POLICY(subject to change)             The current policy is 2 visitors per patient. There are no children allowed. Mask at discretion of facility. Visiting hours are 8a-8p. Overnight visitors will be at the discretion of the nurse. All policies are subject to change.

## 2023-08-09 ENCOUNTER — APPOINTMENT (OUTPATIENT)
Dept: GENERAL RADIOLOGY | Age: 58
End: 2023-08-09
Attending: INTERNAL MEDICINE
Payer: MEDICARE

## 2023-08-09 ENCOUNTER — ANESTHESIA (OUTPATIENT)
Dept: ENDOSCOPY | Age: 58
End: 2023-08-09
Payer: MEDICARE

## 2023-08-09 ENCOUNTER — ANESTHESIA EVENT (OUTPATIENT)
Dept: ENDOSCOPY | Age: 58
End: 2023-08-09
Payer: MEDICARE

## 2023-08-09 ENCOUNTER — HOSPITAL ENCOUNTER (OUTPATIENT)
Age: 58
Setting detail: OUTPATIENT SURGERY
Discharge: HOME | End: 2023-08-09
Attending: INTERNAL MEDICINE | Admitting: INTERNAL MEDICINE
Payer: MEDICARE

## 2023-08-09 VITALS
RESPIRATION RATE: 16 BRPM | BODY MASS INDEX: 40.5 KG/M2 | TEMPERATURE: 97.6 F | HEIGHT: 72 IN | WEIGHT: 299 LBS | DIASTOLIC BLOOD PRESSURE: 73 MMHG | OXYGEN SATURATION: 93 % | HEART RATE: 73 BPM | SYSTOLIC BLOOD PRESSURE: 139 MMHG

## 2023-08-09 LAB
GLUCOSE BLD-MCNC: 111 MG/DL (ref 70–99)
GLUCOSE BLD-MCNC: 98 MG/DL (ref 70–99)
PERFORMED ON: ABNORMAL
PERFORMED ON: NORMAL

## 2023-08-09 PROCEDURE — 7100000000 HC PACU RECOVERY - FIRST 15 MIN: Performed by: INTERNAL MEDICINE

## 2023-08-09 PROCEDURE — 74330 X-RAY BILE/PANC ENDOSCOPY: CPT

## 2023-08-09 PROCEDURE — 3700000000 HC ANESTHESIA ATTENDED CARE: Performed by: INTERNAL MEDICINE

## 2023-08-09 PROCEDURE — 2709999900 HC NON-CHARGEABLE SUPPLY: Performed by: INTERNAL MEDICINE

## 2023-08-09 PROCEDURE — 2500000003 HC RX 250 WO HCPCS: Performed by: NURSE ANESTHETIST, CERTIFIED REGISTERED

## 2023-08-09 PROCEDURE — 3700000001 HC ADD 15 MINUTES (ANESTHESIA): Performed by: INTERNAL MEDICINE

## 2023-08-09 PROCEDURE — 6370000000 HC RX 637 (ALT 250 FOR IP): Performed by: INTERNAL MEDICINE

## 2023-08-09 PROCEDURE — C2617 STENT, NON-COR, TEM W/O DEL: HCPCS | Performed by: INTERNAL MEDICINE

## 2023-08-09 PROCEDURE — 2580000003 HC RX 258: Performed by: NURSE ANESTHETIST, CERTIFIED REGISTERED

## 2023-08-09 PROCEDURE — C2625 STENT, NON-COR, TEM W/DEL SY: HCPCS | Performed by: INTERNAL MEDICINE

## 2023-08-09 PROCEDURE — 2720000010 HC SURG SUPPLY STERILE: Performed by: INTERNAL MEDICINE

## 2023-08-09 PROCEDURE — 6360000002 HC RX W HCPCS: Performed by: INTERNAL MEDICINE

## 2023-08-09 PROCEDURE — 6360000002 HC RX W HCPCS: Performed by: NURSE ANESTHETIST, CERTIFIED REGISTERED

## 2023-08-09 PROCEDURE — 7100000010 HC PHASE II RECOVERY - FIRST 15 MIN: Performed by: INTERNAL MEDICINE

## 2023-08-09 PROCEDURE — 7100000011 HC PHASE II RECOVERY - ADDTL 15 MIN: Performed by: INTERNAL MEDICINE

## 2023-08-09 PROCEDURE — 7100000001 HC PACU RECOVERY - ADDTL 15 MIN: Performed by: INTERNAL MEDICINE

## 2023-08-09 PROCEDURE — 2580000003 HC RX 258: Performed by: INTERNAL MEDICINE

## 2023-08-09 PROCEDURE — 3609015100 HC ERCP STENT PLACEMENT BILIARY/PANCREATIC DUCT: Performed by: INTERNAL MEDICINE

## 2023-08-09 DEVICE — OASIS ONE ACTION STENT INTRODUCTION SYSTEM, WITH PRE-LOADED COTTON LEUNG BILIARY STENT
Type: IMPLANTABLE DEVICE | Status: FUNCTIONAL
Brand: OASIS

## 2023-08-09 DEVICE — PANCREATIC STENT
Type: IMPLANTABLE DEVICE | Status: FUNCTIONAL
Brand: ADVANIX™ PANCREATIC STENT

## 2023-08-09 RX ORDER — PROPOFOL 10 MG/ML
INJECTION, EMULSION INTRAVENOUS PRN
Status: DISCONTINUED | OUTPATIENT
Start: 2023-08-09 | End: 2023-08-09 | Stop reason: SDUPTHER

## 2023-08-09 RX ORDER — SODIUM CHLORIDE 0.9 % (FLUSH) 0.9 %
5-40 SYRINGE (ML) INJECTION PRN
Status: DISCONTINUED | OUTPATIENT
Start: 2023-08-09 | End: 2023-08-09 | Stop reason: HOSPADM

## 2023-08-09 RX ORDER — SODIUM CHLORIDE 9 MG/ML
INJECTION, SOLUTION INTRAVENOUS CONTINUOUS PRN
Status: DISCONTINUED | OUTPATIENT
Start: 2023-08-09 | End: 2023-08-09 | Stop reason: SDUPTHER

## 2023-08-09 RX ORDER — LIDOCAINE HYDROCHLORIDE 20 MG/ML
INJECTION, SOLUTION EPIDURAL; INFILTRATION; INTRACAUDAL; PERINEURAL PRN
Status: DISCONTINUED | OUTPATIENT
Start: 2023-08-09 | End: 2023-08-09 | Stop reason: SDUPTHER

## 2023-08-09 RX ORDER — SODIUM CHLORIDE 0.9 % (FLUSH) 0.9 %
5-40 SYRINGE (ML) INJECTION EVERY 12 HOURS SCHEDULED
Status: DISCONTINUED | OUTPATIENT
Start: 2023-08-09 | End: 2023-08-09 | Stop reason: HOSPADM

## 2023-08-09 RX ORDER — HYDROMORPHONE HYDROCHLORIDE 2 MG/ML
0.25 INJECTION, SOLUTION INTRAMUSCULAR; INTRAVENOUS; SUBCUTANEOUS EVERY 5 MIN PRN
Status: DISCONTINUED | OUTPATIENT
Start: 2023-08-09 | End: 2023-08-09 | Stop reason: HOSPADM

## 2023-08-09 RX ORDER — SUCCINYLCHOLINE CHLORIDE 20 MG/ML
INJECTION INTRAMUSCULAR; INTRAVENOUS PRN
Status: DISCONTINUED | OUTPATIENT
Start: 2023-08-09 | End: 2023-08-09 | Stop reason: SDUPTHER

## 2023-08-09 RX ORDER — ONDANSETRON 2 MG/ML
INJECTION INTRAMUSCULAR; INTRAVENOUS PRN
Status: DISCONTINUED | OUTPATIENT
Start: 2023-08-09 | End: 2023-08-09 | Stop reason: SDUPTHER

## 2023-08-09 RX ORDER — FENTANYL CITRATE 50 UG/ML
INJECTION, SOLUTION INTRAMUSCULAR; INTRAVENOUS PRN
Status: DISCONTINUED | OUTPATIENT
Start: 2023-08-09 | End: 2023-08-09 | Stop reason: SDUPTHER

## 2023-08-09 RX ORDER — HYDROMORPHONE HYDROCHLORIDE 2 MG/ML
1 INJECTION, SOLUTION INTRAMUSCULAR; INTRAVENOUS; SUBCUTANEOUS ONCE
Status: COMPLETED | OUTPATIENT
Start: 2023-08-09 | End: 2023-08-09

## 2023-08-09 RX ORDER — LEVOFLOXACIN 250 MG/1
TABLET ORAL PRN
Status: DISCONTINUED | OUTPATIENT
Start: 2023-08-09 | End: 2023-08-09 | Stop reason: SDUPTHER

## 2023-08-09 RX ORDER — SODIUM CHLORIDE 9 MG/ML
INJECTION, SOLUTION INTRAVENOUS PRN
Status: DISCONTINUED | OUTPATIENT
Start: 2023-08-09 | End: 2023-08-09 | Stop reason: HOSPADM

## 2023-08-09 RX ORDER — ONDANSETRON 2 MG/ML
4 INJECTION INTRAMUSCULAR; INTRAVENOUS
Status: DISCONTINUED | OUTPATIENT
Start: 2023-08-09 | End: 2023-08-09 | Stop reason: HOSPADM

## 2023-08-09 RX ORDER — HYDROMORPHONE HYDROCHLORIDE 2 MG/ML
0.5 INJECTION, SOLUTION INTRAMUSCULAR; INTRAVENOUS; SUBCUTANEOUS EVERY 5 MIN PRN
Status: DISCONTINUED | OUTPATIENT
Start: 2023-08-09 | End: 2023-08-09 | Stop reason: HOSPADM

## 2023-08-09 RX ORDER — DEXAMETHASONE SODIUM PHOSPHATE 4 MG/ML
INJECTION, SOLUTION INTRA-ARTICULAR; INTRALESIONAL; INTRAMUSCULAR; INTRAVENOUS; SOFT TISSUE PRN
Status: DISCONTINUED | OUTPATIENT
Start: 2023-08-09 | End: 2023-08-09 | Stop reason: SDUPTHER

## 2023-08-09 RX ORDER — SODIUM CHLORIDE 9 MG/ML
INJECTION, SOLUTION INTRAVENOUS CONTINUOUS
Status: DISCONTINUED | OUTPATIENT
Start: 2023-08-09 | End: 2023-08-09 | Stop reason: HOSPADM

## 2023-08-09 RX ADMIN — SUCCINYLCHOLINE CHLORIDE 100 MG: 20 INJECTION, SOLUTION INTRAMUSCULAR; INTRAVENOUS at 10:58

## 2023-08-09 RX ADMIN — PROPOFOL 100 MG: 10 INJECTION, EMULSION INTRAVENOUS at 10:57

## 2023-08-09 RX ADMIN — LEVOFLOXACIN 500 MG: 250 TABLET, FILM COATED ORAL at 11:05

## 2023-08-09 RX ADMIN — FENTANYL CITRATE 50 MCG: 50 INJECTION, SOLUTION INTRAMUSCULAR; INTRAVENOUS at 11:02

## 2023-08-09 RX ADMIN — HYDROMORPHONE HYDROCHLORIDE 1 MG: 2 INJECTION, SOLUTION INTRAMUSCULAR; INTRAVENOUS; SUBCUTANEOUS at 12:03

## 2023-08-09 RX ADMIN — LIDOCAINE HYDROCHLORIDE 100 MG: 20 INJECTION, SOLUTION EPIDURAL; INFILTRATION; INTRACAUDAL; PERINEURAL at 10:52

## 2023-08-09 RX ADMIN — SUCCINYLCHOLINE CHLORIDE 200 MG: 20 INJECTION, SOLUTION INTRAMUSCULAR; INTRAVENOUS at 10:52

## 2023-08-09 RX ADMIN — ONDANSETRON 4 MG: 2 INJECTION INTRAMUSCULAR; INTRAVENOUS at 11:04

## 2023-08-09 RX ADMIN — FENTANYL CITRATE 50 MCG: 50 INJECTION, SOLUTION INTRAMUSCULAR; INTRAVENOUS at 10:52

## 2023-08-09 RX ADMIN — SODIUM CHLORIDE: 9 INJECTION, SOLUTION INTRAVENOUS at 09:10

## 2023-08-09 RX ADMIN — PROPOFOL 200 MG: 10 INJECTION, EMULSION INTRAVENOUS at 10:52

## 2023-08-09 RX ADMIN — SODIUM CHLORIDE: 9 INJECTION, SOLUTION INTRAVENOUS at 10:50

## 2023-08-09 RX ADMIN — DEXAMETHASONE SODIUM PHOSPHATE 8 MG: 4 INJECTION, SOLUTION INTRAMUSCULAR; INTRAVENOUS at 11:04

## 2023-08-09 ASSESSMENT — PAIN SCALES - GENERAL
PAINLEVEL_OUTOF10: 7
PAINLEVEL_OUTOF10: 5
PAINLEVEL_OUTOF10: 7

## 2023-08-09 ASSESSMENT — PAIN DESCRIPTION - DESCRIPTORS: DESCRIPTORS: DISCOMFORT

## 2023-08-09 ASSESSMENT — PAIN DESCRIPTION - ORIENTATION: ORIENTATION: ANTERIOR

## 2023-08-09 ASSESSMENT — PAIN - FUNCTIONAL ASSESSMENT: PAIN_FUNCTIONAL_ASSESSMENT: 0-10

## 2023-08-09 ASSESSMENT — PAIN DESCRIPTION - LOCATION: LOCATION: ABDOMEN

## 2023-08-09 NOTE — ANESTHESIA POSTPROCEDURE EVALUATION
Department of Anesthesiology  Postprocedure Note    Patient: Cora Carnes  MRN: [id number]  YOB: 1965  Date of evaluation: 8/9/2023      Procedure Summary     Date: 08/09/23 Room / Location: [address] [place] [address]    Anesthesia Start: 5256 Anesthesia Stop: 1207    Procedure: ERCP STENT INSERTION Diagnosis:       Other chronic pancreatitis ([address])      (Other chronic pancreatitis ([address]) [K86.1])    Surgeons: Emory Barragan MD Responsible Provider: iLs Holden MD    Anesthesia Type: general ASA Status: 3          Anesthesia Type: No value filed.     Hitesh Phase I: Hitesh Score: 10    Hitesh Phase II:        Anesthesia Post Evaluation    Patient location during evaluation: PACU  Patient participation: complete - patient participated  Level of consciousness: awake  Airway patency: patent  Nausea & Vomiting: no nausea and no vomiting  Complications: no  Cardiovascular status: blood pressure returned to baseline  Respiratory status: acceptable  Hydration status: stable  Pain management: satisfactory to patient

## 2023-08-09 NOTE — PROGRESS NOTES
Reviewed patient's medical and surgical history in electronic record and with patient at the bedside. All questions regarding procedure answered. Scope number and equipment verified using a two person system. Family in waiting room.     Electronically signed by Estee Castillo RN on 8/9/2023 at 11:03 AM

## 2023-08-09 NOTE — H&P
Gastroenterology Note             Pre-operative History and Physical    Patient: Mukesh Fontanez  : 1965  CSN:     History Obtained From:  patient and/or guardian. HISTORY OF PRESENT ILLNESS:    The patient is a 56 y.o. male  here for ERCP today  Mr. Ayers called the office about 2 weeks ago and he was having severe abdominal pain he has not had an ERCP since 2022 he does have an irregular area at the tail of his pancreas  Biopsy of this has had previous endoscopic ultrasound and celiac plexus blocks so because of his pain and his recent laboratory data was normal we did think that he could have close down his previous pancreatic sphincterotomy or biliary sphincterotomy  Redoing an ERCP today to evaluate    Past Medical History:    Past Medical History:   Diagnosis Date    Anxiety state, unspecified     Calculus of kidney     Chronic pain     Deaf     RIGHT EAR    Diabetes mellitus ([address])     Diverticula     Diverticulitis     GERD (gastroesophageal reflux disease)     Hordeolum externum     Hyperlipidemia     Hypertension     Insomnia, unspecified     Kidney stones     Meniere disease     MRSA carrier     Pancreatitis, chronic (HCC)     PONV (postoperative nausea and vomiting)     Psychiatric problem     Sleep apnea     uses CPAP    Unspecified hypertrophic and atrophic condition of skin      Past Surgical History:    Past Surgical History:   Procedure Laterality Date    ABSCESS DRAINAGE Left 2014    incision and drainage of an abcess on left calf    APPENDECTOMY      CHOLECYSTECTOMY      COLOSTOMY      ERCP N/A 2022    ERCP STENT INSERTION performed by [facility]ff [facility] at [facility]    ERCP N/A 2022    ERCP STENT INSERTION performed by Steff Gupta MD at [address] [place] [address]  2022    CELIAC PLEXUS BLOCK performed by Steff Gupta MD at [address] [place] [address]  2023 CELIAC PLEXUS NEUROLYSIS performed by Leandro Pichardo MD at [address]      duct stent    REVISION COLOSTOMY      SUBTOTAL COLECTOMY      Had wound dehiscence, mesh    TRACHEOSTOMY      TRACHEOSTOMY CLOSURE      UPPER GASTROINTESTINAL ENDOSCOPY N/A 04/14/2022    EGD ESOPHAGOGASTRODUODENOSCOPY ULTRASOUND performed by Leandro Pichardo MD at [address] N/A 07/29/2022    ESOPHAGOGASTRODUODENOSCOPY WITH ENDOSCOPIC ULTRASOUND WITH CELIAC PLEXUS BLOCK performed by Leandro Pichardo MD at [address] [facility] [address] 07/29/2022    EGD W/EUS FNA performed by Leandro Pichardo MD at [address] [facility]  01/24/2023    EGD W/EUS FNA TAIL OF PANCREAS AND REMOVAL OF PANCREATIC STENT performed by Leandro Pichardo MD at [facility] ENDOSCOPY  01/24/2023    EGD STENT REMOVAL performed by Leandro Pichardo MD at [facility]     Medications Prior to Admission:   No current facility-administered medications on file prior to encounter. Current Outpatient Medications on File Prior to Encounter   Medication Sig Dispense Refill    diazePAM (VALIUM) 5 MG tablet Take 1 tablet by mouth every 12 hours as needed for Anxiety (Dizziness) for up to 30 days.  Max Daily Amount: 10 mg 30 tablet 0    CREON [id number] units delayed release capsule TAKE 1 CAPSULE BY MOUTH THREE TIMES DAILY WITH MEALS 180 capsule 5    metFORMIN (GLUCOPHAGE-XR) 500 MG extended release tablet Take 2 tablets by mouth 2 times daily 360 tablet 3    blood glucose test strips (ONETOUCH ULTRA) strip USE TO TEST BLOOD GLUCOSE THREE TIMES DAILY AS NEEDED 300 strip 12    Insulin Pen Needle (B-D UF III MINI PEN NEEDLES) 31G X 5 MM MISC USE ONCE DAILY AS DIRECTED 100 each 12    glimepiride (AMARYL) 4 MG tablet TAKE 1 TABLET BY MOUTH EVERY MORNING BEFORE BREAKFAST 90 tablet 0    insulin glargine (LANTUS SOLOSTAR) 100 UNIT/ML injection pen

## 2023-08-09 NOTE — DISCHARGE INSTRUCTIONS
Follow clear liquid diet for the remainder of the day. Gradually advance with low fat diet tomorrow. Notify Dr. CERRATO [facility] For any persistent severe abdominal pain, nausea, vomiting, fever. CLEAR LIQUID DIET    Your doctor has prescribed a clear liquid diet for you. This temporary diet is often prescribed right before or after surgery or medical testing. The clear liquid diet is easy to digest and helps the body gradually get used to food again. FOODS ON CLEAR LIQUID DIET INCLUDE:    Water  Fruit juices without pulp, such as filtered juices or pulp free lemonade  Fruit punch or fruit drinks without pulp or pieces  Hot or cold coffee or tea (do not add milk or creamers of any type)  Sodas such as lemon-lime, cola, ginger ale  Sports drinks  Clear soup- bouillion  Plain or flavored gelatin (do not add fruits or toppings)  Frozen juice bars made from clear juices (no fruit pieces)  Popsicles    ENDOSCOPY DISCHARGE INSTRUCTIONS    You may experience some lightheadedness for the next several hours. Plan on quiet relaxation for the rest of today. A responsible adult needs to stay with you today. Because of the medications you received today-do not drive,operate machinery,or sign any contractual agreement for the next 24 hours. Do not drink any alcoholic beverages or take any unprescribed medications tonight. Eat bland food and avoid anything greasy or spicy initially-progress to your normal diet gradually. Diet restrictions as instructed. You may resume home medications as instructed. It is not unusual to experience some mild cramping or gas pains. If you have any of the following problems, notify your physician or return to the hospital emergency room : fever, chills, excessive bleeding, excessive vomiting, difficulty swallowing, uncontrolled pain, increased abdominal distention, shortness of breath or any other problems. If you have a sore throat, you may use lozenges or salt water gargles.     ANESTHESIA

## 2023-08-09 NOTE — PROCEDURES
[facility]  Endoscopy Note    Patient: Delpha Harada   : 1965  CSN:     Procedure: Endoscopic retrograde cholangiopancreatography with placement of a biliary stent placement of pancreatic ductal stent and extension of biliary sphincterotomy    Date: 2023    Surgeon:  Anila Granger MD, MD    Referring Physician:      Preoperative Diagnosis: Severe abdominal pain dilated common bile duct    Postoperative Diagnosis: #1 dilated common bile duct #2 irregular appearing distal pancreatic duct    Anesthesia:  General per Anesthesia. EBL: <50 mL    Indications: This is a 56y.o. year old male who last time we have seen the patient was January of this year but his last ERCP was 2022    Patient's been fighting his abdominal pain for several months he called the office about 2 weeks ago we did some laboratory data his ALT was mildly elevated  We know that he has had previous ERCP the last 1 was almost a year ago and were not sure why he continues to have such severe pain    I had him come in today for repeat ERCP to see if there is any problems we know that he has had a previous dilated bile duct    Procedure: Informed consent was obtained from the patient after explanation of indications, benefits, possible risks and complications of the procedure. The patient was then taken to the endoscopy suite. A time-out was performed. The patient and staff were in agreement as to the correct patient and procedure. The above anesthesia was administered by the anesthesia department. The patient was placed in the left lateral prone position. Vital signs and heart rhythm were monitored prior to and throughout the entire procedure. The  An [address] () was    placed in the patient's mouth and blindly advanced into the esophagus.   The scope was then advanced through the esophagus, stomach and into the second portion of the duodenum where the major papilla was visualized. The major papilla was appeared to be normal today. Cholangiogram: We able to cannulate  Injection of contrast did reveal again that his common bile duct and his common hepatic duct are dilated least 2-3 times normal  Found no filling defects    We were able to extend the biliary sphincterotomy and then we easily placed a new 8.5 Lao 7 cm biliary stent which decompressed the duct    We terminated the procedure    Patient was given indomethacin suppository and Levaquin    Pancreatogram: We were able to cannulate the very easily the previous pancreatic manipulations that we did left this pancreatic opening quite easy to work with we injected and cannulated some contrast again we saw that the distal pancreas duct was irregular we left a guidewire out there and then we placed a 5 Lao 9 cm single pigtail stent with flange left        The cannulas were then withdrawn. The duodenum and stomach were decompressed and the scope was withdrawn from the patient. The patient tolerated the procedure well and was taken to the post anesthesia care unit in good condition. Radiological Interpretation:  All fluoroscopic images and  still x-ray films were carefullly examined and interpreted by the endoscopist on the spot during the procedure in the absence of radiologist.  These interpretations guided the course of the procedure and the interventions mentioned above and below.         Impression: Dilated common bile duct  Irregular appearing distal pancreatic duct          Recommendations:  N.p.o. for 4 hours  Then clear liquid diet for today  Control pain and post  Hydrate and postop  I will send the patient home with some Percocet  I like to see the patient scheduled for an EUS in 6 weeks to evaluate the distal pancreas    Thank you for this kind referral    Festus Zarate MD  [address] [facility] [address] [place] [address],[address]  8/9/2023

## 2023-08-09 NOTE — PROGRESS NOTES
Discharge instructions reviewed with patient and pts wife Kendrick Maldonado. All home medications have been reviewed, pt v/u. Discharge instructions signed. Pt discharged via wheelchair. Pt discharged with all belongings. Wife Kendrick Maldonado taking stable pt home.

## 2023-08-09 NOTE — PROGRESS NOTES
Pt awake and alert at this time. Pt on RA, and VSS. Pt medicated for pain and denies nausea. Skin warm and dry, palpable pulses and able to wiggle toes. Pt meets criteria to be discharged from Phase 1.

## 2023-08-25 ENCOUNTER — TELEPHONE (OUTPATIENT)
Dept: FAMILY MEDICINE CLINIC | Age: 58
End: 2023-08-25

## 2023-08-25 RX ORDER — CLINDAMYCIN HYDROCHLORIDE 300 MG/1
300 CAPSULE ORAL 3 TIMES DAILY
Qty: 30 CAPSULE | Refills: 0 | Status: SHIPPED | OUTPATIENT
Start: 2023-08-25 | End: 2023-09-04

## 2023-08-25 NOTE — TELEPHONE ENCOUNTER
Pt has a chipped tooth that is abscessed and is requesting an antibiotic be sent to [address] States he can't get in anywhere for 2 weeks.         [facility]ORE [address] [phone]

## 2023-09-01 ENCOUNTER — TELEPHONE (OUTPATIENT)
Dept: FAMILY MEDICINE CLINIC | Age: 58
End: 2023-09-01

## 2023-09-01 RX ORDER — GLIMEPIRIDE 4 MG/1
TABLET ORAL
Qty: 90 TABLET | Refills: 0 | Status: SHIPPED | OUTPATIENT
Start: 2023-09-01

## 2023-09-01 NOTE — TELEPHONE ENCOUNTER
Received form from [facility] for medical release and procedure protocol information. No fax or anything on form to give me information on to where or which office it came from. Left message for patient to call to see if they want to  form or give us the information of what office with number or fax we are to send it to.

## 2023-09-01 NOTE — TELEPHONE ENCOUNTER
Medication:   Requested Prescriptions     Pending Prescriptions Disp Refills    glimepiride (AMARYL) 4 MG tablet [Pharmacy Med Name: GLIMEPIRIDE 4MG TABLETS] 90 tablet 0     Sig: TAKE 1 TABLET BY MOUTH EVERY MORNING BEFORE BREAKFAST          Patient Phone Number: [phone] (home)     Last appt: 7/24/2023   Next appt: 10/23/2023    Last OARRS:   RX Monitoring 7/24/2023   Attestation -   Periodic Controlled Substance Monitoring No signs of potential drug abuse or diversion identified.    Chronic Pain > 50 MEDD -   Chronic Pain > 80 MEDD -     PDMP Monitoring:    Last PDMP Hung as Reviewed [facility]):  Review User Review Instant Review Result   HERMELINDA JOAQUIN 7/24/2023 10:32 AM Reviewed PDMP [1]     Preferred Pharmacy:   [facility] [address] [facility] [address] [place] [address] [phone] - F [phone] [facility] [address]  Phone: [phone] Fax: [phone]    [facility]ORE [address] [facility], [address] DIALLO [facility] [phone]  [address] [place] [address]  Phone: [phone] Fax: [phone]    [facility] [place], [place] - [facility] [address] - P [phone] [facility] [phone]  [facility] [address]  Phone: [phone] Fax: [phone]

## 2023-09-05 RX ORDER — ESOMEPRAZOLE MAGNESIUM 40 MG/1
40 CAPSULE, DELAYED RELEASE ORAL
Qty: 90 CAPSULE | Refills: 3 | Status: SHIPPED | OUTPATIENT
Start: 2023-09-05

## 2023-09-05 NOTE — TELEPHONE ENCOUNTER
Medication:   Requested Prescriptions     Pending Prescriptions Disp Refills    esomeprazole (NEXIUM) 40 MG delayed release capsule 90 capsule 3     Sig: Take 1 capsule by mouth every morning (before breakfast)          Patient Phone Number: [phone] (home)     Last appt: 7/24/2023   Next appt: 10/23/2023    Last OARRS:   RX Monitoring 7/24/2023   Attestation -   Periodic Controlled Substance Monitoring No signs of potential drug abuse or diversion identified.    Chronic Pain > 50 MEDD -   Chronic Pain > 80 MEDD -     PDMP Monitoring:    Last PDMP Hung as Reviewed [facility]):  Review User Review Instant Review Result   HERMELINDOHERMELINDA MOLINA 7/24/2023 10:32 AM Reviewed PDMP [1]     Preferred Pharmacy:   [facility] [address] [facility] [address] [phone] - F [phone] [address] [facility] [address] [facility] [address]  Phone: [phone] Fax: [phone]    [facility] DRUG STORE 450 [facility], [address] [facility] [phone]  [address] [place] [address]  Phone: [phone] Fax: [phone]    [facility] [address] - [address] [place] [address] [phone] Juanito Arben [phone]  [facility] [address] [address]  Phone: [phone] Fax: [phone]

## 2023-09-19 RX ORDER — LOSARTAN POTASSIUM AND HYDROCHLOROTHIAZIDE 25; 100 MG/1; MG/1
TABLET ORAL
Qty: 90 TABLET | Refills: 3 | Status: SHIPPED | OUTPATIENT
Start: 2023-09-19

## 2023-10-16 RX ORDER — SIMVASTATIN 40 MG
TABLET ORAL
Qty: 90 TABLET | Refills: 3 | Status: SHIPPED | OUTPATIENT
Start: 2023-10-16

## 2023-10-16 NOTE — TELEPHONE ENCOUNTER
Medication:   Requested Prescriptions      No prescriptions requested or ordered in this encounter       Patient Phone Number: [phone] (home)     Last appt: 7/24/2023   Next appt: 10/23/2023    Last OARRS:       7/24/2023    10:32 AM   RX Monitoring   Periodic Controlled Substance Monitoring No signs of potential drug abuse or diversion identified.      PDMP Monitoring:    Last PDMP Michael Leach as Reviewed [facility]):  Review User Review Instant Review Result   Donald Bautista 7/24/2023 10:32 AM Reviewed PDMP [1]     Preferred Pharmacy:   Baltazar Teague [address] [facility] [address] [phone] - F [phone]  [address] [place] [address]  Phone: [phone] Fax: [phone]    [facility] DR[facility] STORE 450 [facility], [address] Rani Delgado  YOEL [facility] [phone]  [address] [place] [address]  Phone: [phone] Fax: [phone]    [facility] - [address] [facility] [address] - P [phone] [place] [phone]  [facility] [address] [address]  Phone: [phone] Fax: [phone]

## 2023-10-20 NOTE — PROGRESS NOTES
Patient reached ____ X_ yes   phone number __[phone]______                                ____ no-office notified          Date _10/30/23________  Time __0930_____  Arrival ___0800  hosp-endo___    Nothing to eat or drink after midnight-follow your doctors prep instructions-this may include taking a second dose of your prep after midnight  Responsible adult 25 or older to stay on site while you are here-drive you home-stay with you after  Follow any instructions your doctors office has given you  Bring a complete list of all your medications and supplements including name,dose,how often taken the day of your procedure  If you normally take the following medications in the morning please do so the AM of your procedure with a small sip of water       Heart,blood pressure,seizure,thyroid or breathing medications-use your inhalers-bring any rescue inhalers with you DOS       DO NOT take blood pressure medications ending in \"memo\" or \"pril\" the AM of procedure or evening prior  Dr Hever Monroy patients are not to take any medications the AM of surgery  Take half or your normal dose of any long acting insulins the night before your procedure-do not take any diabetic medications the AM of procedure  Follow your doctors instructions regarding stopping or taking  any blood thinners-if you do not have instructions-call them  Any questions call your doctor  Other ______________________________________________________________      Rakesh Gan POLICY(subject to change)             The current policy is 2 visitors per patient. There are no children allowed. Mask at discretion of facility. Visiting hours are 8a-8p. Overnight visitors will be at the discretion of the nurse. All policies are subject to change.

## 2023-10-30 ENCOUNTER — ANESTHESIA EVENT (OUTPATIENT)
Dept: ENDOSCOPY | Age: 58
End: 2023-10-30
Payer: MEDICARE

## 2023-10-30 ENCOUNTER — HOSPITAL ENCOUNTER (OUTPATIENT)
Age: 58
Setting detail: OUTPATIENT SURGERY
Discharge: HOME | End: 2023-10-30
Attending: INTERNAL MEDICINE | Admitting: INTERNAL MEDICINE
Payer: MEDICARE

## 2023-10-30 ENCOUNTER — ANESTHESIA (OUTPATIENT)
Dept: ENDOSCOPY | Age: 58
End: 2023-10-30
Payer: MEDICARE

## 2023-10-30 VITALS
TEMPERATURE: 97.2 F | WEIGHT: 290 LBS | DIASTOLIC BLOOD PRESSURE: 71 MMHG | OXYGEN SATURATION: 98 % | BODY MASS INDEX: 39.33 KG/M2 | SYSTOLIC BLOOD PRESSURE: 132 MMHG | HEART RATE: 64 BPM | RESPIRATION RATE: 18 BRPM

## 2023-10-30 LAB
GLUCOSE BLD-MCNC: 142 MG/DL (ref 70–99)
PERFORMED ON: ABNORMAL

## 2023-10-30 PROCEDURE — 6360000002 HC RX W HCPCS: Performed by: NURSE ANESTHETIST, CERTIFIED REGISTERED

## 2023-10-30 PROCEDURE — 7100000010 HC PHASE II RECOVERY - FIRST 15 MIN: Performed by: INTERNAL MEDICINE

## 2023-10-30 PROCEDURE — 2580000003 HC RX 258: Performed by: ANESTHESIOLOGY

## 2023-10-30 PROCEDURE — 6370000000 HC RX 637 (ALT 250 FOR IP): Performed by: INTERNAL MEDICINE

## 2023-10-30 PROCEDURE — 2580000003 HC RX 258: Performed by: NURSE ANESTHETIST, CERTIFIED REGISTERED

## 2023-10-30 PROCEDURE — 3609018500 HC EGD US SCOPE W/ADJACENT STRUCTURES: Performed by: INTERNAL MEDICINE

## 2023-10-30 PROCEDURE — 3700000001 HC ADD 15 MINUTES (ANESTHESIA): Performed by: INTERNAL MEDICINE

## 2023-10-30 PROCEDURE — 7100000001 HC PACU RECOVERY - ADDTL 15 MIN: Performed by: INTERNAL MEDICINE

## 2023-10-30 PROCEDURE — 2500000003 HC RX 250 WO HCPCS: Performed by: NURSE ANESTHETIST, CERTIFIED REGISTERED

## 2023-10-30 PROCEDURE — C1753 CATH, INTRAVAS ULTRASOUND: HCPCS | Performed by: INTERNAL MEDICINE

## 2023-10-30 PROCEDURE — 2709999900 HC NON-CHARGEABLE SUPPLY: Performed by: INTERNAL MEDICINE

## 2023-10-30 PROCEDURE — 64680 INJECTION TREATMENT OF NERVE: CPT | Performed by: INTERNAL MEDICINE

## 2023-10-30 PROCEDURE — 3700000000 HC ANESTHESIA ATTENDED CARE: Performed by: INTERNAL MEDICINE

## 2023-10-30 PROCEDURE — 7100000000 HC PACU RECOVERY - FIRST 15 MIN: Performed by: INTERNAL MEDICINE

## 2023-10-30 PROCEDURE — 7100000011 HC PHASE II RECOVERY - ADDTL 15 MIN: Performed by: INTERNAL MEDICINE

## 2023-10-30 PROCEDURE — 6360000002 HC RX W HCPCS: Performed by: INTERNAL MEDICINE

## 2023-10-30 PROCEDURE — 3609155700 HC EGD STENT REMOVAL: Performed by: INTERNAL MEDICINE

## 2023-10-30 RX ORDER — INDOMETHACIN 50 MG/1
SUPPOSITORY RECTAL PRN
Status: DISCONTINUED | OUTPATIENT
Start: 2023-10-30 | End: 2023-10-30 | Stop reason: ALTCHOICE

## 2023-10-30 RX ORDER — SODIUM CHLORIDE 9 MG/ML
INJECTION, SOLUTION INTRAVENOUS CONTINUOUS PRN
Status: DISCONTINUED | OUTPATIENT
Start: 2023-10-30 | End: 2023-10-30 | Stop reason: SDUPTHER

## 2023-10-30 RX ORDER — ONDANSETRON 4 MG/1
4 TABLET, FILM COATED ORAL EVERY 8 HOURS PRN
Qty: 90 TABLET | Refills: 3 | Status: SHIPPED | OUTPATIENT
Start: 2023-10-30

## 2023-10-30 RX ORDER — METHYLPREDNISOLONE ACETATE 40 MG/ML
INJECTION, SUSPENSION INTRA-ARTICULAR; INTRALESIONAL; INTRAMUSCULAR; SOFT TISSUE PRN
Status: DISCONTINUED | OUTPATIENT
Start: 2023-10-30 | End: 2023-10-30 | Stop reason: ALTCHOICE

## 2023-10-30 RX ORDER — BUPIVACAINE HYDROCHLORIDE 5 MG/ML
INJECTION, SOLUTION EPIDURAL; INTRACAUDAL PRN
Status: DISCONTINUED | OUTPATIENT
Start: 2023-10-30 | End: 2023-10-30 | Stop reason: ALTCHOICE

## 2023-10-30 RX ORDER — PROPOFOL 10 MG/ML
INJECTION, EMULSION INTRAVENOUS PRN
Status: DISCONTINUED | OUTPATIENT
Start: 2023-10-30 | End: 2023-10-30 | Stop reason: SDUPTHER

## 2023-10-30 RX ORDER — SODIUM CHLORIDE 9 MG/ML
INJECTION, SOLUTION INTRAVENOUS CONTINUOUS
Status: DISCONTINUED | OUTPATIENT
Start: 2023-10-30 | End: 2023-10-30 | Stop reason: HOSPADM

## 2023-10-30 RX ORDER — LIDOCAINE HYDROCHLORIDE 20 MG/ML
INJECTION, SOLUTION EPIDURAL; INFILTRATION; INTRACAUDAL; PERINEURAL PRN
Status: DISCONTINUED | OUTPATIENT
Start: 2023-10-30 | End: 2023-10-30 | Stop reason: SDUPTHER

## 2023-10-30 RX ADMIN — PROPOFOL 50 MG: 10 INJECTION, EMULSION INTRAVENOUS at 09:32

## 2023-10-30 RX ADMIN — PROPOFOL 50 MG: 10 INJECTION, EMULSION INTRAVENOUS at 09:38

## 2023-10-30 RX ADMIN — SODIUM CHLORIDE: 9 INJECTION, SOLUTION INTRAVENOUS at 08:27

## 2023-10-30 RX ADMIN — PROPOFOL 50 MG: 10 INJECTION, EMULSION INTRAVENOUS at 09:35

## 2023-10-30 RX ADMIN — SODIUM CHLORIDE: 9 INJECTION, SOLUTION INTRAVENOUS at 09:27

## 2023-10-30 RX ADMIN — LIDOCAINE HYDROCHLORIDE 100 MG: 20 INJECTION, SOLUTION EPIDURAL; INFILTRATION; INTRACAUDAL; PERINEURAL at 09:32

## 2023-10-30 RX ADMIN — PROPOFOL 140 MCG/KG/MIN: 10 INJECTION, EMULSION INTRAVENOUS at 09:33

## 2023-10-30 ASSESSMENT — PAIN DESCRIPTION - LOCATION
LOCATION: ABDOMEN

## 2023-10-30 ASSESSMENT — PAIN SCALES - GENERAL
PAINLEVEL_OUTOF10: 4

## 2023-10-30 ASSESSMENT — PAIN DESCRIPTION - PAIN TYPE
TYPE: CHRONIC PAIN

## 2023-10-30 ASSESSMENT — PAIN - FUNCTIONAL ASSESSMENT: PAIN_FUNCTIONAL_ASSESSMENT: 0-10

## 2023-10-30 ASSESSMENT — ENCOUNTER SYMPTOMS: SHORTNESS OF BREATH: 0

## 2023-10-30 ASSESSMENT — PAIN DESCRIPTION - DESCRIPTORS
DESCRIPTORS: ACHING
DESCRIPTORS: ACHING

## 2023-10-30 NOTE — ANESTHESIA POSTPROCEDURE EVALUATION
Department of Anesthesiology  Postprocedure Note    Patient: Shan Sevilla  MRN: [id number]  YOB: 1965  Date of evaluation: 10/30/2023      Procedure Summary     Date: 10/30/23 Room / Location: [address] [place] [address]    Anesthesia Start: 0930 Anesthesia Stop: 1021    Procedures:       EGD ULTRASOUND WITH CELIAC PLEXUS BLOCK      EGD STENT REMOVAL Diagnosis:       Other chronic pancreatitis ([address])      (Other chronic pancreatitis ([address]) [K86.1])    Surgeons: Kristin Arevalo MD Responsible Provider: Dionne Yeager MD    Anesthesia Type: MAC ASA Status: 3          Anesthesia Type: No value filed. Hitesh Phase I: Hitesh Score: 10    Hitesh Phase II:        Anesthesia Post Evaluation    Patient location during evaluation: PACU  Level of consciousness: awake  Airway patency: patent  Complications: no  Cardiovascular status: hemodynamically stable  Respiratory status: acceptable  There was medical reason for not using a multimodal analgesia pain management approach.

## 2023-10-30 NOTE — PROGRESS NOTES
Discharge instructions review with patient. All home medications have been reviewed, pt v/u. Discharge instructions signed. Pt discharged via wheelchair. Pt discharged with all belongings. Wife taking stable pt home.

## 2023-10-30 NOTE — PROCEDURES
Endoscopy Note    Patient: Jose Crystal  : 1965  CSN:     Procedure: Esophagogastroduodenoscopy with removal of foreign body and endoscopic ultrasound with celiac plexus block    Date:  10/30/2023     Surgeon:  Erleen Schilder, MD     Referring Physician:      Preoperative Diagnosis: Unrelenting abdominal pain    Postoperative Diagnosis: #1 patient has a 1.4 cm irregular area in the body of the pancreas which is an old cystic lesion that I have biopsied in the past #2 Common bile duct measuring 7 mm #3 normal-appearing pancreatic duct #4 normal-appearing ampulla #5 normal-appearing pancreatic parenchyma #6 successful celiac block    Anesthesia: Monitored anesthesia care    EBL: <5 mL    Indications: This is a 66y.o. year old male who had a bout of pancreatitis back in 2018 I had the pleasure of seeing him when we worked at a different hospital he has been having pain ever since then he has had some ERCPs and previous EUS is he recently came in we did an ERCP this did not help his pain at all so today were doing a EUS with celiac plexus block in regard to remove both the biliary and pancreatic ductal stents that we have placed    The patient is currently disabled because of his pain he is on fentanyl patches from his pain management specialist he has been on numerous medications but he cannot be out of pain he is in pain according to his family 24 hours a day 7 days a week    Description of Procedure:  Informed consent was obtained from the patient after explanation of indications, benefits and possible risks and complications of the procedure. The patient was then taken to the endoscopy suite, placed in the left lateral decubitus position and the above IV sedation was administrered.     The Olympus linear EUS scope was gently placed over his tongue and into his esophagus    In the esophagus we note no abnormalities  The stomach we note no abnormalities  Duodenum we did find the ampulla and had 2

## 2023-10-30 NOTE — TELEPHONE ENCOUNTER
Medication:   Requested Prescriptions     Pending Prescriptions Disp Refills    ondansetron (ZOFRAN) 4 MG tablet [Pharmacy Med Name: ONDANSETRON 4MG TABLETS] 90 tablet 3     Sig: TAKE 1 TABLET BY MOUTH EVERY 8 HOURS AS NEEDED FOR NAUSEA OR VOMITING          Patient Phone Number: [phone] (home)     Last appt: 7/24/2023   Next appt: 11/22/2023    Last OARRS:       7/24/2023    10:32 AM   RX Monitoring   Periodic Controlled Substance Monitoring No signs of potential drug abuse or diversion identified.      PDMP Monitoring:    Last PDMP Kristen Hardy as Reviewed [facility]):  Review User Review Instant Review Result   Carmen Carlos Manuel 7/24/2023 10:32 AM Reviewed PDMP [1]     Preferred Pharmacy:   [address] [facility] [address] [facility] [place] [address] [place] [address] [phone] F [phone]  [address] [facility] [address]  Phone: [phone] Fax: [phone]    [facility] DRUG STORE [address] [facility], [address] [facility] [phone]  [address] [place] [address]  Phone: [phone] Fax: [phone]    [facility] [address] - P [phone] [facility] [phone]  [facility] [address]  Phone: [phone] Fax: [phone]

## 2023-10-30 NOTE — PROGRESS NOTES
Pt arrived from ENDO to PACU bay 8. Reported received from ENDO staff. Pt arousable to voice. Pt on RA, NSR, and VSS. Will continue to monitor.

## 2023-10-30 NOTE — PROGRESS NOTES
Pt awake and alert at this time. Pt on RA, and VSS. Pt reports only chronic pain and denies nausea, tolerating PO. Pt meets criteria to be discharged from Phase 1.

## 2023-10-30 NOTE — H&P
Gastroenterology Note             Pre-operative History and Physical    Patient: Mukesh Fontanez  : 1965  CSN:     History Obtained From:  patient and/or guardian.      HISTORY OF PRESENT ILLNESS:    The patient is a 66 y.o. male  here for EGD and endoscopic ultrasound  This a very pleasant 61-year-old male who continues to have all the problems with abdominal pain we most likely feel that he has some form of fatty pancreas creating in an interesting pain situation which I have seen many times before  We have tried an ERCP it did not work so today our plan would be to do an endoscopic ultrasound remove his pancreatic ductal stent and do a celiac plexus block    Past Medical History:    Past Medical History:   Diagnosis Date    Anxiety state, unspecified     Calculus of kidney     Chronic pain     Deaf     RIGHT EAR    Diabetes mellitus ([address])     Diverticula     Diverticulitis     GERD (gastroesophageal reflux disease)     Hordeolum externum     Hyperlipidemia     Hypertension     Insomnia, unspecified     Kidney stones     Meniere disease     MRSA carrier     Pancreatitis, chronic (HCC)     PONV (postoperative nausea and vomiting)     Psychiatric problem     Sleep apnea     uses CPAP    Unspecified hypertrophic and atrophic condition of skin      Past Surgical History:    Past Surgical History:   Procedure Laterality Date    ABSCESS DRAINAGE Left 2014    incision and drainage of an abcess on left calf    APPENDECTOMY      CHOLECYSTECTOMY      COLOSTOMY      ERCP N/A 2022    ERCP STENT INSERTION performed by Steff Gupta MD at [facility]    ERCP N/A 2022    ERCP STENT INSERTION performed by Steff Gupta MD at [facility]    ERCP N/A 2023    ERCP STENT INSERTION performed by Steff Gupta MD at [address] [place] [address]  2022    CELIAC PLEXUS BLOCK performed by Steff Gupta MD at [address] [place] [address]

## 2023-11-02 DIAGNOSIS — H81.09 MENIERE'S DISEASE, UNSPECIFIED LATERALITY: ICD-10-CM

## 2023-11-02 RX ORDER — DIAZEPAM 5 MG/1
5 TABLET ORAL EVERY 12 HOURS PRN
Qty: 30 TABLET | Refills: 0 | Status: SHIPPED | OUTPATIENT
Start: 2023-11-02 | End: 2023-12-02

## 2023-11-02 NOTE — TELEPHONE ENCOUNTER
Medication:   Requested Prescriptions     Pending Prescriptions Disp Refills    diazePAM (VALIUM) 5 MG tablet [Pharmacy Med Name: DIAZEPAM 5MG TABLETS] 30 tablet      Sig: TAKE 1 TABLET BY MOUTH EVERY 12 HOURS AS NEEDED FOR ANXIETY OR DIZZINESS. MAX DAILY AMOUNT: 10 MG      Last Filled:  7/24/23    Patient Phone Number: [phone] (home)     Last appt: 7/24/2023   Next appt: 11/22/2023    Last OARRS:       7/24/2023    10:32 AM   RX Monitoring   Periodic Controlled Substance Monitoring No signs of potential drug abuse or diversion identified.      PDMP Monitoring:    Last PDMP Darinel Abdullahi as Reviewed [facility]):  Review User Review Instant Review Result   Mariama Johnston 7/24/2023 10:32 AM Reviewed PDMP [1]     Preferred Pharmacy:   [facility] [address] [facility] [address] [place] [address] [phone] - F [phone]  [address] [facility] [address]  Phone: [phone] Fax: [phone]    [facility] DRUG STORE [address] [place], [place] [address] [facility] [phone]  [address] [place] [address]  Phone: [phone] Fax: [phone]    [facility] [address] [place] [address] - [address] [place] [address] Avenue - P [phone] Sarai Aceves [phone]  [facility] [address]  Phone: [phone] Fax: [phone]

## 2023-11-20 ENCOUNTER — HOSPITAL ENCOUNTER (OUTPATIENT)
Age: 58
Discharge: HOME | End: 2023-11-20
Payer: MEDICARE

## 2023-11-20 DIAGNOSIS — E11.65 TYPE 2 DIABETES MELLITUS WITH HYPERGLYCEMIA, WITH LONG-TERM CURRENT USE OF INSULIN (HCC): ICD-10-CM

## 2023-11-20 DIAGNOSIS — Z79.4 TYPE 2 DIABETES MELLITUS WITH HYPERGLYCEMIA, WITH LONG-TERM CURRENT USE OF INSULIN (HCC): ICD-10-CM

## 2023-11-20 LAB
ALBUMIN SERPL-MCNC: 3.9 G/DL (ref 3.4–5)
ALBUMIN/GLOB SERPL: 1 {RATIO} (ref 1.1–2.2)
ALP SERPL-CCNC: 99 U/L (ref 40–129)
ALT SERPL-CCNC: 33 U/L (ref 10–40)
ANION GAP SERPL CALCULATED.3IONS-SCNC: 9 MMOL/L (ref 3–16)
AST SERPL-CCNC: 21 U/L (ref 15–37)
BILIRUB SERPL-MCNC: <0.2 MG/DL (ref 0–1)
BUN SERPL-MCNC: 13 MG/DL (ref 7–20)
CALCIUM SERPL-MCNC: 9.5 MG/DL (ref 8.3–10.6)
CHLORIDE SERPL-SCNC: 104 MMOL/L (ref 99–110)
CHOLEST SERPL-MCNC: 157 MG/DL (ref 0–199)
CO2 SERPL-SCNC: 29 MMOL/L (ref 21–32)
CREAT SERPL-MCNC: 0.7 MG/DL (ref 0.9–1.3)
CREAT UR-MCNC: 169.4 MG/DL (ref 39–259)
GFR SERPLBLD CREATININE-BSD FMLA CKD-EPI: >60 ML/MIN/{1.73_M2}
GLUCOSE SERPL-MCNC: 135 MG/DL (ref 70–99)
HDLC SERPL-MCNC: 33 MG/DL (ref 40–60)
LDLC SERPL CALC-MCNC: 101 MG/DL
MICROALBUMIN UR DL<=1MG/L-MCNC: <1.2 MG/DL
MICROALBUMIN/CREAT UR: NORMAL MG/G (ref 0–30)
POTASSIUM SERPL-SCNC: 4.3 MMOL/L (ref 3.5–5.1)
PROT SERPL-MCNC: 7.9 G/DL (ref 6.4–8.2)
SODIUM SERPL-SCNC: 142 MMOL/L (ref 136–145)
TRIGL SERPL-MCNC: 115 MG/DL (ref 0–150)
VLDLC SERPL CALC-MCNC: 23 MG/DL

## 2023-11-20 PROCEDURE — 36415 COLL VENOUS BLD VENIPUNCTURE: CPT

## 2023-11-20 PROCEDURE — 82570 ASSAY OF URINE CREATININE: CPT

## 2023-11-20 PROCEDURE — 82043 UR ALBUMIN QUANTITATIVE: CPT

## 2023-11-20 PROCEDURE — 80053 COMPREHEN METABOLIC PANEL: CPT

## 2023-11-20 PROCEDURE — 83036 HEMOGLOBIN GLYCOSYLATED A1C: CPT

## 2023-11-20 PROCEDURE — 80061 LIPID PANEL: CPT

## 2023-11-21 ENCOUNTER — TELEPHONE (OUTPATIENT)
Dept: FAMILY MEDICINE CLINIC | Age: 58
End: 2023-11-21

## 2023-11-21 LAB
EST. AVERAGE GLUCOSE BLD GHB EST-MCNC: 182.9 MG/DL
HBA1C MFR BLD: 8 %

## 2023-11-22 ENCOUNTER — OFFICE VISIT (OUTPATIENT)
Dept: FAMILY MEDICINE CLINIC | Age: 58
End: 2023-11-22
Payer: MEDICARE

## 2023-11-22 VITALS
OXYGEN SATURATION: 95 % | DIASTOLIC BLOOD PRESSURE: 84 MMHG | WEIGHT: 299 LBS | HEIGHT: 72 IN | BODY MASS INDEX: 40.5 KG/M2 | TEMPERATURE: 97.2 F | HEART RATE: 72 BPM | SYSTOLIC BLOOD PRESSURE: 128 MMHG | RESPIRATION RATE: 18 BRPM

## 2023-11-22 DIAGNOSIS — E11.29 TYPE 2 DIABETES MELLITUS WITH MICROALBUMINURIA, WITH LONG-TERM CURRENT USE OF INSULIN (HCC): ICD-10-CM

## 2023-11-22 DIAGNOSIS — Z79.4 TYPE 2 DIABETES MELLITUS WITH MICROALBUMINURIA, WITH LONG-TERM CURRENT USE OF INSULIN (HCC): ICD-10-CM

## 2023-11-22 DIAGNOSIS — E78.2 MIXED HYPERLIPIDEMIA: ICD-10-CM

## 2023-11-22 DIAGNOSIS — G47.33 OBSTRUCTIVE SLEEP APNEA SYNDROME: ICD-10-CM

## 2023-11-22 DIAGNOSIS — E11.65 TYPE 2 DIABETES MELLITUS WITH HYPERGLYCEMIA, WITH LONG-TERM CURRENT USE OF INSULIN (HCC): Primary | ICD-10-CM

## 2023-11-22 DIAGNOSIS — Z79.4 TYPE 2 DIABETES MELLITUS WITH HYPERGLYCEMIA, WITH LONG-TERM CURRENT USE OF INSULIN (HCC): Primary | ICD-10-CM

## 2023-11-22 DIAGNOSIS — R80.9 TYPE 2 DIABETES MELLITUS WITH MICROALBUMINURIA, WITH LONG-TERM CURRENT USE OF INSULIN (HCC): ICD-10-CM

## 2023-11-22 DIAGNOSIS — I10 ESSENTIAL HYPERTENSION: ICD-10-CM

## 2023-11-22 PROCEDURE — 3074F SYST BP LT 130 MM HG: CPT | Performed by: FAMILY MEDICINE

## 2023-11-22 PROCEDURE — 90674 CCIIV4 VAC NO PRSV 0.5 ML IM: CPT | Performed by: FAMILY MEDICINE

## 2023-11-22 PROCEDURE — 3052F HG A1C>EQUAL 8.0%<EQUAL 9.0%: CPT | Performed by: FAMILY MEDICINE

## 2023-11-22 PROCEDURE — G0008 ADMIN INFLUENZA VIRUS VAC: HCPCS | Performed by: FAMILY MEDICINE

## 2023-11-22 PROCEDURE — 3078F DIAST BP <80 MM HG: CPT | Performed by: FAMILY MEDICINE

## 2023-11-22 PROCEDURE — 99214 OFFICE O/P EST MOD 30 MIN: CPT | Performed by: FAMILY MEDICINE

## 2023-11-22 RX ORDER — ROSUVASTATIN CALCIUM 20 MG/1
20 TABLET, COATED ORAL NIGHTLY
Qty: 90 TABLET | Refills: 3 | Status: SHIPPED | OUTPATIENT
Start: 2023-11-22

## 2023-11-22 RX ORDER — ACYCLOVIR 400 MG/1
TABLET ORAL
Qty: 9 EACH | Refills: 4 | Status: SHIPPED | OUTPATIENT
Start: 2023-11-22

## 2023-11-22 RX ORDER — ACYCLOVIR 400 MG/1
TABLET ORAL
Qty: 1 EACH | Refills: 3 | Status: SHIPPED | OUTPATIENT
Start: 2023-11-22

## 2023-11-22 NOTE — PROGRESS NOTES
Interpretation of Total Score Depression Severity: 1-4 = Minimal depression, 5-9 = Mild depression, 10-14 = Moderate depression, 15-19 = Moderately severe depression, 20-27 = Severe depression         ASSESSMENT AND PLAN:       Shayy Rodriguez was seen today for follow-up, diabetes, hypertension and cholesterol problem. Diagnoses and all orders for this visit:    Type 2 diabetes mellitus with hyperglycemia, with long-term current use of insulin ([address])  -     Hemoglobin A1C; Future  -     Basic Metabolic Panel; Future  -     Continuous Blood Gluc Sensor (DEXCOM G7 SENSOR) MISC; Test sugars 4-5 times a day  -     Continuous Blood Gluc  ([employer]) [address]; Test sugars 4-5 times a day      -A1C Worsening and Uncontrolled  -continue current meds, needs to do better with diet, CGM to allow easier monitoring of sugars  -Reviewed pre-visit labs with patient. (Lipid, CMP, A1C, and Microalbumin) Review of these labs contributed to MDM. Lab Results   Component Value Date    LABA1C 8.0 11/20/2023       -lab slip given for next visit (ordered above, did not contribute to MDM)  -patient to check sugars four times a day  -encouraged a healthy diet, regular exercise and maintaining a healthy weight. Discussed the importance of TLC in controlling diabetes and preventing long term complications of diabetes including CV, renal, neuropathic and ocular. -RTO  3 months  -eye form given:  Yes      Type 2 diabetes mellitus with microalbuminuria, with long-term current use of insulin (HCC)  On SGLT2 and ACEI  Better sugar control  Continue good BP control    Mixed hyperlipidemia  -Stable, continue current medications. Essential hypertension  -     Magnesium; Future  -Stable, continue current medications. Obstructive sleep apnea syndrome  Stable on CPAP.  Use of CPAP is medically necessary to control IZABELA sx and prevent long term complications of uncontrolled sleep apena    Other orders  -     Influenza, FLUCELVAX,

## 2023-11-22 NOTE — PATIENT INSTRUCTIONS
--Get your Covid vaccine this fall.      - Skin Tac Wipes - help monitors stick better  -can get breathable covers on [address] [place] [address] or similar to keep the monitors in place

## 2023-11-22 NOTE — TELEPHONE ENCOUNTER
Spoke with Dana Horne, they need order and chart notes faxed to [phone]  Holding for OV from today to be signed.

## 2023-11-22 NOTE — TELEPHONE ENCOUNTER
LM for pt to call back , come to find out after speaking to his insurance company he needs to have his supplier contact the insurance for this. There are specific reports they have to pull from the machine which we do not have access to .

## 2023-12-10 ENCOUNTER — HOSPITAL ENCOUNTER (INPATIENT)
Age: 58
LOS: 4 days | Discharge: HOME | End: 2023-12-14
Attending: INTERNAL MEDICINE | Admitting: STUDENT IN AN ORGANIZED HEALTH CARE EDUCATION/TRAINING PROGRAM
Payer: MEDICARE

## 2023-12-10 ENCOUNTER — APPOINTMENT (OUTPATIENT)
Dept: CT IMAGING | Age: 58
End: 2023-12-10
Payer: MEDICARE

## 2023-12-10 DIAGNOSIS — R10.9 ACUTE ABDOMINAL PAIN: ICD-10-CM

## 2023-12-10 DIAGNOSIS — T81.40XA INTRA-ABDOMINAL INFECTION AFTER SURGICAL PROCEDURE: Primary | ICD-10-CM

## 2023-12-10 LAB
ALBUMIN SERPL-MCNC: 3.7 G/DL (ref 3.4–5)
ALP SERPL-CCNC: 106 U/L (ref 40–129)
ALT SERPL-CCNC: 20 U/L (ref 10–40)
ANION GAP SERPL CALCULATED.3IONS-SCNC: 11 MMOL/L (ref 3–16)
AST SERPL-CCNC: 13 U/L (ref 15–37)
BACTERIA URNS QL MICRO: NORMAL /HPF
BASOPHILS # BLD: 0 K/UL (ref 0–0.2)
BASOPHILS NFR BLD: 0 %
BILIRUB DIRECT SERPL-MCNC: <0.2 MG/DL (ref 0–0.3)
BILIRUB INDIRECT SERPL-MCNC: ABNORMAL MG/DL (ref 0–1)
BILIRUB SERPL-MCNC: 0.4 MG/DL (ref 0–1)
BILIRUB UR QL STRIP.AUTO: NEGATIVE
BUN SERPL-MCNC: 15 MG/DL (ref 7–20)
CALCIUM SERPL-MCNC: 9.2 MG/DL (ref 8.3–10.6)
CHLORIDE SERPL-SCNC: 99 MMOL/L (ref 99–110)
CLARITY UR: CLEAR
CO2 SERPL-SCNC: 25 MMOL/L (ref 21–32)
COLOR UR: YELLOW
CREAT SERPL-MCNC: 0.6 MG/DL (ref 0.9–1.3)
DEPRECATED RDW RBC AUTO: 14.1 % (ref 12.4–15.4)
EOSINOPHIL # BLD: 0.4 K/UL (ref 0–0.6)
EOSINOPHIL NFR BLD: 3 %
EPI CELLS #/AREA URNS AUTO: 3 /HPF (ref 0–5)
GFR SERPLBLD CREATININE-BSD FMLA CKD-EPI: >60 ML/MIN/{1.73_M2}
GLUCOSE BLD-MCNC: 103 MG/DL (ref 70–99)
GLUCOSE BLD-MCNC: 167 MG/DL (ref 70–99)
GLUCOSE BLD-MCNC: 96 MG/DL (ref 70–99)
GLUCOSE SERPL-MCNC: 120 MG/DL (ref 70–99)
GLUCOSE UR STRIP.AUTO-MCNC: >=1000 MG/DL
HCT VFR BLD AUTO: 41.1 % (ref 40.5–52.5)
HGB BLD-MCNC: 13.9 G/DL (ref 13.5–17.5)
HGB UR QL STRIP.AUTO: NEGATIVE
HYALINE CASTS #/AREA URNS AUTO: 0 /LPF (ref 0–8)
KETONES UR STRIP.AUTO-MCNC: 40 MG/DL
LACTATE BLDV-SCNC: 1.4 MMOL/L (ref 0.4–1.9)
LEUKOCYTE ESTERASE UR QL STRIP.AUTO: NEGATIVE
LIPASE SERPL-CCNC: 12 U/L (ref 13–60)
LYMPHOCYTES # BLD: 2 K/UL (ref 1–5.1)
LYMPHOCYTES NFR BLD: 14 %
MCH RBC QN AUTO: 27.4 PG (ref 26–34)
MCHC RBC AUTO-ENTMCNC: 33.8 G/DL (ref 31–36)
MCV RBC AUTO: 81.2 FL (ref 80–100)
MONOCYTES # BLD: 1.4 K/UL (ref 0–1.3)
MONOCYTES NFR BLD: 10 %
NEUTROPHILS # BLD: 10.3 K/UL (ref 1.7–7.7)
NEUTROPHILS NFR BLD: 73 %
NITRITE UR QL STRIP.AUTO: NEGATIVE
PERFORMED ON: ABNORMAL
PERFORMED ON: ABNORMAL
PERFORMED ON: NORMAL
PH UR STRIP.AUTO: 6 [PH] (ref 5–8)
PLATELET # BLD AUTO: 340 K/UL (ref 135–450)
PLATELET BLD QL SMEAR: ADEQUATE
PMV BLD AUTO: 9.1 FL (ref 5–10.5)
POTASSIUM SERPL-SCNC: 3.5 MMOL/L (ref 3.5–5.1)
PROT SERPL-MCNC: 8.4 G/DL (ref 6.4–8.2)
PROT UR STRIP.AUTO-MCNC: ABNORMAL MG/DL
RBC # BLD AUTO: 5.07 M/UL (ref 4.2–5.9)
RBC CLUMPS #/AREA URNS AUTO: 1 /HPF (ref 0–4)
RBC MORPH BLD: NORMAL
SLIDE REVIEW: ABNORMAL
SODIUM SERPL-SCNC: 135 MMOL/L (ref 136–145)
SP GR UR STRIP.AUTO: >=1.03 (ref 1–1.03)
UA COMPLETE W REFLEX CULTURE PNL UR: ABNORMAL
UA DIPSTICK W REFLEX MICRO PNL UR: YES
URN SPEC COLLECT METH UR: ABNORMAL
UROBILINOGEN UR STRIP-ACNC: 0.2 E.U./DL
WBC # BLD AUTO: 14.1 K/UL (ref 4–11)
WBC #/AREA URNS AUTO: 1 /HPF (ref 0–5)

## 2023-12-10 PROCEDURE — 6370000000 HC RX 637 (ALT 250 FOR IP): Performed by: STUDENT IN AN ORGANIZED HEALTH CARE EDUCATION/TRAINING PROGRAM

## 2023-12-10 PROCEDURE — 83605 ASSAY OF LACTIC ACID: CPT

## 2023-12-10 PROCEDURE — 99285 EMERGENCY DEPT VISIT HI MDM: CPT

## 2023-12-10 PROCEDURE — 85025 COMPLETE CBC W/AUTO DIFF WBC: CPT

## 2023-12-10 PROCEDURE — 80076 HEPATIC FUNCTION PANEL: CPT

## 2023-12-10 PROCEDURE — 96365 THER/PROPH/DIAG IV INF INIT: CPT

## 2023-12-10 PROCEDURE — 74177 CT ABD & PELVIS W/CONTRAST: CPT

## 2023-12-10 PROCEDURE — 96372 THER/PROPH/DIAG INJ SC/IM: CPT

## 2023-12-10 PROCEDURE — 2060000000 HC ICU INTERMEDIATE R&B

## 2023-12-10 PROCEDURE — 87040 BLOOD CULTURE FOR BACTERIA: CPT

## 2023-12-10 PROCEDURE — 2580000003 HC RX 258: Performed by: STUDENT IN AN ORGANIZED HEALTH CARE EDUCATION/TRAINING PROGRAM

## 2023-12-10 PROCEDURE — 81001 URINALYSIS AUTO W/SCOPE: CPT

## 2023-12-10 PROCEDURE — 83690 ASSAY OF LIPASE: CPT

## 2023-12-10 PROCEDURE — 93005 ELECTROCARDIOGRAM TRACING: CPT | Performed by: STUDENT IN AN ORGANIZED HEALTH CARE EDUCATION/TRAINING PROGRAM

## 2023-12-10 PROCEDURE — 2580000003 HC RX 258: Performed by: INTERNAL MEDICINE

## 2023-12-10 PROCEDURE — 6360000002 HC RX W HCPCS: Performed by: INTERNAL MEDICINE

## 2023-12-10 PROCEDURE — 80048 BASIC METABOLIC PNL TOTAL CA: CPT

## 2023-12-10 PROCEDURE — 96375 TX/PRO/DX INJ NEW DRUG ADDON: CPT

## 2023-12-10 PROCEDURE — 96361 HYDRATE IV INFUSION ADD-ON: CPT

## 2023-12-10 PROCEDURE — 6360000004 HC RX CONTRAST MEDICATION: Performed by: INTERNAL MEDICINE

## 2023-12-10 PROCEDURE — 6360000002 HC RX W HCPCS: Performed by: STUDENT IN AN ORGANIZED HEALTH CARE EDUCATION/TRAINING PROGRAM

## 2023-12-10 RX ORDER — POTASSIUM CHLORIDE 20 MEQ/1
40 TABLET, EXTENDED RELEASE ORAL PRN
Status: DISCONTINUED | OUTPATIENT
Start: 2023-12-10 | End: 2023-12-14 | Stop reason: HOSPADM

## 2023-12-10 RX ORDER — SODIUM CHLORIDE 0.9 % (FLUSH) 0.9 %
5-40 SYRINGE (ML) INJECTION PRN
Status: DISCONTINUED | OUTPATIENT
Start: 2023-12-10 | End: 2023-12-14 | Stop reason: HOSPADM

## 2023-12-10 RX ORDER — POTASSIUM CHLORIDE 7.45 MG/ML
10 INJECTION INTRAVENOUS PRN
Status: DISCONTINUED | OUTPATIENT
Start: 2023-12-10 | End: 2023-12-14 | Stop reason: HOSPADM

## 2023-12-10 RX ORDER — AMITRIPTYLINE HYDROCHLORIDE 25 MG/1
25 TABLET, FILM COATED ORAL NIGHTLY
Status: DISCONTINUED | OUTPATIENT
Start: 2023-12-10 | End: 2023-12-14 | Stop reason: HOSPADM

## 2023-12-10 RX ORDER — HYDROMORPHONE HYDROCHLORIDE 1 MG/ML
0.5 INJECTION, SOLUTION INTRAMUSCULAR; INTRAVENOUS; SUBCUTANEOUS ONCE
Status: COMPLETED | OUTPATIENT
Start: 2023-12-10 | End: 2023-12-10

## 2023-12-10 RX ORDER — ACETAMINOPHEN 650 MG/1
650 SUPPOSITORY RECTAL EVERY 6 HOURS PRN
Status: DISCONTINUED | OUTPATIENT
Start: 2023-12-10 | End: 2023-12-14 | Stop reason: HOSPADM

## 2023-12-10 RX ORDER — METRONIDAZOLE 500 MG/100ML
500 INJECTION, SOLUTION INTRAVENOUS ONCE
Status: COMPLETED | OUTPATIENT
Start: 2023-12-10 | End: 2023-12-10

## 2023-12-10 RX ORDER — METRONIDAZOLE 500 MG/100ML
500 INJECTION, SOLUTION INTRAVENOUS EVERY 8 HOURS
Status: DISCONTINUED | OUTPATIENT
Start: 2023-12-10 | End: 2023-12-14 | Stop reason: HOSPADM

## 2023-12-10 RX ORDER — 0.9 % SODIUM CHLORIDE 0.9 %
1000 INTRAVENOUS SOLUTION INTRAVENOUS ONCE
Status: COMPLETED | OUTPATIENT
Start: 2023-12-10 | End: 2023-12-10

## 2023-12-10 RX ORDER — ROSUVASTATIN CALCIUM 20 MG/1
20 TABLET, COATED ORAL NIGHTLY
Status: DISCONTINUED | OUTPATIENT
Start: 2023-12-10 | End: 2023-12-14 | Stop reason: HOSPADM

## 2023-12-10 RX ORDER — INSULIN GLARGINE 100 [IU]/ML
35 INJECTION, SOLUTION SUBCUTANEOUS NIGHTLY
Status: DISCONTINUED | OUTPATIENT
Start: 2023-12-10 | End: 2023-12-14 | Stop reason: HOSPADM

## 2023-12-10 RX ORDER — SODIUM CHLORIDE 9 MG/ML
INJECTION, SOLUTION INTRAVENOUS CONTINUOUS
Status: ACTIVE | OUTPATIENT
Start: 2023-12-10 | End: 2023-12-11

## 2023-12-10 RX ORDER — DICYCLOMINE HYDROCHLORIDE 10 MG/ML
20 INJECTION INTRAMUSCULAR ONCE
Status: COMPLETED | OUTPATIENT
Start: 2023-12-10 | End: 2023-12-10

## 2023-12-10 RX ORDER — HYDROMORPHONE HYDROCHLORIDE 1 MG/ML
0.5 INJECTION, SOLUTION INTRAMUSCULAR; INTRAVENOUS; SUBCUTANEOUS EVERY 4 HOURS PRN
Status: DISCONTINUED | OUTPATIENT
Start: 2023-12-10 | End: 2023-12-10

## 2023-12-10 RX ORDER — LOSARTAN POTASSIUM AND HYDROCHLOROTHIAZIDE 25; 100 MG/1; MG/1
1 TABLET ORAL DAILY
Status: DISCONTINUED | OUTPATIENT
Start: 2023-12-10 | End: 2023-12-10 | Stop reason: SDUPTHER

## 2023-12-10 RX ORDER — MAGNESIUM SULFATE IN WATER 40 MG/ML
2000 INJECTION, SOLUTION INTRAVENOUS PRN
Status: DISCONTINUED | OUTPATIENT
Start: 2023-12-10 | End: 2023-12-14 | Stop reason: HOSPADM

## 2023-12-10 RX ORDER — ONDANSETRON 2 MG/ML
4 INJECTION INTRAMUSCULAR; INTRAVENOUS EVERY 6 HOURS PRN
Status: DISCONTINUED | OUTPATIENT
Start: 2023-12-10 | End: 2023-12-14 | Stop reason: HOSPADM

## 2023-12-10 RX ORDER — SODIUM CHLORIDE 0.9 % (FLUSH) 0.9 %
5-40 SYRINGE (ML) INJECTION EVERY 12 HOURS SCHEDULED
Status: DISCONTINUED | OUTPATIENT
Start: 2023-12-10 | End: 2023-12-14 | Stop reason: HOSPADM

## 2023-12-10 RX ORDER — FENTANYL CITRATE 50 UG/ML
50 INJECTION, SOLUTION INTRAMUSCULAR; INTRAVENOUS ONCE
Status: COMPLETED | OUTPATIENT
Start: 2023-12-10 | End: 2023-12-10

## 2023-12-10 RX ORDER — 0.9 % SODIUM CHLORIDE 0.9 %
500 INTRAVENOUS SOLUTION INTRAVENOUS ONCE
Status: COMPLETED | OUTPATIENT
Start: 2023-12-10 | End: 2023-12-10

## 2023-12-10 RX ORDER — HYDROMORPHONE HYDROCHLORIDE 1 MG/ML
0.5 INJECTION, SOLUTION INTRAMUSCULAR; INTRAVENOUS; SUBCUTANEOUS
Status: DISCONTINUED | OUTPATIENT
Start: 2023-12-10 | End: 2023-12-10

## 2023-12-10 RX ORDER — INSULIN LISPRO 100 [IU]/ML
0-4 INJECTION, SOLUTION INTRAVENOUS; SUBCUTANEOUS NIGHTLY
Status: DISCONTINUED | OUTPATIENT
Start: 2023-12-10 | End: 2023-12-14 | Stop reason: HOSPADM

## 2023-12-10 RX ORDER — HYDROCHLOROTHIAZIDE 25 MG/1
25 TABLET ORAL DAILY
Status: DISCONTINUED | OUTPATIENT
Start: 2023-12-10 | End: 2023-12-14 | Stop reason: HOSPADM

## 2023-12-10 RX ORDER — SODIUM CHLORIDE 9 MG/ML
INJECTION, SOLUTION INTRAVENOUS PRN
Status: DISCONTINUED | OUTPATIENT
Start: 2023-12-10 | End: 2023-12-14 | Stop reason: HOSPADM

## 2023-12-10 RX ORDER — ONDANSETRON 4 MG/1
4 TABLET, ORALLY DISINTEGRATING ORAL EVERY 8 HOURS PRN
Status: DISCONTINUED | OUTPATIENT
Start: 2023-12-10 | End: 2023-12-14 | Stop reason: HOSPADM

## 2023-12-10 RX ORDER — PANTOPRAZOLE SODIUM 40 MG/1
40 TABLET, DELAYED RELEASE ORAL
Status: DISCONTINUED | OUTPATIENT
Start: 2023-12-11 | End: 2023-12-14 | Stop reason: HOSPADM

## 2023-12-10 RX ORDER — GLUCAGON 1 MG/ML
1 KIT INJECTION PRN
Status: DISCONTINUED | OUTPATIENT
Start: 2023-12-10 | End: 2023-12-14 | Stop reason: HOSPADM

## 2023-12-10 RX ORDER — LOSARTAN POTASSIUM 100 MG/1
100 TABLET ORAL DAILY
Status: DISCONTINUED | OUTPATIENT
Start: 2023-12-10 | End: 2023-12-14 | Stop reason: HOSPADM

## 2023-12-10 RX ORDER — INSULIN LISPRO 100 [IU]/ML
0-8 INJECTION, SOLUTION INTRAVENOUS; SUBCUTANEOUS
Status: DISCONTINUED | OUTPATIENT
Start: 2023-12-10 | End: 2023-12-14 | Stop reason: HOSPADM

## 2023-12-10 RX ORDER — POLYETHYLENE GLYCOL 3350 17 G/17G
17 POWDER, FOR SOLUTION ORAL DAILY PRN
Status: DISCONTINUED | OUTPATIENT
Start: 2023-12-10 | End: 2023-12-14 | Stop reason: HOSPADM

## 2023-12-10 RX ORDER — DEXTROSE MONOHYDRATE 100 MG/ML
INJECTION, SOLUTION INTRAVENOUS CONTINUOUS PRN
Status: DISCONTINUED | OUTPATIENT
Start: 2023-12-10 | End: 2023-12-14 | Stop reason: HOSPADM

## 2023-12-10 RX ORDER — ACETAMINOPHEN 325 MG/1
650 TABLET ORAL EVERY 6 HOURS PRN
Status: DISCONTINUED | OUTPATIENT
Start: 2023-12-10 | End: 2023-12-14 | Stop reason: HOSPADM

## 2023-12-10 RX ORDER — HYDROMORPHONE HYDROCHLORIDE 1 MG/ML
0.5 INJECTION, SOLUTION INTRAMUSCULAR; INTRAVENOUS; SUBCUTANEOUS
Status: DISCONTINUED | OUTPATIENT
Start: 2023-12-10 | End: 2023-12-14 | Stop reason: HOSPADM

## 2023-12-10 RX ORDER — LORAZEPAM 2 MG/ML
1 INJECTION INTRAMUSCULAR ONCE
Status: COMPLETED | OUTPATIENT
Start: 2023-12-10 | End: 2023-12-10

## 2023-12-10 RX ADMIN — LOSARTAN POTASSIUM 100 MG: 100 TABLET, FILM COATED ORAL at 19:27

## 2023-12-10 RX ADMIN — HYDROMORPHONE HYDROCHLORIDE 0.5 MG: 1 INJECTION, SOLUTION INTRAMUSCULAR; INTRAVENOUS; SUBCUTANEOUS at 09:41

## 2023-12-10 RX ADMIN — METRONIDAZOLE 500 MG: 500 INJECTION, SOLUTION INTRAVENOUS at 17:01

## 2023-12-10 RX ADMIN — SODIUM CHLORIDE 500 ML: 9 INJECTION, SOLUTION INTRAVENOUS at 07:04

## 2023-12-10 RX ADMIN — SODIUM CHLORIDE 1000 ML: 9 INJECTION, SOLUTION INTRAVENOUS at 08:57

## 2023-12-10 RX ADMIN — DICYCLOMINE HYDROCHLORIDE 20 MG: 10 INJECTION, SOLUTION INTRAMUSCULAR at 07:54

## 2023-12-10 RX ADMIN — IOPAMIDOL 75 ML: 755 INJECTION, SOLUTION INTRAVENOUS at 07:58

## 2023-12-10 RX ADMIN — LORAZEPAM 1 MG: 2 INJECTION INTRAMUSCULAR; INTRAVENOUS at 11:40

## 2023-12-10 RX ADMIN — HYDROMORPHONE HYDROCHLORIDE 0.5 MG: 1 INJECTION, SOLUTION INTRAMUSCULAR; INTRAVENOUS; SUBCUTANEOUS at 13:14

## 2023-12-10 RX ADMIN — HYDROCHLOROTHIAZIDE 25 MG: 25 TABLET ORAL at 19:27

## 2023-12-10 RX ADMIN — CEFEPIME 2000 MG: 2 INJECTION, POWDER, FOR SOLUTION INTRAVENOUS at 08:58

## 2023-12-10 RX ADMIN — PANCRELIPASE LIPASE, PANCRELIPASE PROTEASE, PANCRELIPASE AMYLASE 25000 UNITS: 5000; 17000; 24000 CAPSULE, DELAYED RELEASE ORAL at 19:27

## 2023-12-10 RX ADMIN — SODIUM CHLORIDE: 9 INJECTION, SOLUTION INTRAVENOUS at 13:03

## 2023-12-10 RX ADMIN — INSULIN GLARGINE 35 UNITS: 100 INJECTION, SOLUTION SUBCUTANEOUS at 23:25

## 2023-12-10 RX ADMIN — FENTANYL CITRATE 50 MCG: 50 INJECTION INTRAMUSCULAR; INTRAVENOUS at 07:03

## 2023-12-10 RX ADMIN — HYDROMORPHONE HYDROCHLORIDE 0.5 MG: 1 INJECTION, SOLUTION INTRAMUSCULAR; INTRAVENOUS; SUBCUTANEOUS at 21:14

## 2023-12-10 RX ADMIN — CEFEPIME 2000 MG: 2 INJECTION, POWDER, FOR SOLUTION INTRAVENOUS at 23:26

## 2023-12-10 RX ADMIN — HYDROMORPHONE HYDROCHLORIDE 0.5 MG: 1 INJECTION, SOLUTION INTRAMUSCULAR; INTRAVENOUS; SUBCUTANEOUS at 16:52

## 2023-12-10 RX ADMIN — ROSUVASTATIN 20 MG: 20 TABLET, FILM COATED ORAL at 23:25

## 2023-12-10 RX ADMIN — Medication 10 ML: at 11:40

## 2023-12-10 RX ADMIN — METRONIDAZOLE 500 MG: 500 INJECTION, SOLUTION INTRAVENOUS at 09:43

## 2023-12-10 ASSESSMENT — PAIN SCALES - GENERAL
PAINLEVEL_OUTOF10: 7
PAINLEVEL_OUTOF10: 9
PAINLEVEL_OUTOF10: 9
PAINLEVEL_OUTOF10: 8
PAINLEVEL_OUTOF10: 9
PAINLEVEL_OUTOF10: 8

## 2023-12-10 ASSESSMENT — PAIN DESCRIPTION - ORIENTATION
ORIENTATION: LOWER;MID
ORIENTATION: LOWER
ORIENTATION: LOWER
ORIENTATION: MID

## 2023-12-10 ASSESSMENT — PAIN DESCRIPTION - LOCATION
LOCATION: ABDOMEN

## 2023-12-10 ASSESSMENT — PAIN - FUNCTIONAL ASSESSMENT
PAIN_FUNCTIONAL_ASSESSMENT: INTOLERABLE, UNABLE TO DO ANY ACTIVE OR PASSIVE ACTIVITIES
PAIN_FUNCTIONAL_ASSESSMENT: 0-10

## 2023-12-10 ASSESSMENT — PAIN DESCRIPTION - PAIN TYPE: TYPE: ACUTE PAIN

## 2023-12-10 ASSESSMENT — PAIN DESCRIPTION - DESCRIPTORS
DESCRIPTORS: CRAMPING;SHARP
DESCRIPTORS: SHOOTING;THROBBING
DESCRIPTORS: BURNING;CRAMPING;SHARP
DESCRIPTORS: CRAMPING;SHOOTING

## 2023-12-10 ASSESSMENT — PAIN DESCRIPTION - FREQUENCY: FREQUENCY: CONTINUOUS

## 2023-12-10 ASSESSMENT — LIFESTYLE VARIABLES: HOW OFTEN DO YOU HAVE A DRINK CONTAINING ALCOHOL: NEVER

## 2023-12-10 NOTE — H&P
V2.0  History and Physical      Name:  Kerrie OdonnellB/Age/Sex: 1965  (66 y.o. male)   MRN & CSN:  [id number] & [id number] Encounter Date/Time: 12/10/2023 9:51 AM EST   Location:  ED- PCP: Sweta Perrin MD       Hospital Day: 1    Assessment and Plan:   Kerrie Peace is a 66 y.o. male with a pmh of diabetes mellitus type 2, hyperlipidemia, hypertension, IZABELA, chronic pancreatitis pain s/p ercp/stent who presents with Abdominal pain    Hospital Problems             Last Modified POA    * (Principal) Abdominal pain 12/10/2023 Yes       Plan:  #. Abdominal pain:  -Patient presented with abdominal pain that has been going on for the past 5 to 6 days. Endorses lower abdominal pain. Denies having nausea, vomiting, urinary urgency/frequency, dysuria, constipation, diarrhea. Patient is on chronic Percocet and fentanyl patch for chronic pain. -In the ED, patient was hemodynamically stable.  -Labs were significant for WBC 14.1. Lipase 12. -CT of abdomen pelvis was done and showed Pancreatic duct stent is no longer noted in the pancreatic duct. Pancreatic duct stent projects in the bowel loops in the midline. Increased stranding is seen in the subcutaneous fat, surrounding the site of prior anterior abdominal wall hernia repair. Correlate for signs of infection.  -Blood culture and UA are pending  -Cefepime and metronidazole  -Patient mentions that he had an extensive surgery 20 years ago with mesh under the abdominal incision. Later, mesh got infected and patient had prolonged hospitalization. -GI consult  -General surgery consulted  -Keep n.p.o. for now  -Pain control    #. Diabetes mellitus:  -Patient is on 70 units of insulin nightly at home. He is also on metformin, Jardiance, and glimepiride  -Start medium dose sliding scale while NPO and 37 units of Lantus  -Monitor blood glucose  -Hypoglycemic protocol    #.   Hypertension:  -Holding home blood pressure meds  -As needed labetalol and hydralazine    #. History of IZABELA:  -CPAP at night      Disposition:   Current Living situation: Home  Expected Disposition: Home  Estimated D/C: 3 to 4 days    Diet No diet orders on file   DVT Prophylaxis [] Lovenox, [x]  Heparin, [] SCDs, [] Ambulation,  [] Eliquis, [] Xarelto, [] Coumadin   Code Status Prior   Surrogate Decision Maker/ POA Wife     Personally reviewed Lab Studies and Imaging     Discussed management of the case with ED attending who recommended to admit patient for lower abdominal pain. EKG interpreted personally and results no ST changes. Imaging that was interpreted personally includes CT abdomen pelvis and results as above. Drugs that require monitoring for toxicity include antibiotics. History from:     patient    History of Present Illness:     Chief Complaint: Abdominal pain  Hadley Berry is a 66 y.o. male with pmh of diabetes mellitus type 2, hyperlipidemia, hypertension, IZABELA, chronic pancreatitis pain s/p ercp/stent who presents with chief complaint of abdominal pain. Patient mentioned that his abdominal pain has been going on for the past 5 days. Reports lower abdominal pain. Mentions that he has episodes when he feels really hot and has cold sweats. He denies having nausea, vomiting, constipation, diarrhea, urinary urgency/frequency. Patient denies having chest pain, palpitation, lower extremity edema, shortness of breath, weakness, numbness, tingling. Since his abdominal pain has been getting worse, patient decided to present to ED. Patient mentioned that he had history of abdominal surgery 20 years ago with patient underwent abdominal incision. Later, the mesh got infected and he had prolonged hospitalization. Patient had ERCP with stent August 2023 which did not help pain so then had eus with celiac plexus block oct 2023 which has significantly improved epigastric/pancreatitis pain.      Review of Systems:        Pertinent positives and negatives

## 2023-12-10 NOTE — CONSULTS
[facility]  GI Consult Note    Patient: Hailee Pepper  : 1965  Acct#:      Date:  12/10/2023    Subjective:       History of Present Illness  Patient is a 66 y.o.  male admitted with lower abd pain and ? Infected mesh and history reported chronic pancreatitis pain s/p ercp/stent and eus as below whom we are consulted for pancreatic stent in small intestine. Pt with multiple day history lower abd pain and chills, came to ER where CT ? Infection lower abd mesh placed 20 years ago. Pt states admit pain is different from his chronic pancreatitis pain which is more epigastric and is followed by Dr Fide Rodriguez. Had ERCP with stent 2023 which did not help pain so then had eus with celiac plexus block oct 2023 which has significantly improved epigastric/pancreatitis pain. Is on chronic narcotics for chronic pain. ERCP with Dr Fide Rodriguez  Procedure: Endoscopic retrograde cholangiopancreatography with placement of a biliary stent placement of pancreatic ductal stent and extension of biliary sphincterotomy  Date: 2023  Surgeon:  Rebecca Ribeiro MD, MD  Preoperative Diagnosis: Severe abdominal pain dilated common bile duct   Postoperative Diagnosis: #1 dilated common bile duct #2 irregular appearing distal pancreatic duct   Anesthesia:  General per Anesthesia. EBL: <50 mL   Indications:  This is a 56y.o. year old male who last time we have seen the patient was January of this year but his last ERCP was 2022   Patient's been fighting his abdominal pain for several months he called the office about 2 weeks ago we did some laboratory data his ALT was mildly elevated  We know that he has had previous ERCP the last 1 was almost a year ago and were not sure why he continues to have such severe pain   I had him come in today for repeat ERCP to see if there is any problems we know that he has had a previous dilated bile duct   Procedure: Informed consent was obtained from the patient after 13.9   HCT 41.1   MCV 81.2        Recent Labs     12/10/23  0658   *   K 3.5   CL 99   CO2 25   BUN 15   CREATININE 0.6*     Recent Labs     12/10/23  0658   AST 13*   ALT 20   BILIDIR <0.2   BILITOT 0.4   ALKPHOS 106     Recent Labs     12/10/23  0658   LIPASE 12.0*     No results for input(s): \"PROTIME\", \"INR\" in the last 72 hours. No results for input(s): \"PTT\" in the last 72 hours. No results for input(s): \"OCCULTBLD\" in the last 72 hours. CT scan 12/10/23: IMPRESSION:  Pancreatic duct stent is no longer noted in the pancreatic duct. Pancreatic  duct stent projects in the bowel loops in the midline. Increased stranding is seen in the subcutaneous fat, surrounding the site of  prior anterior abdominal wall hernia repair. Correlate for signs of  infection. Trace fluid is seen marginating the mesh repair, similar prior  Cystic change in the pancreas, similar to prior please see prior MRI report   Nonobstructing renal calculi                 Assessment / Plan :    ?infected abd wall mesh: this is location of pt's tenderness on exam.   Rec  Surgery has been consulted  Antbx ordered per er team    2. Pancreatic stent in small bowel: likely incidental finding, unlikely causing acute symptoms. Appears was placed aug 2023, not sure when came out as no prior imaging and not commented oct 2023 eus note. Rec  F/u dr Niru Juárez in future    3. Pancreas cysts: unchanged per ct report, had prior eus evaluation, can f/u dr Niru Juárez over time. 3. Pancreatitis pain: signif improved since celiac plexus block.          Elizabeth Alvarado MD , MD  [facility] and [address] [facility]

## 2023-12-10 NOTE — ED NOTES
Patient transported to unit in stable condition by patient transport at this time with all belongings and IV infusing per [facility] Cedeno, RN  12/10/23 9621

## 2023-12-10 NOTE — ED PROVIDER NOTES
EMERGENCY MEDICINE PROVIDER NOTE    Patient Identification  Pt Name: Edwin Alex  MRN: [id number]  [facility] [address] 1965  Date of evaluation: 12/10/2023  Provider: Abigail Zurita DO  PCP: Crys Oliver MD    Chief Complaint  Abdominal Pain (Pt c/o lower abdominal pain that began 5-6 days ago. Pt has been breaking out in hot and cold sweats. Hx of kidney stones, ERCP, chronic pancreatitis, diverticulitis. States this does not feel like either of those. Pt also states he has mesh in his stomach from a prior hernia which got infected and he went septic for (years ago.) )      HPI  (History provided by patient)  This is a 66 y.o. male who was brought in by self for abdominal pain, fever and chills. Patient states that the symptoms have been present for a week. Patient is still urinating and having bowel movements. Patient denies nausea and vomiting. Patient tells me that he had an extensive surgery 20 years ago with mesh under the abdominal incision. He tells me at that time there was an infection and he had to be admitted for a prolonged period of time including a trach. Patient is on chronic Percocet and fentanyl patches for chronic pain.     I have reviewed the following nursing documentation:  Allergies: Amoxicillin and Morphine    Past medical history:   Past Medical History:   Diagnosis Date    Anxiety state, unspecified     Calculus of kidney     Chronic pain     Deaf     RIGHT EAR    Diabetes mellitus ([address])     Diverticula     Diverticulitis     GERD (gastroesophageal reflux disease)     Hordeolum externum     Hyperlipidemia     Hypertension     Insomnia, unspecified     Kidney stones     Meniere disease     MRSA carrier     Pancreatitis, chronic (HCC)     PONV (postoperative nausea and vomiting)     Psychiatric problem     Sleep apnea     uses CPAP    Unspecified hypertrophic and atrophic condition of skin      Past surgical history:   Past Surgical History:   Procedure Laterality Date    ABSCESS

## 2023-12-10 NOTE — ED NOTES
ED TO INPATIENT SBAR HANDOFF    Patient Name: Wendy Lazo   :  1965  66 y.o. MRN:  [id number]  Preferred Name  Lucy Kramer  ED Room #:  ED-0020/20  Family/Caregiver Present no   Restraints no   Sitter no   Sepsis Risk Score Sepsis Risk Score: 11.07    Situation  Code Status: Prior No additional code details. Allergies: Amoxicillin and Morphine  Weight: Patient Vitals for the past 96 hrs (Last 3 readings):   Weight   12/10/23 0615 134.3 kg (296 lb)     Arrived from: home  Chief Complaint:   Chief Complaint   Patient presents with    Abdominal Pain     Pt c/o lower abdominal pain that began 5-6 days ago. Pt has been breaking out in hot and cold sweats. Hx of kidney stones, ERCP, chronic pancreatitis, diverticulitis. States this does not feel like either of those. Pt also states he has mesh in his stomach from a prior hernia which got infected and he went septic for (years ago.)      Hospital Problem/Diagnosis:  Active Problems:    * No active hospital problems. *  Resolved Problems:    * No resolved hospital problems. *    Imaging:   CT ABDOMEN PELVIS W IV CONTRAST Additional Contrast? None   Final Result   Pancreatic duct stent is no longer noted in the pancreatic duct. Pancreatic   duct stent projects in the bowel loops in the midline. Increased stranding is seen in the subcutaneous fat, surrounding the site of   prior anterior abdominal wall hernia repair. Correlate for signs of   infection. Trace fluid is seen marginating the mesh repair, similar prior      Cystic change in the pancreas, similar to prior please see prior MRI report      Nonobstructing renal calculi      RECOMMENDATIONS:   Multiple lesions, including right Bosniak I benign renal cyst measuring 2.2   cm. No follow-up imaging is recommended. JACR 2018 Feb; 264-273, Management of the Incidental Renal Mass on CT,   [employer] ; [id number], Bosniak Classification of Cystic Renal Masses,   Version 2019.

## 2023-12-11 PROBLEM — N20.0 NEPHROLITHIASIS: Status: ACTIVE | Noted: 2023-12-11

## 2023-12-11 PROBLEM — Z87.19 HISTORY OF VENTRAL HERNIA REPAIR: Status: ACTIVE | Noted: 2023-12-11

## 2023-12-11 PROBLEM — E66.9 DIABETES MELLITUS TYPE 2 IN OBESE (HCC): Status: ACTIVE | Noted: 2020-04-14

## 2023-12-11 PROBLEM — R10.9 ABDOMINAL PAIN: Status: ACTIVE | Noted: 2018-11-05

## 2023-12-11 PROBLEM — Z98.890 HISTORY OF COLOSTOMY REVERSAL: Status: ACTIVE | Noted: 2023-12-11

## 2023-12-11 PROBLEM — A41.9 SEPSIS (HCC): Status: ACTIVE | Noted: 2023-12-11

## 2023-12-11 PROBLEM — T85.79XA INFECTED PROSTHETIC MESH OF ABDOMINAL WALL (HCC): Status: ACTIVE | Noted: 2023-12-11

## 2023-12-11 PROBLEM — Z98.890 HISTORY OF VENTRAL HERNIA REPAIR: Status: ACTIVE | Noted: 2023-12-11

## 2023-12-11 PROBLEM — T81.40XA INTRA-ABDOMINAL INFECTION AFTER SURGICAL PROCEDURE: Status: ACTIVE | Noted: 2023-12-11

## 2023-12-11 PROBLEM — E11.69 DIABETES MELLITUS TYPE 2 IN OBESE (HCC): Status: ACTIVE | Noted: 2020-04-14

## 2023-12-11 LAB
ANION GAP SERPL CALCULATED.3IONS-SCNC: 7 MMOL/L (ref 3–16)
BASOPHILS # BLD: 0 K/UL (ref 0–0.2)
BASOPHILS NFR BLD: 0 %
BUN SERPL-MCNC: 12 MG/DL (ref 7–20)
CALCIUM SERPL-MCNC: 9.1 MG/DL (ref 8.3–10.6)
CHLORIDE SERPL-SCNC: 101 MMOL/L (ref 99–110)
CO2 SERPL-SCNC: 26 MMOL/L (ref 21–32)
CREAT SERPL-MCNC: 0.5 MG/DL (ref 0.9–1.3)
DEPRECATED RDW RBC AUTO: 14.2 % (ref 12.4–15.4)
EKG ATRIAL RATE: 89 BPM
EKG DIAGNOSIS: NORMAL
EKG P AXIS: 39 DEGREES
EKG P-R INTERVAL: 184 MS
EKG Q-T INTERVAL: 366 MS
EKG QRS DURATION: 82 MS
EKG QTC CALCULATION (BAZETT): 445 MS
EKG R AXIS: 0 DEGREES
EKG T AXIS: -1 DEGREES
EKG VENTRICULAR RATE: 89 BPM
EOSINOPHIL # BLD: 0 K/UL (ref 0–0.6)
EOSINOPHIL NFR BLD: 0 %
GFR SERPLBLD CREATININE-BSD FMLA CKD-EPI: >60 ML/MIN/{1.73_M2}
GLUCOSE BLD-MCNC: 130 MG/DL (ref 70–99)
GLUCOSE BLD-MCNC: 147 MG/DL (ref 70–99)
GLUCOSE BLD-MCNC: 169 MG/DL (ref 70–99)
GLUCOSE BLD-MCNC: 191 MG/DL (ref 70–99)
GLUCOSE SERPL-MCNC: 116 MG/DL (ref 70–99)
HCT VFR BLD AUTO: 38 % (ref 40.5–52.5)
HGB BLD-MCNC: 12.9 G/DL (ref 13.5–17.5)
LYMPHOCYTES # BLD: 2.7 K/UL (ref 1–5.1)
LYMPHOCYTES NFR BLD: 19 %
MCH RBC QN AUTO: 27.6 PG (ref 26–34)
MCHC RBC AUTO-ENTMCNC: 33.8 G/DL (ref 31–36)
MCV RBC AUTO: 81.6 FL (ref 80–100)
MONOCYTES # BLD: 1.7 K/UL (ref 0–1.3)
MONOCYTES NFR BLD: 12 %
NEUTROPHILS # BLD: 9.8 K/UL (ref 1.7–7.7)
NEUTROPHILS NFR BLD: 69 %
PATH INTERP BLD-IMP: NORMAL
PATH INTERP BLD-IMP: YES
PERFORMED ON: ABNORMAL
PLATELET # BLD AUTO: 300 K/UL (ref 135–450)
PLATELET BLD QL SMEAR: ADEQUATE
PMV BLD AUTO: 8.8 FL (ref 5–10.5)
POLYCHROMASIA BLD QL SMEAR: ABNORMAL
POTASSIUM SERPL-SCNC: 3.8 MMOL/L (ref 3.5–5.1)
RBC # BLD AUTO: 4.66 M/UL (ref 4.2–5.9)
SLIDE REVIEW: ABNORMAL
SODIUM SERPL-SCNC: 134 MMOL/L (ref 136–145)
WBC # BLD AUTO: 14.2 K/UL (ref 4–11)

## 2023-12-11 PROCEDURE — 93010 ELECTROCARDIOGRAM REPORT: CPT | Performed by: INTERNAL MEDICINE

## 2023-12-11 PROCEDURE — 94660 CPAP INITIATION&MGMT: CPT

## 2023-12-11 PROCEDURE — 85025 COMPLETE CBC W/AUTO DIFF WBC: CPT

## 2023-12-11 PROCEDURE — 80048 BASIC METABOLIC PNL TOTAL CA: CPT

## 2023-12-11 PROCEDURE — 99223 1ST HOSP IP/OBS HIGH 75: CPT | Performed by: INTERNAL MEDICINE

## 2023-12-11 PROCEDURE — 6370000000 HC RX 637 (ALT 250 FOR IP): Performed by: STUDENT IN AN ORGANIZED HEALTH CARE EDUCATION/TRAINING PROGRAM

## 2023-12-11 PROCEDURE — 99223 1ST HOSP IP/OBS HIGH 75: CPT | Performed by: SURGERY

## 2023-12-11 PROCEDURE — APPSS60 APP SPLIT SHARED TIME 46-60 MINUTES: Performed by: NURSE PRACTITIONER

## 2023-12-11 PROCEDURE — 2580000003 HC RX 258: Performed by: STUDENT IN AN ORGANIZED HEALTH CARE EDUCATION/TRAINING PROGRAM

## 2023-12-11 PROCEDURE — 2060000000 HC ICU INTERMEDIATE R&B

## 2023-12-11 PROCEDURE — 6360000002 HC RX W HCPCS: Performed by: STUDENT IN AN ORGANIZED HEALTH CARE EDUCATION/TRAINING PROGRAM

## 2023-12-11 PROCEDURE — APPNB30 APP NON BILLABLE TIME 0-30 MINS: Performed by: NURSE PRACTITIONER

## 2023-12-11 RX ORDER — HEPARIN SODIUM 5000 [USP'U]/ML
5000 INJECTION, SOLUTION INTRAVENOUS; SUBCUTANEOUS EVERY 8 HOURS SCHEDULED
Status: DISCONTINUED | OUTPATIENT
Start: 2023-12-11 | End: 2023-12-14 | Stop reason: HOSPADM

## 2023-12-11 RX ORDER — GLIMEPIRIDE 4 MG/1
TABLET ORAL
Qty: 90 TABLET | Refills: 0 | Status: SHIPPED | OUTPATIENT
Start: 2023-12-11

## 2023-12-11 RX ADMIN — METRONIDAZOLE 500 MG: 500 INJECTION, SOLUTION INTRAVENOUS at 17:57

## 2023-12-11 RX ADMIN — HYDROMORPHONE HYDROCHLORIDE 0.5 MG: 1 INJECTION, SOLUTION INTRAMUSCULAR; INTRAVENOUS; SUBCUTANEOUS at 04:04

## 2023-12-11 RX ADMIN — HYDROMORPHONE HYDROCHLORIDE 0.5 MG: 1 INJECTION, SOLUTION INTRAMUSCULAR; INTRAVENOUS; SUBCUTANEOUS at 07:12

## 2023-12-11 RX ADMIN — HYDROCHLOROTHIAZIDE 25 MG: 25 TABLET ORAL at 08:35

## 2023-12-11 RX ADMIN — ACETAMINOPHEN 650 MG: 325 TABLET ORAL at 01:13

## 2023-12-11 RX ADMIN — HYDROMORPHONE HYDROCHLORIDE 0.5 MG: 1 INJECTION, SOLUTION INTRAMUSCULAR; INTRAVENOUS; SUBCUTANEOUS at 01:03

## 2023-12-11 RX ADMIN — HYDROMORPHONE HYDROCHLORIDE 0.5 MG: 1 INJECTION, SOLUTION INTRAMUSCULAR; INTRAVENOUS; SUBCUTANEOUS at 21:02

## 2023-12-11 RX ADMIN — PANCRELIPASE LIPASE, PANCRELIPASE PROTEASE, PANCRELIPASE AMYLASE 25000 UNITS: 5000; 17000; 24000 CAPSULE, DELAYED RELEASE ORAL at 12:16

## 2023-12-11 RX ADMIN — CEFEPIME 2000 MG: 2 INJECTION, POWDER, FOR SOLUTION INTRAVENOUS at 10:14

## 2023-12-11 RX ADMIN — PANCRELIPASE LIPASE, PANCRELIPASE PROTEASE, PANCRELIPASE AMYLASE 25000 UNITS: 5000; 17000; 24000 CAPSULE, DELAYED RELEASE ORAL at 17:49

## 2023-12-11 RX ADMIN — METRONIDAZOLE 500 MG: 500 INJECTION, SOLUTION INTRAVENOUS at 08:42

## 2023-12-11 RX ADMIN — CEFEPIME 2000 MG: 2 INJECTION, POWDER, FOR SOLUTION INTRAVENOUS at 21:07

## 2023-12-11 RX ADMIN — HYDROMORPHONE HYDROCHLORIDE 0.5 MG: 1 INJECTION, SOLUTION INTRAMUSCULAR; INTRAVENOUS; SUBCUTANEOUS at 17:49

## 2023-12-11 RX ADMIN — HYDROMORPHONE HYDROCHLORIDE 0.5 MG: 1 INJECTION, SOLUTION INTRAMUSCULAR; INTRAVENOUS; SUBCUTANEOUS at 13:47

## 2023-12-11 RX ADMIN — PANCRELIPASE LIPASE, PANCRELIPASE PROTEASE, PANCRELIPASE AMYLASE 25000 UNITS: 5000; 17000; 24000 CAPSULE, DELAYED RELEASE ORAL at 08:47

## 2023-12-11 RX ADMIN — ROSUVASTATIN 20 MG: 20 TABLET, FILM COATED ORAL at 21:11

## 2023-12-11 RX ADMIN — ONDANSETRON 4 MG: 2 INJECTION INTRAMUSCULAR; INTRAVENOUS at 14:43

## 2023-12-11 RX ADMIN — Medication 10 ML: at 09:01

## 2023-12-11 RX ADMIN — INSULIN GLARGINE 35 UNITS: 100 INJECTION, SOLUTION SUBCUTANEOUS at 21:13

## 2023-12-11 RX ADMIN — Medication 10 ML: at 21:13

## 2023-12-11 RX ADMIN — HEPARIN SODIUM 5000 UNITS: 5000 INJECTION INTRAVENOUS; SUBCUTANEOUS at 21:17

## 2023-12-11 RX ADMIN — HYDROMORPHONE HYDROCHLORIDE 0.5 MG: 1 INJECTION, SOLUTION INTRAMUSCULAR; INTRAVENOUS; SUBCUTANEOUS at 10:04

## 2023-12-11 RX ADMIN — LOSARTAN POTASSIUM 100 MG: 100 TABLET, FILM COATED ORAL at 08:35

## 2023-12-11 RX ADMIN — METRONIDAZOLE 500 MG: 500 INJECTION, SOLUTION INTRAVENOUS at 03:58

## 2023-12-11 ASSESSMENT — PAIN DESCRIPTION - LOCATION
LOCATION: ABDOMEN

## 2023-12-11 ASSESSMENT — PAIN DESCRIPTION - FREQUENCY
FREQUENCY: CONTINUOUS
FREQUENCY: CONTINUOUS

## 2023-12-11 ASSESSMENT — PAIN DESCRIPTION - DESCRIPTORS
DESCRIPTORS: SHOOTING;THROBBING

## 2023-12-11 ASSESSMENT — PAIN DESCRIPTION - ORIENTATION
ORIENTATION: LOWER;MID
ORIENTATION: LOWER
ORIENTATION: MID;LOWER
ORIENTATION: LOWER
ORIENTATION: LOWER

## 2023-12-11 ASSESSMENT — PAIN SCALES - GENERAL
PAINLEVEL_OUTOF10: 8
PAINLEVEL_OUTOF10: 7
PAINLEVEL_OUTOF10: 8
PAINLEVEL_OUTOF10: 7

## 2023-12-11 ASSESSMENT — PAIN DESCRIPTION - PAIN TYPE
TYPE: ACUTE PAIN
TYPE: ACUTE PAIN

## 2023-12-11 NOTE — CONSULTS
Infectious Diseases   Consult Note        Admission Date: 12/10/2023  Hospital Day: Hospital Day: 2   Attending: Salome Menon MD  Date of service: 12/11/23     Reason for admission: Acute abdominal pain [R10.9]  Abdominal pain [R10.9]  Intra-abdominal infection after surgical procedure [T81.40XA]    Chief complaint/ Reason for consult: Sepsis with intra-abdominal infection    Microbiology:        I have reviewed allavailable micro lab data and cultures    Blood culture (2/2) - collected on 12/10/2023: In process      Antibiotics and immunizations:       Current antibiotics: All antibiotics and their doses were reviewed by me    Recent Abx Admin                     ceFEPIme (MAXIPIME) 2,000 mg in sodium chloride 0.9 % 100 mL IVPB (mini-bag) (mg) 2,000 mg New Bag 12/11/23 1014     2,000 mg New Bag 12/10/23 2326    metronidazole (FLAGYL) 500 mg in 0.9% NaCl 100 mL IVPB premix (mg) 500 mg New Bag 12/11/23 0842     500 mg New Bag  0358                      Immunization History: All immunization history was reviewed by me today.     Immunization History   Administered Date(s) Administered    COVID-19, MODERNA BLUE border, Primary or Immunocompromised, (age 12y+), IM, 100 mcg/0.5mL 03/17/2021, 05/09/2021    COVID-19, PFIZER Bivalent, DO NOT Dilute, (age 12y+), IM, 30 mcg/0.3 mL 10/06/2022    COVID-19, PFIZER PURPLE top, DILUTE for use, (age 15 y+), 30mcg/0.3mL 01/09/2022    Hep B, ENGERIX-B, (age 20y+), IM, 1mL 05/12/2022, 08/18/2022, 01/20/2023    INFLUENZA, INTRADERMAL, QUADRIVALENT, PRESERVATIVE FREE 09/01/2016    Influenza Virus Vaccine 11/23/2012, 10/03/2014, 11/01/2017    Influenza Whole 11/01/2015    Influenza, FLUARIX, FLULAVAL, FLUZONE (age 10 mo+) AND AFLURIA, (age 1 y+), PF, 0.5mL 10/06/2022    Influenza, FLUBLOK, (age 25 y+), PF, 0.5mL 10/08/2018    Influenza, FLUCELVAX, (age 10 mo+), MDCK, PF, 0.5mL 12/10/2019, 11/01/2020, 10/18/2021, 11/22/2023    Influenza, Intradermal, Preservative free 10/08/2013

## 2023-12-11 NOTE — CARE COORDINATION
Discharge Planning:     (CM) reviewed the patient's chart to assess needs. Patient's Readmission Risk Score is 9 %. Patient's medical insurance is [employer]. Patient's PCP is Fe Wyatt No needs anticipated, at this time. CM team to follow. Staff to inform CM if additional discharge needs arise.      Electronically signed by Letitia Franco on 12/11/2023 at 9:21 AM

## 2023-12-11 NOTE — PROGRESS NOTES
Pancreatic duct stent projects in the bowel loops in the midline. Increased stranding is seen in the subcutaneous fat, surrounding the site of prior anterior abdominal wall hernia repair. Correlate for signs of infection. Trace fluid is seen marginating the mesh repair, similar prior Cystic change in the pancreas, similar to prior please see prior MRI report Nonobstructing renal calculi RECOMMENDATIONS: Multiple lesions, including right Bosniak I benign renal cyst measuring 2.2 cm. No follow-up imaging is recommended. JACR 2018 Feb; 264-273, Management of the Incidental Renal Mass on CT, [employer] 2021; 814-848, Bosniak Classification of Cystic Renal Masses, Version 2019. CBC:   Recent Labs     12/10/23  0658 12/11/23  0531   WBC 14.1* 14.2*   HGB 13.9 12.9*    300     BMP:    Recent Labs     12/10/23  0658 12/11/23  0531   * 134*   K 3.5 3.8   CL 99 101   CO2 25 26   BUN 15 12   CREATININE 0.6* 0.5*   GLUCOSE 120* 116*     Hepatic:   Recent Labs     12/10/23  0658   AST 13*   ALT 20   BILITOT 0.4   ALKPHOS 106     Lipids:   Lab Results   Component Value Date/Time    CHOL 157 11/20/2023 10:57 AM    HDL 33 11/20/2023 10:57 AM    HDL 35 12/05/2011 03:40 PM    TRIG 115 11/20/2023 10:57 AM     Hemoglobin A1C:   Lab Results   Component Value Date/Time    LABA1C 8.0 11/20/2023 10:57 AM     TSH:   Lab Results   Component Value Date/Time    TSH 1.76 11/30/2015 01:08 PM     Troponin: No results found for: \"TROPONINT\"  Lactic Acid: No results for input(s): \"LACTA\" in the last 72 hours. BNP: No results for input(s): \"PROBNP\" in the last 72 hours.   UA:  Lab Results   Component Value Date/Time    NITRU Negative 12/10/2023 02:31 PM    COLORU Yellow 12/10/2023 02:31 PM    PHUR 6.0 12/10/2023 02:31 PM    WBCUA 1 12/10/2023 02:31 PM    RBCUA 1 12/10/2023 02:31 PM    YEAST 0 12/07/2018 01:17 PM    BACTERIA None Seen 12/10/2023 02:31 PM    CLARITYU Clear 12/10/2023 02:31 PM    SPECGRAV >=1.030 12/10/2023

## 2023-12-11 NOTE — TELEPHONE ENCOUNTER
Medication:   Requested Prescriptions     Pending Prescriptions Disp Refills    glimepiride (AMARYL) 4 MG tablet [Pharmacy Med Name: GLIMEPIRIDE 4MG TABLETS] 90 tablet 0     Sig: TAKE 1 TABLET BY MOUTH [address] BREAKFAST          Patient Phone Number: [phone] (home)     Last appt: 11/22/2023   Next appt: 2/23/2024    Last OARRS:       11/22/2023     9:53 AM   RX Monitoring   Periodic Controlled Substance Monitoring No signs of potential drug abuse or diversion identified.      PDMP Monitoring:    Last PDMP Fadia Aaron as Reviewed [facility]):  Review User Review Instant Review Result   ERNST MCCOY 12/10/2023  6:52 AM Reviewed PDMP [1]     Preferred Pharmacy:   [facility] [address] [place] Begum [place] [address] [phone] - F [phone] [address] [facility] [phone]  Phone: [phone] Fax: [phone]    [facility] [address] Vicki Begum [place] - P [facility] [phone]  [address] [place] [id number]  Phone: [phone] Fax: [phone]    [facility] [place], [place] - [address] [place] [address] -  [phone] [facility] [phone]  [address] [place] [address]  Phone: [phone] Fax: [phone]    7 [facility], [address] [phone] - F [phone]  [address] [place] [address]  Phone: [phone] Fax: [phone]

## 2023-12-11 NOTE — PROGRESS NOTES
Pt requesting CPAP for overnight, pt states home unit will be brought in by spouse or daughter tomorrow evening but will be unable to sleep without one.  RT notified and perfectserve message placed to hospitalist to request.

## 2023-12-11 NOTE — CONSULTS
Nichole Torrez     Chief complaint-abdominal pain    HPI: 51-year-old male with a history of chronic pancreatitis who presented to the ED yesterday with a 1 week history of lower abdominal pain. The pain was described as sharp, dull and burning. At its worst, it was a 9/10 in severity. Nothing makes the pain better or worse. Associated symptoms include nausea which he believes is related to the pain and fevers. No history of vomiting, change in bowel habits or urinary symptoms.     Past Medical History:   Diagnosis Date    Anxiety state, unspecified     Calculus of kidney     Chronic pain     Deaf     RIGHT EAR    Diabetes mellitus ([address])     Diverticula     Diverticulitis     GERD (gastroesophageal reflux disease)     Hordeolum externum     Hyperlipidemia     Hypertension     Insomnia, unspecified     Kidney stones     Meniere disease     MRSA carrier     Pancreatitis, chronic (HCC)     PONV (postoperative nausea and vomiting)     Psychiatric problem     Sleep apnea     uses CPAP    Unspecified hypertrophic and atrophic condition of skin        Past Surgical History:   Procedure Laterality Date    ABSCESS DRAINAGE Left 01/05/2014    incision and drainage of an abcess on left calf    APPENDECTOMY      CHOLECYSTECTOMY      COLOSTOMY      ERCP N/A 05/26/2022    ERCP STENT INSERTION performed by Preston Wong MD at [facility]    ERCP N/A 09/27/2022    ERCP STENT INSERTION performed by Preston Wong MD at [facility]    ERCP N/A 8/9/2023    ERCP STENT INSERTION performed by Preston Wong MD at [address] [place] [address]  04/14/2022    CELIAC PLEXUS BLOCK performed by Preston Wong MD at [address] [facility]  01/24/2023    CELIAC PLEXUS NEUROLYSIS performed by Preston Wong MD at [address] [facility]  10/30/2023    CELIAC PLEXUS NEUROLYSIS performed by Preston Wong MD at [address] [place] [address] SURGERY      duct stent    REVISION COLOSTOMY      SUBTOTAL COLECTOMY      Had wound dehiscence, mesh    TRACHEOSTOMY      TRACHEOSTOMY CLOSURE      UPPER GASTROINTESTINAL ENDOSCOPY N/A 04/14/2022    EGD ESOPHAGOGASTRODUODENOSCOPY ULTRASOUND performed by Preston Wong MD at [address] [place] [address] N/A 07/29/2022    ESOPHAGOGASTRODUODENOSCOPY WITH ENDOSCOPIC ULTRASOUND WITH CELIAC PLEXUS BLOCK performed by Preston Wong MD at [address] [place] [address] 07/29/2022    EGD W/EUS FNA performed by Preston Wong MD at [address] [place] [address]  01/24/2023    EGD W/EUS FNA TAIL OF PANCREAS AND REMOVAL OF PANCREATIC STENT performed by Preston Wong MD at [address] [place] [address]  01/24/2023    EGD STENT REMOVAL performed by Preston Wong MD at [address] [place] [address] N/A 10/30/2023    EGD ULTRASOUND WITH CELIAC PLEXUS BLOCK performed by Preston Wong MD at [facility]  10/30/2023    EGD STENT REMOVAL performed by Preston Wong MD at [address] [place] [address] 206 History     Socioeconomic History    Marital status:      Spouse name: Not on file    Number of children: Not on file    Years of education: Not on file    Highest education level: Not on file   Occupational History    Occupation: Disabled     Comment: 2000   Tobacco Use    Smoking status: Never    Smokeless tobacco: Never   Vaping Use    Vaping Use: Never used   Substance and Sexual Activity    Alcohol use: No     Alcohol/week: 0.0 standard drinks of alcohol    Drug use: No    Sexual activity: Yes     Partners: Female   Other Topics Concern    Not on file   Social History Narrative     but .      Social Determinants of Health     Financial Resource Strain: Medium Risk (1/20/2023)    Overall Financial Resource Strain (CARDIA)     Difficulty of Paying Living Expenses:

## 2023-12-11 NOTE — PLAN OF CARE
[facility]        Assessment & Plan of Care    History of Present Illness: Mr. Didi Taylor is a 66 y.o. male who presented to the ED yesterday with a 1-week history of lower abdominal pain. The pain is constant, radiates across the lower abdomen, and he describes the pain as sharp, dull, and burning at times. The pain was a 9-10 out of 10 on presentation; better today at a 7 out of 10. No alleviating or aggravating factors noted. Associated erythema along midline abdominal scar; no drainage. He also reports fevers, chills, and nausea. Denies vomiting, diarrhea, constipation, hematochezia, melena, or urinary symptoms. Medical history includes hypertension, hyperlipidemia, diabetes, chronic pancreatitis (multiple prior ERCP), kidney stones, GERD, chronic pain (on chronic Percocet and fentanyl patches; sees pain management provider), anxiety, and IZABELA (uses CPAP). Complex prior abdominal surgery history including appendectomy, open cholecystectomy, and subtotal colectomy with colostomy with prolonged recovery (required tracheostomy), wound dehiscence and repair with mesh, and subsequent colostomy reversal.  No blood thinners. Nonsmoker.     Physical Exam:  CONSTITUTIONAL:  alert, no apparent distress and morbidly obese  NEUROLOGIC:  Mental Status Exam:  Level of Alertness:   awake  Orientation:   person, place, time  EYES:  sclera clear  ENT:  normocepalic, without obvious abnormality  NECK:  supple, symmetrical, trachea midline  LUNGS:  clear to auscultation  CARDIOVASCULAR:  regular rate and rhythm and no murmur noted  ABDOMEN: soft, non-distended, tenderness noted in the lower abdomen and periumbilical region, voluntary guarding absent, no masses palpated, normal bowel sounds,  scars noted right upper quadrant, left lower quadrant, and large midline scar  Extremities: no edema  SKIN:  no bruising or bleeding and normal skin color, texture, turgor    Assessment:  Lower abdominal pain  Infection of abdominal wall, prior mesh  Chronic pancreatitis with prior ERCP/stent placement  Hypertension  Diabetes  IZABELA    Plan:  1. Continued supportive care and monitoring, IV antibiotics/pain control--no plans for surgical intervention at this time unless not improving with conservative measures  2. Regular diet as tolerated; monitor bowel function  3. IV hydration until PO intake is adequate; monitor and correct electrolytes  4. Antibiotics  5. Activity as tolerated  6. PRN analgesics and antiemetics--minimizing narcotics as tolerated  7. Management of medical comorbid etiologies per primary team and consulting services    EDUCATION:  Educated patient on plan of care and disease process--all questions answered. Plans discussed with patient and nursing. Reviewed and discussed with Dr. Neida Argueta consult to follow.       Signed:  JOELLE Landin CNP  12/11/2023 8:46 AM

## 2023-12-12 PROBLEM — Z71.89 DIABETES EDUCATION, ENCOUNTER FOR: Status: ACTIVE | Noted: 2023-12-12

## 2023-12-12 LAB
ANION GAP SERPL CALCULATED.3IONS-SCNC: 8 MMOL/L (ref 3–16)
BASOPHILS # BLD: 0 K/UL (ref 0–0.2)
BASOPHILS NFR BLD: 0.1 %
BUN SERPL-MCNC: 12 MG/DL (ref 7–20)
CALCIUM SERPL-MCNC: 9 MG/DL (ref 8.3–10.6)
CHLORIDE SERPL-SCNC: 100 MMOL/L (ref 99–110)
CO2 SERPL-SCNC: 24 MMOL/L (ref 21–32)
CREAT SERPL-MCNC: 0.6 MG/DL (ref 0.9–1.3)
DEPRECATED RDW RBC AUTO: 14.1 % (ref 12.4–15.4)
EOSINOPHIL # BLD: 0.2 K/UL (ref 0–0.6)
EOSINOPHIL NFR BLD: 1.1 %
GFR SERPLBLD CREATININE-BSD FMLA CKD-EPI: >60 ML/MIN/{1.73_M2}
GLUCOSE BLD-MCNC: 183 MG/DL (ref 70–99)
GLUCOSE BLD-MCNC: 187 MG/DL (ref 70–99)
GLUCOSE BLD-MCNC: 198 MG/DL (ref 70–99)
GLUCOSE BLD-MCNC: 204 MG/DL (ref 70–99)
GLUCOSE SERPL-MCNC: 160 MG/DL (ref 70–99)
HCT VFR BLD AUTO: 37 % (ref 40.5–52.5)
HGB BLD-MCNC: 12.7 G/DL (ref 13.5–17.5)
LYMPHOCYTES # BLD: 1.8 K/UL (ref 1–5.1)
LYMPHOCYTES NFR BLD: 13.3 %
MAGNESIUM SERPL-MCNC: 2 MG/DL (ref 1.8–2.4)
MCH RBC QN AUTO: 27.4 PG (ref 26–34)
MCHC RBC AUTO-ENTMCNC: 34.4 G/DL (ref 31–36)
MCV RBC AUTO: 79.6 FL (ref 80–100)
MONOCYTES # BLD: 1.7 K/UL (ref 0–1.3)
MONOCYTES NFR BLD: 12.4 %
NEUTROPHILS # BLD: 10.1 K/UL (ref 1.7–7.7)
NEUTROPHILS NFR BLD: 73.1 %
PATH INTERP BLD-IMP: NO
PERFORMED ON: ABNORMAL
PLATELET # BLD AUTO: 323 K/UL (ref 135–450)
PMV BLD AUTO: 8.6 FL (ref 5–10.5)
POTASSIUM SERPL-SCNC: 3.1 MMOL/L (ref 3.5–5.1)
RBC # BLD AUTO: 4.65 M/UL (ref 4.2–5.9)
SODIUM SERPL-SCNC: 132 MMOL/L (ref 136–145)
WBC # BLD AUTO: 13.8 K/UL (ref 4–11)

## 2023-12-12 PROCEDURE — 83735 ASSAY OF MAGNESIUM: CPT

## 2023-12-12 PROCEDURE — 80048 BASIC METABOLIC PNL TOTAL CA: CPT

## 2023-12-12 PROCEDURE — 36415 COLL VENOUS BLD VENIPUNCTURE: CPT

## 2023-12-12 PROCEDURE — 6370000000 HC RX 637 (ALT 250 FOR IP): Performed by: STUDENT IN AN ORGANIZED HEALTH CARE EDUCATION/TRAINING PROGRAM

## 2023-12-12 PROCEDURE — APPNB30 APP NON BILLABLE TIME 0-30 MINS: Performed by: NURSE PRACTITIONER

## 2023-12-12 PROCEDURE — 99231 SBSQ HOSP IP/OBS SF/LOW 25: CPT | Performed by: SURGERY

## 2023-12-12 PROCEDURE — 6370000000 HC RX 637 (ALT 250 FOR IP): Performed by: NURSE PRACTITIONER

## 2023-12-12 PROCEDURE — 85025 COMPLETE CBC W/AUTO DIFF WBC: CPT

## 2023-12-12 PROCEDURE — APPSS15 APP SPLIT SHARED TIME 0-15 MINUTES: Performed by: NURSE PRACTITIONER

## 2023-12-12 PROCEDURE — 6360000002 HC RX W HCPCS: Performed by: INTERNAL MEDICINE

## 2023-12-12 PROCEDURE — 2060000000 HC ICU INTERMEDIATE R&B

## 2023-12-12 PROCEDURE — 2580000003 HC RX 258: Performed by: INTERNAL MEDICINE

## 2023-12-12 PROCEDURE — 2580000003 HC RX 258: Performed by: STUDENT IN AN ORGANIZED HEALTH CARE EDUCATION/TRAINING PROGRAM

## 2023-12-12 PROCEDURE — 6360000002 HC RX W HCPCS: Performed by: STUDENT IN AN ORGANIZED HEALTH CARE EDUCATION/TRAINING PROGRAM

## 2023-12-12 PROCEDURE — 99233 SBSQ HOSP IP/OBS HIGH 50: CPT | Performed by: INTERNAL MEDICINE

## 2023-12-12 RX ORDER — LANOLIN ALCOHOL/MO/W.PET/CERES
3 CREAM (GRAM) TOPICAL NIGHTLY PRN
Status: DISCONTINUED | OUTPATIENT
Start: 2023-12-12 | End: 2023-12-14 | Stop reason: HOSPADM

## 2023-12-12 RX ADMIN — HYDROMORPHONE HYDROCHLORIDE 0.5 MG: 1 INJECTION, SOLUTION INTRAMUSCULAR; INTRAVENOUS; SUBCUTANEOUS at 00:23

## 2023-12-12 RX ADMIN — HYDROMORPHONE HYDROCHLORIDE 0.5 MG: 1 INJECTION, SOLUTION INTRAMUSCULAR; INTRAVENOUS; SUBCUTANEOUS at 23:01

## 2023-12-12 RX ADMIN — HEPARIN SODIUM 5000 UNITS: 5000 INJECTION INTRAVENOUS; SUBCUTANEOUS at 21:43

## 2023-12-12 RX ADMIN — ROSUVASTATIN 20 MG: 20 TABLET, FILM COATED ORAL at 21:43

## 2023-12-12 RX ADMIN — HYDROMORPHONE HYDROCHLORIDE 0.5 MG: 1 INJECTION, SOLUTION INTRAMUSCULAR; INTRAVENOUS; SUBCUTANEOUS at 16:06

## 2023-12-12 RX ADMIN — PANCRELIPASE LIPASE, PANCRELIPASE PROTEASE, PANCRELIPASE AMYLASE 25000 UNITS: 5000; 17000; 24000 CAPSULE, DELAYED RELEASE ORAL at 08:41

## 2023-12-12 RX ADMIN — Medication 10 ML: at 21:44

## 2023-12-12 RX ADMIN — MELATONIN TAB 3 MG 3 MG: 3 TAB at 01:32

## 2023-12-12 RX ADMIN — PANTOPRAZOLE SODIUM 40 MG: 40 TABLET, DELAYED RELEASE ORAL at 05:19

## 2023-12-12 RX ADMIN — METRONIDAZOLE 500 MG: 500 INJECTION, SOLUTION INTRAVENOUS at 08:49

## 2023-12-12 RX ADMIN — PANCRELIPASE LIPASE, PANCRELIPASE PROTEASE, PANCRELIPASE AMYLASE 25000 UNITS: 5000; 17000; 24000 CAPSULE, DELAYED RELEASE ORAL at 12:06

## 2023-12-12 RX ADMIN — INSULIN LISPRO 2 UNITS: 100 INJECTION, SOLUTION INTRAVENOUS; SUBCUTANEOUS at 17:04

## 2023-12-12 RX ADMIN — HEPARIN SODIUM 5000 UNITS: 5000 INJECTION INTRAVENOUS; SUBCUTANEOUS at 12:07

## 2023-12-12 RX ADMIN — POTASSIUM CHLORIDE 40 MEQ: 1500 TABLET, EXTENDED RELEASE ORAL at 06:23

## 2023-12-12 RX ADMIN — SODIUM CHLORIDE: 9 INJECTION, SOLUTION INTRAVENOUS at 08:47

## 2023-12-12 RX ADMIN — LOSARTAN POTASSIUM 100 MG: 100 TABLET, FILM COATED ORAL at 08:42

## 2023-12-12 RX ADMIN — Medication 10 ML: at 08:43

## 2023-12-12 RX ADMIN — HYDROMORPHONE HYDROCHLORIDE 0.5 MG: 1 INJECTION, SOLUTION INTRAMUSCULAR; INTRAVENOUS; SUBCUTANEOUS at 08:43

## 2023-12-12 RX ADMIN — INSULIN GLARGINE 35 UNITS: 100 INJECTION, SOLUTION SUBCUTANEOUS at 21:44

## 2023-12-12 RX ADMIN — METRONIDAZOLE 500 MG: 500 INJECTION, SOLUTION INTRAVENOUS at 17:08

## 2023-12-12 RX ADMIN — CEFEPIME 2000 MG: 2 INJECTION, POWDER, FOR SOLUTION INTRAVENOUS at 18:18

## 2023-12-12 RX ADMIN — HYDROCHLOROTHIAZIDE 25 MG: 25 TABLET ORAL at 08:41

## 2023-12-12 RX ADMIN — AMITRIPTYLINE HYDROCHLORIDE 25 MG: 25 TABLET, FILM COATED ORAL at 21:43

## 2023-12-12 RX ADMIN — MELATONIN TAB 3 MG 3 MG: 3 TAB at 23:01

## 2023-12-12 RX ADMIN — SODIUM CHLORIDE: 9 INJECTION, SOLUTION INTRAVENOUS at 17:07

## 2023-12-12 RX ADMIN — HYDROMORPHONE HYDROCHLORIDE 0.5 MG: 1 INJECTION, SOLUTION INTRAMUSCULAR; INTRAVENOUS; SUBCUTANEOUS at 12:07

## 2023-12-12 RX ADMIN — HYDROMORPHONE HYDROCHLORIDE 0.5 MG: 1 INJECTION, SOLUTION INTRAMUSCULAR; INTRAVENOUS; SUBCUTANEOUS at 05:15

## 2023-12-12 RX ADMIN — POTASSIUM CHLORIDE 40 MEQ: 1500 TABLET, EXTENDED RELEASE ORAL at 12:06

## 2023-12-12 RX ADMIN — HEPARIN SODIUM 5000 UNITS: 5000 INJECTION INTRAVENOUS; SUBCUTANEOUS at 06:19

## 2023-12-12 RX ADMIN — PANCRELIPASE LIPASE, PANCRELIPASE PROTEASE, PANCRELIPASE AMYLASE 25000 UNITS: 5000; 17000; 24000 CAPSULE, DELAYED RELEASE ORAL at 17:04

## 2023-12-12 RX ADMIN — METRONIDAZOLE 500 MG: 500 INJECTION, SOLUTION INTRAVENOUS at 01:36

## 2023-12-12 RX ADMIN — CEFEPIME 2000 MG: 2 INJECTION, POWDER, FOR SOLUTION INTRAVENOUS at 09:56

## 2023-12-12 ASSESSMENT — PAIN DESCRIPTION - ORIENTATION
ORIENTATION: LOWER
ORIENTATION: MID;LOWER
ORIENTATION: MID;LOWER
ORIENTATION: ANTERIOR

## 2023-12-12 ASSESSMENT — PAIN SCALES - GENERAL
PAINLEVEL_OUTOF10: 7
PAINLEVEL_OUTOF10: 6

## 2023-12-12 ASSESSMENT — PAIN DESCRIPTION - DESCRIPTORS
DESCRIPTORS: THROBBING
DESCRIPTORS: SHOOTING
DESCRIPTORS: BURNING;CRAMPING;SHOOTING

## 2023-12-12 ASSESSMENT — PAIN DESCRIPTION - LOCATION
LOCATION: ABDOMEN

## 2023-12-12 NOTE — CARE COORDINATION
SW informed to run home IV benefits for possible home IV medication need. Called Marianna viera/[facility] and provided referral.  She will call back with benefit information.     Electronically signed by CASSY Salomon, LSW on 12/12/2023 at 4:02 PM

## 2023-12-12 NOTE — PLAN OF CARE
Problem: Discharge Planning  Goal: Discharge to home or other facility with appropriate resources  Outcome: Progressing     Problem: Pain  Goal: Verbalizes/displays adequate comfort level or baseline comfort level  Outcome: Progressing     Problem: Safety - Adult  Goal: Free from fall injury  Outcome: Progressing     Problem: ABCDS Injury Assessment  Goal: Absence of physical injury  Outcome: Progressing     Problem: Gastrointestinal - Adult  Goal: Maintains or returns to baseline bowel function  Outcome: Progressing  Goal: Maintains adequate nutritional intake  Outcome: Progressing     Problem: Infection - Adult  Goal: Absence of infection at discharge  Outcome: Progressing

## 2023-12-12 NOTE — PROGRESS NOTES
above with patient and RN. Drug Monitoring:    Continue monitoring for antibiotic toxicity as follows: CBC, CMP   Continue to watch for following: new or worsening fever, new hypotension, hives, lip swelling and redness or purulence at vascular access sites. I/v access Management:    Continue to monitor i.v access sites for erythema, induration, discharge or tenderness. As always, continue efforts to minimize tubes/lines/drains as clinically appropriate to reduce chances of line associated infections. Patient education and counseling: The patient was educated in detail about the side-effects of various antibiotics and things to watch for like new rashes, lip swelling, severe reaction, worsening diarrhea, break through fever etc.  Discussed patient's condition and what to expect. All of the patient's questions were addressed in a satisfactory manner and patient verbalized understanding all instructions. Level of complexity of visit and medical decision making: High         Illness(es)/ Infection present that pose threat to life/bodily function. There is potential for severe exacerbation of infection/side effects of treatment. Therapy requires intensive monitoring for antimicrobial agent toxicity. Diabetes mellitus education and counseling:    Patient was educated in detail about the importance of keeping diabetes under control to improve the cure rate of infection and prevent future infections. Patient was advised to check blood glucose level regularly and to stay compliant with the diabetes medications. Patient was advised to the trim the toe nails carefully, wear shoes or slippers at all times and check both feet everyday before going to bed to look for any cuts, blisters, swelling or redness.  Importance of washing the feet everyday with soap and water and keeping them dry, and seeking prompt medical attention in case of a non-healing wound or ulcer on the feet was also

## 2023-12-12 NOTE — PROGRESS NOTES
V2.0    [facility] [place]      Name:  Cecily Reyes /Age/Sex: 1965  (66 y.o. male)   MRN & CSN:  [id number] & [id number] Encounter Date/Time: 2023 8:23 AM EST   Location:  [facility]3380/3380-01 PCP: Chinedu Block MD     Attending:Geoff Zendejas MD       Hospital Day: 3    Assessment and Recommendations   Cecily Reyes is a 66 y.o. male with pmh of  diabetes mellitus type 2, hyperlipidemia, hypertension, IZABELA, chronic pancreatitis pain s/p ercp/stent who presents with Acute abdominal pain      Plan:   #. Abdominal pain:  -Patient presented with abdominal pain that has been going on for the past 5 to 6 days. Endorses lower abdominal pain. Denies having nausea, vomiting, urinary urgency/frequency, dysuria, constipation, diarrhea. Patient is on chronic Percocet and fentanyl patch for chronic pain. -In the ED, patient was hemodynamically stable.  -Labs were significant for WBC 14.1. Lipase 12. -CT of abdomen pelvis was done and showed Pancreatic duct stent is no longer noted in the pancreatic duct. Pancreatic duct stent projects in the bowel loops in the midline. Increased stranding is seen in the subcutaneous fat, surrounding the site of prior anterior abdominal wall hernia repair. Correlate for signs of infection.  -Blood culture and UA are pending  -Continue Cefepime and metronidazole  -Patient mentions that he had an extensive surgery 20 years ago with mesh under the abdominal incision. Later, mesh got infected and patient had prolonged hospitalization. -GI consult:  Pancreatic stent in small bowel: likely incidental finding and unlikely causing acute symptoms  -General surgery on board  -ID on board  -Pain control     #. Diabetes mellitus:  -Patient is on 70 units of insulin nightly at home. He is also on metformin, Jardiance, and glimepiride  -Start medium dose sliding scale and 37 units of Lantus. -Monitor blood glucose  -Hypoglycemic protocol     #.

## 2023-12-13 LAB
ANION GAP SERPL CALCULATED.3IONS-SCNC: 7 MMOL/L (ref 3–16)
BASOPHILS # BLD: 0 K/UL (ref 0–0.2)
BASOPHILS NFR BLD: 0.2 %
BUN SERPL-MCNC: 12 MG/DL (ref 7–20)
CALCIUM SERPL-MCNC: 8.8 MG/DL (ref 8.3–10.6)
CHLORIDE SERPL-SCNC: 100 MMOL/L (ref 99–110)
CO2 SERPL-SCNC: 26 MMOL/L (ref 21–32)
CREAT SERPL-MCNC: 0.6 MG/DL (ref 0.9–1.3)
DEPRECATED RDW RBC AUTO: 14.1 % (ref 12.4–15.4)
EOSINOPHIL # BLD: 0.2 K/UL (ref 0–0.6)
EOSINOPHIL NFR BLD: 2 %
GFR SERPLBLD CREATININE-BSD FMLA CKD-EPI: >60 ML/MIN/{1.73_M2}
GLUCOSE BLD-MCNC: 172 MG/DL (ref 70–99)
GLUCOSE BLD-MCNC: 208 MG/DL (ref 70–99)
GLUCOSE BLD-MCNC: 208 MG/DL (ref 70–99)
GLUCOSE BLD-MCNC: 225 MG/DL (ref 70–99)
GLUCOSE SERPL-MCNC: 184 MG/DL (ref 70–99)
HCT VFR BLD AUTO: 37.9 % (ref 40.5–52.5)
HGB BLD-MCNC: 12.9 G/DL (ref 13.5–17.5)
LYMPHOCYTES # BLD: 1.9 K/UL (ref 1–5.1)
LYMPHOCYTES NFR BLD: 16.8 %
MCH RBC QN AUTO: 27.3 PG (ref 26–34)
MCHC RBC AUTO-ENTMCNC: 34 G/DL (ref 31–36)
MCV RBC AUTO: 80.4 FL (ref 80–100)
MONOCYTES # BLD: 1.5 K/UL (ref 0–1.3)
MONOCYTES NFR BLD: 13.1 %
NEUTROPHILS # BLD: 7.7 K/UL (ref 1.7–7.7)
NEUTROPHILS NFR BLD: 67.9 %
PERFORMED ON: ABNORMAL
PLATELET # BLD AUTO: 352 K/UL (ref 135–450)
PMV BLD AUTO: 8.5 FL (ref 5–10.5)
POTASSIUM SERPL-SCNC: 4.3 MMOL/L (ref 3.5–5.1)
RBC # BLD AUTO: 4.71 M/UL (ref 4.2–5.9)
SODIUM SERPL-SCNC: 133 MMOL/L (ref 136–145)
WBC # BLD AUTO: 11.4 K/UL (ref 4–11)

## 2023-12-13 PROCEDURE — 2580000003 HC RX 258: Performed by: STUDENT IN AN ORGANIZED HEALTH CARE EDUCATION/TRAINING PROGRAM

## 2023-12-13 PROCEDURE — 6360000002 HC RX W HCPCS: Performed by: STUDENT IN AN ORGANIZED HEALTH CARE EDUCATION/TRAINING PROGRAM

## 2023-12-13 PROCEDURE — 6370000000 HC RX 637 (ALT 250 FOR IP): Performed by: INTERNAL MEDICINE

## 2023-12-13 PROCEDURE — APPNB30 APP NON BILLABLE TIME 0-30 MINS: Performed by: NURSE PRACTITIONER

## 2023-12-13 PROCEDURE — 99233 SBSQ HOSP IP/OBS HIGH 50: CPT | Performed by: INTERNAL MEDICINE

## 2023-12-13 PROCEDURE — 99231 SBSQ HOSP IP/OBS SF/LOW 25: CPT | Performed by: SURGERY

## 2023-12-13 PROCEDURE — 6370000000 HC RX 637 (ALT 250 FOR IP): Performed by: NURSE PRACTITIONER

## 2023-12-13 PROCEDURE — 80048 BASIC METABOLIC PNL TOTAL CA: CPT

## 2023-12-13 PROCEDURE — 2580000003 HC RX 258: Performed by: INTERNAL MEDICINE

## 2023-12-13 PROCEDURE — 6360000002 HC RX W HCPCS: Performed by: INTERNAL MEDICINE

## 2023-12-13 PROCEDURE — 36415 COLL VENOUS BLD VENIPUNCTURE: CPT

## 2023-12-13 PROCEDURE — 85025 COMPLETE CBC W/AUTO DIFF WBC: CPT

## 2023-12-13 PROCEDURE — 6370000000 HC RX 637 (ALT 250 FOR IP): Performed by: STUDENT IN AN ORGANIZED HEALTH CARE EDUCATION/TRAINING PROGRAM

## 2023-12-13 PROCEDURE — 2060000000 HC ICU INTERMEDIATE R&B

## 2023-12-13 PROCEDURE — APPSS15 APP SPLIT SHARED TIME 0-15 MINUTES: Performed by: NURSE PRACTITIONER

## 2023-12-13 RX ORDER — OXYCODONE HYDROCHLORIDE AND ACETAMINOPHEN 5; 325 MG/1; MG/1
2 TABLET ORAL EVERY 6 HOURS PRN
Status: DISCONTINUED | OUTPATIENT
Start: 2023-12-13 | End: 2023-12-14 | Stop reason: HOSPADM

## 2023-12-13 RX ORDER — CIPROFLOXACIN 500 MG/1
500 TABLET, FILM COATED ORAL EVERY 12 HOURS SCHEDULED
Status: DISCONTINUED | OUTPATIENT
Start: 2023-12-13 | End: 2023-12-14 | Stop reason: HOSPADM

## 2023-12-13 RX ORDER — OXYCODONE HYDROCHLORIDE AND ACETAMINOPHEN 5; 325 MG/1; MG/1
1 TABLET ORAL EVERY 6 HOURS PRN
Status: DISCONTINUED | OUTPATIENT
Start: 2023-12-13 | End: 2023-12-13

## 2023-12-13 RX ADMIN — MELATONIN TAB 3 MG 3 MG: 3 TAB at 23:19

## 2023-12-13 RX ADMIN — PANCRELIPASE LIPASE, PANCRELIPASE PROTEASE, PANCRELIPASE AMYLASE 25000 UNITS: 5000; 17000; 24000 CAPSULE, DELAYED RELEASE ORAL at 17:05

## 2023-12-13 RX ADMIN — METRONIDAZOLE 500 MG: 500 INJECTION, SOLUTION INTRAVENOUS at 17:09

## 2023-12-13 RX ADMIN — HYDROMORPHONE HYDROCHLORIDE 0.5 MG: 1 INJECTION, SOLUTION INTRAMUSCULAR; INTRAVENOUS; SUBCUTANEOUS at 07:01

## 2023-12-13 RX ADMIN — INSULIN LISPRO 2 UNITS: 100 INJECTION, SOLUTION INTRAVENOUS; SUBCUTANEOUS at 12:23

## 2023-12-13 RX ADMIN — HYDROCHLOROTHIAZIDE 25 MG: 25 TABLET ORAL at 08:17

## 2023-12-13 RX ADMIN — HEPARIN SODIUM 5000 UNITS: 5000 INJECTION INTRAVENOUS; SUBCUTANEOUS at 14:25

## 2023-12-13 RX ADMIN — CEFEPIME 2000 MG: 2 INJECTION, POWDER, FOR SOLUTION INTRAVENOUS at 02:42

## 2023-12-13 RX ADMIN — SODIUM CHLORIDE: 9 INJECTION, SOLUTION INTRAVENOUS at 08:22

## 2023-12-13 RX ADMIN — LOSARTAN POTASSIUM 100 MG: 100 TABLET, FILM COATED ORAL at 08:17

## 2023-12-13 RX ADMIN — HYDROMORPHONE HYDROCHLORIDE 0.5 MG: 1 INJECTION, SOLUTION INTRAMUSCULAR; INTRAVENOUS; SUBCUTANEOUS at 10:55

## 2023-12-13 RX ADMIN — METRONIDAZOLE 500 MG: 500 INJECTION, SOLUTION INTRAVENOUS at 08:22

## 2023-12-13 RX ADMIN — INSULIN LISPRO 2 UNITS: 100 INJECTION, SOLUTION INTRAVENOUS; SUBCUTANEOUS at 17:06

## 2023-12-13 RX ADMIN — PANTOPRAZOLE SODIUM 40 MG: 40 TABLET, DELAYED RELEASE ORAL at 06:55

## 2023-12-13 RX ADMIN — HEPARIN SODIUM 5000 UNITS: 5000 INJECTION INTRAVENOUS; SUBCUTANEOUS at 06:55

## 2023-12-13 RX ADMIN — HYDROMORPHONE HYDROCHLORIDE 0.5 MG: 1 INJECTION, SOLUTION INTRAMUSCULAR; INTRAVENOUS; SUBCUTANEOUS at 14:22

## 2023-12-13 RX ADMIN — ROSUVASTATIN 20 MG: 20 TABLET, FILM COATED ORAL at 22:08

## 2023-12-13 RX ADMIN — AMITRIPTYLINE HYDROCHLORIDE 25 MG: 25 TABLET, FILM COATED ORAL at 22:08

## 2023-12-13 RX ADMIN — OXYCODONE AND ACETAMINOPHEN 2 TABLET: 5; 325 TABLET ORAL at 17:05

## 2023-12-13 RX ADMIN — HEPARIN SODIUM 5000 UNITS: 5000 INJECTION INTRAVENOUS; SUBCUTANEOUS at 22:08

## 2023-12-13 RX ADMIN — PANCRELIPASE LIPASE, PANCRELIPASE PROTEASE, PANCRELIPASE AMYLASE 25000 UNITS: 5000; 17000; 24000 CAPSULE, DELAYED RELEASE ORAL at 12:23

## 2023-12-13 RX ADMIN — SODIUM CHLORIDE: 9 INJECTION, SOLUTION INTRAVENOUS at 17:09

## 2023-12-13 RX ADMIN — METRONIDAZOLE 500 MG: 500 INJECTION, SOLUTION INTRAVENOUS at 01:16

## 2023-12-13 RX ADMIN — INSULIN GLARGINE 35 UNITS: 100 INJECTION, SOLUTION SUBCUTANEOUS at 22:08

## 2023-12-13 RX ADMIN — Medication 10 ML: at 08:18

## 2023-12-13 RX ADMIN — Medication 10 ML: at 22:10

## 2023-12-13 RX ADMIN — PANCRELIPASE LIPASE, PANCRELIPASE PROTEASE, PANCRELIPASE AMYLASE 25000 UNITS: 5000; 17000; 24000 CAPSULE, DELAYED RELEASE ORAL at 08:16

## 2023-12-13 RX ADMIN — CIPROFLOXACIN HYDROCHLORIDE 500 MG: 500 TABLET, FILM COATED ORAL at 22:07

## 2023-12-13 RX ADMIN — OXYCODONE AND ACETAMINOPHEN 2 TABLET: 5; 325 TABLET ORAL at 23:18

## 2023-12-13 RX ADMIN — HYDROMORPHONE HYDROCHLORIDE 0.5 MG: 1 INJECTION, SOLUTION INTRAMUSCULAR; INTRAVENOUS; SUBCUTANEOUS at 02:36

## 2023-12-13 RX ADMIN — CEFEPIME 2000 MG: 2 INJECTION, POWDER, FOR SOLUTION INTRAVENOUS at 09:43

## 2023-12-13 ASSESSMENT — PAIN SCALES - GENERAL
PAINLEVEL_OUTOF10: 4
PAINLEVEL_OUTOF10: 7
PAINLEVEL_OUTOF10: 5
PAINLEVEL_OUTOF10: 7

## 2023-12-13 ASSESSMENT — PAIN DESCRIPTION - LOCATION
LOCATION: ABDOMEN

## 2023-12-13 ASSESSMENT — PAIN DESCRIPTION - ORIENTATION
ORIENTATION: LOWER

## 2023-12-13 ASSESSMENT — PAIN DESCRIPTION - DESCRIPTORS
DESCRIPTORS: BURNING;CRAMPING;SHOOTING
DESCRIPTORS: BURNING;CRAMPING;SHARP
DESCRIPTORS: BURNING;CRAMPING;STABBING

## 2023-12-13 NOTE — PROGRESS NOTES
132* 133*   K 3.8 3.1* 4.3    100 100   CO2 26 24 26   BUN 12 12 12   CREATININE 0.5* 0.6* 0.6*   GLUCOSE 116* 160* 184*       Hepatic:    No results for input(s): \"AST\", \"ALT\", \"ALB\", \"BILITOT\", \"ALKPHOS\" in the last 72 hours. Amylase:    Lab Results   Component Value Date/Time    AMYLASE 53 08/03/2010 03:18 PM    AMYLASE 59 04/22/2010 05:20 PM     Lipase:    Lab Results   Component Value Date/Time    LIPASE 12.0 12/10/2023 06:58 AM    LIPASE 14.0 07/27/2023 01:00 PM    LIPASE 20.0 12/20/2022 05:00 PM      Mag:    Lab Results   Component Value Date/Time    MG 2.00 12/12/2023 05:25 AM    MG 1.80 05/11/2022 10:17 AM     Phos:     Lab Results   Component Value Date/Time    PHOS 3.2 01/04/2014 02:50 PM      Coags: No results found for: \"PROTIME\", \"INR\", \"APTT\"    Cultures:  Anaerobic culture  No results found for: \"LABANAE\"  Fungus stain  No results found for requested labs within last 30 days. Gram stain  No results found for requested labs within last 30 days. Organism  No results found for: \"ORG\"  Surgical culture  No results found for: \"CXSURG\"  Blood culture 1  Results in Past 30 Days  Result Component Current Result Ref Range Previous Result Ref Range   Blood Culture, Routine No Growth to date. Any change in status will be called. (12/10/2023)  Not in Time Range      Blood culture 2  Results in Past 30 Days  Result Component Current Result Ref Range Previous Result Ref Range   Culture, Blood 2 No Growth to date. Any change in status will be called. (12/10/2023)  Not in Time Range      Fecal occult  No results found for requested labs within last 30 days. GI bacterial pathogens by PCR  No results found for requested labs within last 30 days. C. difficile  No results found for requested labs within last 30 days.      Urine culture  Lab Results   Component Value Date/Time    LABURIN No growth at 18-36 hours 12/07/2018 01:25 PM       Pathology:  No relevant pathology     Imaging:  I have personally reviewed the following films:    No results found. Scheduled Meds:   ciprofloxacin  500 mg Oral 2 times per day    heparin (porcine)  5,000 Units SubCUTAneous 3 times per day    sodium chloride flush  5-40 mL IntraVENous 2 times per day    insulin lispro  0-8 Units SubCUTAneous TID WC    insulin lispro  0-4 Units SubCUTAneous Nightly    metroNIDAZOLE  500 mg IntraVENous Q8H    insulin glargine  35 Units SubCUTAneous Nightly    amitriptyline  25 mg Oral Nightly    pantoprazole  40 mg Oral QAM AC    rosuvastatin  20 mg Oral Nightly    lipase-protease-amylase  25,000 Units Oral TID WC    losartan  100 mg Oral Daily    And    hydroCHLOROthiazide  25 mg Oral Daily     Continuous Infusions:   sodium chloride 25 mL/hr at 12/13/23 0822    dextrose       PRN Meds:.melatonin, sodium chloride flush, sodium chloride, potassium chloride **OR** potassium alternative oral replacement **OR** potassium chloride, magnesium sulfate, ondansetron **OR** ondansetron, polyethylene glycol, acetaminophen **OR** acetaminophen, dextrose bolus **OR** dextrose bolus, glucagon (rDNA), dextrose, HYDROmorphone      Assessment:  66 y.o. male admitted with   1. Intra-abdominal infection after surgical procedure    2. Acute abdominal pain        Lower abdominal pain  Infection of abdominal wall, prior mesh  Chronic pancreatitis with prior ERCP/stent placement  Hypertension  Diabetes  IZABELA       Plan:  1. Pain improving, mild lower abdominal tenderness only on exam, decreasing erythema along old midline scar--patient reports as not quite to baseline but close, no nausea or vomiting, tolerating regular diet, passing stool, vitals stable, decreasing leukocytosis; continued supportive care, no plans for surgical intervention at this time  2. Regular diet as tolerated; monitor bowel function  3. Antibiotics per ID  4. Activity as tolerated  5. PRN analgesics and antiemetics--minimizing narcotics as tolerated  6.  Management of medical comorbid

## 2023-12-13 NOTE — CARE COORDINATION
[employer] has checked home IV benefits and are as follows:    medication is covered at 100%    Supplies included: No    Supply cost is $12 per day    Home health provider is PENDING    Electronically signed by Yanelis Goodwin RN on 12/13/2023 at 12:28 PM

## 2023-12-13 NOTE — PROGRESS NOTES
Infectious Diseases   Progress Note      Admission Date: 12/10/2023  Hospital Day: Hospital Day: 4   Attending: Kavon Prajapati MD  Date of service: 12/13/2023     Chief complaint/ Reason for consult:     Sepsis with high-grade fever, leukocytosis, tachycardia  Complicated intra-abdominal infection  History of chronic pancreatitis  History of pancreatic stents    Microbiology:        I have reviewed allavailable micro lab data and cultures    Blood culture (2/2) - collected on 12/10/2023: Negative so far      Antibiotics and immunizations:       Current antibiotics: All antibiotics and their doses were reviewed by me    Recent Abx Admin                     ceFEPIme (MAXIPIME) 2,000 mg in sodium chloride 0.9 % 100 mL IVPB (mini-bag) (mg) 2,000 mg New Bag 12/13/23 0943     2,000 mg New Bag  0242     2,000 mg New Bag 12/12/23 1818    metronidazole (FLAGYL) 500 mg in 0.9% NaCl 100 mL IVPB premix (mg) 500 mg New Bag 12/13/23 0822     500 mg New Bag  0116     500 mg New Bag 12/12/23 1708                      Immunization History: All immunization history was reviewed by me today.     Immunization History   Administered Date(s) Administered    COVID-19, MODERNA BLUE border, Primary or Immunocompromised, (age 12y+), IM, 100 mcg/0.5mL 03/17/2021, 05/09/2021    COVID-19, PFIZER Bivalent, DO NOT Dilute, (age 12y+), IM, 30 mcg/0.3 mL 10/06/2022    COVID-19, PFIZER PURPLE top, DILUTE for use, (age 15 y+), 30mcg/0.3mL 01/09/2022    Hep B, ENGERIX-B, (age 20y+), IM, 1mL 05/12/2022, 08/18/2022, 01/20/2023    INFLUENZA, INTRADERMAL, QUADRIVALENT, PRESERVATIVE FREE 09/01/2016    Influenza Virus Vaccine 11/23/2012, 10/03/2014, 11/01/2017    Influenza Whole 11/01/2015    Influenza, FLUARIX, FLULAVAL, FLUZONE (age 10 mo+) AND AFLURIA, (age 1 y+), PF, 0.5mL 10/06/2022    Influenza, FLUBLOK, (age 25 y+), PF, 0.5mL 10/08/2018    Influenza, FLUCELVAX, (age 10 mo+), MDCK, PF, 0.5mL 12/10/2019, 11/01/2020, 10/18/2021, 11/22/2023 potassium chloride **OR** potassium alternative oral replacement **OR** potassium chloride, magnesium sulfate, ondansetron **OR** ondansetron, polyethylene glycol, acetaminophen **OR** acetaminophen, dextrose bolus **OR** dextrose bolus, glucagon (rDNA), dextrose, HYDROmorphone      Problem list:       Patient Active Problem List   Diagnosis Code    Mixed hyperlipidemia E78.2    Essential hypertension I10    Chronic pancreatitis K86.1    Chronic pain syndrome G89.4    Esophageal reflux K21.9    Sleep apnea G47.30    Allergic rhinitis J30.9    Moderate episode of recurrent major depressive disorder ([facility]) F33.1    Erectile dysfunction, non organic F52.8    Meniere's disease H81.09    Insomnia G47.00    Morbid obesity due to excess calories ([facility]) E66.01    Type 2 diabetes mellitus with renal manifestations ([facility]) E11.29    Microalbuminuria R80.9    Abdominal pain R10.9    Anxiety F41.9    Chronic, continuous use of opioids F11.90    Diabetes mellitus type 2 in obese ([facility]) E11.69, E66.9    Vitamin D deficiency E55.9    Acute abdominal pain R10.9    Intra-abdominal infection after surgical procedure T81.40XA    Infected prosthetic mesh of abdominal wall ([address]) T85.79XA    Sepsis ([address]) A41.9    Nephrolithiasis N20.0    History of colostomy reversal Z98.890    History of ventral hernia repair Z98.890, Z87.19    Diabetes education, encounter for Z71.89       Please note that this chart was generated using Dragon dictation software. Although every effort was made to ensure the accuracy of this automated transcription, some errors in transcription may have occurred inadvertently. If you may need any clarification, please do not hesitate to contact me through EPIC or at the phone number provided below with my electronic signature. Any pictures or media included in this note were obtained after taking informed verbal consent from the patient and with their approval to include those in the patient's medical record.         Rigoberto

## 2023-12-13 NOTE — PROGRESS NOTES
V2.0    [facility] Progress Note      Name:  Edwin Alex /Age/Sex: 1965  (66 y.o. male)   MRN & CSN:  [id number] & [id number] Encounter Date/Time: 2023 8:23 AM EST   Location:  [id number]/[id number] PCP: Crys Oliver MD     Attending:Nora Jordan MD       Hospital Day: 4    Assessment and Recommendations   Edwin Alex is a 66 y.o. male with pmh of  diabetes mellitus type 2, hyperlipidemia, hypertension, IZABELA, chronic pancreatitis pain s/p ercp/stent who presents with Acute abdominal pain      Plan:   #. Intra-abdominal infection after surgical procedure    Iv atbx  - cbc in am  - discused with surgery no plans for surgery at this time  --id on board     #. Diabetes mellitus:  Continue insulin monitor and adjust     #. Hypertension:  -Continue losartan and HCTZ       #. History of IZABELA:  -CPAP at night      Diet ADULT DIET; Regular; 3 carb choices (45 gm/meal)   DVT Prophylaxis [] Lovenox, [x]  Heparin, [] SCDs, [] Ambulation,  [] Eliquis, [] Xarelto  [] Coumadin   Code Status Full Code   Disposition From: Home  Expected Disposition: Home  Estimated Date of Discharge: 2-3 days  Patient requires continued admission due to intraabdominal infection. Surrogate Decision Maker/ POA  Wife     Personally reviewed Lab Studies and Imaging       EKG interpreted personally and results no ST changes. Imaging that was interpreted personally includes CT abdomen and pelvis and results as above. Drugs that require monitoring for toxicity include antibiotics. Subjective:     Chief Complaint: abdominal pain    Edwin Alex is a 66 y.o. male who presents with abdominal pain. Pain is improving no n.v no diarreha      Review of Systems:      Pertinent positives and negatives discussed in HPI    Objective:      Intake/Output Summary (Last 24 hours) at 2023 1234  Last data filed at 2023 8251  Gross per 24 hour   Intake 240 ml   Output --   Net 240 ml        Vitals: 10:57 AM     TSH:   Lab Results   Component Value Date/Time    TSH 1.76 11/30/2015 01:08 PM     Troponin: No results found for: \"TROPONINT\"  Lactic Acid: No results for input(s): \"LACTA\" in the last 72 hours. BNP: No results for input(s): \"PROBNP\" in the last 72 hours. UA:  Lab Results   Component Value Date/Time    NITRU Negative 12/10/2023 02:31 PM    COLORU Yellow 12/10/2023 02:31 PM    PHUR 6.0 12/10/2023 02:31 PM    WBCUA 1 12/10/2023 02:31 PM    RBCUA 1 12/10/2023 02:31 PM    YEAST 0 12/07/2018 01:17 PM    BACTERIA None Seen 12/10/2023 02:31 PM    CLARITYU Clear 12/10/2023 02:31 PM    SPECGRAV >=1.030 12/10/2023 02:31 PM    LEUKOCYTESUR Negative 12/10/2023 02:31 PM    UROBILINOGEN 0.2 12/10/2023 02:31 PM    BILIRUBINUR Negative 12/10/2023 02:31 PM    BILIRUBINUR NEGATIVE 10/12/2011 06:30 PM    BLOODU Negative 12/10/2023 02:31 PM    GLUCOSEU >=1000 12/10/2023 02:31 PM    GLUCOSEU 500 10/12/2011 06:30 PM    KETUA 40 12/10/2023 02:31 PM     Urine Cultures:   Lab Results   Component Value Date/Time    LABURIN No growth at 18-36 hours 12/07/2018 01:25 PM     Blood Cultures:   Lab Results   Component Value Date/Time    [facility]  12/10/2023 06:58 AM     No Growth to date. Any change in status will be called. Lab Results   Component Value Date/Time    BLOODCULT2  12/10/2023 07:40 AM     No Growth to date. Any change in status will be called.      Organism: No results found for: \"ORG\"      Electronically signed by Cristopher Smith MD on 12/13/2023 at 12:34 PM

## 2023-12-13 NOTE — PLAN OF CARE
Problem: Discharge Planning  Goal: Discharge to home or other facility with appropriate resources  Outcome: Progressing     Problem: Pain  Goal: Verbalizes/displays adequate comfort level or baseline comfort level  Outcome: Progressing     Problem: Safety - Adult  Goal: Free from fall injury  Outcome: Progressing     Problem: ABCDS Injury Assessment  Goal: Absence of physical injury  Outcome: Progressing     Problem: Gastrointestinal - Adult  Goal: Maintains or returns to baseline bowel function  Outcome: Progressing  Goal: Maintains adequate nutritional intake  Outcome: Progressing     Problem: Gastrointestinal - Adult  Goal: Maintains adequate nutritional intake  Outcome: Progressing     Problem: Infection - Adult  Goal: Absence of infection at discharge  Outcome: Progressing

## 2023-12-14 VITALS
SYSTOLIC BLOOD PRESSURE: 126 MMHG | HEIGHT: 72 IN | RESPIRATION RATE: 18 BRPM | TEMPERATURE: 97.9 F | BODY MASS INDEX: 40.23 KG/M2 | WEIGHT: 297 LBS | OXYGEN SATURATION: 97 % | HEART RATE: 78 BPM | DIASTOLIC BLOOD PRESSURE: 78 MMHG

## 2023-12-14 LAB
ANION GAP SERPL CALCULATED.3IONS-SCNC: 10 MMOL/L (ref 3–16)
BACTERIA BLD CULT ORG #2: NORMAL
BACTERIA BLD CULT: NORMAL
BASOPHILS # BLD: 0.1 K/UL (ref 0–0.2)
BASOPHILS NFR BLD: 0.4 %
BUN SERPL-MCNC: 12 MG/DL (ref 7–20)
CALCIUM SERPL-MCNC: 8.7 MG/DL (ref 8.3–10.6)
CHLORIDE SERPL-SCNC: 103 MMOL/L (ref 99–110)
CO2 SERPL-SCNC: 22 MMOL/L (ref 21–32)
CREAT SERPL-MCNC: 0.5 MG/DL (ref 0.9–1.3)
DEPRECATED RDW RBC AUTO: 13.9 % (ref 12.4–15.4)
EOSINOPHIL # BLD: 0.3 K/UL (ref 0–0.6)
EOSINOPHIL NFR BLD: 2.5 %
GFR SERPLBLD CREATININE-BSD FMLA CKD-EPI: >60 ML/MIN/{1.73_M2}
GLUCOSE BLD-MCNC: 177 MG/DL (ref 70–99)
GLUCOSE BLD-MCNC: 224 MG/DL (ref 70–99)
GLUCOSE SERPL-MCNC: 173 MG/DL (ref 70–99)
HCT VFR BLD AUTO: 41.2 % (ref 40.5–52.5)
HGB BLD-MCNC: 13.7 G/DL (ref 13.5–17.5)
LYMPHOCYTES # BLD: 2.2 K/UL (ref 1–5.1)
LYMPHOCYTES NFR BLD: 19.3 %
MCH RBC QN AUTO: 26.5 PG (ref 26–34)
MCHC RBC AUTO-ENTMCNC: 33.3 G/DL (ref 31–36)
MCV RBC AUTO: 79.6 FL (ref 80–100)
MONOCYTES # BLD: 1.6 K/UL (ref 0–1.3)
MONOCYTES NFR BLD: 13.8 %
NEUTROPHILS # BLD: 7.3 K/UL (ref 1.7–7.7)
NEUTROPHILS NFR BLD: 64 %
PATH INTERP BLD-IMP: NO
PERFORMED ON: ABNORMAL
PERFORMED ON: ABNORMAL
PLATELET # BLD AUTO: 379 K/UL (ref 135–450)
PMV BLD AUTO: 8.6 FL (ref 5–10.5)
POTASSIUM SERPL-SCNC: 3.6 MMOL/L (ref 3.5–5.1)
RBC # BLD AUTO: 5.18 M/UL (ref 4.2–5.9)
SODIUM SERPL-SCNC: 135 MMOL/L (ref 136–145)
WBC # BLD AUTO: 11.4 K/UL (ref 4–11)

## 2023-12-14 PROCEDURE — 80048 BASIC METABOLIC PNL TOTAL CA: CPT

## 2023-12-14 PROCEDURE — 6370000000 HC RX 637 (ALT 250 FOR IP): Performed by: INTERNAL MEDICINE

## 2023-12-14 PROCEDURE — 6370000000 HC RX 637 (ALT 250 FOR IP): Performed by: STUDENT IN AN ORGANIZED HEALTH CARE EDUCATION/TRAINING PROGRAM

## 2023-12-14 PROCEDURE — 36415 COLL VENOUS BLD VENIPUNCTURE: CPT

## 2023-12-14 PROCEDURE — 6360000002 HC RX W HCPCS: Performed by: STUDENT IN AN ORGANIZED HEALTH CARE EDUCATION/TRAINING PROGRAM

## 2023-12-14 PROCEDURE — 85025 COMPLETE CBC W/AUTO DIFF WBC: CPT

## 2023-12-14 PROCEDURE — 2580000003 HC RX 258: Performed by: STUDENT IN AN ORGANIZED HEALTH CARE EDUCATION/TRAINING PROGRAM

## 2023-12-14 RX ORDER — CIPROFLOXACIN 500 MG/1
500 TABLET, FILM COATED ORAL EVERY 12 HOURS SCHEDULED
Qty: 30 TABLET | Refills: 0 | Status: SHIPPED | OUTPATIENT
Start: 2023-12-14 | End: 2023-12-29

## 2023-12-14 RX ORDER — METRONIDAZOLE 500 MG/1
500 TABLET ORAL 3 TIMES DAILY
Qty: 45 TABLET | Refills: 0 | Status: SHIPPED | OUTPATIENT
Start: 2023-12-14 | End: 2023-12-29

## 2023-12-14 RX ADMIN — METRONIDAZOLE 500 MG: 500 INJECTION, SOLUTION INTRAVENOUS at 01:11

## 2023-12-14 RX ADMIN — HEPARIN SODIUM 5000 UNITS: 5000 INJECTION INTRAVENOUS; SUBCUTANEOUS at 06:25

## 2023-12-14 RX ADMIN — METRONIDAZOLE 500 MG: 500 INJECTION, SOLUTION INTRAVENOUS at 08:40

## 2023-12-14 RX ADMIN — LOSARTAN POTASSIUM 100 MG: 100 TABLET, FILM COATED ORAL at 08:35

## 2023-12-14 RX ADMIN — OXYCODONE AND ACETAMINOPHEN 2 TABLET: 5; 325 TABLET ORAL at 06:25

## 2023-12-14 RX ADMIN — HYDROCHLOROTHIAZIDE 25 MG: 25 TABLET ORAL at 08:36

## 2023-12-14 RX ADMIN — PANCRELIPASE LIPASE, PANCRELIPASE PROTEASE, PANCRELIPASE AMYLASE 25000 UNITS: 5000; 17000; 24000 CAPSULE, DELAYED RELEASE ORAL at 08:34

## 2023-12-14 RX ADMIN — CIPROFLOXACIN HYDROCHLORIDE 500 MG: 500 TABLET, FILM COATED ORAL at 08:35

## 2023-12-14 RX ADMIN — OXYCODONE AND ACETAMINOPHEN 2 TABLET: 5; 325 TABLET ORAL at 14:31

## 2023-12-14 RX ADMIN — SODIUM CHLORIDE 25 ML/HR: 9 INJECTION, SOLUTION INTRAVENOUS at 08:40

## 2023-12-14 RX ADMIN — Medication 10 ML: at 14:28

## 2023-12-14 RX ADMIN — PANTOPRAZOLE SODIUM 40 MG: 40 TABLET, DELAYED RELEASE ORAL at 06:25

## 2023-12-14 ASSESSMENT — PAIN SCALES - GENERAL
PAINLEVEL_OUTOF10: 7
PAINLEVEL_OUTOF10: 7
PAINLEVEL_OUTOF10: 5
PAINLEVEL_OUTOF10: 5
PAINLEVEL_OUTOF10: 4

## 2023-12-14 ASSESSMENT — PAIN DESCRIPTION - ORIENTATION
ORIENTATION: MID
ORIENTATION: MID

## 2023-12-14 ASSESSMENT — PAIN DESCRIPTION - LOCATION
LOCATION: ABDOMEN
LOCATION: ABDOMEN

## 2023-12-14 NOTE — PROGRESS NOTES
Patient discharged to home. IV removed without complication and dressing applied. Patient belongings packed and sent with patient. Discharge instructions explained and all questions answered. Patient transported to [facility] by [facility] in a wheel chair.

## 2023-12-14 NOTE — PLAN OF CARE
Problem: Discharge Planning  Goal: Discharge to home or other facility with appropriate resources  12/14/2023 1555 by Jose Snyder RN  Outcome: Completed  12/14/2023 0536 by Dot Del Toro RN  Outcome: Progressing     Problem: Pain  Goal: Verbalizes/displays adequate comfort level or baseline comfort level  12/14/2023 1555 by Jose Snyder RN  Outcome: Completed  12/14/2023 0536 by Dot Del Toro RN  Outcome: Progressing     Problem: Safety - Adult  Goal: Free from fall injury  12/14/2023 1555 by Jose Snyder RN  Outcome: Completed  12/14/2023 0536 by Dot Del Toro RN  Outcome: Progressing     Problem: ABCDS Injury Assessment  Goal: Absence of physical injury  12/14/2023 1555 by Jose Snyder RN  Outcome: Completed  12/14/2023 0536 by Dot Del Toro RN  Outcome: Progressing     Problem: Gastrointestinal - Adult  Goal: Maintains or returns to baseline bowel function  12/14/2023 1555 by Jose Snyder RN  Outcome: Completed  12/14/2023 0536 by Dot Del Toro RN  Outcome: Progressing  Goal: Maintains adequate nutritional intake  12/14/2023 1555 by Jose Snyder RN  Outcome: Completed  12/14/2023 0536 by Dot Del Toro RN  Outcome: Progressing     Problem: Infection - Adult  Goal: Absence of infection at discharge  12/14/2023 1555 by Jose Snyder RN  Outcome: Completed  12/14/2023 0536 by Dot Del Toro RN  Outcome: Progressing

## 2023-12-14 NOTE — PLAN OF CARE
Problem: Discharge Planning  Goal: Discharge to home or other facility with appropriate resources  Outcome: Progressing     Problem: Pain  Goal: Verbalizes/displays adequate comfort level or baseline comfort level  Outcome: Progressing     Problem: Safety - Adult  Goal: Free from fall injury  Outcome: Progressing     Problem: ABCDS Injury Assessment  Goal: Absence of physical injury  Outcome: Progressing     Problem: Gastrointestinal - Adult  Goal: Maintains or returns to baseline bowel function  Outcome: Progressing  Goal: Maintains adequate nutritional intake  Outcome: Progressing     Problem: Gastrointestinal - Adult  Goal: Maintains adequate nutritional intake  Outcome: Progressing

## 2023-12-14 NOTE — PROGRESS NOTES
Infectious Diseases   Progress Note      Admission Date: 12/10/2023  Hospital Day: Hospital Day: 5   Attending: King Wells MD  Date of service: 12/14/2023     Chief complaint/ Reason for consult:     Sepsis with high-grade fever, leukocytosis, tachycardia  Complicated intra-abdominal infection  History of chronic pancreatitis  History of pancreatic stents    Microbiology:        I have reviewed allavailable micro lab data and cultures    Blood culture (2/2) - collected on 12/10/2023: Negative so far      Antibiotics and immunizations:       Current antibiotics: All antibiotics and their doses were reviewed by me    Recent Abx Admin                     metronidazole (FLAGYL) 500 mg in 0.9% NaCl 100 mL IVPB premix (mg) 500 mg New Bag 12/14/23 0840     500 mg New Bag  0111     500 mg New Bag 12/13/23 1709    ciprofloxacin (CIPRO) tablet 500 mg (mg) 500 mg Given 12/14/23 0835     500 mg Given 12/13/23 2207                      Immunization History: All immunization history was reviewed by me today.     Immunization History   Administered Date(s) Administered    COVID-19, MODERNA BLUE border, Primary or Immunocompromised, (age 12y+), IM, 100 mcg/0.5mL 03/17/2021, 05/09/2021    COVID-19, PFIZER Bivalent, DO NOT Dilute, (age 12y+), IM, 30 mcg/0.3 mL 10/06/2022    COVID-19, PFIZER PURPLE top, DILUTE for use, (age 15 y+), 30mcg/0.3mL 01/09/2022    Hep B, ENGERIX-B, (age 20y+), IM, 1mL 05/12/2022, 08/18/2022, 01/20/2023    INFLUENZA, INTRADERMAL, QUADRIVALENT, PRESERVATIVE FREE 09/01/2016    Influenza Virus Vaccine 11/23/2012, 10/03/2014, 11/01/2017    Influenza Whole 11/01/2015    Influenza, FLUARIX, FLULAVAL, FLUZONE (age 10 mo+) AND AFLURIA, (age 1 y+), PF, 0.5mL 10/06/2022    Influenza, FLUBLOK, (age 25 y+), PF, 0.5mL 10/08/2018    Influenza, FLUCELVAX, (age 10 mo+), MDCK, PF, 0.5mL 12/10/2019, 11/01/2020, 10/18/2021, 11/22/2023    Influenza, Intradermal, Preservative free 10/08/2013    Pneumococcal, PCV-13, Feb; 264-273, Management of the Incidental Renal Mass on CT,   [employer] 2021; 434844, Bosniak Classification of Cystic Renal Masses,   Version 2019. Medications: All current and past medications were reviewed.      ciprofloxacin  500 mg Oral 2 times per day    heparin (porcine)  5,000 Units SubCUTAneous 3 times per day    sodium chloride flush  5-40 mL IntraVENous 2 times per day    insulin lispro  0-8 Units SubCUTAneous TID WC    insulin lispro  0-4 Units SubCUTAneous Nightly    metroNIDAZOLE  500 mg IntraVENous Q8H    insulin glargine  35 Units SubCUTAneous Nightly    amitriptyline  25 mg Oral Nightly    pantoprazole  40 mg Oral QAM AC    rosuvastatin  20 mg Oral Nightly    lipase-protease-amylase  25,000 Units Oral TID WC    losartan  100 mg Oral Daily    And    hydroCHLOROthiazide  25 mg Oral Daily        sodium chloride 25 mL/hr (12/14/23 0840)    dextrose         oxyCODONE-acetaminophen, melatonin, sodium chloride flush, sodium chloride, potassium chloride **OR** potassium alternative oral replacement **OR** potassium chloride, magnesium sulfate, ondansetron **OR** ondansetron, polyethylene glycol, acetaminophen **OR** acetaminophen, dextrose bolus **OR** dextrose bolus, glucagon (rDNA), dextrose, HYDROmorphone      Problem list:       Patient Active Problem List   Diagnosis Code    Mixed hyperlipidemia E78.2    Essential hypertension I10    Chronic pancreatitis K86.1    Chronic pain syndrome G89.4    Esophageal reflux K21.9    Sleep apnea G47.30    Allergic rhinitis J30.9    Moderate episode of recurrent major depressive disorder ([address]) F33.1    Erectile dysfunction, non organic F52.8    Meniere's disease H81.09    Insomnia G47.00    Morbid obesity due to excess calories ([facility]) E66.01    Type 2 diabetes mellitus with renal manifestations ([facility]) E11.29    Microalbuminuria R80.9    Abdominal pain R10.9    Anxiety F41.9    Chronic, continuous use of opioids F11.90    Diabetes mellitus type 2 in

## 2023-12-15 ENCOUNTER — CARE COORDINATION (OUTPATIENT)
Dept: CASE MANAGEMENT | Age: 58
End: 2023-12-15

## 2023-12-15 NOTE — CARE COORDINATION
Care Transitions Outreach Attempt    Call within 2 business days of discharge: Yes     Attempted to reach patient for transitions of care follow up. Unable to reach patient. Patient: Kerrie Peace Patient : 1965 MRN: [id number]    Last Discharge Facility       Date Complaint Diagnosis Description Type Department Provider    12/10/23 Abdominal Pain Intra-abdominal infection after surgical procedure . .. ED to Hosp-Admission (Discharged) (ADMITTED) F[facility] 3T Cally Null MD; Ty Nakul. .. Was this an external facility discharge?  No Discharge Facility Name:     Noted following upcoming appointments from discharge chart review:   [facility] follow up appointment(s):   Future Appointments   Date Time Provider [address]   2024 11:00 AM Sweta Perrin MD [address] [place] [address]  follow up appointment(s):

## 2023-12-26 ENCOUNTER — OFFICE VISIT (OUTPATIENT)
Dept: FAMILY MEDICINE CLINIC | Age: 58
End: 2023-12-26
Payer: MEDICARE

## 2023-12-26 VITALS
HEART RATE: 77 BPM | DIASTOLIC BLOOD PRESSURE: 70 MMHG | WEIGHT: 295.6 LBS | BODY MASS INDEX: 40.09 KG/M2 | OXYGEN SATURATION: 98 % | TEMPERATURE: 97.1 F | SYSTOLIC BLOOD PRESSURE: 138 MMHG

## 2023-12-26 DIAGNOSIS — T85.79XD INFECTED PROSTHETIC MESH OF ABDOMINAL WALL, SUBSEQUENT ENCOUNTER: Primary | ICD-10-CM

## 2023-12-26 DIAGNOSIS — T81.40XA INTRA-ABDOMINAL INFECTION AFTER SURGICAL PROCEDURE: ICD-10-CM

## 2023-12-26 PROCEDURE — 3078F DIAST BP <80 MM HG: CPT | Performed by: FAMILY MEDICINE

## 2023-12-26 PROCEDURE — 99213 OFFICE O/P EST LOW 20 MIN: CPT | Performed by: FAMILY MEDICINE

## 2023-12-26 PROCEDURE — 3075F SYST BP GE 130 - 139MM HG: CPT | Performed by: FAMILY MEDICINE

## 2023-12-26 RX ORDER — LOSARTAN POTASSIUM AND HYDROCHLOROTHIAZIDE 25; 100 MG/1; MG/1
1 TABLET ORAL DAILY
Qty: 90 TABLET | Refills: 3 | Status: SHIPPED | OUTPATIENT
Start: 2023-12-26

## 2023-12-27 ENCOUNTER — CARE COORDINATION (OUTPATIENT)
Dept: CARE COORDINATION | Age: 58
End: 2023-12-27

## 2023-12-27 NOTE — CARE COORDINATION
Care Transitions Follow Up Call    Patient: Neisha Officer  Patient : 1965   MRN: [id number]  Reason for Admission: infected prosthetic mesh of abdominal wall  Discharge Date: 23 RARS: Readmission Risk Score: 8.4  Attempted to reach pt for follow up call. Left message requesting call back.     Follow Up  Future Appointments   Date Time Provider [address]   2024 11:00 AM Hannah Avila  [facility]
Home

## 2024-01-03 ENCOUNTER — CARE COORDINATION (OUTPATIENT)
Dept: CASE MANAGEMENT | Age: 59
End: 2024-01-03

## 2024-01-03 NOTE — CARE COORDINATION
Care Transitions Follow Up Call    Patient Current Location:  Home: [address] [place] [address]    Care Transition Nurse contacted the patient by telephone.  Verified name and  with patient as identifiers.    Patient: Deshaun Ayers  Patient : 1965   MRN: [id number]  Reason for Admission: Abdominal pain, sepsis, infected mesh abd wall  Discharge Date: 23 RARS: Readmission Risk Score: 8.4      Needs to be reviewed by the provider   Additional needs identified to be addressed with provider: No  none             Method of communication with provider: none.    Patient reports that he is doing \"OK\". He does report a very small amount of redness near incision scar, he reports that this has improved from his \"entire abdomen was fire engine red\".  He denies any fever and has not been prescribed any additional antibiotics.  His options are mesh removal or live with possibility of recurrent infection.  He has not made a decision about whether or not to have the mesh removed.  CTN will continue with outreach follow up calls.      Follow Up  Future Appointments   Date Time Provider Department Center   2024 11:00 AM Ginger Anguiano MD [facility]     External follow up appointment(s):     Care Transition Nurse reviewed red flags with patient and discussed any barriers to care and/or understanding of plan of care after discharge. Discussed appropriate site of care based on symptoms and resources available to patient including: PCP  Urgent care clinics  When to call 911. The patient agrees to contact the PCP office for questions related to their healthcare.       Offered patient enrollment in the Remote Patient Monitoring (RPM) program for in-home monitoring: HTN, DM Will offer on a follow up call .     Care Transitions Subsequent and Final Call    Subsequent and Final Calls  Do you have any ongoing symptoms?: No  Have your medications changed?: No  Do you have any questions related to your

## 2024-01-10 ENCOUNTER — CARE COORDINATION (OUTPATIENT)
Dept: CASE MANAGEMENT | Age: 59
End: 2024-01-10

## 2024-01-10 NOTE — CARE COORDINATION
Care Transitions Follow Up Call    Patient Current Location: Home: [address] [place] [address]    Care Transition Nurse contacted the patient by telephone to follow up after recent hospital admission.      Patient: Deshaun Ayers  Patient : 1965   MRN: <[id number]>  Reason for Admission: 4 days -> sepsis, leukocytosis, tachycardia, complicated intra-abdominal infection after surgical procedure, DM (A1c 8.0% 2023), HTN, hx IZABELA on CPAP, hx chronic pancreatitis, hx pancreatic stents -> home no services on PO abx  Discharge Date: 23 RARS: Readmission Risk Score: 8.4      Needs to be reviewed by the provider   Additional needs identified to be addressed with provider: No         Method of communication with provider: none.    Spiked a fever a few days ago and stomach is red again so he scheduled appt with surgeon tomorrow to review the options/plan again. His wife will attend the appointment with him. He is in no distress during our conversation. He denies needs. Care transition outreach complete at this time. He has CTN contact info for future needs/questions.     Addressed changes since last contact:  none  Discussed follow-up appointments. If no appointment was previously scheduled, appointment scheduling offered: completed [facility] .   Is follow up appointment scheduled within 7 days of discharge? completed.    Follow Up  Future Appointments   Date Time Provider Department Center   2024  1:15 PM Russel Toscano MD  [facility]   2024 11:00 AM Ginger Anguiano MD [facility]     Care Transition Nurse reviewed medical action plan and red flags with patient and discussed any barriers to care and/or understanding of plan of care after discharge. Discussed appropriate site of care based on symptoms and resources available to patient including: PCP  Specialist. The patient agrees to contact the PCP office for questions related to their healthcare.     Patients top risk

## 2024-01-18 ENCOUNTER — OFFICE VISIT (OUTPATIENT)
Dept: SURGERY | Age: 59
End: 2024-01-18
Payer: MEDICARE

## 2024-01-18 VITALS — SYSTOLIC BLOOD PRESSURE: 118 MMHG | WEIGHT: 285.8 LBS | BODY MASS INDEX: 38.76 KG/M2 | DIASTOLIC BLOOD PRESSURE: 72 MMHG

## 2024-01-18 DIAGNOSIS — T85.79XD INFECTED PROSTHETIC MESH OF ABDOMINAL WALL, SUBSEQUENT ENCOUNTER: Primary | ICD-10-CM

## 2024-01-18 PROCEDURE — 99214 OFFICE O/P EST MOD 30 MIN: CPT | Performed by: SURGERY

## 2024-01-18 PROCEDURE — 3074F SYST BP LT 130 MM HG: CPT | Performed by: SURGERY

## 2024-01-18 PROCEDURE — 3078F DIAST BP <80 MM HG: CPT | Performed by: SURGERY

## 2024-01-18 NOTE — PROGRESS NOTES
Subjective:      CC-abdominal wall cellulitis    Patient ID:HPI-Deshaun Ayers is a 58 y.o. male seen in follow-up for abdominal wall cellulitis    HPI    Review of Systems    Objective:   Physical Exam  Constitutional:       Appearance: He is well-developed.   HENT:      Head: Normocephalic and atraumatic.      Right Ear: External ear normal.      Left Ear: External ear normal.   Eyes:      Conjunctiva/sclera: Conjunctivae normal.   Cardiovascular:      Rate and Rhythm: Normal rate and regular rhythm.   Pulmonary:      Effort: Pulmonary effort is normal.      Breath sounds: Normal breath sounds.   Abdominal:      General: There is no distension.      Palpations: Abdomen is soft.      Comments: Tender abdominal wall with erythema along the midline   Musculoskeletal:         General: Normal range of motion.      Cervical back: Normal range of motion and neck supple.   Skin:     General: Skin is warm and dry.   Neurological:      Mental Status: He is alert and oriented to person, place, and time.   Psychiatric:         Behavior: Behavior normal.         Assessment:      58-year-old male with a distant history of a sigmoid colostomy with subsequent reversal and ventral hernia repair with mesh.  He is seen in follow-up status post inpatient admission for cellulitis of the abdominal wall.  His symptoms have not improved despite a prolonged course of antibiotics.  He continues to have abdominal wall erythema and tenderness along with low-grade fevers.  The overall findings are consistent with infected mesh with possible erosion of mesh into bowel.      Plan:      Will plan for exploratory laparotomy with explantation of old mesh with possible small bowel resection.  We will then plan for primary closure of the fascia.  He understands that this will leave him at future risk of developing being a another hernia.        Russel Toscano MD

## 2024-01-22 ENCOUNTER — OFFICE VISIT (OUTPATIENT)
Dept: SURGERY | Age: 59
End: 2024-01-22
Payer: MEDICARE

## 2024-01-22 VITALS — BODY MASS INDEX: 38.19 KG/M2 | WEIGHT: 281.6 LBS | SYSTOLIC BLOOD PRESSURE: 122 MMHG | DIASTOLIC BLOOD PRESSURE: 76 MMHG

## 2024-01-22 DIAGNOSIS — T85.79XD INFECTED PROSTHETIC MESH OF ABDOMINAL WALL, SUBSEQUENT ENCOUNTER: Primary | ICD-10-CM

## 2024-01-22 PROCEDURE — 3074F SYST BP LT 130 MM HG: CPT | Performed by: SURGERY

## 2024-01-22 PROCEDURE — 99213 OFFICE O/P EST LOW 20 MIN: CPT | Performed by: SURGERY

## 2024-01-22 PROCEDURE — 3078F DIAST BP <80 MM HG: CPT | Performed by: SURGERY

## 2024-01-22 RX ORDER — METRONIDAZOLE 500 MG/1
500 TABLET ORAL 3 TIMES DAILY
Qty: 15 TABLET | Refills: 0 | Status: ON HOLD | OUTPATIENT
Start: 2024-01-22 | End: 2024-01-27

## 2024-01-22 RX ORDER — CIPROFLOXACIN 500 MG/1
500 TABLET, FILM COATED ORAL 2 TIMES DAILY
Qty: 10 TABLET | Refills: 0 | Status: ON HOLD | OUTPATIENT
Start: 2024-01-22 | End: 2024-01-27

## 2024-01-22 RX ORDER — INSULIN LISPRO 100 [IU]/ML
INJECTION, SOLUTION INTRAVENOUS; SUBCUTANEOUS
Qty: 6 ADJUSTABLE DOSE PRE-FILLED PEN SYRINGE | Refills: 12 | Status: ON HOLD | OUTPATIENT
Start: 2024-01-22

## 2024-01-22 RX ORDER — FLUTICASONE PROPIONATE 50 MCG
SPRAY, SUSPENSION (ML) NASAL
Qty: 48 G | Refills: 3 | Status: ON HOLD | OUTPATIENT
Start: 2024-01-22

## 2024-01-22 NOTE — TELEPHONE ENCOUNTER
Medication:   Requested Prescriptions     Pending Prescriptions Disp Refills    fluticasone (FLONASE) 50 MCG/ACT nasal spray [Pharmacy Med Name: FLUTICASONE 50MCG NASAL SP (120) RX] 48 g 3     Sig: SHAKE LIQUID AND USE 2 SPRAYS IN EACH NOSTRIL DAILY       Patient Phone Number: [phone] (home)     Last appt: 12/26/2023   Next appt: 2/23/2024    Last OARRS:       11/22/2023     9:53 AM   RX Monitoring   Periodic Controlled Substance Monitoring No signs of potential drug abuse or diversion identified.     PDMP Monitoring:    Last PDMP Hung as Reviewed ([place]):  Review User Review Instant Review Result   MCCOYERNST 12/10/2023  6:52 AM Reviewed PDMP [1]     Preferred Pharmacy:   [id number] [place], [place] - [address]  - P [phone] - F [phone]  [facility] [facility] [address]  Phone: [phone] Fax: [phone]    [id number] [place], [place] - [address] -  [phone] - F [phone]  [address] [facility] [address]  Phone: [phone] Fax: [phone]    [id number] [place], [place] - [address] [facility] [phone] - F [phone]  [address] [facility] [address]  Phone: [phone] Fax: [phone]    [facility] [place], [place] - [address] [facility] [phone] - F [phone]  [address] [facility] [address]  Phone: [phone] Fax: [phone]

## 2024-01-22 NOTE — PROGRESS NOTES
Subjective:        Chief complaint-draining abdominal wound    Patient ID: HPI-Deshaun Ayers is a 58 y.o. male who presents for evaluation of a draining abdominal wound    HPI    Review of Systems    Objective:   Physical Exam  Constitutional:       Appearance: He is well-developed.   HENT:      Head: Normocephalic and atraumatic.      Right Ear: External ear normal.      Left Ear: External ear normal.   Eyes:      Conjunctiva/sclera: Conjunctivae normal.   Pulmonary:      Effort: Pulmonary effort is normal. No respiratory distress.   Abdominal:      General: There is no distension.      Palpations: Abdomen is soft.      Comments: 1 cm draining wound mid abdomen   Musculoskeletal:         General: Normal range of motion.      Cervical back: Normal range of motion and neck supple.   Skin:     General: Skin is warm and dry.   Neurological:      Mental Status: He is alert and oriented to person, place, and time.   Psychiatric:         Behavior: Behavior normal.         Assessment:      58-year-old male who is seen today after developing a draining abdominal wound.  He has been seen recently for what was felt to be infected abdominal wall mesh from a distant surgery.  Until recently, he had only abdominal wall cellulitis.  The drainage developed over the weekend.  On physical examination, he has a 1 cm open wound with copious output.  He either decompressed an intra-abdominal fluid collection or has developed a enterocutaneous fistula.  Suspect the latter.      Plan:      Cipro 500 mg p.o. twice daily and Flagyl 500 mg p.o. 3 times daily  His surgery date was moved up to 3 days from now.  We are planning for abdominal exploration with explantation of old mesh and probable small bowel resection.        Russel Toscano MD

## 2024-01-24 NOTE — PROGRESS NOTES
Name_______________________________________Printed:____________________  Date and time of surgery____1/25/2024   0730____________________Arrival Time:_____0600   MAIN___________   1. The instructions given regarding when and if a patient needs to stop oral intake prior to surgery varies.Follow the specific instructions you were given                  __X_Nothing to eat or to drink after Midnight the night before.                   ____Carbo loading or instructions will be given to select patients-if you have been given those instructions -please do the following                           The evening before your surgery after dinner before midnight drink 40 ounces of gatorade.If you are diabetic use sugar free.  The morning of surgery drink 40 ounces of water.This needs to be finished 3 hours prior to your surgery start time.    2. Take the following pills with a small sip of water on the morning of surgery____PERCOCET IF NEEDED_______________________________________________               X   Do not take blood pressure medications ending in pril or sartan the zuleika prior to surgery or the morning of surgery. Dr Cisneros's patient are not to take any medications the AM of surgery.         3. Aspirin, Ibuprofen, Advil, Naproxen, Vitamin E and other Anti-inflammatory products and supplements should be stopped for 5 -7days before surgery or as directed by your physician.   4. Check with your Doctor regarding stopping Plavix, Coumadin,Eliquis, Lovenox,Effient,Pradaxa,Xarelto, Fragmin or other blood thinners and follow their instructions.   5. Do not smoke, and do not drink any alcoholic beverages 24 hours prior to surgery.  This includes NA Beer.Refrain from the usage of any recreational drugs.   6. You may brush your teeth and gargle the morning of surgery.  DO NOT SWALLOW WATER   7. You MUST make arrangements for a responsible adult to stay on site while you are here and take you home after your surgery. You will not be   Please bring picture ID and insurance card.             19.  Visit our web site for additional information:  [email or web address]              20.During flu season no children under the age of 14 are permitted in the hospital for the safety of all patients.                              21.X  If you take a long acting insulin in the evening only  take half of your usual  dose the night  before your procedure              22.X  If you use a c-pap please bring DOS if staying overnight,             23.For your convenience [facility] has a pharmacy on site to fill your prescriptions.             24. If you use oxygen and have a portable tank please bring it  with you the DOS             25. Bring a complete list of all your medications with name and dose include any supplements.             26. Other__________________________________________   *Please call pre admission testing if you any further questions   [facility]         [phone]   [place] [phone]   [place]            [phone]    [facility]  [phone]   [facility]   [phone]       VISITOR POLICY(subject to change)    Current policy is 2 visitors per patient. No children. Mask is  at the discretion of the facility. Visiting hours are 8a-8p. Overnight visitors will be at the discretion of the nurse. All policies subject to change.      All above information reviewed with patient in person or by phone.Patient verbalizes understanding.All questions and concerns addressed.                                                                                                 Patient/Rep____PATIENT________________                                                                                                                                    PRE OP INSTRUCTIONS

## 2024-01-25 ENCOUNTER — HOSPITAL ENCOUNTER (INPATIENT)
Age: 59
LOS: 9 days | Discharge: HOME HEALTH | End: 2024-02-03
Attending: SURGERY | Admitting: SURGERY
Payer: MEDICARE

## 2024-01-25 ENCOUNTER — ANESTHESIA (OUTPATIENT)
Dept: OPERATING ROOM | Age: 59
End: 2024-01-25
Payer: MEDICARE

## 2024-01-25 ENCOUNTER — ANESTHESIA EVENT (OUTPATIENT)
Dept: OPERATING ROOM | Age: 59
End: 2024-01-25
Payer: MEDICARE

## 2024-01-25 DIAGNOSIS — T85.79XA INFECTED PROSTHETIC MESH OF ABDOMINAL WALL, INITIAL ENCOUNTER (HCC): ICD-10-CM

## 2024-01-25 PROBLEM — T85.79XD: Status: ACTIVE | Noted: 2024-01-25

## 2024-01-25 LAB
ABO + RH BLD: NORMAL
ANION GAP SERPL CALCULATED.3IONS-SCNC: 13 MMOL/L (ref 3–16)
BLD GP AB SCN SERPL QL: NORMAL
BUN SERPL-MCNC: 8 MG/DL (ref 7–20)
CALCIUM SERPL-MCNC: 8.9 MG/DL (ref 8.3–10.6)
CHLORIDE SERPL-SCNC: 102 MMOL/L (ref 99–110)
CO2 SERPL-SCNC: 23 MMOL/L (ref 21–32)
CREAT SERPL-MCNC: 0.5 MG/DL (ref 0.9–1.3)
DEPRECATED RDW RBC AUTO: 15 % (ref 12.4–15.4)
GFR SERPLBLD CREATININE-BSD FMLA CKD-EPI: >60 ML/MIN/{1.73_M2}
GLUCOSE BLD-MCNC: 222 MG/DL (ref 70–99)
GLUCOSE BLD-MCNC: 224 MG/DL (ref 70–99)
GLUCOSE BLD-MCNC: 252 MG/DL (ref 70–99)
GLUCOSE BLD-MCNC: 284 MG/DL (ref 70–99)
GLUCOSE SERPL-MCNC: 215 MG/DL (ref 70–99)
HCT VFR BLD AUTO: 35.1 % (ref 40.5–52.5)
HGB BLD-MCNC: 11.8 G/DL (ref 13.5–17.5)
MCH RBC QN AUTO: 26.2 PG (ref 26–34)
MCHC RBC AUTO-ENTMCNC: 33.6 G/DL (ref 31–36)
MCV RBC AUTO: 78.2 FL (ref 80–100)
PERFORMED ON: ABNORMAL
PLATELET # BLD AUTO: 600 K/UL (ref 135–450)
PMV BLD AUTO: 7.9 FL (ref 5–10.5)
POTASSIUM SERPL-SCNC: 3.1 MMOL/L (ref 3.5–5.1)
RBC # BLD AUTO: 4.49 M/UL (ref 4.2–5.9)
SODIUM SERPL-SCNC: 138 MMOL/L (ref 136–145)
WBC # BLD AUTO: 9.3 K/UL (ref 4–11)

## 2024-01-25 PROCEDURE — 44120 REMOVAL OF SMALL INTESTINE: CPT | Performed by: SURGERY

## 2024-01-25 PROCEDURE — 1200000000 HC SEMI PRIVATE

## 2024-01-25 PROCEDURE — 0DQL0ZZ REPAIR TRANSVERSE COLON, OPEN APPROACH: ICD-10-PCS | Performed by: SURGERY

## 2024-01-25 PROCEDURE — 36415 COLL VENOUS BLD VENIPUNCTURE: CPT

## 2024-01-25 PROCEDURE — 86850 RBC ANTIBODY SCREEN: CPT

## 2024-01-25 PROCEDURE — 6360000002 HC RX W HCPCS: Performed by: SURGERY

## 2024-01-25 PROCEDURE — 85027 COMPLETE CBC AUTOMATED: CPT

## 2024-01-25 PROCEDURE — 86900 BLOOD TYPING SEROLOGIC ABO: CPT

## 2024-01-25 PROCEDURE — 2580000003 HC RX 258: Performed by: NURSE ANESTHETIST, CERTIFIED REGISTERED

## 2024-01-25 PROCEDURE — 6360000002 HC RX W HCPCS: Performed by: STUDENT IN AN ORGANIZED HEALTH CARE EDUCATION/TRAINING PROGRAM

## 2024-01-25 PROCEDURE — 88300 SURGICAL PATH GROSS: CPT

## 2024-01-25 PROCEDURE — C9113 INJ PANTOPRAZOLE SODIUM, VIA: HCPCS | Performed by: SURGERY

## 2024-01-25 PROCEDURE — 88307 TISSUE EXAM BY PATHOLOGIST: CPT

## 2024-01-25 PROCEDURE — 80048 BASIC METABOLIC PNL TOTAL CA: CPT

## 2024-01-25 PROCEDURE — 87185 SC STD ENZYME DETCJ PER NZM: CPT

## 2024-01-25 PROCEDURE — 87075 CULTR BACTERIA EXCEPT BLOOD: CPT

## 2024-01-25 PROCEDURE — 3600000004 HC SURGERY LEVEL 4 BASE: Performed by: SURGERY

## 2024-01-25 PROCEDURE — 0WPF0JZ REMOVAL OF SYNTHETIC SUBSTITUTE FROM ABDOMINAL WALL, OPEN APPROACH: ICD-10-PCS | Performed by: SURGERY

## 2024-01-25 PROCEDURE — 2709999900 HC NON-CHARGEABLE SUPPLY: Performed by: SURGERY

## 2024-01-25 PROCEDURE — 0DNB0ZZ RELEASE ILEUM, OPEN APPROACH: ICD-10-PCS | Performed by: SURGERY

## 2024-01-25 PROCEDURE — 83036 HEMOGLOBIN GLYCOSYLATED A1C: CPT

## 2024-01-25 PROCEDURE — 87186 SC STD MICRODIL/AGAR DIL: CPT

## 2024-01-25 PROCEDURE — 87077 CULTURE AEROBIC IDENTIFY: CPT

## 2024-01-25 PROCEDURE — 3700000001 HC ADD 15 MINUTES (ANESTHESIA): Performed by: SURGERY

## 2024-01-25 PROCEDURE — 86901 BLOOD TYPING SEROLOGIC RH(D): CPT

## 2024-01-25 PROCEDURE — 2720000010 HC SURG SUPPLY STERILE: Performed by: SURGERY

## 2024-01-25 PROCEDURE — 2700000000 HC OXYGEN THERAPY PER DAY

## 2024-01-25 PROCEDURE — 6370000000 HC RX 637 (ALT 250 FOR IP): Performed by: STUDENT IN AN ORGANIZED HEALTH CARE EDUCATION/TRAINING PROGRAM

## 2024-01-25 PROCEDURE — 7100000001 HC PACU RECOVERY - ADDTL 15 MIN: Performed by: SURGERY

## 2024-01-25 PROCEDURE — 0DB80ZZ EXCISION OF SMALL INTESTINE, OPEN APPROACH: ICD-10-PCS | Performed by: SURGERY

## 2024-01-25 PROCEDURE — 87102 FUNGUS ISOLATION CULTURE: CPT

## 2024-01-25 PROCEDURE — 2580000003 HC RX 258: Performed by: SURGERY

## 2024-01-25 PROCEDURE — 3600000014 HC SURGERY LEVEL 4 ADDTL 15MIN: Performed by: SURGERY

## 2024-01-25 PROCEDURE — 87076 CULTURE ANAEROBE IDENT EACH: CPT

## 2024-01-25 PROCEDURE — 6360000002 HC RX W HCPCS: Performed by: INTERNAL MEDICINE

## 2024-01-25 PROCEDURE — 3700000000 HC ANESTHESIA ATTENDED CARE: Performed by: SURGERY

## 2024-01-25 PROCEDURE — 6360000002 HC RX W HCPCS: Performed by: NURSE ANESTHETIST, CERTIFIED REGISTERED

## 2024-01-25 PROCEDURE — 2500000003 HC RX 250 WO HCPCS: Performed by: NURSE ANESTHETIST, CERTIFIED REGISTERED

## 2024-01-25 PROCEDURE — 7100000000 HC PACU RECOVERY - FIRST 15 MIN: Performed by: SURGERY

## 2024-01-25 PROCEDURE — 87205 SMEAR GRAM STAIN: CPT

## 2024-01-25 PROCEDURE — 94150 VITAL CAPACITY TEST: CPT

## 2024-01-25 PROCEDURE — 87070 CULTURE OTHR SPECIMN AEROBIC: CPT

## 2024-01-25 PROCEDURE — 0DN60ZZ RELEASE STOMACH, OPEN APPROACH: ICD-10-PCS | Performed by: SURGERY

## 2024-01-25 RX ORDER — LIDOCAINE HYDROCHLORIDE 20 MG/ML
INJECTION, SOLUTION EPIDURAL; INFILTRATION; INTRACAUDAL; PERINEURAL PRN
Status: DISCONTINUED | OUTPATIENT
Start: 2024-01-25 | End: 2024-01-25 | Stop reason: SDUPTHER

## 2024-01-25 RX ORDER — OXYCODONE HYDROCHLORIDE 5 MG/1
5 TABLET ORAL
Status: DISCONTINUED | OUTPATIENT
Start: 2024-01-25 | End: 2024-01-25 | Stop reason: HOSPADM

## 2024-01-25 RX ORDER — DEXMEDETOMIDINE HYDROCHLORIDE 100 UG/ML
INJECTION, SOLUTION INTRAVENOUS PRN
Status: DISCONTINUED | OUTPATIENT
Start: 2024-01-25 | End: 2024-01-25 | Stop reason: SDUPTHER

## 2024-01-25 RX ORDER — CEFAZOLIN 3 G/1
INJECTION, POWDER, FOR SOLUTION INTRAVENOUS
Status: DISCONTINUED
Start: 2024-01-25 | End: 2024-01-25

## 2024-01-25 RX ORDER — SODIUM CHLORIDE 9 MG/ML
INJECTION, SOLUTION INTRAVENOUS PRN
Status: DISCONTINUED | OUTPATIENT
Start: 2024-01-25 | End: 2024-01-25 | Stop reason: HOSPADM

## 2024-01-25 RX ORDER — ACETAMINOPHEN 325 MG/1
650 TABLET ORAL ONCE
Status: DISCONTINUED | OUTPATIENT
Start: 2024-01-25 | End: 2024-01-25 | Stop reason: HOSPADM

## 2024-01-25 RX ORDER — SODIUM CHLORIDE 9 MG/ML
INJECTION, SOLUTION INTRAVENOUS PRN
Status: DISCONTINUED | OUTPATIENT
Start: 2024-01-25 | End: 2024-02-03 | Stop reason: HOSPADM

## 2024-01-25 RX ORDER — SODIUM CHLORIDE 450 MG/100ML
INJECTION, SOLUTION INTRAVENOUS CONTINUOUS
Status: DISCONTINUED | OUTPATIENT
Start: 2024-01-25 | End: 2024-01-26

## 2024-01-25 RX ORDER — SODIUM CHLORIDE 0.9 % (FLUSH) 0.9 %
5-40 SYRINGE (ML) INJECTION PRN
Status: DISCONTINUED | OUTPATIENT
Start: 2024-01-25 | End: 2024-01-25 | Stop reason: HOSPADM

## 2024-01-25 RX ORDER — METHOCARBAMOL 100 MG/ML
1000 INJECTION, SOLUTION INTRAMUSCULAR; INTRAVENOUS ONCE
Status: COMPLETED | OUTPATIENT
Start: 2024-01-25 | End: 2024-01-25

## 2024-01-25 RX ORDER — INSULIN LISPRO 100 [IU]/ML
0-4 INJECTION, SOLUTION INTRAVENOUS; SUBCUTANEOUS NIGHTLY
Status: DISCONTINUED | OUTPATIENT
Start: 2024-01-25 | End: 2024-01-29 | Stop reason: CLARIF

## 2024-01-25 RX ORDER — MAGNESIUM SULFATE HEPTAHYDRATE 500 MG/ML
INJECTION, SOLUTION INTRAMUSCULAR; INTRAVENOUS PRN
Status: DISCONTINUED | OUTPATIENT
Start: 2024-01-25 | End: 2024-01-25 | Stop reason: SDUPTHER

## 2024-01-25 RX ORDER — MAGNESIUM HYDROXIDE 1200 MG/15ML
LIQUID ORAL CONTINUOUS PRN
Status: COMPLETED | OUTPATIENT
Start: 2024-01-25 | End: 2024-01-25

## 2024-01-25 RX ORDER — POTASSIUM CHLORIDE 7.45 MG/ML
10 INJECTION INTRAVENOUS
Status: COMPLETED | OUTPATIENT
Start: 2024-01-25 | End: 2024-01-26

## 2024-01-25 RX ORDER — DEXAMETHASONE SODIUM PHOSPHATE 4 MG/ML
INJECTION, SOLUTION INTRA-ARTICULAR; INTRALESIONAL; INTRAMUSCULAR; INTRAVENOUS; SOFT TISSUE PRN
Status: DISCONTINUED | OUTPATIENT
Start: 2024-01-25 | End: 2024-01-25 | Stop reason: SDUPTHER

## 2024-01-25 RX ORDER — NALOXONE HYDROCHLORIDE 0.4 MG/ML
INJECTION, SOLUTION INTRAMUSCULAR; INTRAVENOUS; SUBCUTANEOUS PRN
Status: DISCONTINUED | OUTPATIENT
Start: 2024-01-25 | End: 2024-01-29

## 2024-01-25 RX ORDER — KETOROLAC TROMETHAMINE 30 MG/ML
30 INJECTION, SOLUTION INTRAMUSCULAR; INTRAVENOUS ONCE
Status: COMPLETED | OUTPATIENT
Start: 2024-01-25 | End: 2024-01-25

## 2024-01-25 RX ORDER — HYDROMORPHONE HYDROCHLORIDE 2 MG/ML
0.5 INJECTION, SOLUTION INTRAMUSCULAR; INTRAVENOUS; SUBCUTANEOUS EVERY 5 MIN PRN
Status: COMPLETED | OUTPATIENT
Start: 2024-01-25 | End: 2024-01-25

## 2024-01-25 RX ORDER — KETAMINE HCL IN NACL, ISO-OSM 100MG/10ML
SYRINGE (ML) INJECTION PRN
Status: DISCONTINUED | OUTPATIENT
Start: 2024-01-25 | End: 2024-01-25 | Stop reason: SDUPTHER

## 2024-01-25 RX ORDER — FENTANYL CITRATE 50 UG/ML
50 INJECTION, SOLUTION INTRAMUSCULAR; INTRAVENOUS ONCE
Status: COMPLETED | OUTPATIENT
Start: 2024-01-25 | End: 2024-01-25

## 2024-01-25 RX ORDER — ONDANSETRON 2 MG/ML
4 INJECTION INTRAMUSCULAR; INTRAVENOUS
Status: COMPLETED | OUTPATIENT
Start: 2024-01-25 | End: 2024-01-25

## 2024-01-25 RX ORDER — HYDROMORPHONE HYDROCHLORIDE 2 MG/ML
INJECTION, SOLUTION INTRAMUSCULAR; INTRAVENOUS; SUBCUTANEOUS PRN
Status: DISCONTINUED | OUTPATIENT
Start: 2024-01-25 | End: 2024-01-25 | Stop reason: SDUPTHER

## 2024-01-25 RX ORDER — SUCCINYLCHOLINE/SOD CL,ISO/PF 200MG/10ML
SYRINGE (ML) INTRAVENOUS PRN
Status: DISCONTINUED | OUTPATIENT
Start: 2024-01-25 | End: 2024-01-25 | Stop reason: SDUPTHER

## 2024-01-25 RX ORDER — VECURONIUM BROMIDE 1 MG/ML
INJECTION, POWDER, LYOPHILIZED, FOR SOLUTION INTRAVENOUS PRN
Status: DISCONTINUED | OUTPATIENT
Start: 2024-01-25 | End: 2024-01-25 | Stop reason: SDUPTHER

## 2024-01-25 RX ORDER — FENTANYL CITRATE 50 UG/ML
50 INJECTION, SOLUTION INTRAMUSCULAR; INTRAVENOUS EVERY 5 MIN PRN
Status: COMPLETED | OUTPATIENT
Start: 2024-01-25 | End: 2024-01-25

## 2024-01-25 RX ORDER — SODIUM CHLORIDE 0.9 % (FLUSH) 0.9 %
5-40 SYRINGE (ML) INJECTION PRN
Status: DISCONTINUED | OUTPATIENT
Start: 2024-01-25 | End: 2024-02-03 | Stop reason: HOSPADM

## 2024-01-25 RX ORDER — SODIUM CHLORIDE, SODIUM LACTATE, POTASSIUM CHLORIDE, CALCIUM CHLORIDE 600; 310; 30; 20 MG/100ML; MG/100ML; MG/100ML; MG/100ML
INJECTION, SOLUTION INTRAVENOUS CONTINUOUS
Status: DISCONTINUED | OUTPATIENT
Start: 2024-01-25 | End: 2024-01-25 | Stop reason: HOSPADM

## 2024-01-25 RX ORDER — GLUCAGON 1 MG/ML
1 KIT INJECTION PRN
Status: DISCONTINUED | OUTPATIENT
Start: 2024-01-25 | End: 2024-02-03 | Stop reason: HOSPADM

## 2024-01-25 RX ORDER — ONDANSETRON 2 MG/ML
INJECTION INTRAMUSCULAR; INTRAVENOUS PRN
Status: DISCONTINUED | OUTPATIENT
Start: 2024-01-25 | End: 2024-01-25 | Stop reason: SDUPTHER

## 2024-01-25 RX ORDER — SODIUM CHLORIDE 0.9 % (FLUSH) 0.9 %
5-40 SYRINGE (ML) INJECTION EVERY 12 HOURS SCHEDULED
Status: DISCONTINUED | OUTPATIENT
Start: 2024-01-25 | End: 2024-02-03 | Stop reason: HOSPADM

## 2024-01-25 RX ORDER — LABETALOL HYDROCHLORIDE 5 MG/ML
10 INJECTION, SOLUTION INTRAVENOUS EVERY 6 HOURS PRN
Status: DISCONTINUED | OUTPATIENT
Start: 2024-01-25 | End: 2024-02-03 | Stop reason: HOSPADM

## 2024-01-25 RX ORDER — SODIUM CHLORIDE 0.9 % (FLUSH) 0.9 %
5-40 SYRINGE (ML) INJECTION EVERY 12 HOURS SCHEDULED
Status: DISCONTINUED | OUTPATIENT
Start: 2024-01-25 | End: 2024-01-25 | Stop reason: HOSPADM

## 2024-01-25 RX ORDER — ACETAMINOPHEN 500 MG
1000 TABLET ORAL ONCE
Status: CANCELLED | OUTPATIENT
Start: 2024-01-25 | End: 2024-01-25

## 2024-01-25 RX ORDER — PROPOFOL 10 MG/ML
INJECTION, EMULSION INTRAVENOUS PRN
Status: DISCONTINUED | OUTPATIENT
Start: 2024-01-25 | End: 2024-01-25 | Stop reason: SDUPTHER

## 2024-01-25 RX ORDER — DIAZEPAM 5 MG/ML
2.5 INJECTION, SOLUTION INTRAMUSCULAR; INTRAVENOUS EVERY 6 HOURS PRN
Status: DISCONTINUED | OUTPATIENT
Start: 2024-01-25 | End: 2024-02-03 | Stop reason: HOSPADM

## 2024-01-25 RX ORDER — INSULIN LISPRO 100 [IU]/ML
0-4 INJECTION, SOLUTION INTRAVENOUS; SUBCUTANEOUS
Status: DISCONTINUED | OUTPATIENT
Start: 2024-01-25 | End: 2024-01-26

## 2024-01-25 RX ORDER — HYDROMORPHONE HYDROCHLORIDE 1 MG/ML
1 INJECTION, SOLUTION INTRAMUSCULAR; INTRAVENOUS; SUBCUTANEOUS
Status: DISCONTINUED | OUTPATIENT
Start: 2024-01-25 | End: 2024-01-27

## 2024-01-25 RX ORDER — KETOROLAC TROMETHAMINE 30 MG/ML
30 INJECTION, SOLUTION INTRAMUSCULAR; INTRAVENOUS ONCE
Status: DISPENSED | OUTPATIENT
Start: 2024-01-25 | End: 2024-01-30

## 2024-01-25 RX ORDER — ONDANSETRON 2 MG/ML
4 INJECTION INTRAMUSCULAR; INTRAVENOUS EVERY 6 HOURS PRN
Status: DISCONTINUED | OUTPATIENT
Start: 2024-01-25 | End: 2024-02-03 | Stop reason: HOSPADM

## 2024-01-25 RX ORDER — ENOXAPARIN SODIUM 100 MG/ML
30 INJECTION SUBCUTANEOUS 2 TIMES DAILY
Status: DISCONTINUED | OUTPATIENT
Start: 2024-01-26 | End: 2024-02-03 | Stop reason: HOSPADM

## 2024-01-25 RX ORDER — SODIUM CHLORIDE 9 MG/ML
INJECTION, SOLUTION INTRAVENOUS CONTINUOUS PRN
Status: DISCONTINUED | OUTPATIENT
Start: 2024-01-25 | End: 2024-01-25 | Stop reason: SDUPTHER

## 2024-01-25 RX ORDER — SODIUM CHLORIDE 450 MG/100ML
INJECTION, SOLUTION INTRAVENOUS
Status: DISPENSED
Start: 2024-01-25 | End: 2024-01-26

## 2024-01-25 RX ORDER — METRONIDAZOLE 500 MG/100ML
500 INJECTION, SOLUTION INTRAVENOUS ONCE
Status: DISCONTINUED | OUTPATIENT
Start: 2024-01-25 | End: 2024-01-25

## 2024-01-25 RX ORDER — APREPITANT 40 MG/1
40 CAPSULE ORAL ONCE
Status: DISCONTINUED | OUTPATIENT
Start: 2024-01-25 | End: 2024-01-25 | Stop reason: HOSPADM

## 2024-01-25 RX ORDER — FENTANYL CITRATE 50 UG/ML
INJECTION, SOLUTION INTRAMUSCULAR; INTRAVENOUS PRN
Status: DISCONTINUED | OUTPATIENT
Start: 2024-01-25 | End: 2024-01-25 | Stop reason: SDUPTHER

## 2024-01-25 RX ORDER — DEXTROSE MONOHYDRATE 100 MG/ML
INJECTION, SOLUTION INTRAVENOUS CONTINUOUS PRN
Status: DISCONTINUED | OUTPATIENT
Start: 2024-01-25 | End: 2024-02-03 | Stop reason: HOSPADM

## 2024-01-25 RX ORDER — PANTOPRAZOLE SODIUM 40 MG/10ML
40 INJECTION, POWDER, LYOPHILIZED, FOR SOLUTION INTRAVENOUS DAILY
Status: DISCONTINUED | OUTPATIENT
Start: 2024-01-25 | End: 2024-02-03 | Stop reason: HOSPADM

## 2024-01-25 RX ADMIN — HYDROMORPHONE HYDROCHLORIDE 0.5 MG: 2 INJECTION, SOLUTION INTRAMUSCULAR; INTRAVENOUS; SUBCUTANEOUS at 10:23

## 2024-01-25 RX ADMIN — HYDROMORPHONE HYDROCHLORIDE 0.5 MG: 2 INJECTION, SOLUTION INTRAMUSCULAR; INTRAVENOUS; SUBCUTANEOUS at 13:24

## 2024-01-25 RX ADMIN — PROPOFOL 200 MG: 10 INJECTION, EMULSION INTRAVENOUS at 07:36

## 2024-01-25 RX ADMIN — FENTANYL CITRATE 50 MCG: 0.05 INJECTION, SOLUTION INTRAMUSCULAR; INTRAVENOUS at 12:55

## 2024-01-25 RX ADMIN — VECURONIUM BROMIDE 5 MG: 1 INJECTION, POWDER, LYOPHILIZED, FOR SOLUTION INTRAVENOUS at 10:40

## 2024-01-25 RX ADMIN — Medication 190 MG: at 07:36

## 2024-01-25 RX ADMIN — SODIUM CHLORIDE: 4.5 INJECTION, SOLUTION INTRAVENOUS at 13:37

## 2024-01-25 RX ADMIN — SODIUM CHLORIDE 3000 MG: 900 INJECTION INTRAVENOUS at 07:31

## 2024-01-25 RX ADMIN — VECURONIUM BROMIDE 10 MG: 1 INJECTION, POWDER, LYOPHILIZED, FOR SOLUTION INTRAVENOUS at 07:45

## 2024-01-25 RX ADMIN — LIDOCAINE HYDROCHLORIDE 100 MG: 20 INJECTION, SOLUTION EPIDURAL; INFILTRATION; INTRACAUDAL; PERINEURAL at 07:36

## 2024-01-25 RX ADMIN — ONDANSETRON 4 MG: 2 INJECTION INTRAMUSCULAR; INTRAVENOUS at 13:07

## 2024-01-25 RX ADMIN — HYDROMORPHONE HYDROCHLORIDE 0.5 MG: 2 INJECTION, SOLUTION INTRAMUSCULAR; INTRAVENOUS; SUBCUTANEOUS at 11:27

## 2024-01-25 RX ADMIN — VECURONIUM BROMIDE 5 MG: 1 INJECTION, POWDER, LYOPHILIZED, FOR SOLUTION INTRAVENOUS at 11:44

## 2024-01-25 RX ADMIN — DEXMEDETOMIDINE HYDROCHLORIDE 10 MCG: 100 INJECTION, SOLUTION INTRAVENOUS at 08:30

## 2024-01-25 RX ADMIN — PIPERACILLIN AND TAZOBACTAM 3375 MG: 3; .375 INJECTION, POWDER, FOR SOLUTION INTRAVENOUS at 20:52

## 2024-01-25 RX ADMIN — FENTANYL CITRATE 50 MCG: 0.05 INJECTION, SOLUTION INTRAMUSCULAR; INTRAVENOUS at 14:48

## 2024-01-25 RX ADMIN — SUGAMMADEX 200 MG: 100 INJECTION, SOLUTION INTRAVENOUS at 12:25

## 2024-01-25 RX ADMIN — MAGNESIUM SULFATE HEPTAHYDRATE 1 G: 500 INJECTION, SOLUTION INTRAMUSCULAR; INTRAVENOUS at 07:56

## 2024-01-25 RX ADMIN — DEXMEDETOMIDINE HYDROCHLORIDE 10 MCG: 100 INJECTION, SOLUTION INTRAVENOUS at 08:05

## 2024-01-25 RX ADMIN — HYDROMORPHONE HYDROCHLORIDE 1 MG: 1 INJECTION, SOLUTION INTRAMUSCULAR; INTRAVENOUS; SUBCUTANEOUS at 17:28

## 2024-01-25 RX ADMIN — SODIUM CHLORIDE: 9 INJECTION, SOLUTION INTRAVENOUS at 07:30

## 2024-01-25 RX ADMIN — HYDROMORPHONE HYDROCHLORIDE 0.5 MG: 2 INJECTION, SOLUTION INTRAMUSCULAR; INTRAVENOUS; SUBCUTANEOUS at 13:15

## 2024-01-25 RX ADMIN — HYDROMORPHONE HYDROCHLORIDE 0.5 MG: 2 INJECTION, SOLUTION INTRAMUSCULAR; INTRAVENOUS; SUBCUTANEOUS at 10:04

## 2024-01-25 RX ADMIN — POTASSIUM CHLORIDE 10 MEQ: 7.46 INJECTION, SOLUTION INTRAVENOUS at 23:05

## 2024-01-25 RX ADMIN — METHOCARBAMOL 1000 MG: 100 INJECTION INTRAMUSCULAR; INTRAVENOUS at 14:53

## 2024-01-25 RX ADMIN — KETOROLAC TROMETHAMINE 30 MG: 30 INJECTION INTRAMUSCULAR; INTRAVENOUS at 14:42

## 2024-01-25 RX ADMIN — Medication 25 MG: at 08:16

## 2024-01-25 RX ADMIN — HYDROMORPHONE HYDROCHLORIDE 0.5 MG: 2 INJECTION, SOLUTION INTRAMUSCULAR; INTRAVENOUS; SUBCUTANEOUS at 13:39

## 2024-01-25 RX ADMIN — SODIUM CHLORIDE: 9 INJECTION, SOLUTION INTRAVENOUS at 10:26

## 2024-01-25 RX ADMIN — DEXAMETHASONE SODIUM PHOSPHATE 8 MG: 4 INJECTION, SOLUTION INTRAMUSCULAR; INTRAVENOUS at 07:45

## 2024-01-25 RX ADMIN — PIPERACILLIN AND TAZOBACTAM 4500 MG: 4; .5 INJECTION, POWDER, FOR SOLUTION INTRAVENOUS at 17:11

## 2024-01-25 RX ADMIN — DIAZEPAM 2.5 MG: 5 INJECTION, SOLUTION INTRAMUSCULAR; INTRAVENOUS at 20:52

## 2024-01-25 RX ADMIN — ONDANSETRON 4 MG: 2 INJECTION INTRAMUSCULAR; INTRAVENOUS at 22:59

## 2024-01-25 RX ADMIN — HYDROMORPHONE HYDROCHLORIDE 1 MG: 1 INJECTION, SOLUTION INTRAMUSCULAR; INTRAVENOUS; SUBCUTANEOUS at 20:55

## 2024-01-25 RX ADMIN — FENTANYL CITRATE 50 MCG: 0.05 INJECTION, SOLUTION INTRAMUSCULAR; INTRAVENOUS at 13:04

## 2024-01-25 RX ADMIN — VECURONIUM BROMIDE 5 MG: 1 INJECTION, POWDER, LYOPHILIZED, FOR SOLUTION INTRAVENOUS at 08:57

## 2024-01-25 RX ADMIN — INSULIN LISPRO 2 UNITS: 100 INJECTION, SOLUTION INTRAVENOUS; SUBCUTANEOUS at 18:32

## 2024-01-25 RX ADMIN — Medication: at 13:29

## 2024-01-25 RX ADMIN — ONDANSETRON 4 MG: 2 INJECTION INTRAMUSCULAR; INTRAVENOUS at 07:45

## 2024-01-25 RX ADMIN — HYDROMORPHONE HYDROCHLORIDE 0.5 MG: 2 INJECTION, SOLUTION INTRAMUSCULAR; INTRAVENOUS; SUBCUTANEOUS at 13:54

## 2024-01-25 RX ADMIN — FENTANYL CITRATE 50 MCG: 50 INJECTION, SOLUTION INTRAMUSCULAR; INTRAVENOUS at 07:36

## 2024-01-25 RX ADMIN — PANTOPRAZOLE SODIUM 40 MG: 40 INJECTION, POWDER, FOR SOLUTION INTRAVENOUS at 17:28

## 2024-01-25 RX ADMIN — VECURONIUM BROMIDE 5 MG: 1 INJECTION, POWDER, LYOPHILIZED, FOR SOLUTION INTRAVENOUS at 09:42

## 2024-01-25 RX ADMIN — FENTANYL CITRATE 50 MCG: 50 INJECTION, SOLUTION INTRAMUSCULAR; INTRAVENOUS at 08:10

## 2024-01-25 RX ADMIN — HYDROMORPHONE HYDROCHLORIDE 1 MG: 1 INJECTION, SOLUTION INTRAMUSCULAR; INTRAVENOUS; SUBCUTANEOUS at 23:00

## 2024-01-25 RX ADMIN — Medication 25 MG: at 10:01

## 2024-01-25 RX ADMIN — POTASSIUM CHLORIDE 10 MEQ: 7.46 INJECTION, SOLUTION INTRAVENOUS at 21:06

## 2024-01-25 ASSESSMENT — PAIN SCALES - GENERAL
PAINLEVEL_OUTOF10: 10
PAINLEVEL_OUTOF10: 8
PAINLEVEL_OUTOF10: 8
PAINLEVEL_OUTOF10: 9
PAINLEVEL_OUTOF10: 8
PAINLEVEL_OUTOF10: 10
PAINLEVEL_OUTOF10: 9
PAINLEVEL_OUTOF10: 8
PAINLEVEL_OUTOF10: 10
PAINLEVEL_OUTOF10: 9
PAINLEVEL_OUTOF10: 9
PAINLEVEL_OUTOF10: 7
PAINLEVEL_OUTOF10: 10
PAINLEVEL_OUTOF10: 9
PAINLEVEL_OUTOF10: 9
PAINLEVEL_OUTOF10: 10
PAINLEVEL_OUTOF10: 7

## 2024-01-25 ASSESSMENT — PAIN DESCRIPTION - DESCRIPTORS
DESCRIPTORS: DISCOMFORT
DESCRIPTORS: DISCOMFORT;ACHING
DESCRIPTORS: DISCOMFORT
DESCRIPTORS: DISCOMFORT
DESCRIPTORS: ACHING;DISCOMFORT
DESCRIPTORS: DISCOMFORT
DESCRIPTORS: DISCOMFORT

## 2024-01-25 ASSESSMENT — PAIN DESCRIPTION - LOCATION
LOCATION: ABDOMEN

## 2024-01-25 ASSESSMENT — PAIN - FUNCTIONAL ASSESSMENT
PAIN_FUNCTIONAL_ASSESSMENT: PREVENTS OR INTERFERES SOME ACTIVE ACTIVITIES AND ADLS
PAIN_FUNCTIONAL_ASSESSMENT: PREVENTS OR INTERFERES SOME ACTIVE ACTIVITIES AND ADLS
PAIN_FUNCTIONAL_ASSESSMENT: NONE - DENIES PAIN
PAIN_FUNCTIONAL_ASSESSMENT: PREVENTS OR INTERFERES SOME ACTIVE ACTIVITIES AND ADLS

## 2024-01-25 ASSESSMENT — PAIN DESCRIPTION - ORIENTATION
ORIENTATION: LOWER
ORIENTATION: MID

## 2024-01-25 ASSESSMENT — ENCOUNTER SYMPTOMS: SHORTNESS OF BREATH: 0

## 2024-01-25 NOTE — OP NOTE
Treitz.  In doing so, we were able to remove some of the  mesh off the omentum anteriorly and the small bowel.    At this point in time, we evaluated the small bowel for resection.   There was an area, which was too badly damaged to salvage.  This  involved the area where there was a full-thickness enterotomy.  It was  divided proximally and distally and then the mesentery was divided the  LigaSure.  This was measured and was a 45-cm segment of bowel.  The  distal end of transection was about 50 cm proximal to the ileocecal  valve.    At this point in time, the adhesions had been completely lysed and the  mesh had been completely removed.  The abdominal cavity was copiously  irrigated with warm saline.    We inspected the transverse colon where the mesh had been densely  adherent.  We excised some further mesh off the transverse colon.  There  was one pinhole, which was full thickness.  This was oversewn with 3-0  Vicryl sutures.  There were two other areas where there were some  serosal tearing/thinning of the colon.  It was felt that we could repair  this adequately and did not need a transverse colon resection.  These  areas were oversewn with interrupted 3-0 silk sutures.    We then mobilized an omental pedicle off the greater curvature of the  stomach.  This sit easily over the transverse colon where the serosal  tears had been repaired.  This was tacked circumferentially with  interrupted 3-0 Vicryl sutures.    We then ran the small bowel from the ligament of Treitz down to the  terminal ileum.  There were two other areas which had small serosal  tears.  There was also mesh densely adherent to the area.  The mesh was  excised off bowel and then the areas were oversewn with interrupted 3-0  silk sutures.    We then prepared for anastomosis.  The corners of the previously placed  staple lines were then cut.  A BAY-75 green stapler load was fed into  both sides and fired creating a side-to-side anastomosis.

## 2024-01-25 NOTE — PROGRESS NOTES
Incentive Spirometry education and demonstration completed by Respiratory Therapy/per RN.     Response to education: Very Good     Teaching Time: 5 minutes    Minimum Predicted Vital Capacity - 776 mL.  Patient's Actual Vital Capacity - 1252-2716 mL. Turning over to Nursing for routine follow-up Yes.    Comments: .    Electronically signed by Ana Mcintosh RCP on 1/25/2024 at 4:25 PM

## 2024-01-25 NOTE — ACP (ADVANCE CARE PLANNING)
Advanced Care Planning Note.    Purpose of Encounter: Advanced care planning in light of ex lap, infected mesh removal  Parties In Attendance: Patient, Wife (POA)  Decisional Capacity: Yes  Subjective: Patient has ex-lap, removal of infected mesh   Objective: Cr 0.5  Goals of Care Determination: Patient wants full support  Plan:  Ex-lap, IV Abx, Glucose control  Code Status: Full   Time spent on Advanced care Plannin minutes  Advanced Care Planning Documents: Completed advanced directives on chart, Wife, is the POA.    Chito Anderson MD  2024 6:03 PM

## 2024-01-25 NOTE — PROGRESS NOTES
Pharmacist Review and Automatic Dose Adjustment of Prophylactic Enoxaparin    Reviewed reason(s) for admission/hospital problem list    The reviewing pharmacist has made an adjustment to the ordered enoxaparin dose or converted to UFH per the approved [facility] protocol and table as identified below.        Deshaun Ayers is a 58 y.o. male.     Recent Labs     01/25/24  0639   CREATININE 0.5*       Estimated Creatinine Clearance: 227 mL/min (A) (based on SCr of 0.5 mg/dL (L)).    Recent Labs     01/25/24  0639   HGB 11.8*   HCT 35.1*   *     No results for input(s): \"INR\" in the last 72 hours.    Height:   Ht Readings from Last 1 Encounters:   01/24/24 1.829 m (6')     Weight:  Wt Readings from Last 1 Encounters:   01/25/24 127 kg (280 lb)               Plan: Based upon the patient's weight and renal function    Ordered: Enoxaparin 40mg SUBQ Daily    Changed/converted to    New Order: Enoxaparin 30mg SUBQ BID      Thank you,  Domonique Mendez [facility]  1/25/2024, 3:23 PM

## 2024-01-25 NOTE — BRIEF OP NOTE
Brief Postoperative Note      Patient: Deshaun Ayers  YOB: 1965  MRN: [id number]    Date of Procedure: 1/25/2024    Pre-Op Diagnosis Codes:     * Infected prosthetic mesh of abdominal wall, initial encounter ([facility]) [T85.79XA]    Post-Op Diagnosis: Same       Procedure(s):  EXPLORATORY LAPAROTOMY, EXCISION INFECTED ABDOMINAL WALL MESH, LYSIS OF ADHESIONS, SMALL BOWEL RESECTION    Surgeon(s):  Russel Toscano MD    Assistant:  Surgical Assistant: Jamel Santamaria RN; Kyle Vilchis    Anesthesia: General    Estimated Blood Loss (mL): Minimal    Complications: None    Specimens:   ID Type Source Tests Collected by Time Destination   1 : Abdominal Wall Fluid Body Fluid Fluid CULTURE, ANAEROBIC (Canceled), CULTURE, FUNGUS, CULTURE, BODY FLUID (Canceled), CULTURE WITH SMEAR, ACID FAST BACILLIUS (Canceled) Russel Toscano MD 1/25/2024 0803    A : Small Bowel Tissue Tissue SURGICAL PATHOLOGY Russel Toscano MD 1/25/2024 1114    B : Abdominal Mesh Viral Lesion SURGICAL PATHOLOGY Russel Toscano MD 1/25/2024 1115        Implants:  * No implants in log *      Drains:   NG/OG/NJ/NE Tube Nasogastric 16 fr Left nostril (Active)   Surrounding Skin Clean, dry & intact 01/25/24 1242   Securement device Adhesive based ramesh 01/25/24 1242   Status Suction-low intermittent 01/25/24 1242   Drainage Appearance Brown 01/25/24 1242       Urinary Catheter 01/25/24 Meraz (Active)   Site Assessment No urethral drainage 01/25/24 1242   Urine Color Yellow 01/25/24 1242   Urine Appearance Clear 01/25/24 1242   Collection Container Standard 01/25/24 1242   Securement Method Securing device (Describe) 01/25/24 1242   Catheter Best Practices  Bag below bladder;Bag not on floor 01/25/24 1242   Status Draining 01/25/24 1242       Findings: lysis of adhesions 3.5 hours, explantation mesh, SBR with anastomosis, abdominal wall closure      Electronically signed by Russel Toscano MD on 1/25/2024 at 1:14 PM

## 2024-01-25 NOTE — ANESTHESIA PRE PROCEDURE
Department of Anesthesiology  Preprocedure Note       Name:  Deshaun Ayers   Age:  58 y.o.  :  1965                                          MRN:  [id number]         Date:  2024      Surgeon: Surgeon(s):  Russel Toscano MD    Procedure: Procedure(s):  EXPLORATORY LAPAROTOMY, EXPLORATION INFECTED ABDOMINAL WALL MESH, LYSIS OF ADHESIONS, POSSIBLE SMALL BOWEL RESECTION    Medications prior to admission:   Prior to Admission medications    Medication Sig Start Date End Date Taking? Authorizing Provider   fluticasone (FLONASE) 50 MCG/ACT nasal spray SHAKE LIQUID AND USE 2 SPRAYS IN EACH NOSTRIL DAILY 24   Ginger Anguiano MD   insulin lispro, 1 Unit Dial, (HUMALOG/ADMELOG) 100 UNIT/ML SOPN IF LESS THAN 100 INJECT NONE, 100-150 INJECT 16 UNITS UNDER THE SKIN 151-200 18 UNITS, 201-250 20 UNITS, 251-300 22 UNITS THREE TIMES DAILY (average 60 units a day) 24   Ginger Anguiano MD   ciprofloxacin (CIPRO) 500 MG tablet Take 1 tablet by mouth 2 times daily for 5 days 24  Russel Toscano MD   metroNIDAZOLE (FLAGYL) 500 MG tablet Take 1 tablet by mouth 3 times daily for 5 days 24  Russel Toscano MD   losartan-hydroCHLOROthiazide (HYZAAR) 100-25 MG per tablet Take 1 tablet by mouth daily 23   Ginger Anguiano MD   glimepiride (AMARYL) 4 MG tablet TAKE 1 TABLET BY MOUTH EVERY MORNING BEFORE BREAKFAST 23   Ginger Anguiano MD   rosuvastatin (CRESTOR) 20 MG tablet Take 1 tablet by mouth nightly 23   Ginger Anguiano MD   Continuous Blood Gluc Sensor (DEXCOM G7 SENSOR) MISC Test sugars 4-5 times a day 23   Ginger Anguiano MD   ondansetron (ZOFRAN) 4 MG tablet TAKE 1 TABLET BY MOUTH EVERY 8 HOURS AS NEEDED FOR NAUSEA OR VOMITING 10/30/23   Ginger Anguiano MD   esomeprazole (NEXIUM) 40 MG delayed release capsule Take 1 capsule by mouth every morning (before breakfast)  Patient taking differently: Take 1 capsule by mouth daily as

## 2024-01-25 NOTE — ANESTHESIA POSTPROCEDURE EVALUATION
Department of Anesthesiology  Postprocedure Note    Patient: Deshaun Ayers  MRN: [id number]  YOB: 1965  Date of evaluation: 1/25/2024    Procedure Summary       Date: 01/25/24 Room / Location: [address]    Anesthesia Start: 0730 Anesthesia Stop: 1247    Procedure: EXPLORATORY LAPAROTOMY, EXCISION INFECTED ABDOMINAL WALL MESH, LYSIS OF ADHESIONS, SMALL BOWEL RESECTION (Abdomen) Diagnosis:       Infected prosthetic mesh of abdominal wall, initial encounter ([facility])      (Infected prosthetic mesh of abdominal wall, initial encounter ([facility]) [T85.79XA])    Surgeons: Russel Toscano MD Responsible Provider: Ellie Underwood MD    Anesthesia Type: general ASA Status: 3            Anesthesia Type: No value filed.    Hitesh Phase I: Hitesh Score: 9    Hitesh Phase II:      Anesthesia Post Evaluation    Patient location during evaluation: bedside  Patient participation: complete - patient participated  Level of consciousness: awake and alert  Pain score: 4  Airway patency: patent  Nausea & Vomiting: no vomiting  Cardiovascular status: hemodynamically stable  Respiratory status: nonlabored ventilation  Hydration status: stable  Multimodal analgesia pain management approach  Pain management: adequate    No notable events documented.

## 2024-01-25 NOTE — H&P
Deshaun Ayers     HPI: 58 year old male who is here for removal of infected mesh    Past Medical History:   Diagnosis Date    Anxiety state, unspecified     Calculus of kidney     Chronic pain     Deaf     RIGHT EAR    Diabetes mellitus (HCC)     Diverticula     Diverticulitis     GERD (gastroesophageal reflux disease)     Hordeolum externum     Hyperlipidemia     Hypertension     Insomnia, unspecified     Kidney stones     Meniere disease     MRSA carrier     Pancreatitis, chronic (HCC)     PONV (postoperative nausea and vomiting)     Psychiatric problem     Sleep apnea     uses CPAP    Unspecified hypertrophic and atrophic condition of skin        Past Surgical History:   Procedure Laterality Date    ABSCESS DRAINAGE Left 01/05/2014    incision and drainage of an abcess on left calf    APPENDECTOMY      CHOLECYSTECTOMY      COLOSTOMY      ERCP N/A 05/26/2022    ERCP STENT INSERTION performed by Hung Field MD at [facility] ENDOSCOPY    ERCP N/A 09/27/2022    ERCP STENT INSERTION performed by Hung Field MD at [facility] ENDOSCOPY    ERCP N/A 8/9/2023    ERCP STENT INSERTION performed by Hung Field MD at [facility] ENDOSCOPY    INNER EAR SURGERY      RIGHT    KIDNEY STONE REMOVAL      NERVE SURGERY  04/14/2022    CELIAC PLEXUS BLOCK performed by Hung Field MD at [facility] ENDOSCOPY    NERVE SURGERY  01/24/2023    CELIAC PLEXUS NEUROLYSIS performed by Hung Field MD at [facility] ENDOSCOPY    PAIN MANAGEMENT PROCEDURE  10/30/2023    CELIAC PLEXUS NEUROLYSIS performed by Hung Field MD at [facility] ENDOSCOPY    PANCREAS SURGERY      duct stent    REVISION COLOSTOMY      SUBTOTAL COLECTOMY      Had wound dehiscence, mesh    TRACHEOSTOMY      TRACHEOSTOMY CLOSURE      UPPER GASTROINTESTINAL ENDOSCOPY N/A 04/14/2022    EGD ESOPHAGOGASTRODUODENOSCOPY ULTRASOUND performed by Hung Field MD at [facility] ENDOSCOPY    UPPER GASTROINTESTINAL ENDOSCOPY N/A 07/29/2022    ESOPHAGOGASTRODUODENOSCOPY WITH ENDOSCOPIC ULTRASOUND WITH  Ginger CORTEZ MD   metFORMIN (GLUCOPHAGE-XR) 500 MG extended release tablet Take 2 tablets by mouth 2 times daily 7/11/23   Ginger Anguiano MD   blood glucose test strips (ONETOUCH ULTRA) strip USE TO TEST BLOOD GLUCOSE THREE TIMES DAILY AS NEEDED 6/12/23   Ginger Anguiano MD   Insulin Pen Needle (B-D UF III MINI PEN NEEDLES) 31G X 5 MM MISC USE ONCE DAILY AS DIRECTED 5/31/23   Ginger Anguiano MD   insulin glargine (LANTUS SOLOSTAR) 100 UNIT/ML injection pen Inject 70 Units into the skin nightly 4/24/23   Ginger Anguiano MD   vitamin D (ERGOCALCIFEROL) 1.25 MG (45358 UT) CAPS capsule Take 1 capsule by mouth once a week 4/24/23   Ginger Anguiano MD   JARDIANCE 25 MG tablet TAKE 1 TABLET BY MOUTH DAILY 3/13/23   Ginger Anguiano MD   ibuprofen (ADVIL;MOTRIN) 800 MG tablet TAKE 1 TABLET BY MOUTH THREE TIMES DAILY WITH MEALS  Patient taking differently: TAKE 1 TABLET BY MOUTH THREE TIMES DAILY PRN WITH MEALS 2/27/23   Ginger Anguiano MD   amitriptyline (ELAVIL) 25 MG tablet  12/5/22   Maira Fortune MD   fentaNYL (DURAGESIC) 12 MCG/HR APPLY 1 PATCH TOPICALLY TO THE SKIN EVERY 3 DAYS  Patient not taking: Reported on 1/24/2024 12/23/22   Maira Fortune MD   oxyCODONE-acetaminophen (PERCOCET)  MG per tablet Take 1 tablet by mouth 3 times daily.    Provider, Historical, MD   ONETOUCH DELICA LANCETS FINE MISC USE TO TEST THREE TIMES DAILY 12/3/19   Ginger Anguiano MD   MICROLET LANCETS MISC TEST 3 TO 4 TIMES DAILY 9/17/13   Ginger Anguiano MD       Active Problems:    * No active hospital problems. *  Resolved Problems:    * No resolved hospital problems. *      Blood pressure (!) 161/73, pulse 95, temperature 97.7 °F (36.5 °C), temperature source Temporal, resp. rate 16, height 1.829 m (6'), weight 127 kg (280 lb), SpO2 96 %.    Review of Systems    Physical Exam  Cardiovascular:      Rate and Rhythm: Normal rate and regular rhythm.   Pulmonary:      Effort: Pulmonary effort is normal.

## 2024-01-25 NOTE — CONSULTS
CC : Infected mesh removal     History Obtained From:  patient    Patient is a 58-year-old male with past medical history of T2DM, HLD, HTN, IZABELA, diverticulitis with complications s/p mesh in 2002 leading to prolonged hospitalization, ICU management at [facility] which got infected with drainage leading to abdominal wall cellulitis.  Patient presented today for exploratory laparotomy, excision of infected abdominal wall mesh, lysis of adhesions and small bowel resection.  Patient underwent the surgery without any complications.  On the floor, patient was found to be hypertensive to the 200s.  Patient was in pain and anxious.  General surgery consulted to medicine for medical and diabetic management.    Plan:     -Continue IV antibiotics  -Continue pain control  -As needed anxiolytics/antihypertensives  -Sliding scale insulin, keep blood glucose between 140-180  -Check A1c    Assessment:   Deshaun Ayers is a 58 y.o. male with PMH of T2DM, HLD, HTN, IZABELA, diverticulitis with complications s/p mesh in 2002 leading to prolonged hospitalization, ICU management at [facility] who was admitted for exploratory laparotomy, excision of infected abdominal wall mesh, lysis of adhesions and small bowel resection.    Infected mesh  Patient is s/p exploratory laparotomy, excision of infected abdominal wall mesh, lysis of adhesions and small bowel resection. Patient had diverticulitis with complications s/p mesh in 2002 leading to prolonged hospitalization, ICU management at [facility]  -General surgery following    T2DM -uncontrolled  Patient's last HgbA1c from 11/2022 = 8.0%  At home patient is on insulin, metformin, Jardiance, Amaryl    HTN  At home patient is on Hyzaar    HLD  At home patient is on rosuvastatin    Code Status: Full Code   FEN: Diet NPO Exceptions are: Ice Chips, Popsicles   DVT PPX: [x] Lovenox, []Heparin, [] SCDs, [] Eliquis, [] Xarelto, [] Coumadin  DISPO: [x] GMF, [] ICU, [] CVU    Chito Anderson,  Saturday)  JARDIANCE 25 MG tablet, TAKE 1 TABLET BY MOUTH DAILY  ibuprofen (ADVIL;MOTRIN) 800 MG tablet, TAKE 1 TABLET BY MOUTH THREE TIMES DAILY WITH MEALS (Patient taking differently: TAKE 1 TABLET BY MOUTH THREE TIMES DAILY PRN WITH MEALS)  amitriptyline (ELAVIL) 25 MG tablet,   fentaNYL (DURAGESIC) 12 MCG/HR, APPLY 1 PATCH TOPICALLY TO THE SKIN EVERY 3 DAYS (Patient not taking: Reported on 1/24/2024)  oxyCODONE-acetaminophen (PERCOCET)  MG per tablet, Take 1 tablet by mouth 3 times daily.  ONETOUCH DELICA LANCETS FINE MISC, USE TO TEST THREE TIMES DAILY  MICROLET LANCETS MISC, TEST 3 TO 4 TIMES DAILY    Allergies:  Morphine    Social History:   TOBACCO:   reports that he has never smoked. He has never used smokeless tobacco.  ETOH:   reports no history of alcohol use.  DRUGS : Denies use  Patient currently lives with wife at home    Family History:       Problem Relation Age of Onset    Diabetes Mother     Sudden Death Mother         suicide    Alcohol Abuse Father     Alcohol Abuse Brother     No Known Problems Brother     No Known Problems Daughter     No Known Problems Daughter     No Known Problems Son        DATA:    Labs:  CBC:   Recent Labs     01/25/24  0639   WBC 9.3   HGB 11.8*   HCT 35.1*   *       BMP:   Recent Labs     01/25/24  0639      K 3.1*      CO2 23   BUN 8   CREATININE 0.5*   GLUCOSE 215*     LFT's: No results for input(s): \"AST\", \"ALT\", \"ALB\", \"BILITOT\", \"ALKPHOS\" in the last 72 hours.  Troponin: No results for input(s): \"TROPONINI\" in the last 72 hours.  BNP:No results for input(s): \"BNP\" in the last 72 hours.  ABGs: No results for input(s): \"PHART\", \"ACG7TVN\", \"PO2ART\" in the last 72 hours.  INR: No results for input(s): \"INR\" in the last 72 hours.    U/A:No results for input(s): \"NITRITE\", \"COLORU\", \"PHUR\", \"LABCAST\", \"WBCUA\", \"RBCUA\", \"MUCUS\", \"TRICHOMONAS\", \"YEAST\", \"BACTERIA\", \"CLARITYU\", \"SPECGRAV\", \"LEUKOCYTESUR\", \"UROBILINOGEN\", \"BILIRUBINUR\", \"BLOODU\",

## 2024-01-25 NOTE — FLOWSHEET NOTE
Phase 1 complete, pt seen by anesthesiologist. VSS, pt resting comfortably. Abd dressing is CDI, ice applied. Meraz catheter notes yellow, clear urine. PCA in place. Will transfer to 4T, family updated.

## 2024-01-25 NOTE — PROGRESS NOTES
Pt admit to pacu from OR, drowsy, responds to verbal stimuli.  VSS, O2 sats 97% on 6L simple mask.  Abdominal dressing CD&I.  Will monitor

## 2024-01-26 LAB
ANION GAP SERPL CALCULATED.3IONS-SCNC: 13 MMOL/L (ref 3–16)
BASOPHILS # BLD: 0 K/UL (ref 0–0.2)
BASOPHILS NFR BLD: 0.1 %
BUN SERPL-MCNC: 14 MG/DL (ref 7–20)
CALCIUM SERPL-MCNC: 8.7 MG/DL (ref 8.3–10.6)
CHLORIDE SERPL-SCNC: 106 MMOL/L (ref 99–110)
CO2 SERPL-SCNC: 19 MMOL/L (ref 21–32)
CREAT SERPL-MCNC: 0.5 MG/DL (ref 0.9–1.3)
DEPRECATED RDW RBC AUTO: 16.1 % (ref 12.4–15.4)
EOSINOPHIL # BLD: 0 K/UL (ref 0–0.6)
EOSINOPHIL NFR BLD: 0 %
EST. AVERAGE GLUCOSE BLD GHB EST-MCNC: 197.3 MG/DL
GFR SERPLBLD CREATININE-BSD FMLA CKD-EPI: >60 ML/MIN/{1.73_M2}
GLUCOSE BLD-MCNC: 196 MG/DL (ref 70–99)
GLUCOSE BLD-MCNC: 207 MG/DL (ref 70–99)
GLUCOSE BLD-MCNC: 209 MG/DL (ref 70–99)
GLUCOSE BLD-MCNC: 221 MG/DL (ref 70–99)
GLUCOSE SERPL-MCNC: 208 MG/DL (ref 70–99)
HBA1C MFR BLD: 8.5 %
HCT VFR BLD AUTO: 42.6 % (ref 40.5–52.5)
HGB BLD-MCNC: 13.5 G/DL (ref 13.5–17.5)
LOEFFLER MB STN SPEC: NORMAL
LYMPHOCYTES # BLD: 1.2 K/UL (ref 1–5.1)
LYMPHOCYTES NFR BLD: 8.2 %
MCH RBC QN AUTO: 25.7 PG (ref 26–34)
MCHC RBC AUTO-ENTMCNC: 31.7 G/DL (ref 31–36)
MCV RBC AUTO: 81 FL (ref 80–100)
MONOCYTES # BLD: 1.4 K/UL (ref 0–1.3)
MONOCYTES NFR BLD: 9.5 %
NEUTROPHILS # BLD: 12.3 K/UL (ref 1.7–7.7)
NEUTROPHILS NFR BLD: 82.2 %
PERFORMED ON: ABNORMAL
PLATELET # BLD AUTO: 663 K/UL (ref 135–450)
PMV BLD AUTO: 7.9 FL (ref 5–10.5)
POTASSIUM SERPL-SCNC: 4.4 MMOL/L (ref 3.5–5.1)
RBC # BLD AUTO: 5.26 M/UL (ref 4.2–5.9)
SODIUM SERPL-SCNC: 138 MMOL/L (ref 136–145)
WBC # BLD AUTO: 15 K/UL (ref 4–11)

## 2024-01-26 PROCEDURE — 80048 BASIC METABOLIC PNL TOTAL CA: CPT

## 2024-01-26 PROCEDURE — 6370000000 HC RX 637 (ALT 250 FOR IP): Performed by: STUDENT IN AN ORGANIZED HEALTH CARE EDUCATION/TRAINING PROGRAM

## 2024-01-26 PROCEDURE — APPNB30 APP NON BILLABLE TIME 0-30 MINS: Performed by: NURSE PRACTITIONER

## 2024-01-26 PROCEDURE — 99024 POSTOP FOLLOW-UP VISIT: CPT | Performed by: SURGERY

## 2024-01-26 PROCEDURE — 02HV33Z INSERTION OF INFUSION DEVICE INTO SUPERIOR VENA CAVA, PERCUTANEOUS APPROACH: ICD-10-PCS | Performed by: SURGERY

## 2024-01-26 PROCEDURE — 6360000002 HC RX W HCPCS: Performed by: SURGERY

## 2024-01-26 PROCEDURE — 6360000002 HC RX W HCPCS: Performed by: INTERNAL MEDICINE

## 2024-01-26 PROCEDURE — 6360000002 HC RX W HCPCS: Performed by: STUDENT IN AN ORGANIZED HEALTH CARE EDUCATION/TRAINING PROGRAM

## 2024-01-26 PROCEDURE — 6370000000 HC RX 637 (ALT 250 FOR IP): Performed by: INTERNAL MEDICINE

## 2024-01-26 PROCEDURE — 2580000003 HC RX 258: Performed by: STUDENT IN AN ORGANIZED HEALTH CARE EDUCATION/TRAINING PROGRAM

## 2024-01-26 PROCEDURE — C9113 INJ PANTOPRAZOLE SODIUM, VIA: HCPCS | Performed by: SURGERY

## 2024-01-26 PROCEDURE — APPSS15 APP SPLIT SHARED TIME 0-15 MINUTES: Performed by: NURSE PRACTITIONER

## 2024-01-26 PROCEDURE — 85025 COMPLETE CBC W/AUTO DIFF WBC: CPT

## 2024-01-26 PROCEDURE — 2580000003 HC RX 258: Performed by: SURGERY

## 2024-01-26 PROCEDURE — 1200000000 HC SEMI PRIVATE

## 2024-01-26 RX ORDER — SODIUM CHLORIDE, SODIUM LACTATE, POTASSIUM CHLORIDE, CALCIUM CHLORIDE 600; 310; 30; 20 MG/100ML; MG/100ML; MG/100ML; MG/100ML
INJECTION, SOLUTION INTRAVENOUS CONTINUOUS
Status: DISCONTINUED | OUTPATIENT
Start: 2024-01-26 | End: 2024-01-31

## 2024-01-26 RX ORDER — SODIUM CHLORIDE 0.9 % (FLUSH) 0.9 %
5-40 SYRINGE (ML) INJECTION EVERY 12 HOURS SCHEDULED
Status: DISCONTINUED | OUTPATIENT
Start: 2024-01-26 | End: 2024-02-03 | Stop reason: HOSPADM

## 2024-01-26 RX ORDER — LIDOCAINE HYDROCHLORIDE 10 MG/ML
5 INJECTION, SOLUTION EPIDURAL; INFILTRATION; INTRACAUDAL; PERINEURAL ONCE
Status: DISCONTINUED | OUTPATIENT
Start: 2024-01-26 | End: 2024-02-03 | Stop reason: HOSPADM

## 2024-01-26 RX ORDER — SODIUM CHLORIDE 9 MG/ML
25 INJECTION, SOLUTION INTRAVENOUS PRN
Status: DISCONTINUED | OUTPATIENT
Start: 2024-01-26 | End: 2024-02-03 | Stop reason: HOSPADM

## 2024-01-26 RX ORDER — SODIUM CHLORIDE 0.9 % (FLUSH) 0.9 %
5-40 SYRINGE (ML) INJECTION PRN
Status: DISCONTINUED | OUTPATIENT
Start: 2024-01-26 | End: 2024-02-03 | Stop reason: HOSPADM

## 2024-01-26 RX ORDER — INSULIN LISPRO 100 [IU]/ML
0-8 INJECTION, SOLUTION INTRAVENOUS; SUBCUTANEOUS
Status: DISCONTINUED | OUTPATIENT
Start: 2024-01-26 | End: 2024-01-29

## 2024-01-26 RX ADMIN — SODIUM CHLORIDE, POTASSIUM CHLORIDE, SODIUM LACTATE AND CALCIUM CHLORIDE: 600; 310; 30; 20 INJECTION, SOLUTION INTRAVENOUS at 10:49

## 2024-01-26 RX ADMIN — HYDROMORPHONE HYDROCHLORIDE 1 MG: 1 INJECTION, SOLUTION INTRAMUSCULAR; INTRAVENOUS; SUBCUTANEOUS at 08:10

## 2024-01-26 RX ADMIN — DIAZEPAM 2.5 MG: 5 INJECTION, SOLUTION INTRAMUSCULAR; INTRAVENOUS at 09:25

## 2024-01-26 RX ADMIN — PIPERACILLIN AND TAZOBACTAM 3375 MG: 3; .375 INJECTION, POWDER, FOR SOLUTION INTRAVENOUS at 13:50

## 2024-01-26 RX ADMIN — HYDROMORPHONE HYDROCHLORIDE 1 MG: 1 INJECTION, SOLUTION INTRAMUSCULAR; INTRAVENOUS; SUBCUTANEOUS at 18:07

## 2024-01-26 RX ADMIN — DIAZEPAM 2.5 MG: 5 INJECTION, SOLUTION INTRAMUSCULAR; INTRAVENOUS at 02:58

## 2024-01-26 RX ADMIN — INSULIN LISPRO 2 UNITS: 100 INJECTION, SOLUTION INTRAVENOUS; SUBCUTANEOUS at 12:43

## 2024-01-26 RX ADMIN — INSULIN LISPRO 2 UNITS: 100 INJECTION, SOLUTION INTRAVENOUS; SUBCUTANEOUS at 18:04

## 2024-01-26 RX ADMIN — HYDROMORPHONE HYDROCHLORIDE 1 MG: 1 INJECTION, SOLUTION INTRAMUSCULAR; INTRAVENOUS; SUBCUTANEOUS at 15:26

## 2024-01-26 RX ADMIN — ONDANSETRON 4 MG: 2 INJECTION INTRAMUSCULAR; INTRAVENOUS at 20:42

## 2024-01-26 RX ADMIN — HYDROMORPHONE HYDROCHLORIDE 1 MG: 1 INJECTION, SOLUTION INTRAMUSCULAR; INTRAVENOUS; SUBCUTANEOUS at 10:39

## 2024-01-26 RX ADMIN — INSULIN LISPRO 1 UNITS: 100 INJECTION, SOLUTION INTRAVENOUS; SUBCUTANEOUS at 08:10

## 2024-01-26 RX ADMIN — HYDROMORPHONE HYDROCHLORIDE 1 MG: 1 INJECTION, SOLUTION INTRAMUSCULAR; INTRAVENOUS; SUBCUTANEOUS at 01:10

## 2024-01-26 RX ADMIN — ENOXAPARIN SODIUM 30 MG: 100 INJECTION SUBCUTANEOUS at 20:41

## 2024-01-26 RX ADMIN — HYDROMORPHONE HYDROCHLORIDE 1 MG: 1 INJECTION, SOLUTION INTRAMUSCULAR; INTRAVENOUS; SUBCUTANEOUS at 05:42

## 2024-01-26 RX ADMIN — ENOXAPARIN SODIUM 30 MG: 100 INJECTION SUBCUTANEOUS at 08:10

## 2024-01-26 RX ADMIN — ONDANSETRON 4 MG: 2 INJECTION INTRAMUSCULAR; INTRAVENOUS at 05:41

## 2024-01-26 RX ADMIN — DIAZEPAM 2.5 MG: 5 INJECTION, SOLUTION INTRAMUSCULAR; INTRAVENOUS at 15:31

## 2024-01-26 RX ADMIN — LABETALOL HYDROCHLORIDE 10 MG: 5 INJECTION, SOLUTION INTRAVENOUS at 08:27

## 2024-01-26 RX ADMIN — POTASSIUM CHLORIDE 10 MEQ: 7.46 INJECTION, SOLUTION INTRAVENOUS at 02:17

## 2024-01-26 RX ADMIN — HYDROMORPHONE HYDROCHLORIDE 1 MG: 1 INJECTION, SOLUTION INTRAMUSCULAR; INTRAVENOUS; SUBCUTANEOUS at 02:57

## 2024-01-26 RX ADMIN — HYDROMORPHONE HYDROCHLORIDE 1 MG: 1 INJECTION, SOLUTION INTRAMUSCULAR; INTRAVENOUS; SUBCUTANEOUS at 12:43

## 2024-01-26 RX ADMIN — ONDANSETRON 4 MG: 2 INJECTION INTRAMUSCULAR; INTRAVENOUS at 12:49

## 2024-01-26 RX ADMIN — PANTOPRAZOLE SODIUM 40 MG: 40 INJECTION, POWDER, FOR SOLUTION INTRAVENOUS at 08:10

## 2024-01-26 RX ADMIN — HYDROMORPHONE HYDROCHLORIDE 1 MG: 1 INJECTION, SOLUTION INTRAMUSCULAR; INTRAVENOUS; SUBCUTANEOUS at 20:42

## 2024-01-26 RX ADMIN — PIPERACILLIN AND TAZOBACTAM 3375 MG: 3; .375 INJECTION, POWDER, FOR SOLUTION INTRAVENOUS at 05:47

## 2024-01-26 ASSESSMENT — PAIN DESCRIPTION - DESCRIPTORS
DESCRIPTORS: ACHING;CRAMPING;DISCOMFORT
DESCRIPTORS: SHARP;THROBBING
DESCRIPTORS: SHOOTING;THROBBING
DESCRIPTORS: ACHING;CRAMPING;DISCOMFORT
DESCRIPTORS: ACHING;CRAMPING;DISCOMFORT
DESCRIPTORS: SHARP;THROBBING
DESCRIPTORS: ACHING;SHARP
DESCRIPTORS: SHARP;THROBBING

## 2024-01-26 ASSESSMENT — PAIN DESCRIPTION - PAIN TYPE
TYPE: SURGICAL PAIN
TYPE: SURGICAL PAIN

## 2024-01-26 ASSESSMENT — PAIN DESCRIPTION - LOCATION
LOCATION: ABDOMEN

## 2024-01-26 ASSESSMENT — PAIN SCALES - GENERAL
PAINLEVEL_OUTOF10: 9
PAINLEVEL_OUTOF10: 7
PAINLEVEL_OUTOF10: 8
PAINLEVEL_OUTOF10: 6
PAINLEVEL_OUTOF10: 7
PAINLEVEL_OUTOF10: 9
PAINLEVEL_OUTOF10: 7
PAINLEVEL_OUTOF10: 8
PAINLEVEL_OUTOF10: 7
PAINLEVEL_OUTOF10: 7
PAINLEVEL_OUTOF10: 6
PAINLEVEL_OUTOF10: 7

## 2024-01-26 ASSESSMENT — PAIN DESCRIPTION - ORIENTATION
ORIENTATION: MID
ORIENTATION: INNER
ORIENTATION: MID
ORIENTATION: LOWER
ORIENTATION: MID
ORIENTATION: LOWER

## 2024-01-26 ASSESSMENT — PAIN - FUNCTIONAL ASSESSMENT
PAIN_FUNCTIONAL_ASSESSMENT: PREVENTS OR INTERFERES SOME ACTIVE ACTIVITIES AND ADLS
PAIN_FUNCTIONAL_ASSESSMENT: ACTIVITIES ARE NOT PREVENTED
PAIN_FUNCTIONAL_ASSESSMENT: PREVENTS OR INTERFERES SOME ACTIVE ACTIVITIES AND ADLS
PAIN_FUNCTIONAL_ASSESSMENT: ACTIVITIES ARE NOT PREVENTED
PAIN_FUNCTIONAL_ASSESSMENT: ACTIVITIES ARE NOT PREVENTED

## 2024-01-26 ASSESSMENT — PAIN DESCRIPTION - FREQUENCY
FREQUENCY: CONTINUOUS
FREQUENCY: CONTINUOUS

## 2024-01-26 ASSESSMENT — PAIN DESCRIPTION - ONSET
ONSET: ON-GOING
ONSET: ON-GOING

## 2024-01-26 NOTE — PROGRESS NOTES
Shift assessment complete. Meds given per eMAR. Pt is still having some breakthrough pain unrelieved with PCA pump. No drainage noted on dry dressing to abd. NG on to low int suction. Wife at bedside. Call light within reach. Bed alarm on.   Electronically signed by Anabella Avery RN on 1/25/2024 at 10:46 PM

## 2024-01-26 NOTE — PLAN OF CARE
Problem: Chronic Conditions and Co-morbidities  Goal: Patient's chronic conditions and co-morbidity symptoms are monitored and maintained or improved  1/26/2024 1005 by Fransisca Doran RN  Outcome: Progressing  Flowsheets (Taken 1/26/2024 0800)  Care Plan - Patient's Chronic Conditions and Co-Morbidity Symptoms are Monitored and Maintained or Improved:   Monitor and assess patient's chronic conditions and comorbid symptoms for stability, deterioration, or improvement   Collaborate with multidisciplinary team to address chronic and comorbid conditions and prevent exacerbation or deterioration   Update acute care plan with appropriate goals if chronic or comorbid symptoms are exacerbated and prevent overall improvement and discharge  1/25/2024 2229 by Anabella vAery RN  Outcome: Progressing     Problem: Discharge Planning  Goal: Discharge to home or other facility with appropriate resources  1/26/2024 1005 by Fransisca Doran RN  Outcome: Progressing  Flowsheets (Taken 1/26/2024 0800)  Discharge to home or other facility with appropriate resources:   Identify barriers to discharge with patient and caregiver   Arrange for needed discharge resources and transportation as appropriate   Identify discharge learning needs (meds, wound care, etc)   Refer to discharge planning if patient needs post-hospital services based on physician order or complex needs related to functional status, cognitive ability or social support system  1/25/2024 2229 by Anabella Avery, RN  Outcome: Progressing     Problem: Pain  Goal: Verbalizes/displays adequate comfort level or baseline comfort level  1/26/2024 1005 by Fransisca Doran RN  Outcome: Progressing  1/25/2024 2229 by Anabella Avery, RN  Outcome: Progressing     Problem: Safety - Adult  Goal: Free from fall injury  1/26/2024 1005 by Fransisca Doran, RN  Outcome: Progressing  1/25/2024 2229 by Anabella Avery, RN  Outcome: Progressing

## 2024-01-26 NOTE — DISCHARGE INSTR - COC
Continuity of Care Form    Patient Name: Deshaun Ayers   :  1965  MRN:  [id number]    Admit date:  2024  Discharge date:  ***    Code Status Order: Full Code   Advance Directives:   Advance Care Flowsheet Documentation       Date/Time Healthcare Directive Type of Healthcare Directive Copy in Chart Healthcare Agent Appointed Healthcare Agent's Name Healthcare Agent's Phone Number    24 [phone] No, patient does not have an advance directive for healthcare treatment -- -- -- -- --            Admitting Physician:  Russel Toscano MD  PCP: Ginger Anguiano MD    Discharging Nurse: ***  Discharging Hospital Unit/Room#: 4TN-4480/4480-01  Discharging Unit Phone Number: ***    Emergency Contact:   Extended Emergency Contact Information  Primary Emergency Contact: Isabela Collado  [address]  Home Phone: [phone]  Relation: Child  Secondary Emergency Contact: OPALGILBERTO  Home Phone: [phone]  Mobile Phone: [phone]  Relation: Ex-Spouse    Past Surgical History:  Past Surgical History:   Procedure Laterality Date    ABSCESS DRAINAGE Left 2014    incision and drainage of an abcess on left calf    APPENDECTOMY      CHOLECYSTECTOMY      COLOSTOMY      ERCP N/A 2022    ERCP STENT INSERTION performed by Hung Field MD at [facility] ENDOSCOPY    ERCP N/A 2022    ERCP STENT INSERTION performed by Hung Field MD at [facility] ENDOSCOPY    ERCP N/A 2023    ERCP STENT INSERTION performed by Hung Field MD at [facility] ENDOSCOPY    INNER EAR SURGERY      RIGHT    KIDNEY STONE REMOVAL      LAPAROTOMY N/A 2024    EXPLORATORY LAPAROTOMY, EXCISION INFECTED ABDOMINAL WALL MESH, LYSIS OF ADHESIONS, SMALL BOWEL RESECTION performed by Russel Toscano MD at [facility] OR    NERVE SURGERY  2022    CELIAC PLEXUS BLOCK performed by Hung Field MD at [facility] ENDOSCOPY    NERVE SURGERY  2023     Discharge:   Respiratory Treatments: ***  Oxygen Therapy:  {Therapy; copd oxygen:16865}  Ventilator:    { CC Vent List:798569523}    Rehab Therapies: SN  Weight Bearing Status/Restrictions: { CC Weight Bearin}  Other Medical Equipment (for information only, NOT a DME order):  {EQUIPMENT:841948475}  Other Treatments: ***    Patient's personal belongings (please select all that are sent with patient):  {CHP DME Belongings:912102662}    RN SIGNATURE:  {Esignature:822268811}    CASE MANAGEMENT/SOCIAL WORK SECTION    Inpatient Status Date: 24    Readmission Risk Assessment Score: 14  Readmission Risk              Risk of Unplanned Readmission:  11           Discharging to Facility/ Agency       [facility] ([facility])  [address] [address]  Phone: [phone]  Fax: [phone]             / signature: Electronically signed by Cecilia Mckeon RN on 24 at 2:36 PM EST    PHYSICIAN SECTION    Prognosis: Good    Condition at Discharge: Stable    Rehab Potential (if transferring to Rehab): Good    Recommended Labs or Other Treatments After Discharge: Normal saline wet-to-dry dressings to open abdominal wound daily and PRN, leave retention sutures/gauze pads in place; call to schedule appointment to follow up with surgeon (Dr. Toscano) on 2024.    Physician Certification: I certify the above information and transfer of Deshaun Ayers  is necessary for the continuing treatment of the diagnosis listed and that he requires Home Care for less 30 days.     Update Admission H&P: No change in H&P    PHYSICIAN SIGNATURE:  Electronically signed by JOELLE Peña CNP on 2/3/24 at 2:27 PM EST

## 2024-01-26 NOTE — PROGRESS NOTES
[facility]        Progress Note    Patient Name: Deshaun Ayers  MRN: [id number]  YOB: 1965  Date of Evaluation: 2024    Subjective:  No acute events overnight  Pain controlled  No nausea or vomiting, NGT in place  No flatus or BM  Resting in bed at this time    Post-Operative Day #1      Vital Signs:  Patient Vitals for the past 24 hrs:   BP Temp Temp src Pulse Resp SpO2   24 1249 131/71 98.3 °F (36.8 °C) Oral (!) 116 21 95 %   24 1243 -- -- -- -- 19 --   24 1109 -- -- -- -- 24 --   24 1039 -- -- -- -- 23 --   24 0915 (!) 150/77 -- -- (!) 105 27 94 %   24 0840 -- -- -- -- 24 --   24 0800 (!) 211/79 98.3 °F (36.8 °C) Oral (!) 111 28 94 %   24 0612 -- -- -- -- 20 --   24 0542 -- -- -- -- 21 --   24 0415 -- -- -- -- 16 95 %   24 0327 -- -- -- -- 22 --   24 0300 122/76 -- -- (!) 109 22 92 %   24 0257 -- -- -- -- 25 --   24 0140 -- -- -- -- 20 --   24 2351 -- -- -- -- 19 96 %   24 2330 -- -- -- -- 20 --   24 2300 130/82 -- -- (!) 112 22 96 %   24 2125 -- -- -- -- 18 --   24 2045 119/64 98.7 °F (37.1 °C) Axillary (!) 110 18 96 %   24 1758 -- -- -- -- 17 --   24 1728 -- -- -- -- 23 --   24 1653 (!) 159/97 -- -- (!) 101 17 97 %   24 1628 -- -- -- -- 23 96 %   24 1600 130/86 -- -- (!) 102 16 96 %   24 1515 (!) 169/97 -- -- (!) 105 10 97 %   24 1445 (!) 157/85 -- -- 92 22 96 %   24 1430 (!) 159/84 -- -- 90 23 96 %   24 1415 (!) 152/84 -- -- 92 21 96 %      TEMPERATURE HISTORY 24H: Temp (24hrs), Av.4 °F (36.9 °C), Min:98.3 °F (36.8 °C), Max:98.7 °F (37.1 °C)    BLOOD PRESSURE HISTORY: Systolic (36hrs), Av , Min:119 , Max:211    Diastolic (36hrs), Av, Min:64, Max:97      Intake/Output:  I/O last 3 completed shifts:  In: 1750 [I.V.:1700; NG/GT:50]  Out: 5350 [Urine:2850; Emesis/NG  adhered area. Representative sections are submitted as follows:     A1: First end margin.   A2 - A3: Adhered bowel.   A4: Second end margin.     B: Part B is received in formalin, labeled \"Deshaun Ayers, infected   prosthetic mesh of abdominal wall\", and consists of an aggregate of   tan-pink to red, highly fragmented to ragged mesh-like material with   minimal attached soft tissue, measuring approximately 15.2 x 14.6 x 3.5 cm. The outer surfaces exhibit large areas of tan-white to slightly   green-tinged exudative material. Multiple fragments of blue suture   material are identified. The specimen is for gross only.  GROER/PJS     MICROSCOPIC DESCRIPTION: A.  4 H&E slides reviewed.  Microscopic   examination performed.     CPT: 88300 X1   88307 X1      Imaging:  I have personally reviewed the following films:    No results found.    Scheduled Meds:   insulin lispro  0-8 Units SubCUTAneous TID WC    sodium chloride flush  5-40 mL IntraVENous 2 times per day    enoxaparin  30 mg SubCUTAneous BID    pantoprazole  40 mg IntraVENous Daily    ketorolac  30 mg IntraVENous Once    piperacillin-tazobactam  3,375 mg IntraVENous Q8H    insulin lispro  0-4 Units SubCUTAneous Nightly     Continuous Infusions:   lactated ringers IV soln 100 mL/hr at 01/26/24 1049    sodium chloride      HYDROmorphone      dextrose       PRN Meds:.sodium chloride flush, sodium chloride, HYDROmorphone, naloxone 0.4 mg in 10 mL sodium chloride syringe, ondansetron, diazePAM, glucose, dextrose bolus **OR** dextrose bolus, glucagon (rDNA), dextrose, labetalol      Assessment:  58 y.o. male admitted with   1. Infected prosthetic mesh of abdominal wall, initial encounter ([facility])        OR Date 1/25/2024, exploratory laparotomy, lysis of adhesions (3.5 hours), explantation of old hernia mesh, small bowel resection with anastomosis, and abdominal wall closure for infected abdominal wall mesh with possible enterocutaneous

## 2024-01-26 NOTE — CARE COORDINATION
Discharge Planning Note:    Chart reviewed and it appears that patient has minimal needs for discharge at this time. Risk Score 16 %     Primary Care Physician is  Ginger Anguiano    Primary insurance is [employer] MEDICARE     Please notify case management if any discharge needs are identified.      Case management will continue to follow progress and update discharge plan as needed.

## 2024-01-26 NOTE — CARE COORDINATION
Case Management Assessment  Initial Evaluation    Date/Time of Evaluation: 1/26/2024 2:49 PM  Assessment Completed by: CASSY DEAN    If patient is discharged prior to next notation, then this note serves as note for discharge by case management.    Patient Name: Deshaun Ayers                   YOB: 1965  Diagnosis: Infected prosthetic mesh of abdominal wall, initial encounter ([facility]) [T85.79XA]  Infected prosthetic mesh of abdominal wall, subsequent encounter [T85.79XD]                   Date / Time: 1/25/2024  6:01 AM    Patient Admission Status: Inpatient   Readmission Risk (Low < 19, Mod (19-27), High > 27): Readmission Risk Score: 15.9    Current PCP: Ginger Anguiano MD  PCP verified by CM?      Chart Reviewed: Yes      History Provided by: Patient, Spouse  Patient Orientation: Alert and Oriented, Person, Place, Situation, Self    Patient Cognition: Alert    Hospitalization in the last 30 days (Readmission):  No    If yes, Readmission Assessment in  Navigator will be completed.    Advance Directives:      Code Status: Full Code   Patient's Primary Decision Maker is: Legal Next of Kin    Primary Decision Maker: Isabela Collado - Child - [phone]    Discharge Planning:    Patient lives with: Spouse/Significant Other Type of Home: House  Primary Care Giver: Self  Patient Support Systems include: Spouse/Significant Other, Children   Current Financial resources: Other (Comment), Food [place], Medicare (Social Security Disablity Income)  Current community resources:    Current services prior to admission: None            Current DME:              Type of Home Care services:  IV Therapy, Nursing Services    ADLS  Prior functional level: Independent in ADLs/IADLs  Current functional level: Independent in ADLs/IADLs    PT AM-PAC:   /24  OT AM-PAC:   /24    Family can provide assistance at DC: Yes  Would you like Case Management to discuss the discharge plan with any other family  patient representative Deshaun and his family were provided with a choice of provider and agrees with the discharge plan. Freedom of choice list with basic dialogue that supports the patient's individualized plan of care/goals and shares the quality data associated with the providers was provided to:     Patient Representative Name:       The Patient and/or Patient Representative Agree with the Discharge Plan?      CASSY DEAN  Case Management Department  Ph: [phone] Fax: [phone]

## 2024-01-26 NOTE — PROGRESS NOTES
Admit Date: 1/25/2024  Diet: Diet NPO Exceptions are: Ice Chips, Popsicles    CC:  Infected mesh removal     Interval history:   Overnight, there were no acute events. Patient's vitals remained stable    Patient was seen this morning in bed with his daughter at bedside.  Patient endorsed that his night was okay.  His pain is better controlled.  He does claim that he did not get enough sleep because he cannot use his CPAP due to having an NGT in place.  Patient denies fevers, chills, nausea, vomiting, chest pain, shortness of breath, diarrhea, constipation, dysuria, urinary frequency or urgency.     Plan:     -Continue IV Zosyn  -Continue pain control  -As needed anxiolytics/antihypertensives  -Sliding scale insulin, keep blood glucose between 140-180    Assessment:   Deshaun Ayers is a 58 y.o. male with PMH of T2DM, HLD, HTN, IZABELA, diverticulitis with complications s/p mesh in 2002 leading to prolonged hospitalization, ICU management at [facility] who was admitted for exploratory laparotomy, excision of infected abdominal wall mesh, lysis of adhesions and small bowel resection.     Infected mesh  Patient is s/p exploratory laparotomy, excision of infected abdominal wall mesh, lysis of adhesions and small bowel resection. Patient had diverticulitis with complications s/p mesh in 2002 leading to prolonged hospitalization, ICU management at [facility]  -General surgery following     T2DM -uncontrolled  Patient's last HgbA1c from 1/2024 = 8.5%  At home patient is on insulin, metformin, Jardiance, Amaryl     HTN  At home patient is on Hyzaar     HLD  At home patient is on rosuvastatin    Code Status: Full Code   FEN: Diet NPO Exceptions are: Ice Chips, Popsicles   DVT PPX: [x] Lovenox, []Heparin, [] SCDs,[] Eliquis, [] Xarelto, [] Coumadin  DISPO: [x] GMF, [] ICU, [] CVU    Chito Anderson MD  1/26/2024,  2:44 PM    This note was likely completed using voice recognition technology and may contain unintended errors.      Objective:   Vitals:   T-max:  Patient Vitals for the past 8 hrs:   BP Temp Temp src Pulse Resp SpO2   01/26/24 1249 131/71 98.3 °F (36.8 °C) Oral (!) 116 21 95 %   01/26/24 1243 -- -- -- -- 19 --   01/26/24 1109 -- -- -- -- 24 --   01/26/24 1039 -- -- -- -- 23 --   01/26/24 0915 (!) 150/77 -- -- (!) 105 27 94 %   01/26/24 0840 -- -- -- -- 24 --   01/26/24 0800 (!) 211/79 98.3 °F (36.8 °C) Oral (!) 111 28 94 %       Intake/Output Summary (Last 24 hours) at 1/26/2024 1444  Last data filed at 1/26/2024 0836  Gross per 24 hour   Intake 250 ml   Output 5000 ml   Net -4750 ml       Physical Exam  HENT:      Head: Normocephalic and atraumatic.      Comments: NGT in place     Mouth/Throat:      Mouth: Mucous membranes are moist.   Eyes:      Pupils: Pupils are equal, round, and reactive to light.   Cardiovascular:      Rate and Rhythm: Normal rate and regular rhythm.      Pulses: Normal pulses.      Heart sounds: Normal heart sounds.   Pulmonary:      Effort: Pulmonary effort is normal.      Breath sounds: Normal breath sounds.   Abdominal:      Palpations: Abdomen is soft.      Comments: Abdominal wound noted, no drainage   Musculoskeletal:         General: Normal range of motion.      Right lower leg: No edema.      Left lower leg: No edema.   Neurological:      Mental Status: He is alert and oriented to person, place, and time.   Psychiatric:         Mood and Affect: Mood normal.         Behavior: Behavior normal.      Review of Systems  - Negative except Interval History    LABS:    CBC:   Recent Labs     01/25/24  0639 01/26/24  0542   WBC 9.3 15.0*   HGB 11.8* 13.5   HCT 35.1* 42.6   * 663*   MCV 78.2* 81.0     Renal:    Recent Labs     01/25/24  0639 01/26/24  0542    138   K 3.1* 4.4    106   CO2 23 19*   BUN 8 14   CREATININE 0.5* 0.5*   GLUCOSE 215* 208*   CALCIUM 8.9 8.7   ANIONGAP 13 13     Hepatic: No results for input(s): \"AST\", \"ALT\", \"BILITOT\", \"BILIDIR\", \"PROT\", \"LABALBU\", \"ALKPHOS\"

## 2024-01-27 LAB
ALBUMIN SERPL-MCNC: 2.8 G/DL (ref 3.4–5)
ANION GAP SERPL CALCULATED.3IONS-SCNC: 13 MMOL/L (ref 3–16)
BASOPHILS # BLD: 0 K/UL (ref 0–0.2)
BASOPHILS NFR BLD: 0.1 %
BUN SERPL-MCNC: 17 MG/DL (ref 7–20)
CALCIUM SERPL-MCNC: 8.9 MG/DL (ref 8.3–10.6)
CHLORIDE SERPL-SCNC: 108 MMOL/L (ref 99–110)
CO2 SERPL-SCNC: 24 MMOL/L (ref 21–32)
CREAT SERPL-MCNC: 0.6 MG/DL (ref 0.9–1.3)
DEPRECATED RDW RBC AUTO: 15.9 % (ref 12.4–15.4)
EOSINOPHIL # BLD: 0 K/UL (ref 0–0.6)
EOSINOPHIL NFR BLD: 0 %
GFR SERPLBLD CREATININE-BSD FMLA CKD-EPI: >60 ML/MIN/{1.73_M2}
GLUCOSE BLD-MCNC: 171 MG/DL (ref 70–99)
GLUCOSE BLD-MCNC: 175 MG/DL (ref 70–99)
GLUCOSE BLD-MCNC: 203 MG/DL (ref 70–99)
GLUCOSE BLD-MCNC: 223 MG/DL (ref 70–99)
GLUCOSE SERPL-MCNC: 213 MG/DL (ref 70–99)
HCT VFR BLD AUTO: 36.4 % (ref 40.5–52.5)
HGB BLD-MCNC: 11.9 G/DL (ref 13.5–17.5)
LYMPHOCYTES # BLD: 1.6 K/UL (ref 1–5.1)
LYMPHOCYTES NFR BLD: 8 %
MAGNESIUM SERPL-MCNC: 2.2 MG/DL (ref 1.8–2.4)
MCH RBC QN AUTO: 26.3 PG (ref 26–34)
MCHC RBC AUTO-ENTMCNC: 32.8 G/DL (ref 31–36)
MCV RBC AUTO: 80.4 FL (ref 80–100)
MONOCYTES # BLD: 1.4 K/UL (ref 0–1.3)
MONOCYTES NFR BLD: 6.8 %
NEUTROPHILS # BLD: 17.3 K/UL (ref 1.7–7.7)
NEUTROPHILS NFR BLD: 85.1 %
PERFORMED ON: ABNORMAL
PHOSPHATE SERPL-MCNC: 1.9 MG/DL (ref 2.5–4.9)
PLATELET # BLD AUTO: 611 K/UL (ref 135–450)
PMV BLD AUTO: 7.8 FL (ref 5–10.5)
POTASSIUM SERPL-SCNC: 4.2 MMOL/L (ref 3.5–5.1)
RBC # BLD AUTO: 4.53 M/UL (ref 4.2–5.9)
SODIUM SERPL-SCNC: 145 MMOL/L (ref 136–145)
WBC # BLD AUTO: 20.3 K/UL (ref 4–11)

## 2024-01-27 PROCEDURE — C1751 CATH, INF, PER/CENT/MIDLINE: HCPCS

## 2024-01-27 PROCEDURE — 99024 POSTOP FOLLOW-UP VISIT: CPT | Performed by: SURGERY

## 2024-01-27 PROCEDURE — 2700000000 HC OXYGEN THERAPY PER DAY

## 2024-01-27 PROCEDURE — 85025 COMPLETE CBC W/AUTO DIFF WBC: CPT

## 2024-01-27 PROCEDURE — 97530 THERAPEUTIC ACTIVITIES: CPT

## 2024-01-27 PROCEDURE — 97535 SELF CARE MNGMENT TRAINING: CPT

## 2024-01-27 PROCEDURE — 94761 N-INVAS EAR/PLS OXIMETRY MLT: CPT

## 2024-01-27 PROCEDURE — 6370000000 HC RX 637 (ALT 250 FOR IP): Performed by: INTERNAL MEDICINE

## 2024-01-27 PROCEDURE — 94760 N-INVAS EAR/PLS OXIMETRY 1: CPT

## 2024-01-27 PROCEDURE — 97162 PT EVAL MOD COMPLEX 30 MIN: CPT

## 2024-01-27 PROCEDURE — 6360000002 HC RX W HCPCS: Performed by: SURGERY

## 2024-01-27 PROCEDURE — C9113 INJ PANTOPRAZOLE SODIUM, VIA: HCPCS | Performed by: SURGERY

## 2024-01-27 PROCEDURE — 6360000002 HC RX W HCPCS: Performed by: INTERNAL MEDICINE

## 2024-01-27 PROCEDURE — 36569 INSJ PICC 5 YR+ W/O IMAGING: CPT

## 2024-01-27 PROCEDURE — 83735 ASSAY OF MAGNESIUM: CPT

## 2024-01-27 PROCEDURE — 97116 GAIT TRAINING THERAPY: CPT

## 2024-01-27 PROCEDURE — 97165 OT EVAL LOW COMPLEX 30 MIN: CPT

## 2024-01-27 PROCEDURE — 2580000003 HC RX 258: Performed by: SURGERY

## 2024-01-27 PROCEDURE — 1200000000 HC SEMI PRIVATE

## 2024-01-27 PROCEDURE — 80069 RENAL FUNCTION PANEL: CPT

## 2024-01-27 RX ADMIN — PIPERACILLIN AND TAZOBACTAM 3375 MG: 3; .375 INJECTION, POWDER, FOR SOLUTION INTRAVENOUS at 14:08

## 2024-01-27 RX ADMIN — DIAZEPAM 2.5 MG: 5 INJECTION, SOLUTION INTRAMUSCULAR; INTRAVENOUS at 14:13

## 2024-01-27 RX ADMIN — INSULIN LISPRO 2 UNITS: 100 INJECTION, SOLUTION INTRAVENOUS; SUBCUTANEOUS at 18:16

## 2024-01-27 RX ADMIN — PIPERACILLIN AND TAZOBACTAM 3375 MG: 3; .375 INJECTION, POWDER, FOR SOLUTION INTRAVENOUS at 04:57

## 2024-01-27 RX ADMIN — ENOXAPARIN SODIUM 30 MG: 100 INJECTION SUBCUTANEOUS at 20:39

## 2024-01-27 RX ADMIN — PIPERACILLIN AND TAZOBACTAM 3375 MG: 3; .375 INJECTION, POWDER, FOR SOLUTION INTRAVENOUS at 21:10

## 2024-01-27 RX ADMIN — SODIUM CHLORIDE, PRESERVATIVE FREE 10 ML: 5 INJECTION INTRAVENOUS at 10:01

## 2024-01-27 RX ADMIN — HYDROMORPHONE HYDROCHLORIDE 1 MG: 1 INJECTION, SOLUTION INTRAMUSCULAR; INTRAVENOUS; SUBCUTANEOUS at 09:59

## 2024-01-27 RX ADMIN — HYDROMORPHONE HYDROCHLORIDE 1 MG: 1 INJECTION, SOLUTION INTRAMUSCULAR; INTRAVENOUS; SUBCUTANEOUS at 02:32

## 2024-01-27 RX ADMIN — HYDROMORPHONE HYDROCHLORIDE 1 MG: 1 INJECTION, SOLUTION INTRAMUSCULAR; INTRAVENOUS; SUBCUTANEOUS at 00:27

## 2024-01-27 RX ADMIN — DIAZEPAM 2.5 MG: 5 INJECTION, SOLUTION INTRAMUSCULAR; INTRAVENOUS at 20:39

## 2024-01-27 RX ADMIN — HYDROMORPHONE HYDROCHLORIDE 1 MG: 1 INJECTION, SOLUTION INTRAMUSCULAR; INTRAVENOUS; SUBCUTANEOUS at 04:48

## 2024-01-27 RX ADMIN — PANTOPRAZOLE SODIUM 40 MG: 40 INJECTION, POWDER, FOR SOLUTION INTRAVENOUS at 09:59

## 2024-01-27 RX ADMIN — Medication 10 ML: at 21:11

## 2024-01-27 RX ADMIN — DIAZEPAM 2.5 MG: 5 INJECTION, SOLUTION INTRAMUSCULAR; INTRAVENOUS at 00:27

## 2024-01-27 RX ADMIN — HYDROMORPHONE HYDROCHLORIDE 1 MG: 1 INJECTION, SOLUTION INTRAMUSCULAR; INTRAVENOUS; SUBCUTANEOUS at 06:43

## 2024-01-27 RX ADMIN — DIAZEPAM 2.5 MG: 5 INJECTION, SOLUTION INTRAMUSCULAR; INTRAVENOUS at 07:38

## 2024-01-27 RX ADMIN — SODIUM CHLORIDE, PRESERVATIVE FREE 10 ML: 5 INJECTION INTRAVENOUS at 21:11

## 2024-01-27 RX ADMIN — ONDANSETRON 4 MG: 2 INJECTION INTRAMUSCULAR; INTRAVENOUS at 04:48

## 2024-01-27 RX ADMIN — ENOXAPARIN SODIUM 30 MG: 100 INJECTION SUBCUTANEOUS at 09:58

## 2024-01-27 ASSESSMENT — PAIN DESCRIPTION - DESCRIPTORS
DESCRIPTORS: ACHING;CRAMPING;DISCOMFORT
DESCRIPTORS: DISCOMFORT;CRAMPING;ACHING
DESCRIPTORS: ACHING;CRAMPING;DISCOMFORT
DESCRIPTORS: DISCOMFORT

## 2024-01-27 ASSESSMENT — PAIN DESCRIPTION - ORIENTATION
ORIENTATION: INNER
ORIENTATION: MID

## 2024-01-27 ASSESSMENT — PAIN DESCRIPTION - FREQUENCY: FREQUENCY: CONTINUOUS

## 2024-01-27 ASSESSMENT — PAIN DESCRIPTION - ONSET: ONSET: ON-GOING

## 2024-01-27 ASSESSMENT — PAIN DESCRIPTION - LOCATION
LOCATION: ABDOMEN

## 2024-01-27 ASSESSMENT — PAIN SCALES - GENERAL
PAINLEVEL_OUTOF10: 7
PAINLEVEL_OUTOF10: 8
PAINLEVEL_OUTOF10: 10
PAINLEVEL_OUTOF10: 7
PAINLEVEL_OUTOF10: 8
PAINLEVEL_OUTOF10: 7

## 2024-01-27 ASSESSMENT — PAIN - FUNCTIONAL ASSESSMENT: PAIN_FUNCTIONAL_ASSESSMENT: PREVENTS OR INTERFERES WITH MANY ACTIVE NOT PASSIVE ACTIVITIES

## 2024-01-27 ASSESSMENT — PAIN DESCRIPTION - PAIN TYPE: TYPE: SURGICAL PAIN

## 2024-01-27 NOTE — PROGRESS NOTES
[facility] - Inpatient Rehabilitation Department   Phone: [phone]    Physical Therapy    [x] Initial Evaluation            [] Daily Treatment Note         [] Discharge Summary      Patient: Deshaun Ayers   : 1965   MRN: [id number]   Date of Service:  2024  Admitting Diagnosis: Infected prosthetic mesh of abdominal wall, subsequent encounter  Current Admission Summary: Deshaun Ayers is a 58 y.o. male with PMH of T2DM, HLD, HTN, IZABELA, diverticulitis with complications s/p mesh in  leading to prolonged hospitalization, ICU management at [facility] who was admitted for exploratory laparotomy, excision of infected abdominal wall mesh, lysis of adhesions and small bowel resection on 24.  Past Medical History:  has a past medical history of Anxiety state, unspecified, Calculus of kidney, Chronic pain, Deaf, Diabetes mellitus (HCC), Diverticula, Diverticulitis, GERD (gastroesophageal reflux disease), Hordeolum externum, Hyperlipidemia, Hypertension, Insomnia, unspecified, Kidney stones, Meniere disease, MRSA carrier, Pancreatitis, chronic (HCC), PONV (postoperative nausea and vomiting), Psychiatric problem, Sleep apnea, and Unspecified hypertrophic and atrophic condition of skin.  Past Surgical History:  has a past surgical history that includes colostomy; Revision Colostomy; Cholecystectomy; Pancreas surgery; Appendectomy; Inner ear surgery; tracheostomy; tracheostomy closure; subtotal colectomy; Abscess Drainage (Left, 2014); Upper gastrointestinal endoscopy (N/A, 2022); Nerve Surgery (2022); ERCP (N/A, 2022); Upper gastrointestinal endoscopy (N/A, 2022); Upper gastrointestinal endoscopy (N/A, 2022); ERCP (N/A, 2022); Nerve Surgery (2023); Upper gastrointestinal endoscopy (2023); Upper gastrointestinal endoscopy (2023); Kidney stone removal; ERCP (N/A, 2023); Upper gastrointestinal endoscopy (N/A, 10/30/2023); Upper  completed on this date.  Comments:  Wheelchair Mobility:  No w/c mobility completed on this date.  Comments:  Balance:  Static Sitting Balance: good: independent with functional balance in unsupported position  Dynamic Sitting Balance: fair (+): maintains balance at SBA/supervision without use of UE support  Static Standing Balance: fair (-): maintains balance at CGA with use of UE support  Dynamic Standing Balance: fair (-): maintains balance at CGA with use of UE support  Comments:    Other Therapeutic Interventions  Pt assisted with upper and lower body dressing and bathing, oral care, and grooming. See OT note for assist levels.    Functional Outcomes  -PAC Inpatient Mobility Raw Score : 17              Cognition  WFL- slightly decreased awareness of need for assistance  Orientation:    alert and oriented x 4  Command Following:   WFL    Education  Barriers To Learning: hearing  Patient Education: patient educated on goals, PT role and benefits, plan of care, general safety, functional mobility training, family education, disease specific education, transfer training, discharge recommendations  Learning Assessment:  patient verbalizes understanding, would benefit from continued reinforcement    Assessment  Activity Tolerance: Pt is limited by abdominal pain.  Impairments Requiring Therapeutic Intervention: decreased functional mobility, decreased strength, decreased endurance, decreased balance, increased pain  Prognosis: good  Clinical Assessment: Pt is a 59 y/o male who presents s/p exploratory laparotomy, excision of infected abdominal wall mesh, lysis of adhesions and small bowel resection on 1/25/24. Pt is presenting below his baseline level of function and required up to CGA for performance of functional mobility tasks without use of an AD. Pt will benefit from continued skilled PT to facilitate return to PLOF and to promote independence.  Safety Interventions: patient left in chair, chair alarm in

## 2024-01-27 NOTE — PLAN OF CARE
Problem: Chronic Conditions and Co-morbidities  Goal: Patient's chronic conditions and co-morbidity symptoms are monitored and maintained or improved  1/26/2024 2244 by Anabella Avery RN  Outcome: Progressing  1/26/2024 1005 by Fransisca Doran RN  Outcome: Progressing  Flowsheets (Taken 1/26/2024 0800)  Care Plan - Patient's Chronic Conditions and Co-Morbidity Symptoms are Monitored and Maintained or Improved:   Monitor and assess patient's chronic conditions and comorbid symptoms for stability, deterioration, or improvement   Collaborate with multidisciplinary team to address chronic and comorbid conditions and prevent exacerbation or deterioration   Update acute care plan with appropriate goals if chronic or comorbid symptoms are exacerbated and prevent overall improvement and discharge     Problem: Discharge Planning  Goal: Discharge to home or other facility with appropriate resources  1/26/2024 2244 by Anabella Avery RN  Outcome: Progressing  1/26/2024 1005 by Fransisca Doran RN  Outcome: Progressing  Flowsheets (Taken 1/26/2024 0800)  Discharge to home or other facility with appropriate resources:   Identify barriers to discharge with patient and caregiver   Arrange for needed discharge resources and transportation as appropriate   Identify discharge learning needs (meds, wound care, etc)   Refer to discharge planning if patient needs post-hospital services based on physician order or complex needs related to functional status, cognitive ability or social support system     Problem: Pain  Goal: Verbalizes/displays adequate comfort level or baseline comfort level  1/26/2024 2244 by Anabella Avery RN  Outcome: Progressing  1/26/2024 1005 by Fransisca Doran RN  Outcome: Progressing     Problem: Safety - Adult  Goal: Free from fall injury  1/26/2024 2244 by Anabella Avery RN  Outcome: Progressing  1/26/2024 1005 by Fransisca Doran RN  Outcome: Progressing     Problem: Skin/Tissue Integrity  Goal: Absence of  new skin breakdown  Description: 1.  Monitor for areas of redness and/or skin breakdown  2.  Assess vascular access sites hourly  3.  Every 4-6 hours minimum:  Change oxygen saturation probe site  4.  Every 4-6 hours:  If on nasal continuous positive airway pressure, respiratory therapy assess nares and determine need for appliance change or resting period.  Outcome: Progressing

## 2024-01-27 NOTE — PROGRESS NOTES
Deshaun Ayers is a 58 y.o. male patient.    Current Facility-Administered Medications   Medication Dose Route Frequency Provider Last Rate Last Admin    benzocaine-menthol (CEPACOL SORE THROAT) lozenge 1 lozenge  1 lozenge Oral Q2H PRN Russel Toscano MD        lactated ringers IV soln infusion   IntraVENous Continuous MonicaChito  mL/hr at 01/26/24 1049 New Bag at 01/26/24 1049    insulin lispro (HUMALOG) injection vial 0-8 Units  0-8 Units SubCUTAneous TID Nora Olivier MD   2 Units at 01/26/24 1804    lidocaine PF 1 % injection 5 mL  5 mL IntraDERmal Once Russel Toscano MD        sodium chloride flush 0.9 % injection 5-40 mL  5-40 mL IntraVENous 2 times per day Russel Toscano MD   10 mL at 01/27/24 1001    sodium chloride flush 0.9 % injection 5-40 mL  5-40 mL IntraVENous PRN Russel Toscano MD        0.9 % sodium chloride infusion  25 mL IntraVENous PRN Russel Toscano MD        sodium chloride flush 0.9 % injection 5-40 mL  5-40 mL IntraVENous 2 times per day Russel Toscano MD        sodium chloride flush 0.9 % injection 5-40 mL  5-40 mL IntraVENous PRN Russel Toscano MD        0.9 % sodium chloride infusion   IntraVENous PRN Russel Toscano MD        naloxone 0.4 mg in 10 mL sodium chloride syringe   IntraVENous PRN Russel Toscano MD        enoxaparin Sodium (LOVENOX) injection 30 mg  30 mg SubCUTAneous BID Russel Toscano MD   30 mg at 01/27/24 0958    ondansetron (ZOFRAN) injection 4 mg  4 mg IntraVENous Q6H PRN Russel Toscano MD   4 mg at 01/27/24 0448    pantoprazole (PROTONIX) injection 40 mg  40 mg IntraVENous Daily Russel Toscano MD   40 mg at 01/27/24 0959    HYDROmorphone (DILAUDID) 1 mg/mL PCA   IntraVENous Continuous Russel Toscano MD   Rate Verify at 01/25/24 1515    ketorolac (TORADOL) injection 30 mg  30 mg IntraVENous Once Ellie Underwood MD        piperacillin-tazobactam (ZOSYN) 3,375 mg

## 2024-01-27 NOTE — PROGRESS NOTES
Arrived to place PICC after chart review. Pre-procedure and timeout done with VIVEK Kyle. Discussed limitations of placement and allergies.      Vessels easily collapsible upon assessment. No difficulty accessing L basilic vein. Blood free flowing and non-pulsatile. Guidewire, introducer, and catheter all easily inserted. PICC placement verified using 3CG technology as evidenced by peaked P-waves with no initial deflection.     OK to use PICC. Please use new IV tubing when connecting to the newly placed central line.      Patient tolerated sterile procedure well. Bed left in low position with belongings and call light within reach. Educated on line care. Handoff to bedside RN.      Please call with any questions or concerns. The  will direct you to the PICC RN that is on call.      [phone]

## 2024-01-27 NOTE — PROGRESS NOTES
Shift assessment complete. Meds given per eMAR. IV access loss. Per Dorcas place PICC orders. Pt consents. PICC team notified. Will continue to monitor. Bed alarm on. Call light within reach.  Electronically signed by Anabella Avery RN on 1/26/2024 at 10:47 PM

## 2024-01-27 NOTE — PROGRESS NOTES
Occupational Therapy    [facility] - Inpatient Rehabilitation Department   Phone: [phone]    Occupational Therapy    [x] Initial Evaluation            [] Daily Treatment Note         [] Discharge Summary      Patient: Deshaun Ayers   : 1965   MRN: [id number]   Date of Service:  2024    Admitting Diagnosis:  Infected prosthetic mesh of abdominal wall, subsequent encounter  Current Admission Summary:   Expand All Collapse All    Deshaun Ayers      HPI: 58 year old male who is here for removal of infected mesh      OPERATION PERFORMED:  Exploratory laparotomy, lysis of adhesions (3.5  hours), explantation of old hernia mesh, small bowel resection with  anastomosis, and abdominal wall closure.    Plan is for  additional wound closure prior to discharge from hospital.       Past Medical History:  has a past medical history of Anxiety state, unspecified, Calculus of kidney, Chronic pain, Deaf, Diabetes mellitus (HCC), Diverticula, Diverticulitis, GERD (gastroesophageal reflux disease), Hordeolum externum, Hyperlipidemia, Hypertension, Insomnia, unspecified, Kidney stones, Meniere disease, MRSA carrier, Pancreatitis, chronic (HCC), PONV (postoperative nausea and vomiting), Psychiatric problem, Sleep apnea, and Unspecified hypertrophic and atrophic condition of skin.  Past Surgical History:  has a past surgical history that includes colostomy; Revision Colostomy; Cholecystectomy; Pancreas surgery; Appendectomy; Inner ear surgery; tracheostomy; tracheostomy closure; subtotal colectomy; Abscess Drainage (Left, 2014); Upper gastrointestinal endoscopy (N/A, 2022); Nerve Surgery (2022); ERCP (N/A, 2022); Upper gastrointestinal endoscopy (N/A, 2022); Upper gastrointestinal endoscopy (N/A, 2022); ERCP (N/A, 2022); Nerve Surgery (2023); Upper gastrointestinal endoscopy (2023); Upper gastrointestinal endoscopy (2023); Kidney stone  assistance.    Comments:  Functional Mobility  Functional Mobility Activity: to/from bathroom, 75 feet in hallway   Device Use: no device  Required Assistance: contact guard assistance  Comment: no LOB, slow, steady pace   Balance:  Static Sitting Balance: good: independent with functional balance in unsupported position  Dynamic Sitting Balance: fair (+): maintains balance at SBA/supervision without use of UE support  Static Standing Balance: fair: maintains balance at CGA without use of UE support  Dynamic Standing Balance: fair: maintains balance at CGA without use of UE support  Comments:    Other Therapeutic Interventions    Functional Outcomes  AM-PAC Inpatient Daily Activity Raw Score: 17    Cognition  WFL  Orientation:    alert and oriented x 4  Command Following:   WFL     Education  Barriers To Learning: none  Patient Education: patient educated on goals, OT role and benefits, plan of care, precautions, ADL adaptive strategies, discharge recommendations  Learning Assessment:  patient verbalizes understanding, would benefit from continued reinforcement    Assessment  Activity Tolerance: tolerated well, limited by pain - 02 saturation mid 90s on 3L - HR 110s at rest, 120s in seated after activity   Impairments Requiring Therapeutic Intervention: decreased functional mobility, decreased ADL status, decreased strength, decreased endurance  Prognosis: good  Clinical Assessment: Patient typically independent at baseline. Currently requiring CGA for short distance mobility, max A for LB ADLs  - limited by pain. Patient presents with the above deficits and would benefit from continued skilled OT to address return to PLOF.     Safety Interventions: patient left in chair, chair alarm in place, call light within reach, and nurse notified    Plan  Frequency: 3-5 x/per week  Current Treatment Recommendations: strengthening, balance training, functional mobility training, transfer training, patient/caregiver education,  and ADL/self-care training    Goals  Patient Goals: to return home, to be in less pain    Short Term Goals:  Time Frame: discharge   Patient will complete upper body ADL at stand by assistance   Patient will complete lower body ADL at moderate assistance   Patient will complete toileting at minimal assistance   Patient will complete functional transfers at stand by assistance   Patient will complete functional mobility at stand by assistance  x 150 feet with LRAD for participation in IADLs and ADLs.     Above goals reviewed on 1/27/2024.  All goals are ongoing at this time unless indicated above.       Therapy Session Time     Individual Group Co-treatment   Time In    1038   Time Out    1123   Minutes    45        Timed Code Treatment Minutes:   30  Total Treatment Minutes:  45       Electronically Signed By: Baudilio Whitaker OT

## 2024-01-28 PROBLEM — B99.9 INTRA-ABDOMINAL INFECTION: Status: ACTIVE | Noted: 2023-12-11

## 2024-01-28 PROBLEM — D72.829 LEUKOCYTOSIS: Status: ACTIVE | Noted: 2024-01-28

## 2024-01-28 LAB
ALBUMIN SERPL-MCNC: 2.3 G/DL (ref 3.4–5)
ANION GAP SERPL CALCULATED.3IONS-SCNC: 16 MMOL/L (ref 3–16)
BACTERIA SPEC AEROBE CULT: ABNORMAL
BACTERIA SPEC ANAEROBE CULT: ABNORMAL
BASOPHILS # BLD: 0.1 K/UL (ref 0–0.2)
BASOPHILS NFR BLD: 0.8 %
BUN SERPL-MCNC: 15 MG/DL (ref 7–20)
CALCIUM SERPL-MCNC: 8 MG/DL (ref 8.3–10.6)
CHLORIDE SERPL-SCNC: 108 MMOL/L (ref 99–110)
CO2 SERPL-SCNC: 20 MMOL/L (ref 21–32)
CREAT SERPL-MCNC: <0.5 MG/DL (ref 0.9–1.3)
DEPRECATED RDW RBC AUTO: 15.5 % (ref 12.4–15.4)
EOSINOPHIL # BLD: 0 K/UL (ref 0–0.6)
EOSINOPHIL NFR BLD: 0.2 %
GFR SERPLBLD CREATININE-BSD FMLA CKD-EPI: >60 ML/MIN/{1.73_M2}
GLUCOSE BLD-MCNC: 154 MG/DL (ref 70–99)
GLUCOSE BLD-MCNC: 159 MG/DL (ref 70–99)
GLUCOSE BLD-MCNC: 177 MG/DL (ref 70–99)
GLUCOSE BLD-MCNC: 185 MG/DL (ref 70–99)
GLUCOSE SERPL-MCNC: 142 MG/DL (ref 70–99)
HCT VFR BLD AUTO: 30.4 % (ref 40.5–52.5)
HGB BLD-MCNC: 9.6 G/DL (ref 13.5–17.5)
LYMPHOCYTES # BLD: 1.3 K/UL (ref 1–5.1)
LYMPHOCYTES NFR BLD: 8.2 %
MAGNESIUM SERPL-MCNC: 1.7 MG/DL (ref 1.8–2.4)
MCH RBC QN AUTO: 25.4 PG (ref 26–34)
MCHC RBC AUTO-ENTMCNC: 31.5 G/DL (ref 31–36)
MCV RBC AUTO: 80.7 FL (ref 80–100)
MONOCYTES # BLD: 0.9 K/UL (ref 0–1.3)
MONOCYTES NFR BLD: 5.4 %
NEUTROPHILS # BLD: 14.1 K/UL (ref 1.7–7.7)
NEUTROPHILS NFR BLD: 85.4 %
ORGANISM: ABNORMAL
PERFORMED ON: ABNORMAL
PHOSPHATE SERPL-MCNC: 1.8 MG/DL (ref 2.5–4.9)
PLATELET # BLD AUTO: 505 K/UL (ref 135–450)
PMV BLD AUTO: 7.5 FL (ref 5–10.5)
POTASSIUM SERPL-SCNC: 3.9 MMOL/L (ref 3.5–5.1)
RBC # BLD AUTO: 3.77 M/UL (ref 4.2–5.9)
SODIUM SERPL-SCNC: 144 MMOL/L (ref 136–145)
WBC # BLD AUTO: 16.5 K/UL (ref 4–11)

## 2024-01-28 PROCEDURE — 6360000002 HC RX W HCPCS: Performed by: STUDENT IN AN ORGANIZED HEALTH CARE EDUCATION/TRAINING PROGRAM

## 2024-01-28 PROCEDURE — 85025 COMPLETE CBC W/AUTO DIFF WBC: CPT

## 2024-01-28 PROCEDURE — 99024 POSTOP FOLLOW-UP VISIT: CPT | Performed by: SURGERY

## 2024-01-28 PROCEDURE — C9113 INJ PANTOPRAZOLE SODIUM, VIA: HCPCS | Performed by: SURGERY

## 2024-01-28 PROCEDURE — 6360000002 HC RX W HCPCS: Performed by: SURGERY

## 2024-01-28 PROCEDURE — 99223 1ST HOSP IP/OBS HIGH 75: CPT | Performed by: INTERNAL MEDICINE

## 2024-01-28 PROCEDURE — 2500000003 HC RX 250 WO HCPCS: Performed by: STUDENT IN AN ORGANIZED HEALTH CARE EDUCATION/TRAINING PROGRAM

## 2024-01-28 PROCEDURE — 2580000003 HC RX 258: Performed by: STUDENT IN AN ORGANIZED HEALTH CARE EDUCATION/TRAINING PROGRAM

## 2024-01-28 PROCEDURE — 1200000000 HC SEMI PRIVATE

## 2024-01-28 PROCEDURE — 83735 ASSAY OF MAGNESIUM: CPT

## 2024-01-28 PROCEDURE — 6360000002 HC RX W HCPCS: Performed by: INTERNAL MEDICINE

## 2024-01-28 PROCEDURE — 2580000003 HC RX 258: Performed by: SURGERY

## 2024-01-28 PROCEDURE — 80069 RENAL FUNCTION PANEL: CPT

## 2024-01-28 RX ORDER — MAGNESIUM SULFATE IN WATER 40 MG/ML
2000 INJECTION, SOLUTION INTRAVENOUS ONCE
Status: COMPLETED | OUTPATIENT
Start: 2024-01-28 | End: 2024-01-28

## 2024-01-28 RX ADMIN — SODIUM CHLORIDE, POTASSIUM CHLORIDE, SODIUM LACTATE AND CALCIUM CHLORIDE: 600; 310; 30; 20 INJECTION, SOLUTION INTRAVENOUS at 14:18

## 2024-01-28 RX ADMIN — ENOXAPARIN SODIUM 30 MG: 100 INJECTION SUBCUTANEOUS at 20:53

## 2024-01-28 RX ADMIN — SODIUM CHLORIDE, PRESERVATIVE FREE 10 ML: 5 INJECTION INTRAVENOUS at 08:59

## 2024-01-28 RX ADMIN — DIAZEPAM 2.5 MG: 5 INJECTION, SOLUTION INTRAMUSCULAR; INTRAVENOUS at 23:50

## 2024-01-28 RX ADMIN — PIPERACILLIN AND TAZOBACTAM 3375 MG: 3; .375 INJECTION, POWDER, FOR SOLUTION INTRAVENOUS at 13:32

## 2024-01-28 RX ADMIN — POTASSIUM PHOSPHATES 21 MMOL: 236; 224 INJECTION, SOLUTION INTRAVENOUS at 13:21

## 2024-01-28 RX ADMIN — SODIUM CHLORIDE, POTASSIUM CHLORIDE, SODIUM LACTATE AND CALCIUM CHLORIDE: 600; 310; 30; 20 INJECTION, SOLUTION INTRAVENOUS at 23:44

## 2024-01-28 RX ADMIN — PIPERACILLIN AND TAZOBACTAM 3375 MG: 3; .375 INJECTION, POWDER, FOR SOLUTION INTRAVENOUS at 05:24

## 2024-01-28 RX ADMIN — Medication: at 01:25

## 2024-01-28 RX ADMIN — DIAZEPAM 2.5 MG: 5 INJECTION, SOLUTION INTRAMUSCULAR; INTRAVENOUS at 17:04

## 2024-01-28 RX ADMIN — Medication 10 ML: at 09:33

## 2024-01-28 RX ADMIN — MAGNESIUM SULFATE HEPTAHYDRATE 2000 MG: 40 INJECTION, SOLUTION INTRAVENOUS at 09:04

## 2024-01-28 RX ADMIN — SODIUM CHLORIDE, POTASSIUM CHLORIDE, SODIUM LACTATE AND CALCIUM CHLORIDE: 600; 310; 30; 20 INJECTION, SOLUTION INTRAVENOUS at 02:04

## 2024-01-28 RX ADMIN — PANTOPRAZOLE SODIUM 40 MG: 40 INJECTION, POWDER, FOR SOLUTION INTRAVENOUS at 08:59

## 2024-01-28 RX ADMIN — DIAZEPAM 2.5 MG: 5 INJECTION, SOLUTION INTRAMUSCULAR; INTRAVENOUS at 01:34

## 2024-01-28 RX ADMIN — PIPERACILLIN AND TAZOBACTAM 3375 MG: 3; .375 INJECTION, POWDER, FOR SOLUTION INTRAVENOUS at 20:55

## 2024-01-28 RX ADMIN — ENOXAPARIN SODIUM 30 MG: 100 INJECTION SUBCUTANEOUS at 09:00

## 2024-01-28 ASSESSMENT — PAIN DESCRIPTION - ORIENTATION
ORIENTATION: MID

## 2024-01-28 ASSESSMENT — PAIN - FUNCTIONAL ASSESSMENT
PAIN_FUNCTIONAL_ASSESSMENT: PREVENTS OR INTERFERES SOME ACTIVE ACTIVITIES AND ADLS

## 2024-01-28 ASSESSMENT — PAIN SCALES - GENERAL
PAINLEVEL_OUTOF10: 7
PAINLEVEL_OUTOF10: 0
PAINLEVEL_OUTOF10: 7

## 2024-01-28 ASSESSMENT — PAIN DESCRIPTION - FREQUENCY
FREQUENCY: CONTINUOUS

## 2024-01-28 ASSESSMENT — PAIN SCALES - WONG BAKER
WONGBAKER_NUMERICALRESPONSE: 0
WONGBAKER_NUMERICALRESPONSE: 0

## 2024-01-28 ASSESSMENT — PAIN DESCRIPTION - PAIN TYPE
TYPE: SURGICAL PAIN

## 2024-01-28 ASSESSMENT — PAIN DESCRIPTION - ONSET
ONSET: ON-GOING

## 2024-01-28 ASSESSMENT — PAIN DESCRIPTION - DESCRIPTORS
DESCRIPTORS: DISCOMFORT

## 2024-01-28 ASSESSMENT — PAIN DESCRIPTION - LOCATION
LOCATION: ABDOMEN

## 2024-01-28 ASSESSMENT — PAIN DESCRIPTION - DIRECTION: RADIATING_TOWARDS: SIDES

## 2024-01-28 NOTE — CONSULTS
Infectious Diseases   Consult Note      Reason for Consult:  abd wall abscess   Requesting Physician:  Dorcas       Date of Admission: 1/25/2024  Subjective:   CHIEF COMPLAINT:   none given       HPI:    Deshaun Ayers is a 58yoM with history of IZABELA, pancreatitis, HTN, HLD, DM                S/p hernia repair with mesh >20 years ago without complication.    Pain, erythema of abd wall noted 11/2023 and worsened.    Admission in 12/2023 with intra-abd infection involving mesh managed non-operatively.  Treated empirically with abx with improvement and discharged on cipro and flagyl x2 weeks.    Office f/u with [facility] - abd wall cellulitis and spontaneous drainage of foul smelling fluid noted.  Plans made for I&D.    Admitted 1/25/24 for surgery.  OR 1/25/24 - ex-lap, EDGAR, removal of all mesh, SBR  Mixed enteric ubaldo in culture     He is AF   WBC elevated and declining.   +BM.  NGT in place.  NPO.  Feels generally lousy today.        Current abx:  Zosyn 3.375 q8       Past Surgical History:       Diagnosis Date    Anxiety state, unspecified     Calculus of kidney     Chronic pain     Deaf     RIGHT EAR    Diabetes mellitus (HCC)     Diverticula     Diverticulitis     GERD (gastroesophageal reflux disease)     Hordeolum externum     Hyperlipidemia     Hypertension     Insomnia, unspecified     Kidney stones     Meniere disease     MRSA carrier     Pancreatitis, chronic (HCC)     PONV (postoperative nausea and vomiting)     Psychiatric problem     Sleep apnea     uses CPAP    Unspecified hypertrophic and atrophic condition of skin          Procedure Laterality Date    ABSCESS DRAINAGE Left 01/05/2014    incision and drainage of an abcess on left calf    APPENDECTOMY      CHOLECYSTECTOMY      COLOSTOMY      ERCP N/A 05/26/2022    ERCP STENT INSERTION performed by Hung Field MD at [facility] ENDOSCOPY    ERCP N/A 09/27/2022    ERCP STENT INSERTION performed by Hung Field MD at [facility] ENDOSCOPY    ERCP N/A 8/9/2023     no history of illicit drug use or other significant epidemiologic exposures.      Family History:       Problem Relation Age of Onset    Diabetes Mother     Sudden Death Mother         suicide    Alcohol Abuse Father     Alcohol Abuse Brother     No Known Problems Brother     No Known Problems Daughter     No Known Problems Daughter     No Known Problems Son        Current Medications:    Current Facility-Administered Medications: potassium phosphate 21 mmol in sodium chloride 0.9 % 500 mL IVPB, 21 mmol, IntraVENous, Once  benzocaine-menthol (CEPACOL SORE THROAT) lozenge 1 lozenge, 1 lozenge, Oral, Q2H PRN  phenol 1.4 % mouth spray 1 spray, 1 spray, Mouth/Throat, Q2H PRN  lactated ringers IV soln infusion, , IntraVENous, Continuous  insulin lispro (HUMALOG) injection vial 0-8 Units, 0-8 Units, SubCUTAneous, TID WC  lidocaine PF 1 % injection 5 mL, 5 mL, IntraDERmal, Once  sodium chloride flush 0.9 % injection 5-40 mL, 5-40 mL, IntraVENous, 2 times per day  sodium chloride flush 0.9 % injection 5-40 mL, 5-40 mL, IntraVENous, PRN  0.9 % sodium chloride infusion, 25 mL, IntraVENous, PRN  sodium chloride flush 0.9 % injection 5-40 mL, 5-40 mL, IntraVENous, 2 times per day  sodium chloride flush 0.9 % injection 5-40 mL, 5-40 mL, IntraVENous, PRN  0.9 % sodium chloride infusion, , IntraVENous, PRN  naloxone 0.4 mg in 10 mL sodium chloride syringe, , IntraVENous, PRN  enoxaparin Sodium (LOVENOX) injection 30 mg, 30 mg, SubCUTAneous, BID  ondansetron (ZOFRAN) injection 4 mg, 4 mg, IntraVENous, Q6H PRN  pantoprazole (PROTONIX) injection 40 mg, 40 mg, IntraVENous, Daily  HYDROmorphone (DILAUDID) 1 mg/mL PCA, , IntraVENous, Continuous  ketorolac (TORADOL) injection 30 mg, 30 mg, IntraVENous, Once  [COMPLETED] piperacillin-tazobactam (ZOSYN) 4,500 mg in sodium chloride 0.9 % 100 mL IVPB (mini-bag), 4,500 mg, IntraVENous, Once **FOLLOWED BY** piperacillin-tazobactam (ZOSYN) 3,375 mg in sodium chloride 0.9 % 50 mL IVPB  type 2 in obese (HCC)    Vitamin D deficiency    Acute abdominal pain    Intra-abdominal infection after surgical procedure    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)    Sepsis (HCC)    Nephrolithiasis    History of colostomy reversal    History of ventral hernia repair    Encounter for medication counseling    Infected prosthetic mesh of abdominal wall, subsequent encounter       Remote history of hernia repair w mesh    Admitted with infection of abd involving mesh  Failed to resolve with abx alone  Spontaneous drainage through skin   OR 1/25/24 - removal f all mesh, SBR, closure of abd   Progressing well post-operatively   Culture from surgery with mixed enteric ubaldo - E faecalis, strep, Citrobacter, anaerobe     Leukocytosis   Declining     No abx allergies     -continue Zosyn, will cover all organisms isolated in culture   -follow trend of WBC   -he has had definitive management with excision of all mesh and irrigation.  Barring surgical complications, would not expect need of prolonged abx     ID will follow      Above d/w patient, questions addressed        Medical Decision Making:  The following items were considered in medical decision making:  Discussion of patient care with other providers  Reviewed clinical lab tests  Reviewed radiology tests  Reviewed other diagnostic tests/interventions  Independent review of radiologic images  Microbiology cultures and other micro tests reviewed       Risk of Complications/Morbidity: High   Illness(es)/ Infection present that pose threat to bodily function.   There is potential for severe exacerbation of infection/side effects of treatment.  Therapy requires intensive monitoring for antimicrobial agent toxicity         Tatyana Gleason M.D.    Thank you for the opportunity to participate in the care of your patient.    Please do not hesitate to contact me:   [phone] office

## 2024-01-28 NOTE — PROGRESS NOTES
Admit Date: 1/25/2024  Diet: Diet NPO Exceptions are: Ice Chips, Popsicles    CC:  Infected mesh removal     Interval history:   Overnight, there were no acute events. Patient's vitals remained stable    Patient was seen this morning in bed.  Patient is looking forward to watching the playoff games today.  Patient endorsed that he has improved and he feels well.  He is glad with the progress he is making albeit slowly.  Patient denies fevers, chills, nausea, vomiting, chest pain, shortness of breath, diarrhea, constipation, dysuria, urinary frequency or urgency.     Plan:     -Continue IV Zosyn  -WBC improving; will monitor closely for now  -Pt had a drop in Hgb from 11.9>>9.6, he does not have an overt source of bleeding. Will monitor closely; could be dilutional as he received 3.76L input between 1/27-1/28  -Continue pain control  -As needed anxiolytics/antihypertensives  -Sliding scale insulin, keep blood glucose between 140-180    Assessment:   Deshaun Ayers is a 58 y.o. male with PMH of T2DM, HLD, HTN, IZABELA, diverticulitis with complications s/p mesh in 2002 leading to prolonged hospitalization, ICU management at [facility] who was admitted for exploratory laparotomy, excision of infected abdominal wall mesh, lysis of adhesions and small bowel resection.     Infected mesh  Patient is s/p exploratory laparotomy, excision of infected abdominal wall mesh, lysis of adhesions and small bowel resection. Patient had diverticulitis with complications s/p mesh in 2002 leading to prolonged hospitalization, ICU management at [facility]  -General surgery following     T2DM -uncontrolled  Patient's last HgbA1c from 1/2024 = 8.5%  At home patient is on insulin, metformin, Jardiance, Amaryl     HTN  At home patient is on Hyzaar     HLD  At home patient is on rosuvastatin    Code Status: Full Code   FEN: Diet NPO Exceptions are: Ice Chips, Popsicles   DVT PPX: [x] Lovenox, []Heparin, [] SCDs,[] Eliquis, [] Xarelto, []    GLUCOSE 208* 213* 142*   CALCIUM 8.7 8.9 8.0*   MG  --  2.20 1.70*   PHOS  --  1.9* 1.8*   ANIONGAP 13 13 16       Hepatic:   Recent Labs     01/27/24  0520 01/28/24  0530   LABALBU 2.8* 2.3*       Troponin: No results for input(s): \"TROPONINI\" in the last 72 hours.  BNP: No results for input(s): \"BNP\" in the last 72 hours.  Lipids: No results for input(s): \"CHOL\", \"HDL\" in the last 72 hours.    Invalid input(s): \"LDLCALCU\", \"TRIGLYCERIDE\"  ABGs:  No results for input(s): \"PHART\", \"FLS9XVQ\", \"PO2ART\", \"UXP3MRK\", \"BEART\", \"THGBART\", \"V0UBXGOJ\", \"USY7KQE\" in the last 72 hours.    INR: No results for input(s): \"INR\" in the last 72 hours.  Lactate: No results for input(s): \"LACTATE\" in the last 72 hours.  Cultures:  -----------------------------------------------------------------  RAD:   No orders to display       Medications:     Scheduled Meds:   magnesium sulfate  2,000 mg IntraVENous Once    potassium phosphate IVPB (PERIPHERAL LINE)  21 mmol IntraVENous Once    insulin lispro  0-8 Units SubCUTAneous TID WC    lidocaine 1 % injection  5 mL IntraDERmal Once    sodium chloride flush  5-40 mL IntraVENous 2 times per day    sodium chloride flush  5-40 mL IntraVENous 2 times per day    enoxaparin  30 mg SubCUTAneous BID    pantoprazole  40 mg IntraVENous Daily    ketorolac  30 mg IntraVENous Once    piperacillin-tazobactam  3,375 mg IntraVENous Q8H    insulin lispro  0-4 Units SubCUTAneous Nightly     Continuous Infusions:   lactated ringers IV soln 100 mL/hr at 01/28/24 0204    sodium chloride      sodium chloride      HYDROmorphone      dextrose       PRN Meds:benzocaine-menthol, phenol, sodium chloride flush, sodium chloride, sodium chloride flush, sodium chloride, naloxone 0.4 mg in 10 mL sodium chloride syringe, ondansetron, diazePAM, glucose, dextrose bolus **OR** dextrose bolus, glucagon (rDNA), dextrose, labetalol

## 2024-01-28 NOTE — PROGRESS NOTES
Deshaun Ayers is a 58 y.o. male patient.    Current Facility-Administered Medications   Medication Dose Route Frequency Provider Last Rate Last Admin    potassium phosphate 21 mmol in sodium chloride 0.9 % 500 mL IVPB  21 mmol IntraVENous Once Chito Anderson  mL/hr at 01/28/24 1321 21 mmol at 01/28/24 1321    benzocaine-menthol (CEPACOL SORE THROAT) lozenge 1 lozenge  1 lozenge Oral Q2H PRN Russel Toscano MD        phenol 1.4 % mouth spray 1 spray  1 spray Mouth/Throat Q2H PRN Russel Toscano MD        lactated ringers IV soln infusion   IntraVENous Continuous Chito Anderson  mL/hr at 01/28/24 0204 New Bag at 01/28/24 0204    insulin lispro (HUMALOG) injection vial 0-8 Units  0-8 Units SubCUTAneous TID Nora Olivier MD   2 Units at 01/27/24 1816    lidocaine PF 1 % injection 5 mL  5 mL IntraDERmal Once Russel Toscano MD        sodium chloride flush 0.9 % injection 5-40 mL  5-40 mL IntraVENous 2 times per day Russel Toscano MD   10 mL at 01/28/24 0859    sodium chloride flush 0.9 % injection 5-40 mL  5-40 mL IntraVENous PRN Russel Toscano MD        0.9 % sodium chloride infusion  25 mL IntraVENous PRN Russle Toscano MD        sodium chloride flush 0.9 % injection 5-40 mL  5-40 mL IntraVENous 2 times per day Russel Toscano MD   10 mL at 01/28/24 0933    sodium chloride flush 0.9 % injection 5-40 mL  5-40 mL IntraVENous PRN Russel Toscano MD        0.9 % sodium chloride infusion   IntraVENous PRN Russel Toscano MD        naloxone 0.4 mg in 10 mL sodium chloride syringe   IntraVENous PRN Russel Toscano MD        enoxaparin Sodium (LOVENOX) injection 30 mg  30 mg SubCUTAneous BID Russel Toscano MD   30 mg at 01/28/24 0900    ondansetron (ZOFRAN) injection 4 mg  4 mg IntraVENous Q6H PRN Russel Toscano MD   4 mg at 01/27/24 0448    pantoprazole (PROTONIX) injection 40 mg  40 mg IntraVENous Daily Russel Toscano,  MD   40 mg at 01/28/24 0859    HYDROmorphone (DILAUDID) 1 mg/mL PCA   IntraVENous Continuous Russel Toscano MD   Rate Change at 01/28/24 0343    ketorolac (TORADOL) injection 30 mg  30 mg IntraVENous Once Ellie Underwood MD        piperacillin-tazobactam (ZOSYN) 3,375 mg in sodium chloride 0.9 % 50 mL IVPB (mini-bag)  3,375 mg IntraVENous Q8H Russel Toscano MD 12.5 mL/hr at 01/28/24 1332 3,375 mg at 01/28/24 1332    diazePAM (VALIUM) injection 2.5 mg  2.5 mg IntraVENous Q6H PRN Nora Jordan MD   2.5 mg at 01/28/24 0134    insulin lispro (HUMALOG) injection vial 0-4 Units  0-4 Units SubCUTAneous Nightly Chito Anderson MD        glucose-vitamin C chewable tablet 4 tablet  4 tablet Oral PRN Chito Anderson MD        dextrose bolus 10% 125 mL  125 mL IntraVENous PRN Chito Anderson MD        Or    dextrose bolus 10% 250 mL  250 mL IntraVENous PRN Chito Anderson MD        glucagon injection 1 mg  1 mg SubCUTAneous PRN Chito Anderson MD        dextrose 10 % infusion   IntraVENous Continuous PRN Chito Anderson MD        labetalol (NORMODYNE;TRANDATE) injection 10 mg  10 mg IntraVENous Q6H PRN Chito Anderson MD   10 mg at 01/26/24 0827     Allergies   Allergen Reactions    Morphine      RASH, hallucinations oral and IV     Principal Problem:    Infected prosthetic mesh of abdominal wall, subsequent encounter  Resolved Problems:    * No resolved hospital problems. *    Blood pressure (!) 149/90, pulse (!) 106, temperature 98.2 °F (36.8 °C), temperature source Oral, resp. rate 16, height 1.829 m (6'), weight 127 kg (280 lb), SpO2 98 %.    Subjective:  Symptoms:  Stable.    Diet:  NPO.    Activity level: Impaired due to pain.    Pain:  He complains of pain that is moderate.      Objective:  General Appearance:  Comfortable.    Vital signs: (most recent): Blood pressure (!) 149/90, pulse (!) 106, temperature 98.2 °F (36.8 °C), temperature source Oral, resp. rate 16, height 1.829 m (6'), weight 127 kg (280  lb), SpO2 98 %.    Output: Producing urine and producing stool.    Lungs:  Normal effort and normal respiratory rate.    Abdomen: Abdomen is soft and distended.  (Dressing intact).    Neurological: Patient is alert and oriented to person, place and time.    Skin:  Warm and dry.      Assessment & Plan 58-year-old male who is postop day #3 status post explantation of infected abdominal wall mesh, extensive lysis of adhesions and small bowel resection with anastomosis.  Pain improving.  Had 2 normal bowel movements.  Abdomen still remains distended.  Not ready to have NG tube out yet.  Continue wound care and IV antibiotics.  Increase ambulation.    Russel Toscano MD  1/28/2024

## 2024-01-28 NOTE — PROGRESS NOTES
01/27/24 1941   Oxygen Therapy/Pulse Ox   O2 Therapy Room air   O2 Device None (Room air)   Pulse (!) 109   Respirations 20   SpO2 92 %   End Tidal CO2/tcpCO2   (patient is off pain pump)

## 2024-01-28 NOTE — PLAN OF CARE

## 2024-01-28 NOTE — PROGRESS NOTES
Shift assessment complete. Meds given per eMAR. Dressing change to abdomen. Wet to dry with 4X4 ABD pad and medapore tape. Pt tolerated well.   Pt st much improvement today. Call light within reach. Wife at bedside.   Electronically signed by Anabella Avery RN on 1/28/2024 at 2:09 AM

## 2024-01-29 ENCOUNTER — APPOINTMENT (OUTPATIENT)
Dept: GENERAL RADIOLOGY | Age: 59
End: 2024-01-29
Attending: SURGERY
Payer: MEDICARE

## 2024-01-29 PROBLEM — E66.812 CLASS 2 OBESITY DUE TO EXCESS CALORIES WITH BODY MASS INDEX (BMI) OF 37.0 TO 37.9 IN ADULT: Status: ACTIVE | Noted: 2024-01-29

## 2024-01-29 PROBLEM — Z86.14 HISTORY OF MRSA INFECTION: Status: ACTIVE | Noted: 2024-01-29

## 2024-01-29 PROBLEM — A49.8 ANAEROBIC BACTERIAL INFECTION: Status: ACTIVE | Noted: 2024-01-29

## 2024-01-29 PROBLEM — A49.8 E COLI INFECTION: Status: ACTIVE | Noted: 2024-01-29

## 2024-01-29 PROBLEM — Z86.59 HISTORY OF DEPRESSION: Status: ACTIVE | Noted: 2024-01-29

## 2024-01-29 PROBLEM — A49.8 ENTEROCOCCUS FAECALIS INFECTION: Status: ACTIVE | Noted: 2024-01-29

## 2024-01-29 PROBLEM — E66.09 CLASS 2 OBESITY DUE TO EXCESS CALORIES WITH BODY MASS INDEX (BMI) OF 37.0 TO 37.9 IN ADULT: Status: ACTIVE | Noted: 2024-01-29

## 2024-01-29 PROBLEM — A49.8 CITROBACTER INFECTION: Status: ACTIVE | Noted: 2024-01-29

## 2024-01-29 LAB
ANION GAP SERPL CALCULATED.3IONS-SCNC: 15 MMOL/L (ref 3–16)
BASOPHILS # BLD: 0.1 K/UL (ref 0–0.2)
BASOPHILS NFR BLD: 0.4 %
BUN SERPL-MCNC: 11 MG/DL (ref 7–20)
CALCIUM SERPL-MCNC: 8.3 MG/DL (ref 8.3–10.6)
CHLORIDE SERPL-SCNC: 106 MMOL/L (ref 99–110)
CO2 SERPL-SCNC: 21 MMOL/L (ref 21–32)
CREAT SERPL-MCNC: <0.5 MG/DL (ref 0.9–1.3)
DEPRECATED RDW RBC AUTO: 15.5 % (ref 12.4–15.4)
EOSINOPHIL # BLD: 0.2 K/UL (ref 0–0.6)
EOSINOPHIL NFR BLD: 1.5 %
GFR SERPLBLD CREATININE-BSD FMLA CKD-EPI: >60 ML/MIN/{1.73_M2}
GLUCOSE BLD-MCNC: 171 MG/DL (ref 70–99)
GLUCOSE BLD-MCNC: 173 MG/DL (ref 70–99)
GLUCOSE BLD-MCNC: 182 MG/DL (ref 70–99)
GLUCOSE BLD-MCNC: 190 MG/DL (ref 70–99)
GLUCOSE SERPL-MCNC: 189 MG/DL (ref 70–99)
HCT VFR BLD AUTO: 32.7 % (ref 40.5–52.5)
HGB BLD-MCNC: 10.4 G/DL (ref 13.5–17.5)
LYMPHOCYTES # BLD: 1.5 K/UL (ref 1–5.1)
LYMPHOCYTES NFR BLD: 11.1 %
MCH RBC QN AUTO: 25.4 PG (ref 26–34)
MCHC RBC AUTO-ENTMCNC: 31.8 G/DL (ref 31–36)
MCV RBC AUTO: 79.9 FL (ref 80–100)
MONOCYTES # BLD: 1 K/UL (ref 0–1.3)
MONOCYTES NFR BLD: 7.5 %
NEUTROPHILS # BLD: 10.7 K/UL (ref 1.7–7.7)
NEUTROPHILS NFR BLD: 79.5 %
PERFORMED ON: ABNORMAL
PLATELET # BLD AUTO: 528 K/UL (ref 135–450)
PMV BLD AUTO: 8.2 FL (ref 5–10.5)
POTASSIUM SERPL-SCNC: 3.4 MMOL/L (ref 3.5–5.1)
RBC # BLD AUTO: 4.09 M/UL (ref 4.2–5.9)
SODIUM SERPL-SCNC: 142 MMOL/L (ref 136–145)
WBC # BLD AUTO: 13.4 K/UL (ref 4–11)

## 2024-01-29 PROCEDURE — 85025 COMPLETE CBC W/AUTO DIFF WBC: CPT

## 2024-01-29 PROCEDURE — APPSS15 APP SPLIT SHARED TIME 0-15 MINUTES: Performed by: NURSE PRACTITIONER

## 2024-01-29 PROCEDURE — 74019 RADEX ABDOMEN 2 VIEWS: CPT

## 2024-01-29 PROCEDURE — APPNB30 APP NON BILLABLE TIME 0-30 MINS: Performed by: NURSE PRACTITIONER

## 2024-01-29 PROCEDURE — C9113 INJ PANTOPRAZOLE SODIUM, VIA: HCPCS | Performed by: SURGERY

## 2024-01-29 PROCEDURE — 99233 SBSQ HOSP IP/OBS HIGH 50: CPT | Performed by: INTERNAL MEDICINE

## 2024-01-29 PROCEDURE — 2500000003 HC RX 250 WO HCPCS: Performed by: SURGERY

## 2024-01-29 PROCEDURE — 2500000003 HC RX 250 WO HCPCS: Performed by: NURSE PRACTITIONER

## 2024-01-29 PROCEDURE — 99024 POSTOP FOLLOW-UP VISIT: CPT | Performed by: SURGERY

## 2024-01-29 PROCEDURE — 2580000003 HC RX 258: Performed by: SURGERY

## 2024-01-29 PROCEDURE — 6360000002 HC RX W HCPCS: Performed by: SURGERY

## 2024-01-29 PROCEDURE — 6360000002 HC RX W HCPCS: Performed by: INTERNAL MEDICINE

## 2024-01-29 PROCEDURE — 80048 BASIC METABOLIC PNL TOTAL CA: CPT

## 2024-01-29 PROCEDURE — 6360000002 HC RX W HCPCS: Performed by: NURSE PRACTITIONER

## 2024-01-29 PROCEDURE — 1200000000 HC SEMI PRIVATE

## 2024-01-29 PROCEDURE — 36415 COLL VENOUS BLD VENIPUNCTURE: CPT

## 2024-01-29 RX ORDER — HYDROMORPHONE HYDROCHLORIDE 1 MG/ML
1 INJECTION, SOLUTION INTRAMUSCULAR; INTRAVENOUS; SUBCUTANEOUS
Status: DISCONTINUED | OUTPATIENT
Start: 2024-01-29 | End: 2024-02-02

## 2024-01-29 RX ORDER — HYDROMORPHONE HYDROCHLORIDE 1 MG/ML
0.5 INJECTION, SOLUTION INTRAMUSCULAR; INTRAVENOUS; SUBCUTANEOUS
Status: DISCONTINUED | OUTPATIENT
Start: 2024-01-29 | End: 2024-02-02

## 2024-01-29 RX ORDER — POTASSIUM CHLORIDE 29.8 MG/ML
20 INJECTION INTRAVENOUS PRN
Status: DISCONTINUED | OUTPATIENT
Start: 2024-01-29 | End: 2024-02-03 | Stop reason: HOSPADM

## 2024-01-29 RX ORDER — MAGNESIUM SULFATE IN WATER 40 MG/ML
2000 INJECTION, SOLUTION INTRAVENOUS PRN
Status: DISCONTINUED | OUTPATIENT
Start: 2024-01-29 | End: 2024-02-03 | Stop reason: HOSPADM

## 2024-01-29 RX ORDER — POTASSIUM CHLORIDE 29.8 MG/ML
20 INJECTION INTRAVENOUS PRN
Status: DISCONTINUED | OUTPATIENT
Start: 2024-01-29 | End: 2024-01-29 | Stop reason: SDUPTHER

## 2024-01-29 RX ORDER — MAGNESIUM SULFATE IN WATER 40 MG/ML
2000 INJECTION, SOLUTION INTRAVENOUS ONCE
Status: COMPLETED | OUTPATIENT
Start: 2024-01-29 | End: 2024-01-29

## 2024-01-29 RX ORDER — INSULIN LISPRO 100 [IU]/ML
0-8 INJECTION, SOLUTION INTRAVENOUS; SUBCUTANEOUS EVERY 4 HOURS
Status: DISCONTINUED | OUTPATIENT
Start: 2024-01-29 | End: 2024-01-30 | Stop reason: CLARIF

## 2024-01-29 RX ORDER — POTASSIUM CHLORIDE 7.45 MG/ML
10 INJECTION INTRAVENOUS
Status: COMPLETED | OUTPATIENT
Start: 2024-01-29 | End: 2024-01-29

## 2024-01-29 RX ADMIN — PIPERACILLIN AND TAZOBACTAM 3375 MG: 3; .375 INJECTION, POWDER, FOR SOLUTION INTRAVENOUS at 05:23

## 2024-01-29 RX ADMIN — SODIUM CHLORIDE, PRESERVATIVE FREE 10 ML: 5 INJECTION INTRAVENOUS at 21:04

## 2024-01-29 RX ADMIN — POTASSIUM CHLORIDE 10 MEQ: 7.46 INJECTION, SOLUTION INTRAVENOUS at 16:54

## 2024-01-29 RX ADMIN — ONDANSETRON 4 MG: 2 INJECTION INTRAMUSCULAR; INTRAVENOUS at 20:51

## 2024-01-29 RX ADMIN — DIAZEPAM 2.5 MG: 5 INJECTION, SOLUTION INTRAMUSCULAR; INTRAVENOUS at 15:43

## 2024-01-29 RX ADMIN — HYDROMORPHONE HYDROCHLORIDE 1 MG: 1 INJECTION, SOLUTION INTRAMUSCULAR; INTRAVENOUS; SUBCUTANEOUS at 23:35

## 2024-01-29 RX ADMIN — PIPERACILLIN AND TAZOBACTAM 3375 MG: 3; .375 INJECTION, POWDER, FOR SOLUTION INTRAVENOUS at 15:12

## 2024-01-29 RX ADMIN — PIPERACILLIN AND TAZOBACTAM 3375 MG: 3; .375 INJECTION, POWDER, FOR SOLUTION INTRAVENOUS at 21:01

## 2024-01-29 RX ADMIN — ENOXAPARIN SODIUM 30 MG: 100 INJECTION SUBCUTANEOUS at 09:37

## 2024-01-29 RX ADMIN — ASCORBIC ACID, VITAMIN A PALMITATE, CHOLECALCIFEROL, THIAMINE HYDROCHLORIDE, RIBOFLAVIN-5 PHOSPHATE SODIUM, PYRIDOXINE HYDROCHLORIDE, NIACINAMIDE, DEXPANTHENOL, ALPHA-TOCOPHEROL ACETATE, VITAMIN K1, FOLIC ACID, BIOTIN, CYANOCOBALAMIN: 200; 3300; 200; 6; 3.6; 6; 40; 15; 10; 150; 600; 60; 5 INJECTION, SOLUTION INTRAVENOUS at 18:39

## 2024-01-29 RX ADMIN — POTASSIUM CHLORIDE 10 MEQ: 7.46 INJECTION, SOLUTION INTRAVENOUS at 15:51

## 2024-01-29 RX ADMIN — MICONAZOLE NITRATE: 20 POWDER TOPICAL at 21:03

## 2024-01-29 RX ADMIN — MICONAZOLE NITRATE: 20 POWDER TOPICAL at 11:15

## 2024-01-29 RX ADMIN — HYDROMORPHONE HYDROCHLORIDE 1 MG: 1 INJECTION, SOLUTION INTRAMUSCULAR; INTRAVENOUS; SUBCUTANEOUS at 20:51

## 2024-01-29 RX ADMIN — DIAZEPAM 2.5 MG: 5 INJECTION, SOLUTION INTRAMUSCULAR; INTRAVENOUS at 07:07

## 2024-01-29 RX ADMIN — HYDROMORPHONE HYDROCHLORIDE 0.5 MG: 1 INJECTION, SOLUTION INTRAMUSCULAR; INTRAVENOUS; SUBCUTANEOUS at 15:43

## 2024-01-29 RX ADMIN — ENOXAPARIN SODIUM 30 MG: 100 INJECTION SUBCUTANEOUS at 20:51

## 2024-01-29 RX ADMIN — MAGNESIUM SULFATE HEPTAHYDRATE 2000 MG: 40 INJECTION, SOLUTION INTRAVENOUS at 15:10

## 2024-01-29 RX ADMIN — HYDROMORPHONE HYDROCHLORIDE 1 MG: 1 INJECTION, SOLUTION INTRAMUSCULAR; INTRAVENOUS; SUBCUTANEOUS at 18:31

## 2024-01-29 RX ADMIN — PANTOPRAZOLE SODIUM 40 MG: 40 INJECTION, POWDER, FOR SOLUTION INTRAVENOUS at 09:37

## 2024-01-29 ASSESSMENT — PAIN DESCRIPTION - ONSET: ONSET: ON-GOING

## 2024-01-29 ASSESSMENT — ENCOUNTER SYMPTOMS
SINUS PRESSURE: 0
SORE THROAT: 0
SHORTNESS OF BREATH: 0
WHEEZING: 0
ABDOMINAL PAIN: 0
COUGH: 0
NAUSEA: 0
BACK PAIN: 0
CONSTIPATION: 0
EYE REDNESS: 0
RHINORRHEA: 0
EYE DISCHARGE: 0
SINUS PAIN: 0
DIARRHEA: 0

## 2024-01-29 ASSESSMENT — PAIN DESCRIPTION - FREQUENCY: FREQUENCY: INTERMITTENT

## 2024-01-29 ASSESSMENT — PAIN SCALES - GENERAL
PAINLEVEL_OUTOF10: 8
PAINLEVEL_OUTOF10: 8
PAINLEVEL_OUTOF10: 4

## 2024-01-29 ASSESSMENT — PAIN - FUNCTIONAL ASSESSMENT
PAIN_FUNCTIONAL_ASSESSMENT: ACTIVITIES ARE NOT PREVENTED
PAIN_FUNCTIONAL_ASSESSMENT: ACTIVITIES ARE NOT PREVENTED

## 2024-01-29 ASSESSMENT — PAIN DESCRIPTION - ORIENTATION: ORIENTATION: OTHER (COMMENT)

## 2024-01-29 ASSESSMENT — PAIN DESCRIPTION - DESCRIPTORS
DESCRIPTORS: DISCOMFORT
DESCRIPTORS: DISCOMFORT

## 2024-01-29 ASSESSMENT — PAIN DESCRIPTION - LOCATION
LOCATION: NOSE
LOCATION: NOSE

## 2024-01-29 ASSESSMENT — PAIN DESCRIPTION - PAIN TYPE: TYPE: ACUTE PAIN;SURGICAL PAIN

## 2024-01-29 NOTE — PLAN OF CARE
Problem: Chronic Conditions and Co-morbidities  Goal: Patient's chronic conditions and co-morbidity symptoms are monitored and maintained or improved  Outcome: Progressing  Flowsheets (Taken 1/28/2024 2049)  Care Plan - Patient's Chronic Conditions and Co-Morbidity Symptoms are Monitored and Maintained or Improved: Monitor and assess patient's chronic conditions and comorbid symptoms for stability, deterioration, or improvement     Problem: Discharge Planning  Goal: Discharge to home or other facility with appropriate resources  Outcome: Progressing  Flowsheets (Taken 1/28/2024 2049)  Discharge to home or other facility with appropriate resources: Identify barriers to discharge with patient and caregiver

## 2024-01-29 NOTE — PROGRESS NOTES
Infectious Diseases   Progress Note      Admission Date: 1/25/2024  Hospital Day: Hospital Day: 5   Attending: Russel Toscano MD  Date of service: 1/29/2024     Chief complaint/ Reason for consult:     Infected abdominal mesh, s/p surgical removal of the infected mesh on 1/15/2024-surgical cultures are growing Streptococcus, Enterococcus faecalis, Citrobacter, anaerobes  Leukocytosis on admission  Type 2 diabetes mellitus  History of colostomy reversal  Obstructive sleep apnea    Microbiology:        I have reviewed allavailable micro lab data and cultures    Abdominal surgical culture  - collected on 1/25/2024: Group B streptococcus, Enterococcus faecalis, Citrobacter, Prevotella    Susceptibility    Citrobacter freundii (1)    Antibiotic Interpretation Microscan  Method Status    ceFAZolin Resistant >=64 mcg/mL BACTERIAL SUSCEPTIBILITY PANEL BY JOSE GUADALUPE     cefTRIAXone Sensitive <=0.25 mcg/mL BACTERIAL SUSCEPTIBILITY PANEL BY JOSE GUADALUPE     ciprofloxacin Sensitive <=0.25 mcg/mL BACTERIAL SUSCEPTIBILITY PANEL BY JOSE GUADALUPE     ertapenem Sensitive <=0.12 mcg/mL BACTERIAL SUSCEPTIBILITY PANEL BY JOSE GUADALUPE     gentamicin Sensitive <=1 mcg/mL BACTERIAL SUSCEPTIBILITY PANEL BY JOSE GUADALUPE     levofloxacin Sensitive <=0.12 mcg/mL BACTERIAL SUSCEPTIBILITY PANEL BY JOSE GUADALUPE     piperacillin-tazobactam Sensitive <=4 mcg/mL BACTERIAL SUSCEPTIBILITY PANEL BY JOSE GUADLAUPE     trimethoprim-sulfamethoxazole Sensitive <=20 mcg/mL BACTERIAL SUSCEPTIBILITY PANEL BY JOSE GUADALUPE     Enterococcus faecalis (2)      Antibiotic Interpretation Microscan  Method Status    ampicillin Sensitive <=2 mcg/mL BACTERIAL SUSCEPTIBILITY PANEL BY JOSE GUADALUPE     vancomycin Sensitive 1 mcg/mL BACTERIAL SUSCEPTIBILITY PANEL BY JOSE GUADALUPE       Antibiotics and immunizations:       Current antibiotics: All antibiotics and their doses were reviewed by me    Recent Abx Admin                     piperacillin-tazobactam (ZOSYN) 3,375 mg in sodium chloride 0.9 % 50 mL IVPB (mini-bag) (mg) 3,375 mg New Bag 01/29/24  continuous use of opioids F11.90    Diabetes mellitus type 2 in obese (HCC) E11.69, E66.9    Vitamin D deficiency E55.9    Acute abdominal pain R10.9    Intra-abdominal infection B99.9    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) T85.79XA    Sepsis (HCC) A41.9    Nephrolithiasis N20.0    History of colostomy reversal Z98.890    History of ventral hernia repair Z98.890, Z87.19    Encounter for medication counseling Z71.89    Infected prosthetic mesh of abdominal wall, subsequent encounter T85.79XD    Leukocytosis D72.829    Class 2 obesity due to excess calories with body mass index (BMI) of 37.0 to 37.9 in adult E66.09, Z68.37    History of depression Z86.59    History of MRSA infection Z86.14    Anaerobic bacterial infection A49.8    Citrobacter infection A49.8    Enterococcus faecalis infection A49.8    E coli infection A49.8       Please note that this chart was generated using Dragon dictation software. Although every effort was made to ensure the accuracy of this automated transcription, some errors in transcription may have occurred inadvertently. If you may need any clarification, please do not hesitate to contact me through EPIC or at the phone number provided below with my electronic signature.  Any pictures or media included in this note were obtained after taking informed verbal consent from the patient and with their approval to include those in the patient's medical record.        Rigoberto Poe MD, MPH, FACP, [facility]  1/29/2024, 1:03 PM  [facility]   [address] ([facility]  [place] [address]  Office: [phone]  Fax: [phone]  In-person Clinic days:  Tuesday & Thursday a.m.  Virtual clinic days: Monday, Wednesday & Friday a.m.

## 2024-01-29 NOTE — PROGRESS NOTES
Admit Date: 1/25/2024  Diet: Diet NPO Exceptions are: Ice Chips, Popsicles    CC:  Infected mesh removal     Interval history:   Overnight, there were no acute events. Patient's vitals remained stable    Patient was seen this morning in bed.  Patient is happy with his progress overall.  Endorsed that he is doing well.  Patient denies fevers, chills, nausea, vomiting, chest pain, shortness of breath, diarrhea, constipation, dysuria, urinary frequency or urgency.     Plan:     -Continue IV Zosyn  -WBC improving; will monitor closely for now  -Continue pain control  -As needed anxiolytics/antihypertensives  -Sliding scale insulin, keep blood glucose between 140-180    Assessment:   Deshaun Ayers is a 58 y.o. male with PMH of T2DM, HLD, HTN, IZABELA, diverticulitis with complications s/p mesh in 2002 leading to prolonged hospitalization, ICU management at [facility] who was admitted for exploratory laparotomy, excision of infected abdominal wall mesh, lysis of adhesions and small bowel resection.     Infected mesh  Patient is s/p exploratory laparotomy, excision of infected abdominal wall mesh, lysis of adhesions and small bowel resection. Patient had diverticulitis with complications s/p mesh in 2002 leading to prolonged hospitalization, ICU management at [facility]  -General surgery following     T2DM -uncontrolled  Patient's last HgbA1c from 1/2024 = 8.5%  At home patient is on insulin, metformin, Jardiance, Amaryl     HTN  At home patient is on Hyzaar     HLD  At home patient is on rosuvastatin    Code Status: Full Code   FEN: Diet NPO Exceptions are: Ice Chips, Popsicles   DVT PPX: [x] Lovenox, []Heparin, [] SCDs,[] Eliquis, [] Xarelto, [] Coumadin  DISPO: [x] GMF, [] ICU, [] CVU    Chito Anderson MD  1/29/2024,  10:59 AM    This note was likely completed using voice recognition technology and may contain unintended errors.     Objective:   Vitals:   T-max:  Patient Vitals for the past 8 hrs:   BP Temp Temp src  results for input(s): \"CHOL\", \"HDL\" in the last 72 hours.    Invalid input(s): \"LDLCALCU\", \"TRIGLYCERIDE\"  ABGs:  No results for input(s): \"PHART\", \"RIV3QKS\", \"PO2ART\", \"TJT2OGA\", \"BEART\", \"THGBART\", \"J5TMFPDV\", \"NQV5JNX\" in the last 72 hours.    INR: No results for input(s): \"INR\" in the last 72 hours.  Lactate: No results for input(s): \"LACTATE\" in the last 72 hours.  Cultures:  -----------------------------------------------------------------  RAD:   XR ABDOMEN (2 VIEWS)    (Results Pending)       Medications:     Scheduled Meds:   miconazole   Topical BID    insulin lispro  0-8 Units SubCUTAneous TID WC    lidocaine 1 % injection  5 mL IntraDERmal Once    sodium chloride flush  5-40 mL IntraVENous 2 times per day    sodium chloride flush  5-40 mL IntraVENous 2 times per day    enoxaparin  30 mg SubCUTAneous BID    pantoprazole  40 mg IntraVENous Daily    ketorolac  30 mg IntraVENous Once    piperacillin-tazobactam  3,375 mg IntraVENous Q8H    insulin lispro  0-4 Units SubCUTAneous Nightly     Continuous Infusions:   lactated ringers IV soln 100 mL/hr at 01/29/24 0523    sodium chloride      sodium chloride      dextrose       PRN Meds:HYDROmorphone **OR** HYDROmorphone, benzocaine-menthol, phenol, sodium chloride flush, sodium chloride, sodium chloride flush, sodium chloride, ondansetron, diazePAM, glucose, dextrose bolus **OR** dextrose bolus, glucagon (rDNA), dextrose, labetalol

## 2024-01-29 NOTE — CONSULTS
Clinical Pharmacy Note    Pharmacy consulted by Jany LAI CNP to manage TPN    Starting TPN rate: 40ml/hr  Goal TPN rate: 83 ml/hr    Access: central  Indication: Inadequate oral intake related to altered GI function.    Labs:  General Labs:    CBC with Differential:    Lab Results   Component Value Date/Time    WBC 16.5 01/28/2024 05:30 AM    RBC 3.77 01/28/2024 05:30 AM    HGB 9.6 01/28/2024 05:30 AM    HCT 30.4 01/28/2024 05:30 AM     01/28/2024 05:30 AM    MCV 80.7 01/28/2024 05:30 AM    MCH 25.4 01/28/2024 05:30 AM    MCHC 31.5 01/28/2024 05:30 AM    RDW 15.5 01/28/2024 05:30 AM    SEGSPCT 59.0 02/16/2013 05:10 AM    LYMPHOPCT 8.2 01/28/2024 05:30 AM    MONOPCT 5.4 01/28/2024 05:30 AM    EOSPCT 2.7 10/12/2011 07:08 PM    BASOPCT 0.8 01/28/2024 05:30 AM    MONOSABS 0.9 01/28/2024 05:30 AM    LYMPHSABS 1.3 01/28/2024 05:30 AM    EOSABS 0.0 01/28/2024 05:30 AM    BASOSABS 0.1 01/28/2024 05:30 AM    DIFFTYPE Auto 02/16/2013 05:10 AM     Platelets:    Lab Results   Component Value Date/Time     01/28/2024 05:30 AM     Hemoglobin/Hematocrit:    Lab Results   Component Value Date/Time    HGB 9.6 01/28/2024 05:30 AM    HCT 30.4 01/28/2024 05:30 AM     BMP:    Lab Results   Component Value Date/Time     01/28/2024 05:30 AM    K 3.9 01/28/2024 05:30 AM    K 3.6 12/14/2023 05:43 AM     01/28/2024 05:30 AM    CO2 20 01/28/2024 05:30 AM    BUN 15 01/28/2024 05:30 AM    LABALBU 2.3 01/28/2024 05:30 AM    CREATININE <0.5 01/28/2024 05:30 AM    CALCIUM 8.0 01/28/2024 05:30 AM    GFRAA >60 08/12/2022 11:36 AM    GFRAA >60 05/08/2013 01:39 PM    LABGLOM >60 01/28/2024 05:30 AM    GLUCOSE 142 01/28/2024 05:30 AM     Magnesium:    Lab Results   Component Value Date/Time    MG 1.70 01/28/2024 05:30 AM     Phosphorus:    Lab Results   Component Value Date/Time    PHOS 1.8 01/28/2024 05:30 AM       Electrolyte replacement as follows:   Replace magnesium with 2 g of magnesium sulfate administered IV

## 2024-01-29 NOTE — PROGRESS NOTES
Nutrition Note    RECOMMENDATIONS  PO Diet: NPO  Nutrition Support:   Recommend Bag 1: Clinimix 5/20 starting at 40 mL/hr  Monitor closely and correct lytes (Phos,Mg,K+)  Recommend Bag 2: As long as electrolytes stable or WNL, advance Clinimix 5/20 to goal rate of 83 mL/hr  Recommend 250 mL 20% lipids 2 times per week    NUTRITION ASSESSMENT   Pt triggered for consult on TPN recommendations. S/p explantation of infected abdominal wall mesh, extensive EDGAR and small bowel resection with anastomosis 1/25. NPO throughout admission now x 4 days. NG tube in place for decompression with 3.3L output over last 24 hrs per I/Os. Wt hx in EMR indicates 15 lb (5.1%) wt loss in 1 month. Will place TPN recommendations in chart and continue to monitor.     Nutrition Related Findings: LR at 100 mL/hr. POCG 173. LBM 1/29. Non-pitting generalized edema.  Wounds: Surgical Incision  Nutrition Education:  Education not indicated   Nutrition Goals: Initiate nutrition support, Tolerate nutrition support at goal rate, by next RD assessment     MALNUTRITION ASSESSMENT   Acute Illness  Malnutrition Status: At risk for malnutrition (Comment)    NUTRITION DIAGNOSIS   Inadequate oral intake related to altered GI function as evidenced by NPO or clear liquid status due to medical condition, nutrition support - parenteral nutrition    CURRENT NUTRITION THERAPIES  Diet NPO Exceptions are: Ice Chips, Popsicles     PO Intake: NPO   PO Supplement Intake:NPO    Current Parenteral Nutrition Recs:  Type and Formula: Premix Central   Lipids: 250ml, Two times weekly  Duration: Continuous  Current PN Order Provides: Clinimix 5/20 at 40 mL/hr provides 960 mL total volume, 845 calories without lipids, 48 grams of protein, and dextrose load of 1.05 mg/kg/min  Goal PN Orders Provides: Clinimx 5/20 at 83 mL/hr provides 1992 mL total volume, 1755 calories without lipids, 100 grams of protein, and dextrose load of 2.18 mg/kg/min    ANTHROPOMETRICS  Current Height:

## 2024-01-29 NOTE — PROGRESS NOTES
Assisted patient up to bathroom then stepped outside room to give patient privacy. Wife arrived and entered room. After a few more minutes found patient in bed with wife assisting but NG was pulled out to 44 instead of 60. Dr. Toscano on the unit and informed of finding. Stated to insert NG back to 60 franny. Charge nurse aware and stated just to check with air bolus. NG inserted to 60 franny on tube and air bolus heard.

## 2024-01-29 NOTE — PROGRESS NOTES
[facility]        Progress Note    Patient Name: Deshaun Ayers  MRN: [id number]  YOB: 1965  Date of Evaluation: 2024    Subjective:  No acute events overnight  Pain controlled  No nausea or vomiting, NGT in place with high output but consuming large volume of ice chips  Passing flatus and stool x2  Resting in bed at this time    Post-Operative Day #4      Vital Signs:  Patient Vitals for the past 24 hrs:   BP Temp Temp src Pulse Resp SpO2 Height Weight   24 1111 -- -- -- -- -- -- 1.829 m (6' 0.01\") --   24 0930 (!) 143/87 98.2 °F (36.8 °C) Oral 86 16 99 % -- --   24 0715 -- -- -- -- -- -- 1.829 m (6' 0.01\") 127 kg (279 lb 15.8 oz)   24 0327 (!) 152/80 98.2 °F (36.8 °C) Oral 95 16 97 % -- --   24 2346 (!) 172/79 98.2 °F (36.8 °C) Oral 92 18 97 % -- --   24 2046 (!) 153/81 98.4 °F (36.9 °C) Oral 92 18 97 % -- --   24 1601 (!) 169/94 98.2 °F (36.8 °C) Oral (!) 101 17 93 % -- --        TEMPERATURE HISTORY 24H: Temp (24hrs), Av.2 °F (36.8 °C), Min:98.2 °F (36.8 °C), Max:98.4 °F (36.9 °C)    BLOOD PRESSURE HISTORY: Systolic (36hrs), Av , Min:127 , Max:172    Diastolic (36hrs), Av, Min:75, Max:94      Intake/Output:  I/O last 3 completed shifts:  In: 7631.6 [P.O.:360; I.V.:6288.7; IV Piggyback:982.8]  Out: 5800 [Urine:2500; Emesis/NG output:3300]  I/O this shift:  In: -   Out: 600 [Urine:600]  Drain/tube Output:       Physical Exam:  General: awake, alert, oriented to person, place, time  Lungs: unlabored respirations  Abdomen: soft, non-distended, incisional tenderness only, bowel sounds present  Skin/Wound: open midline abdominal wound bed is clean, no drainage, retention sutures in place, rash noted at lower aspect of wound--dressing changed    Labs:  CBC:    Recent Labs     24  0520 24  0530 24  1210   WBC 20.3* 16.5* 13.4*   HGB 11.9* 9.6* 10.4*   HCT 36.4* 30.4* 32.7*   *  chloride, sodium chloride flush, sodium chloride, ondansetron, diazePAM, glucose, dextrose bolus **OR** dextrose bolus, glucagon (rDNA), dextrose, labetalol      Assessment:  58 y.o. male admitted with   1. 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 Pain controlled, incisional tenderness only, wound bed is clean--rash noted along lower aspect, no nausea or vomiting, NGT in place with high output but consuming large volume of ice chips, passing flatus and stool x2, vitals/labs stable--decreasing leukocytosis; continued supportive care, local wound care, at miconazole powder, keep abdominal binder in place, check AXR  2. NPO with NGT decompression; monitor bowel function  3. IV hydration; monitor and correct electrolytes  4. Antibiotics per ID  5. Activity as tolerated, ambulate TID--PT/OT following  6. Pulmonary toilet, incentive spirometry  7. PRN analgesics and antiemetics--minimizing narcotics as tolerated, stop PCA add PRN Dilaudid  8. DVT prophylaxis with  Lovenox and SCD's  9. Management of medical comorbid etiologies per consulting services    EDUCATION:  Educated patient on plan of care and disease process--all questions answered.    Plans discussed with patient and nursing.  Reviewed and discussed with Dr. Toscano.      Signed:  Jany Ontiveros, JOELLE - CNP  1/29/2024 2:33 PM    Continues to improve  Bowel function has returned but he is having high NG tube output  Abdomen softer  Wound progressing well  Will check abdominal x-rays today to make a decision about removal of NG tube  Start TPN until patient taking adequate calories by mouth  Continue antibiotics per ID  Increase

## 2024-01-29 NOTE — PROGRESS NOTES
Physical Therapy    Pt had just been up to the bathroom and was cleaned up. Pt reports fatigue from this activity and hopes to get NG tube discontinued. Pt politely requests that PT return for session tomorrow. Will attempt to see pt then as schedule allows.    Karen Mortensen, PT, DPT [id number]

## 2024-01-29 NOTE — PROGRESS NOTES
Pt evening shift assessment complete. VSS and medication given as ordered. Pt assessed for comfort needs. ABD dressing CDI, PCA pump infusing at ordered rate. Pt does not express any additional needs at this time, resting comfortably in bed.

## 2024-01-30 LAB
ALBUMIN SERPL-MCNC: 2.6 G/DL (ref 3.4–5)
ALP SERPL-CCNC: 72 U/L (ref 40–129)
ALT SERPL-CCNC: 22 U/L (ref 10–40)
ANION GAP SERPL CALCULATED.3IONS-SCNC: 9 MMOL/L (ref 3–16)
AST SERPL-CCNC: 17 U/L (ref 15–37)
BILIRUB DIRECT SERPL-MCNC: <0.2 MG/DL (ref 0–0.3)
BILIRUB INDIRECT SERPL-MCNC: NORMAL MG/DL (ref 0–1)
BILIRUB SERPL-MCNC: <0.2 MG/DL (ref 0–1)
BUN SERPL-MCNC: 8 MG/DL (ref 7–20)
CALCIUM SERPL-MCNC: 7.9 MG/DL (ref 8.3–10.6)
CHLORIDE SERPL-SCNC: 107 MMOL/L (ref 99–110)
CO2 SERPL-SCNC: 26 MMOL/L (ref 21–32)
CREAT SERPL-MCNC: <0.5 MG/DL (ref 0.9–1.3)
GFR SERPLBLD CREATININE-BSD FMLA CKD-EPI: >60 ML/MIN/{1.73_M2}
GLUCOSE BLD-MCNC: 194 MG/DL (ref 70–99)
GLUCOSE BLD-MCNC: 198 MG/DL (ref 70–99)
GLUCOSE BLD-MCNC: 215 MG/DL (ref 70–99)
GLUCOSE BLD-MCNC: 218 MG/DL (ref 70–99)
GLUCOSE BLD-MCNC: 235 MG/DL (ref 70–99)
GLUCOSE BLD-MCNC: 237 MG/DL (ref 70–99)
GLUCOSE SERPL-MCNC: 229 MG/DL (ref 70–99)
MAGNESIUM SERPL-MCNC: 1.9 MG/DL (ref 1.8–2.4)
PERFORMED ON: ABNORMAL
PHOSPHATE SERPL-MCNC: 1.8 MG/DL (ref 2.5–4.9)
POTASSIUM SERPL-SCNC: 3.4 MMOL/L (ref 3.5–5.1)
PROT SERPL-MCNC: 6.8 G/DL (ref 6.4–8.2)
SODIUM SERPL-SCNC: 142 MMOL/L (ref 136–145)
TRIGL SERPL-MCNC: 137 MG/DL (ref 0–150)

## 2024-01-30 PROCEDURE — 1200000000 HC SEMI PRIVATE

## 2024-01-30 PROCEDURE — 80076 HEPATIC FUNCTION PANEL: CPT

## 2024-01-30 PROCEDURE — APPNB30 APP NON BILLABLE TIME 0-30 MINS: Performed by: NURSE PRACTITIONER

## 2024-01-30 PROCEDURE — 6370000000 HC RX 637 (ALT 250 FOR IP): Performed by: SURGERY

## 2024-01-30 PROCEDURE — 83735 ASSAY OF MAGNESIUM: CPT

## 2024-01-30 PROCEDURE — 2500000003 HC RX 250 WO HCPCS: Performed by: SURGERY

## 2024-01-30 PROCEDURE — 99024 POSTOP FOLLOW-UP VISIT: CPT | Performed by: SURGERY

## 2024-01-30 PROCEDURE — 99233 SBSQ HOSP IP/OBS HIGH 50: CPT | Performed by: INTERNAL MEDICINE

## 2024-01-30 PROCEDURE — 97530 THERAPEUTIC ACTIVITIES: CPT

## 2024-01-30 PROCEDURE — 6360000002 HC RX W HCPCS: Performed by: SURGERY

## 2024-01-30 PROCEDURE — 80069 RENAL FUNCTION PANEL: CPT

## 2024-01-30 PROCEDURE — 2500000003 HC RX 250 WO HCPCS: Performed by: NURSE PRACTITIONER

## 2024-01-30 PROCEDURE — C9113 INJ PANTOPRAZOLE SODIUM, VIA: HCPCS | Performed by: SURGERY

## 2024-01-30 PROCEDURE — APPSS15 APP SPLIT SHARED TIME 0-15 MINUTES: Performed by: NURSE PRACTITIONER

## 2024-01-30 PROCEDURE — 2580000003 HC RX 258: Performed by: SURGERY

## 2024-01-30 PROCEDURE — 2580000003 HC RX 258: Performed by: NURSE PRACTITIONER

## 2024-01-30 PROCEDURE — 6360000002 HC RX W HCPCS: Performed by: NURSE PRACTITIONER

## 2024-01-30 PROCEDURE — 84478 ASSAY OF TRIGLYCERIDES: CPT

## 2024-01-30 PROCEDURE — 6360000002 HC RX W HCPCS: Performed by: INTERNAL MEDICINE

## 2024-01-30 RX ORDER — INSULIN LISPRO 100 [IU]/ML
0-16 INJECTION, SOLUTION INTRAVENOUS; SUBCUTANEOUS EVERY 4 HOURS
Status: DISCONTINUED | OUTPATIENT
Start: 2024-01-30 | End: 2024-02-03 | Stop reason: HOSPADM

## 2024-01-30 RX ADMIN — MICONAZOLE NITRATE: 20 POWDER TOPICAL at 23:28

## 2024-01-30 RX ADMIN — PIPERACILLIN AND TAZOBACTAM 3375 MG: 3; .375 INJECTION, POWDER, FOR SOLUTION INTRAVENOUS at 16:20

## 2024-01-30 RX ADMIN — INSULIN LISPRO 4 UNITS: 100 INJECTION, SOLUTION INTRAVENOUS; SUBCUTANEOUS at 21:30

## 2024-01-30 RX ADMIN — PANTOPRAZOLE SODIUM 40 MG: 40 INJECTION, POWDER, FOR SOLUTION INTRAVENOUS at 08:43

## 2024-01-30 RX ADMIN — INSULIN LISPRO 4 UNITS: 100 INJECTION, SOLUTION INTRAVENOUS; SUBCUTANEOUS at 18:04

## 2024-01-30 RX ADMIN — HYDROMORPHONE HYDROCHLORIDE 1 MG: 1 INJECTION, SOLUTION INTRAMUSCULAR; INTRAVENOUS; SUBCUTANEOUS at 15:42

## 2024-01-30 RX ADMIN — MICONAZOLE NITRATE: 20 POWDER TOPICAL at 08:48

## 2024-01-30 RX ADMIN — ENOXAPARIN SODIUM 30 MG: 100 INJECTION SUBCUTANEOUS at 20:05

## 2024-01-30 RX ADMIN — INSULIN LISPRO 2 UNITS: 100 INJECTION, SOLUTION INTRAVENOUS; SUBCUTANEOUS at 06:03

## 2024-01-30 RX ADMIN — HYDROMORPHONE HYDROCHLORIDE 1 MG: 1 INJECTION, SOLUTION INTRAMUSCULAR; INTRAVENOUS; SUBCUTANEOUS at 23:33

## 2024-01-30 RX ADMIN — HYDROMORPHONE HYDROCHLORIDE 1 MG: 1 INJECTION, SOLUTION INTRAMUSCULAR; INTRAVENOUS; SUBCUTANEOUS at 08:43

## 2024-01-30 RX ADMIN — INSULIN LISPRO 2 UNITS: 100 INJECTION, SOLUTION INTRAVENOUS; SUBCUTANEOUS at 11:31

## 2024-01-30 RX ADMIN — HYDROMORPHONE HYDROCHLORIDE 1 MG: 1 INJECTION, SOLUTION INTRAMUSCULAR; INTRAVENOUS; SUBCUTANEOUS at 18:04

## 2024-01-30 RX ADMIN — HYDROMORPHONE HYDROCHLORIDE 1 MG: 1 INJECTION, SOLUTION INTRAMUSCULAR; INTRAVENOUS; SUBCUTANEOUS at 05:13

## 2024-01-30 RX ADMIN — HYDROMORPHONE HYDROCHLORIDE 1 MG: 1 INJECTION, SOLUTION INTRAMUSCULAR; INTRAVENOUS; SUBCUTANEOUS at 20:05

## 2024-01-30 RX ADMIN — ASCORBIC ACID, VITAMIN A PALMITATE, CHOLECALCIFEROL, THIAMINE HYDROCHLORIDE, RIBOFLAVIN-5 PHOSPHATE SODIUM, PYRIDOXINE HYDROCHLORIDE, NIACINAMIDE, DEXPANTHENOL, ALPHA-TOCOPHEROL ACETATE, VITAMIN K1, FOLIC ACID, BIOTIN, CYANOCOBALAMIN: 200; 3300; 200; 6; 3.6; 6; 40; 15; 10; 150; 600; 60; 5 INJECTION, SOLUTION INTRAVENOUS at 18:11

## 2024-01-30 RX ADMIN — ENOXAPARIN SODIUM 30 MG: 100 INJECTION SUBCUTANEOUS at 08:43

## 2024-01-30 RX ADMIN — DIAZEPAM 2.5 MG: 5 INJECTION, SOLUTION INTRAMUSCULAR; INTRAVENOUS at 05:13

## 2024-01-30 RX ADMIN — HYDROMORPHONE HYDROCHLORIDE 1 MG: 1 INJECTION, SOLUTION INTRAMUSCULAR; INTRAVENOUS; SUBCUTANEOUS at 03:04

## 2024-01-30 RX ADMIN — PIPERACILLIN AND TAZOBACTAM 3375 MG: 3; .375 INJECTION, POWDER, FOR SOLUTION INTRAVENOUS at 05:17

## 2024-01-30 RX ADMIN — THIAMINE HYDROCHLORIDE 100 MG: 100 INJECTION, SOLUTION INTRAMUSCULAR; INTRAVENOUS at 15:48

## 2024-01-30 RX ADMIN — POTASSIUM PHOSPHATES 20 MMOL: 236; 224 INJECTION, SOLUTION INTRAVENOUS at 11:34

## 2024-01-30 RX ADMIN — HYDROMORPHONE HYDROCHLORIDE 1 MG: 1 INJECTION, SOLUTION INTRAMUSCULAR; INTRAVENOUS; SUBCUTANEOUS at 13:27

## 2024-01-30 ASSESSMENT — ENCOUNTER SYMPTOMS
EYE REDNESS: 0
DIARRHEA: 0
SINUS PAIN: 0
ABDOMINAL PAIN: 0
SINUS PRESSURE: 0
WHEEZING: 0
RHINORRHEA: 0
NAUSEA: 0
EYE DISCHARGE: 0
SHORTNESS OF BREATH: 0
COUGH: 0
SORE THROAT: 0
CONSTIPATION: 0
BACK PAIN: 0

## 2024-01-30 ASSESSMENT — PAIN SCALES - GENERAL
PAINLEVEL_OUTOF10: 7
PAINLEVEL_OUTOF10: 7
PAINLEVEL_OUTOF10: 9
PAINLEVEL_OUTOF10: 2
PAINLEVEL_OUTOF10: 7
PAINLEVEL_OUTOF10: 1
PAINLEVEL_OUTOF10: 8
PAINLEVEL_OUTOF10: 1
PAINLEVEL_OUTOF10: 4
PAINLEVEL_OUTOF10: 7
PAINLEVEL_OUTOF10: 8
PAINLEVEL_OUTOF10: 2

## 2024-01-30 ASSESSMENT — PAIN DESCRIPTION - DESCRIPTORS
DESCRIPTORS: ACHING
DESCRIPTORS: DISCOMFORT
DESCRIPTORS: DISCOMFORT
DESCRIPTORS: ACHING
DESCRIPTORS: DISCOMFORT

## 2024-01-30 ASSESSMENT — PAIN DESCRIPTION - LOCATION
LOCATION: ABDOMEN
LOCATION: ABDOMEN
LOCATION: ABDOMEN;NOSE
LOCATION: ABDOMEN;NOSE
LOCATION: ABDOMEN

## 2024-01-30 ASSESSMENT — PAIN - FUNCTIONAL ASSESSMENT
PAIN_FUNCTIONAL_ASSESSMENT: ACTIVITIES ARE NOT PREVENTED

## 2024-01-30 ASSESSMENT — PAIN DESCRIPTION - PAIN TYPE
TYPE: SURGICAL PAIN
TYPE: SURGICAL PAIN

## 2024-01-30 ASSESSMENT — PAIN DESCRIPTION - ONSET
ONSET: ON-GOING
ONSET: ON-GOING

## 2024-01-30 ASSESSMENT — PAIN DESCRIPTION - ORIENTATION
ORIENTATION: MID

## 2024-01-30 ASSESSMENT — PAIN DESCRIPTION - FREQUENCY
FREQUENCY: INTERMITTENT
FREQUENCY: INTERMITTENT

## 2024-01-30 NOTE — PROGRESS NOTES
[facility]        Progress Note    Patient Name: Deshaun Ayers  MRN: [id number]  YOB: 1965  Date of Evaluation: 2024    Subjective:  No acute events overnight  Pain controlled  No nausea or vomiting, NGT in place with high output but consuming large volume of ice chips/popsicles  Passing flatus and stool, denies bloating  Resting in bed at this time    Post-Operative Day #5      Vital Signs:  Patient Vitals for the past 24 hrs:   BP Temp Temp src Pulse Resp SpO2 Weight   24 1254 (!) 151/82 98.3 °F (36.8 °C) Axillary 80 16 99 % --   24 0830 132/70 98.3 °F (36.8 °C) Oral 78 16 98 % --   24 0600 -- -- -- -- -- -- 126.6 kg (279 lb)   24 0543 -- -- -- -- 18 -- --   24 0537 (!) 156/80 97.8 °F (36.6 °C) Oral 78 18 98 % --   24 0513 -- -- -- -- 18 -- --   24 0334 -- -- -- -- 19 -- --   24 0304 -- -- -- -- 18 -- --   24 0005 -- -- -- -- 16 -- --   24 2335 -- -- -- -- 18 -- --   24 2241 (!) 149/77 97.7 °F (36.5 °C) Oral 87 17 98 % --   24 212 -- -- -- -- 17 -- --   24 2030 (!) 158/79 98.4 °F (36.9 °C) Oral 85 18 100 % --   24 1901 -- -- -- -- 18 -- --   24 1545 (!) 157/88 -- -- 87 16 94 % --        TEMPERATURE HISTORY 24H: Temp (24hrs), Av.1 °F (36.7 °C), Min:97.7 °F (36.5 °C), Max:98.4 °F (36.9 °C)    BLOOD PRESSURE HISTORY: Systolic (36hrs), Av , Min:132 , Max:158    Diastolic (36hrs), Av, Min:70, Max:88      Intake/Output:  I/O last 3 completed shifts:  In: 3857.6 [P.O.:360; I.V.:2514.7; IV Piggyback:982.8]  Out: 7100 [Urine:2900; Emesis/NG output:4200]  I/O this shift:  In: -   Out: 1000 [Emesis/NG output:1000]  Drain/tube Output:       Physical Exam:  General: awake, alert, oriented to person, place, time  Lungs: unlabored respirations  Abdomen: soft, non-distended, incisional tenderness only, bowel sounds present  Skin/Wound: open midline abdominal wound  hernia mesh, small bowel resection with anastomosis, and abdominal wall closure for infected abdominal wall mesh 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 Pain controlled, incisional tenderness only, wound bed is clean--improved appearance of rash along lower aspect of wound, no nausea or vomiting, NGT in place with high output but consuming large volume of ice chips/popsicles, passing flatus and stool, vitals/labs stable--CBC pending, AXR reviewed; continued supportive care, local wound care, keep abdominal binder in place  2. NPO, NGT clamp trial--return to suction for high residual output, nausea, or vomiting; monitor bowel function  3. TPN, IV hydration; monitor and correct electrolytes  4. Antibiotics per ID  5. Activity as tolerated, ambulate TID--PT/OT following  6. Pulmonary toilet, incentive spirometry  7. PRN analgesics and antiemetics--minimizing narcotics as tolerated  8. DVT prophylaxis with  Lovenox and SCD's  9. Management of medical comorbid etiologies per consulting services    EDUCATION:  Educated patient on plan of care and disease process--all questions answered.    Plans discussed with patient and nursing.  Reviewed and discussed with Dr. Toscano.      Signed:  Jany Ontiveros, APRN - CNP  1/30/2024 1:21 PM    Continues to improve clinically  Bowel function has resumed  Having high NG tube output  Will trial clamp NG tube  Continue TPN and supportive measures  Increase ambulation

## 2024-01-30 NOTE — PROGRESS NOTES
Clinical Pharmacy Note    Pharmacy consulted by Surgeon to manage TPN    Current TPN rate: 40ml/hr  Goal TPN rate: 83ml/hr      Labs:  General Labs:  BMP:    Lab Results   Component Value Date/Time     01/30/2024 05:00 AM    K 3.4 01/30/2024 05:00 AM    K 3.6 12/14/2023 05:43 AM     01/30/2024 05:00 AM    CO2 26 01/30/2024 05:00 AM    BUN 8 01/30/2024 05:00 AM    LABALBU 2.6 01/30/2024 05:00 AM    CREATININE <0.5 01/30/2024 05:00 AM    CALCIUM 7.9 01/30/2024 05:00 AM    GFRAA >60 08/12/2022 11:36 AM    GFRAA >60 05/08/2013 01:39 PM    LABGLOM >60 01/30/2024 05:00 AM    GLUCOSE 229 01/30/2024 05:00 AM        Latest Reference Range & Units 01/30/24 05:00   Triglycerides 0 - 150 mg/dL 137        Latest Reference Range & Units 04/22/10 17:20 08/04/10 04:50 04/28/11 16:10 10/12/11 19:36 12/05/11 15:40 07/09/12 09:17 12/13/12 13:08 05/08/13 13:39 10/03/13 11:25 01/04/14 14:50 01/06/14 04:11 05/08/14 11:42 12/18/14 09:57 04/20/15 12:17 08/04/15 10:26 11/30/15 13:08 02/29/16 13:09 05/31/16 15:53 08/31/16 09:22 11/28/16 12:31 02/28/17 08:15 05/30/17 09:22 08/05/17 16:53 08/30/17 10:05 11/21/17 09:05 02/24/18 11:42 05/29/18 09:30 12/10/18 11:51 12/16/18 07:43 03/13/19 09:55 01/13/20 11:02 07/15/20 11:06 12/29/20 11:08 06/16/21 10:25 10/13/21 10:47 01/24/22 11:01 02/19/22 09:30 02/20/22 06:03 02/21/22 05:31 05/11/22 10:17 06/03/22 11:30 08/12/22 11:36 11/21/22 10:40 12/20/22 17:00 04/21/23 10:18 04/26/23 11:38 07/21/23 09:42 07/27/23 13:00 11/20/23 10:57 12/10/23 06:58 12/11/23 05:31 12/12/23 05:25 12/13/23 04:55 12/14/23 05:43 01/25/24 06:39 01/26/24 05:42 01/27/24 05:20 01/28/24 05:30 01/29/24 12:10 01/30/24 05:00   Potassium 3.5 - 5.1 mmol/L 3.7 3.7 3.7 3.4 (L) 3.5 3.7 3.7 3.7 4.2 3.5 3.8 3.9 3.5 3.7 3.8 3.9 3.7 3.5 3.8 3.4 (L) 4.0 3.9 3.8 3.9 3.4 (L) 4.0 3.6 3.7 3.7 3.8 4.2 4.5 4.6 3.7 4.1 4.1 3.9 3.8 3.2 (L) 3.9 4.2 4.1 3.5 3.9 5.1 4.4 3.8 4.2 4.3 3.5 3.8 3.1 (L) 4.3 3.6 3.1 (L) 4.4 4.2 3.9 3.4 (L) 3.4 (L)

## 2024-01-30 NOTE — PROGRESS NOTES
V2.0    [facility] Progress Note      Name:  Deshaun Ayers /Age/Sex: 1965  (58 y.o. male)   MRN & CSN:  [id number] & [id number] Encounter Date/Time: 2024 7:35 AM EST   Location:  [facility]4480/4480-01 PCP: Ginger Anguiano MD     Attending:Russel Toscano MD       Hospital Day: 6    Assessment and Recommendations   Deshaun Ayers is a 58 y.o. male with pmh of T2DM, HLD, HTN, ZIABELA, diverticulitis with complications s/p mesh in  leading to prolonged hospitalization, ICU management at [facility] who was admitted for exploratory laparotomy, excision of infected abdominal wall mesh, lysis of adhesions and small bowel resection.       Plan:   Infected mesh  Patient is s/p exploratory laparotomy, excision of infected abdominal wall mesh, lysis of adhesions and small bowel resection. Patient had diverticulitis with complications s/p mesh in  leading to prolonged hospitalization, ICU management at [facility]  - Continue zosyn  - Pain control  -General surgery following     T2DM -uncontrolled  Patient's last HgbA1c from 2024 = 8.5%  At home patient is on insulin, metformin, Jardiance, Amaryl     HTN  At home patient is on Hyzaar     HLD  At home patient is on rosuvastatin      Diet Diet NPO Exceptions are: Ice Chips, Popsicles  PN-Adult Premix  - Standard Electrolytes - Central Line   DVT Prophylaxis [x] Lovenox, []  Heparin, [] SCDs, [] Ambulation,  [] Eliquis, [] Xarelto  [] Coumadin   Code Status Full Code   Disposition Estimated Date of Discharge: 3-4 days  Patient requires continued admission due to infected mesh.   Surrogate Decision Maker/ POA       Personally reviewed Lab Studies and Imaging         Subjective:     Chief Complaint: infected mesh removal    Deshaun Ayers is a 58 y.o. male who presents with infected mesh removal.    Patient was seen and examined this morning. No acute event overnight.    Patient endorses abdominal pain. Mentions that his abdominal pain is improving.    BILITOT <0.2   ALKPHOS 72     Lipids:   Lab Results   Component Value Date/Time    CHOL 157 11/20/2023 10:57 AM    HDL 33 11/20/2023 10:57 AM    HDL 35 12/05/2011 03:40 PM    TRIG 115 11/20/2023 10:57 AM     Hemoglobin A1C:   Lab Results   Component Value Date/Time    LABA1C 8.5 01/25/2024 06:39 AM     TSH:   Lab Results   Component Value Date/Time    TSH 1.76 11/30/2015 01:08 PM     Troponin: No results found for: \"TROPONINT\"  Lactic Acid: No results for input(s): \"LACTA\" in the last 72 hours.  BNP: No results for input(s): \"PROBNP\" in the last 72 hours.  UA:  Lab Results   Component Value Date/Time    NITRU Negative 12/10/2023 02:31 PM    COLORU Yellow 12/10/2023 02:31 PM    PHUR 6.0 12/10/2023 02:31 PM    WBCUA 1 12/10/2023 02:31 PM    RBCUA 1 12/10/2023 02:31 PM    YEAST 0 12/07/2018 01:17 PM    BACTERIA None Seen 12/10/2023 02:31 PM    CLARITYU Clear 12/10/2023 02:31 PM    SPECGRAV >=1.030 12/10/2023 02:31 PM    LEUKOCYTESUR Negative 12/10/2023 02:31 PM    UROBILINOGEN 0.2 12/10/2023 02:31 PM    BILIRUBINUR Negative 12/10/2023 02:31 PM    BILIRUBINUR NEGATIVE 10/12/2011 06:30 PM    BLOODU Negative 12/10/2023 02:31 PM    GLUCOSEU >=1000 12/10/2023 02:31 PM    GLUCOSEU 500 10/12/2011 06:30 PM    KETUA 40 12/10/2023 02:31 PM     Urine Cultures:   Lab Results   Component Value Date/Time    LABURIN No growth at 18-36 hours 12/07/2018 01:25 PM     Blood Cultures:   Lab Results   Component Value Date/Time    BC No Growth after 4 days of incubation. 12/10/2023 06:58 AM     Lab Results   Component Value Date/Time    BLOODCULT2 No Growth after 4 days of incubation. 12/10/2023 07:40 AM     Organism:   Lab Results   Component Value Date/Time    ORG Citrobacter freundii 01/25/2024 08:03 AM    ORG Enterococcus faecalis 01/25/2024 08:03 AM    ORG Beta Strep Group G 01/25/2024 08:03 AM    ORG Prevotella buccae 01/25/2024 08:03 AM         Electronically signed by Slava Zendejas MD on 1/30/2024 at 7:35 AM

## 2024-01-30 NOTE — PROGRESS NOTES
Occupational Therapy  Deshaun Ayers  1965 01/30/24    Attempted OT treatment with pt politely declining and stating no needs at this time. Pt reports his wife assisted with his ADLs and he completed functional mobility with PT. Will follow up as schedule and pt status allow.     Thank you,    Judy Loco, OTR/L, C/NDT ([id number])

## 2024-01-30 NOTE — PROGRESS NOTES
[facility] - Inpatient Rehabilitation Department   Phone: [phone]    Physical Therapy    [] Initial Evaluation            [x] Daily Treatment Note         [] Discharge Summary      Patient: Deshaun Ayers   : 1965   MRN: [id number]   Date of Service:  2024  Admitting Diagnosis: Infected prosthetic mesh of abdominal wall, subsequent encounter  Current Admission Summary: Deshaun Ayers is a 58 y.o. male with PMH of T2DM, HLD, HTN, IZABELA, diverticulitis with complications s/p mesh in  leading to prolonged hospitalization, ICU management at [facility] who was admitted for exploratory laparotomy, excision of infected abdominal wall mesh, lysis of adhesions and small bowel resection on 24.  Past Medical History:  has a past medical history of Anxiety state, unspecified, Calculus of kidney, Chronic pain, Deaf, Diabetes mellitus (HCC), Diverticula, Diverticulitis, GERD (gastroesophageal reflux disease), Hordeolum externum, Hyperlipidemia, Hypertension, Insomnia, unspecified, Kidney stones, Meniere disease, MRSA carrier, Pancreatitis, chronic (HCC), PONV (postoperative nausea and vomiting), Psychiatric problem, Sleep apnea, and Unspecified hypertrophic and atrophic condition of skin.  Past Surgical History:  has a past surgical history that includes colostomy; Revision Colostomy; Cholecystectomy; Pancreas surgery; Appendectomy; Inner ear surgery; tracheostomy; tracheostomy closure; subtotal colectomy; Abscess Drainage (Left, 2014); Upper gastrointestinal endoscopy (N/A, 2022); Nerve Surgery (2022); ERCP (N/A, 2022); Upper gastrointestinal endoscopy (N/A, 2022); Upper gastrointestinal endoscopy (N/A, 2022); ERCP (N/A, 2022); Nerve Surgery (2023); Upper gastrointestinal endoscopy (2023); Upper gastrointestinal endoscopy (2023); Kidney stone removal; ERCP (N/A, 2023); Upper gastrointestinal endoscopy (N/A, 10/30/2023); Upper

## 2024-01-30 NOTE — PROGRESS NOTES
ALLERGIES:  No Known Allergies    CC:  Molluscum     HISTORY OF PRESENT ILLNESS:  The patient is a 22 year old female here for Molluscum  .  Is the condition better or worse? Better  Current treatment: imiquimod 5% cream 3 nights weekly, Candida antigen   Location: buttock    New skin problems: none      Past Medical History:   Diagnosis Date   • Negative History of CA, HTN, DM, CAD, CVA, DVT, Asthma NONE       Current Outpatient Medications   Medication   • imiquimod (ALDARA) 5 % cream   • norethindrone-ethinyl estradiol-iron (JUNEL FE 1.5/30) 1.5-30 MG-MCG tablet     No current facility-administered medications for this visit.        EXAM:  The patient is pleasant, well appearing, no distress.  Mood and affect are appropriate.  Oriented to person, place, time.  Multiple umbilicated papules located on the buttocks thighs and vulva. Some are inflamed.    IMPRESSION AND PLAN:  Molluscum contagiosum, improving    Increase  imiquimod 5% cream to 5 nights weekly. Anticipate inflammatory reaction discussed.   injected with 0.3 ML of Candida antigen into 2 lesions. Well-tolerated. Patient declines cryotherapy.    Written advice regarding photoprotection to reduce skin cancer risk and advice on safe  vitamin D sources provided.      FOLLOW UP:  One month    On 3/1/2019, Sabina CACERES RMA scribed the services personally performed by Luanne Ferguson MD The documentation recorded by the scribe accurately and completely reflects the service(s) I personally performed and the decisions made by me.             mcg/0.5mL  03/17/2021   Second Dose COVID-19, MODERNA BLUE border, Primary or Immunocompromised, (age 12y+), IM, 100 mcg/0.5mL   05/09/2021       Last COVID Lab SARS-CoV-2 (no units)   Date Value   07/27/2022 Not Detected     POC BUN (mg/dl)   Date Value   02/16/2013 9     POC Chloride (mEq/L)   Date Value   02/16/2013 103     POC CO2 (mEq/L)   Date Value   02/16/2013 24     POC Creatinine (mg/dl)   Date Value   02/16/2013 0.6 (L)     POC Glucose (mg/dl)   Date Value   01/30/2024 235 (H)     POC Ionized Calcium (mmol/L)   Date Value   02/16/2013 1.10 (L)     POC Potassium (mEq/L)   Date Value   02/16/2013 3.3 (L)     POC Sodium (mEq/L)   Date Value   02/16/2013 141            Assessment:     The patient is a 58 y.o. old male who  has a past medical history of Anxiety state, unspecified, Calculus of kidney, Chronic pain, Deaf, Diabetes mellitus (HCC), Diverticula, Diverticulitis, GERD (gastroesophageal reflux disease), Hordeolum externum, Hyperlipidemia, Hypertension, Insomnia, unspecified, Kidney stones, Meniere disease, MRSA carrier, Pancreatitis, chronic (HCC), PONV (postoperative nausea and vomiting), Psychiatric problem, Sleep apnea, and Unspecified hypertrophic and atrophic condition of skin. with following problems:    Infected abdominal mesh, s/p surgical removal of the infected mesh on 1/15/2024-surgical cultures are growing Streptococcus, Enterococcus faecalis, Citrobacter, anaerobes - covered with iv zosyn  Leukocytosis on admission- improving slowly  Type 2 diabetes mellitus  History of colostomy reversal  Obstructive sleep apnea  Essential hypertension- BP ok  Mixed hyperlipidemia  History of MRSA  History of depression  Obesity Class 2 due to excess calorie intake : Body mass index is 37.83 kg/m².        Discussion:      The patient is afebrile.  The patient is on IV Zosyn.  He is tolerating the antibiotics okay.  White cell count is 17,400 today.    Surgical cultures from January 25 reviewed again.  No  stomach with the side port   probably just above the EG junction.  Recommend readjusting the gastric tube   tip into the distal stomach for more physiologic positioning.      Questionable diffuse mild ileus.      Linear radiopaque catheter tubing projecting along the right lower quadrant   which is of uncertain etiology.  Recommend clinical follow-up correlating   with prior surgical history.      Status post cholecystectomy             Medications: All current and past medications were reviewed.     thiamine (B-1) 100 mg in sodium chloride 0.9 % 100 mL IVPB  100 mg IntraVENous Q24H    insulin lispro  0-16 Units SubCUTAneous Q4H    miconazole   Topical BID    [START ON 2/1/2024] fat emulsion  250 mL IntraVENous Once per day on Mon Thu    lidocaine 1 % injection  5 mL IntraDERmal Once    sodium chloride flush  5-40 mL IntraVENous 2 times per day    sodium chloride flush  5-40 mL IntraVENous 2 times per day    enoxaparin  30 mg SubCUTAneous BID    pantoprazole  40 mg IntraVENous Daily    piperacillin-tazobactam  3,375 mg IntraVENous Q8H        PN-Adult Premix 5/20 - Standard Electrolytes - Central Line      PN-Adult Premix 5/20 - Standard Electrolytes - Central Line 40 mL/hr at 01/29/24 1839    lactated ringers IV soln 75 mL/hr at 01/29/24 1349    sodium chloride      sodium chloride      dextrose         HYDROmorphone **OR** HYDROmorphone, magnesium sulfate, potassium chloride, benzocaine-menthol, phenol, sodium chloride flush, sodium chloride, sodium chloride flush, sodium chloride, ondansetron, diazePAM, glucose, dextrose bolus **OR** dextrose bolus, glucagon (rDNA), dextrose, labetalol      Problem list:       Patient Active Problem List   Diagnosis Code    Mixed hyperlipidemia E78.2    Essential hypertension I10    Chronic pancreatitis K86.1    Chronic pain syndrome G89.4    Esophageal reflux K21.9    IZABELA (obstructive sleep apnea) G47.33    Allergic rhinitis J30.9    Moderate episode of recurrent major depressive  Clinic days:  Tuesday & Thursday a.m.  Virtual clinic days: Monday, Wednesday & Friday a.m.

## 2024-01-31 LAB
ALBUMIN SERPL-MCNC: 2.5 G/DL (ref 3.4–5)
ANION GAP SERPL CALCULATED.3IONS-SCNC: 6 MMOL/L (ref 3–16)
BASOPHILS # BLD: 0.1 K/UL (ref 0–0.2)
BASOPHILS NFR BLD: 0.5 %
BUN SERPL-MCNC: 7 MG/DL (ref 7–20)
CALCIUM SERPL-MCNC: 8.1 MG/DL (ref 8.3–10.6)
CHLORIDE SERPL-SCNC: 104 MMOL/L (ref 99–110)
CO2 SERPL-SCNC: 30 MMOL/L (ref 21–32)
CREAT SERPL-MCNC: <0.5 MG/DL (ref 0.9–1.3)
DEPRECATED RDW RBC AUTO: 15.8 % (ref 12.4–15.4)
EOSINOPHIL # BLD: 0.3 K/UL (ref 0–0.6)
EOSINOPHIL NFR BLD: 2.7 %
GFR SERPLBLD CREATININE-BSD FMLA CKD-EPI: >60 ML/MIN/{1.73_M2}
GLUCOSE BLD-MCNC: 205 MG/DL (ref 70–99)
GLUCOSE BLD-MCNC: 230 MG/DL (ref 70–99)
GLUCOSE BLD-MCNC: 241 MG/DL (ref 70–99)
GLUCOSE BLD-MCNC: 254 MG/DL (ref 70–99)
GLUCOSE BLD-MCNC: 282 MG/DL (ref 70–99)
GLUCOSE BLD-MCNC: 295 MG/DL (ref 70–99)
GLUCOSE BLD-MCNC: 300 MG/DL (ref 70–99)
GLUCOSE BLD-MCNC: 307 MG/DL (ref 70–99)
GLUCOSE SERPL-MCNC: 221 MG/DL (ref 70–99)
HCT VFR BLD AUTO: 33.2 % (ref 40.5–52.5)
HGB BLD-MCNC: 10.7 G/DL (ref 13.5–17.5)
LYMPHOCYTES # BLD: 1.9 K/UL (ref 1–5.1)
LYMPHOCYTES NFR BLD: 15.7 %
MAGNESIUM SERPL-MCNC: 1.9 MG/DL (ref 1.8–2.4)
MCH RBC QN AUTO: 25.2 PG (ref 26–34)
MCHC RBC AUTO-ENTMCNC: 32.1 G/DL (ref 31–36)
MCV RBC AUTO: 78.4 FL (ref 80–100)
MONOCYTES # BLD: 0.8 K/UL (ref 0–1.3)
MONOCYTES NFR BLD: 6.5 %
NEUTROPHILS # BLD: 9.1 K/UL (ref 1.7–7.7)
NEUTROPHILS NFR BLD: 74.6 %
PERFORMED ON: ABNORMAL
PHOSPHATE SERPL-MCNC: 2.3 MG/DL (ref 2.5–4.9)
PLATELET # BLD AUTO: 442 K/UL (ref 135–450)
PMV BLD AUTO: 7.9 FL (ref 5–10.5)
POTASSIUM SERPL-SCNC: 3.1 MMOL/L (ref 3.5–5.1)
RBC # BLD AUTO: 4.23 M/UL (ref 4.2–5.9)
SODIUM SERPL-SCNC: 140 MMOL/L (ref 136–145)
WBC # BLD AUTO: 12.2 K/UL (ref 4–11)

## 2024-01-31 PROCEDURE — 99024 POSTOP FOLLOW-UP VISIT: CPT | Performed by: SURGERY

## 2024-01-31 PROCEDURE — 1200000000 HC SEMI PRIVATE

## 2024-01-31 PROCEDURE — 80069 RENAL FUNCTION PANEL: CPT

## 2024-01-31 PROCEDURE — 6360000002 HC RX W HCPCS: Performed by: NURSE PRACTITIONER

## 2024-01-31 PROCEDURE — 6360000002 HC RX W HCPCS: Performed by: SURGERY

## 2024-01-31 PROCEDURE — APPNB30 APP NON BILLABLE TIME 0-30 MINS: Performed by: NURSE PRACTITIONER

## 2024-01-31 PROCEDURE — 6360000002 HC RX W HCPCS: Performed by: INTERNAL MEDICINE

## 2024-01-31 PROCEDURE — 2500000003 HC RX 250 WO HCPCS: Performed by: SURGERY

## 2024-01-31 PROCEDURE — 6370000000 HC RX 637 (ALT 250 FOR IP): Performed by: STUDENT IN AN ORGANIZED HEALTH CARE EDUCATION/TRAINING PROGRAM

## 2024-01-31 PROCEDURE — 83735 ASSAY OF MAGNESIUM: CPT

## 2024-01-31 PROCEDURE — 2580000003 HC RX 258: Performed by: NURSE PRACTITIONER

## 2024-01-31 PROCEDURE — APPSS15 APP SPLIT SHARED TIME 0-15 MINUTES: Performed by: NURSE PRACTITIONER

## 2024-01-31 PROCEDURE — 99232 SBSQ HOSP IP/OBS MODERATE 35: CPT | Performed by: INTERNAL MEDICINE

## 2024-01-31 PROCEDURE — 6370000000 HC RX 637 (ALT 250 FOR IP): Performed by: SURGERY

## 2024-01-31 PROCEDURE — 2500000003 HC RX 250 WO HCPCS: Performed by: NURSE PRACTITIONER

## 2024-01-31 PROCEDURE — 2580000003 HC RX 258: Performed by: SURGERY

## 2024-01-31 PROCEDURE — C9113 INJ PANTOPRAZOLE SODIUM, VIA: HCPCS | Performed by: SURGERY

## 2024-01-31 PROCEDURE — 85025 COMPLETE CBC W/AUTO DIFF WBC: CPT

## 2024-01-31 RX ORDER — POTASSIUM CHLORIDE 29.8 MG/ML
20 INJECTION INTRAVENOUS ONCE
Status: COMPLETED | OUTPATIENT
Start: 2024-01-31 | End: 2024-01-31

## 2024-01-31 RX ORDER — INSULIN GLARGINE 100 [IU]/ML
6 INJECTION, SOLUTION SUBCUTANEOUS 2 TIMES DAILY
Status: DISCONTINUED | OUTPATIENT
Start: 2024-01-31 | End: 2024-02-01

## 2024-01-31 RX ORDER — INSULIN GLARGINE 100 [IU]/ML
6 INJECTION, SOLUTION SUBCUTANEOUS DAILY
Status: DISCONTINUED | OUTPATIENT
Start: 2024-01-31 | End: 2024-01-31

## 2024-01-31 RX ADMIN — PIPERACILLIN AND TAZOBACTAM 3375 MG: 3; .375 INJECTION, POWDER, FOR SOLUTION INTRAVENOUS at 16:57

## 2024-01-31 RX ADMIN — INSULIN GLARGINE 6 UNITS: 100 INJECTION, SOLUTION SUBCUTANEOUS at 20:06

## 2024-01-31 RX ADMIN — INSULIN LISPRO 4 UNITS: 100 INJECTION, SOLUTION INTRAVENOUS; SUBCUTANEOUS at 06:37

## 2024-01-31 RX ADMIN — HYDROMORPHONE HYDROCHLORIDE 1 MG: 1 INJECTION, SOLUTION INTRAMUSCULAR; INTRAVENOUS; SUBCUTANEOUS at 22:49

## 2024-01-31 RX ADMIN — INSULIN LISPRO 8 UNITS: 100 INJECTION, SOLUTION INTRAVENOUS; SUBCUTANEOUS at 14:55

## 2024-01-31 RX ADMIN — POTASSIUM PHOSPHATES 15 MMOL: 236; 224 INJECTION, SOLUTION INTRAVENOUS at 17:03

## 2024-01-31 RX ADMIN — DIAZEPAM 2.5 MG: 5 INJECTION, SOLUTION INTRAMUSCULAR; INTRAVENOUS at 01:54

## 2024-01-31 RX ADMIN — INSULIN LISPRO 12 UNITS: 100 INJECTION, SOLUTION INTRAVENOUS; SUBCUTANEOUS at 18:22

## 2024-01-31 RX ADMIN — HYDROMORPHONE HYDROCHLORIDE 1 MG: 1 INJECTION, SOLUTION INTRAMUSCULAR; INTRAVENOUS; SUBCUTANEOUS at 09:32

## 2024-01-31 RX ADMIN — HYDROMORPHONE HYDROCHLORIDE 1 MG: 1 INJECTION, SOLUTION INTRAMUSCULAR; INTRAVENOUS; SUBCUTANEOUS at 20:06

## 2024-01-31 RX ADMIN — PANTOPRAZOLE SODIUM 40 MG: 40 INJECTION, POWDER, FOR SOLUTION INTRAVENOUS at 09:38

## 2024-01-31 RX ADMIN — THIAMINE HYDROCHLORIDE 100 MG: 100 INJECTION, SOLUTION INTRAMUSCULAR; INTRAVENOUS at 14:55

## 2024-01-31 RX ADMIN — HYDROMORPHONE HYDROCHLORIDE 1 MG: 1 INJECTION, SOLUTION INTRAMUSCULAR; INTRAVENOUS; SUBCUTANEOUS at 12:29

## 2024-01-31 RX ADMIN — INSULIN GLARGINE 6 UNITS: 100 INJECTION, SOLUTION SUBCUTANEOUS at 12:14

## 2024-01-31 RX ADMIN — POTASSIUM CHLORIDE 20 MEQ: 29.8 INJECTION, SOLUTION INTRAVENOUS at 12:05

## 2024-01-31 RX ADMIN — ASCORBIC ACID, VITAMIN A PALMITATE, CHOLECALCIFEROL, THIAMINE HYDROCHLORIDE, RIBOFLAVIN-5 PHOSPHATE SODIUM, PYRIDOXINE HYDROCHLORIDE, NIACINAMIDE, DEXPANTHENOL, ALPHA-TOCOPHEROL ACETATE, VITAMIN K1, FOLIC ACID, BIOTIN, CYANOCOBALAMIN: 200; 3300; 200; 6; 3.6; 6; 40; 15; 10; 150; 600; 60; 5 INJECTION, SOLUTION INTRAVENOUS at 18:05

## 2024-01-31 RX ADMIN — HYDROMORPHONE HYDROCHLORIDE 1 MG: 1 INJECTION, SOLUTION INTRAMUSCULAR; INTRAVENOUS; SUBCUTANEOUS at 17:58

## 2024-01-31 RX ADMIN — HYDROMORPHONE HYDROCHLORIDE 1 MG: 1 INJECTION, SOLUTION INTRAMUSCULAR; INTRAVENOUS; SUBCUTANEOUS at 01:55

## 2024-01-31 RX ADMIN — INSULIN LISPRO 8 UNITS: 100 INJECTION, SOLUTION INTRAVENOUS; SUBCUTANEOUS at 01:54

## 2024-01-31 RX ADMIN — HYDROMORPHONE HYDROCHLORIDE 1 MG: 1 INJECTION, SOLUTION INTRAMUSCULAR; INTRAVENOUS; SUBCUTANEOUS at 14:54

## 2024-01-31 RX ADMIN — PIPERACILLIN AND TAZOBACTAM 3375 MG: 3; .375 INJECTION, POWDER, FOR SOLUTION INTRAVENOUS at 09:36

## 2024-01-31 RX ADMIN — HYDROMORPHONE HYDROCHLORIDE 1 MG: 1 INJECTION, SOLUTION INTRAMUSCULAR; INTRAVENOUS; SUBCUTANEOUS at 04:24

## 2024-01-31 RX ADMIN — MICONAZOLE NITRATE: 20 POWDER TOPICAL at 09:39

## 2024-01-31 RX ADMIN — INSULIN LISPRO 12 UNITS: 100 INJECTION, SOLUTION INTRAVENOUS; SUBCUTANEOUS at 22:49

## 2024-01-31 RX ADMIN — INSULIN LISPRO 4 UNITS: 100 INJECTION, SOLUTION INTRAVENOUS; SUBCUTANEOUS at 09:58

## 2024-01-31 RX ADMIN — PIPERACILLIN AND TAZOBACTAM 3375 MG: 3; .375 INJECTION, POWDER, FOR SOLUTION INTRAVENOUS at 00:06

## 2024-01-31 RX ADMIN — ENOXAPARIN SODIUM 30 MG: 100 INJECTION SUBCUTANEOUS at 20:05

## 2024-01-31 RX ADMIN — HYDROMORPHONE HYDROCHLORIDE 1 MG: 1 INJECTION, SOLUTION INTRAMUSCULAR; INTRAVENOUS; SUBCUTANEOUS at 06:38

## 2024-01-31 RX ADMIN — ENOXAPARIN SODIUM 30 MG: 100 INJECTION SUBCUTANEOUS at 09:38

## 2024-01-31 ASSESSMENT — ENCOUNTER SYMPTOMS
COUGH: 0
RHINORRHEA: 0
ABDOMINAL PAIN: 0
EYE REDNESS: 0
DIARRHEA: 0
NAUSEA: 0
SINUS PAIN: 0
CONSTIPATION: 0
SHORTNESS OF BREATH: 0
EYE DISCHARGE: 0
BACK PAIN: 0
WHEEZING: 0
SORE THROAT: 0
SINUS PRESSURE: 0

## 2024-01-31 ASSESSMENT — PAIN DESCRIPTION - ORIENTATION
ORIENTATION: MID

## 2024-01-31 ASSESSMENT — PAIN SCALES - GENERAL
PAINLEVEL_OUTOF10: 7
PAINLEVEL_OUTOF10: 5
PAINLEVEL_OUTOF10: 7

## 2024-01-31 ASSESSMENT — PAIN DESCRIPTION - LOCATION
LOCATION: ABDOMEN

## 2024-01-31 ASSESSMENT — PAIN DESCRIPTION - DESCRIPTORS
DESCRIPTORS: ACHING

## 2024-01-31 NOTE — PLAN OF CARE

## 2024-01-31 NOTE — PROGRESS NOTES
Assessment complete. VSS. Abdominal incision is CD&I, patient rates abdominal pain 7/10. POC discussed with patient and agreed upon mutually. Residual check on NG is 40ml. Will continue to clamp per order. Call light within reach. Will continue to monitor  Mattie Bhagat RN

## 2024-01-31 NOTE — PROGRESS NOTES
[facility]        Progress Note    Patient Name: Deshaun Ayers  MRN: [id number]  YOB: 1965  Date of Evaluation: 2024    Subjective:  No acute events overnight  Pain controlled  No nausea or vomiting with NGT clamped, low residual output  Passing flatus and stool, denies bloating  Resting in bed at this time    Post-Operative Day #6      Vital Signs:  Patient Vitals for the past 24 hrs:   BP Temp Temp src Pulse Resp SpO2   24 1321 113/70 97.8 °F (36.6 °C) Oral 77 17 95 %   24 1259 -- -- -- -- 18 --   24 1229 -- -- -- -- 18 --   24 0915 (!) 162/87 97.7 °F (36.5 °C) Oral 72 18 100 %   24 0638 -- -- -- -- 18 --   24 0512 (!) 141/79 97.8 °F (36.6 °C) Oral 71 18 98 %   24 0454 -- -- -- -- 18 --   24 0424 -- -- -- -- 18 --   24 0225 -- -- -- -- 18 --   24 0003 -- -- -- -- 16 --   24 2332 (!) 143/100 97.7 °F (36.5 °C) Axillary 76 16 98 %   245 -- -- -- -- 16 --   24 1956 (!) 157/81 97.7 °F (36.5 °C) Axillary 77 16 96 %   24 1730 -- -- -- 86 -- 97 %   24 1723 (!) 146/79 98.5 °F (36.9 °C) Oral (!) 168 17 (!) 82 %        TEMPERATURE HISTORY 24H: Temp (24hrs), Av.9 °F (36.6 °C), Min:97.7 °F (36.5 °C), Max:98.5 °F (36.9 °C)    BLOOD PRESSURE HISTORY: Systolic (36hrs), Av , Min:113 , Max:162    Diastolic (36hrs), Av, Min:70, Max:100      Intake/Output:  I/O last 3 completed shifts:  In: -   Out: 7500 [Urine:3100; Emesis/NG output:4400]  I/O this shift:  In: -   Out: 275 [Urine:275]  Drain/tube Output:       Physical Exam:  General: awake, alert, oriented to person, place, time  Lungs: unlabored respirations  Abdomen: soft, non-distended, incisional tenderness only, bowel sounds present  Skin/Wound: open midline abdominal wound bed is clean, no drainage, retention sutures in place, improved appearance of rash at lower aspect of wound--dressing  Line 60 mL/hr at 01/30/24 1811    sodium chloride      sodium chloride      dextrose       PRN Meds:.HYDROmorphone **OR** HYDROmorphone, magnesium sulfate, potassium chloride, benzocaine-menthol, phenol, sodium chloride flush, sodium chloride, sodium chloride flush, sodium chloride, ondansetron, diazePAM, glucose, dextrose bolus **OR** dextrose bolus, glucagon (rDNA), dextrose, labetalol      Assessment:  58 y.o. male admitted with   1. 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 Pain controlled, incisional tenderness only, wound bed is clean--improved appearance of rash along lower aspect of wound, no nausea or vomiting with NGT clamped, low residual output, passing flatus and stool, vitals/labs stable--decreasing leukocytosis; continued supportive care, local wound care--no plans for secondary closure at this time, keep abdominal binder in place, remove NGT  2. Start clear liquid diet with supplements as tolerated; monitor bowel function  3. Continue TPN, stop IV hydration; monitor and correct electrolytes  4. Antibiotics per ID  5. Activity as tolerated, ambulate TID--PT/OT following  6. Pulmonary toilet, incentive spirometry  7. PRN analgesics and antiemetics--minimizing narcotics as tolerated  8. DVT prophylaxis with  Lovenox and SCD's  9. Management of medical comorbid etiologies per consulting services  10. Disposition: Anticipate discharge home in the next 2-3 days    EDUCATION:  Educated patient on plan of care and disease process--all questions answered.    Plans discussed with patient and nursing.  Reviewed and discussed with Dr. Toscano.      Signed:  JOELLE Champagne -

## 2024-01-31 NOTE — PROGRESS NOTES
V2.0    [facility] Progress Note      Name:  Deshaun Ayers /Age/Sex: 1965  (58 y.o. male)   MRN & CSN:  [id number] & [id number] Encounter Date/Time: 2024 7:35 AM EST   Location:  [address] [place], [place] [address]290/4480-01 PCP: Ginger Anguiano MD     Attending:Russel Toscano MD       Hospital Day: 7    Assessment and Recommendations   Deshaun Ayers is a 58 y.o. male with pmh of T2DM, HLD, HTN, IZABELA, diverticulitis with complications s/p mesh in  leading to prolonged hospitalization, ICU management at [facility] who was admitted for exploratory laparotomy, excision of infected abdominal wall mesh, lysis of adhesions and small bowel resection.       Plan:   Infected mesh  Patient is s/p exploratory laparotomy, excision of infected abdominal wall mesh, lysis of adhesions and small bowel resection. Patient had diverticulitis with complications s/p mesh in  leading to prolonged hospitalization, ICU management at [facility]  - Continue zosyn  - Pain control  -General surgery following     T2DM -uncontrolled  Patient's last HgbA1c from 2024 = 8.5%  Lantus 6 U daily  High dose sliding scale  At home patient is on insulin, metformin, Jardiance, Amaryl     HTN  At home patient is on Hyzaar     HLD  At home patient is on rosuvastatin      Diet Diet NPO Exceptions are: Ice Chips, Popsicles  PN-Adult Premix 5/20 - Standard Electrolytes - Central Line   DVT Prophylaxis [x] Lovenox, []  Heparin, [] SCDs, [] Ambulation,  [] Eliquis, [] Xarelto  [] Coumadin   Code Status Full Code   Disposition Estimated Date of Discharge: 3-4 days  Patient requires continued admission due to infected mesh.   Surrogate Decision Maker/ POA       Personally reviewed Lab Studies and Imaging         Subjective:     Chief Complaint: infected mesh removal    Deshaun Ayers is a 58 y.o. male who presents with infected mesh removal.    Patient was seen and examined this morning. No acute event overnight.    Patient endorses abdominal pain. Mentions  that his abdominal pain is improving. Patient denies fevers, chills, nausea, vomiting, chest pain, shortness of breath, diarrhea, constipation, dysuria, urinary frequency or urgency.      Review of Systems:      Pertinent positives and negatives discussed in HPI    Objective:     Intake/Output Summary (Last 24 hours) at 1/31/2024 0752  Last data filed at 1/31/2024 0424  Gross per 24 hour   Intake --   Output 2600 ml   Net -2600 ml        Vitals:   Vitals:    01/31/24 0424 01/31/24 0454 01/31/24 0512 01/31/24 0638   BP:   (!) 141/79    Pulse:   71    Resp: 18 18 18 18   Temp:   97.8 °F (36.6 °C)    TempSrc:   Oral    SpO2:   98%    Weight:       Height:             Physical Exam:      General: NAD  Eyes: EOMI  ENT: neck supple, NGT in place  Cardiovascular: Regular rate.  Respiratory: Clear to auscultation  Gastrointestinal: abdominal binder  Genitourinary: no suprapubic tenderness  Musculoskeletal: No edema  Skin: warm, dry  Neuro: Alert.  Psych: Mood appropriate.         Medications:   Medications:    thiamine (B-1) 100 mg in sodium chloride 0.9 % 100 mL IVPB  100 mg IntraVENous Q24H    insulin lispro  0-16 Units SubCUTAneous Q4H    miconazole   Topical BID    [START ON 2/1/2024] fat emulsion  250 mL IntraVENous Once per day on Mon Thu    lidocaine 1 % injection  5 mL IntraDERmal Once    sodium chloride flush  5-40 mL IntraVENous 2 times per day    sodium chloride flush  5-40 mL IntraVENous 2 times per day    enoxaparin  30 mg SubCUTAneous BID    pantoprazole  40 mg IntraVENous Daily    piperacillin-tazobactam  3,375 mg IntraVENous Q8H      Infusions:    PN-Adult Premix 5/20 - Standard Electrolytes - Central Line 60 mL/hr at 01/30/24 1811    lactated ringers IV soln 50 mL/hr at 01/30/24 2018    sodium chloride      sodium chloride      dextrose       PRN Meds: HYDROmorphone, 0.5 mg, Q2H PRN   Or  HYDROmorphone, 1 mg, Q2H PRN  magnesium sulfate, 2,000 mg, PRN  potassium chloride, 20 mEq, PRN  benzocaine-menthol, 1

## 2024-01-31 NOTE — PROGRESS NOTES
Physical Therapy  Pt kindly declined therapy as this time.  Pt states he will walk later.    Will follow-up as able.    Thanks, Tiffany Walker PT, DPT [id number]

## 2024-01-31 NOTE — PROGRESS NOTES
Infectious Diseases   Progress Note      Admission Date: 1/25/2024  Hospital Day: Hospital Day: 7   Attending: Russel Toscano MD  Date of service: 1/31/2024     Chief complaint/ Reason for consult:     Infected abdominal mesh, s/p surgical removal of the infected mesh on 1/15/2024-surgical cultures are growing Streptococcus, Enterococcus faecalis, Citrobacter, anaerobes  Leukocytosis on admission  Type 2 diabetes mellitus  History of colostomy reversal  Obstructive sleep apnea    Microbiology:        I have reviewed allavailable micro lab data and cultures    Abdominal surgical culture  - collected on 1/25/2024: Group B streptococcus, Enterococcus faecalis, Citrobacter, Prevotella    Susceptibility    Citrobacter freundii (1)    Antibiotic Interpretation Microscan  Method Status    ceFAZolin Resistant >=64 mcg/mL BACTERIAL SUSCEPTIBILITY PANEL BY JOSE GUADALUPE     cefTRIAXone Sensitive <=0.25 mcg/mL BACTERIAL SUSCEPTIBILITY PANEL BY JOSE GUADALUPE     ciprofloxacin Sensitive <=0.25 mcg/mL BACTERIAL SUSCEPTIBILITY PANEL BY JOSE GUADALUPE     ertapenem Sensitive <=0.12 mcg/mL BACTERIAL SUSCEPTIBILITY PANEL BY JOSE GUADALUPE     gentamicin Sensitive <=1 mcg/mL BACTERIAL SUSCEPTIBILITY PANEL BY JOSE GUADALUPE     levofloxacin Sensitive <=0.12 mcg/mL BACTERIAL SUSCEPTIBILITY PANEL BY JOSE GUADALUPE     piperacillin-tazobactam Sensitive <=4 mcg/mL BACTERIAL SUSCEPTIBILITY PANEL BY JOSE GUADALUPE     trimethoprim-sulfamethoxazole Sensitive <=20 mcg/mL BACTERIAL SUSCEPTIBILITY PANEL BY JOSE GUADALUPE     Enterococcus faecalis (2)      Antibiotic Interpretation Microscan  Method Status    ampicillin Sensitive <=2 mcg/mL BACTERIAL SUSCEPTIBILITY PANEL BY JOSE GUADALUPE     vancomycin Sensitive 1 mcg/mL BACTERIAL SUSCEPTIBILITY PANEL BY JOSE GUADALUPE       Antibiotics and immunizations:       Current antibiotics: All antibiotics and their doses were reviewed by me    Recent Abx Admin                     piperacillin-tazobactam (ZOSYN) 3,375 mg in sodium chloride 0.9 % 50 mL IVPB (mini-bag) (mg) 3,375 mg New Bag 01/31/24    Neck:      Thyroid: No thyromegaly.   Cardiovascular:      Rate and Rhythm: Normal rate and regular rhythm.      Heart sounds: Normal heart sounds. No murmur heard.     No friction rub.   Pulmonary:      Effort: No respiratory distress.      Breath sounds: No stridor. No wheezing or rales.   Abdominal:      General: Bowel sounds are normal.      Palpations: Abdomen is soft.      Tenderness: There is no abdominal tenderness. There is no guarding or rebound.      Comments: Abdominal surgical dressing noted   Musculoskeletal:         General: No swelling, tenderness or deformity. Normal range of motion.      Cervical back: Normal range of motion and neck supple.      Right lower leg: No edema.      Left lower leg: No edema.   Lymphadenopathy:      Cervical: No cervical adenopathy.   Skin:     General: Skin is warm and dry.      Coloration: Skin is not jaundiced.      Findings: No bruising, erythema or rash.   Neurological:      General: No focal deficit present.      Mental Status: He is alert and oriented to person, place, and time. Mental status is at baseline.      Motor: No abnormal muscle tone.   Psychiatric:         Mood and Affect: Mood normal.         Behavior: Behavior normal.            Lines and drains: All vascular access sites are healthy with no local erythema, discharge or tenderness.      Intake and output:    I/O last 3 completed shifts:  In: -   Out: 7500 [Urine:3100; Emesis/NG output:4400]    Lab Data:   All available labs and old records have been reviewed by me.    CBC:  Recent Labs     01/29/24  1210 01/31/24  0550   WBC 13.4* 12.2*   RBC 4.09* 4.23   HGB 10.4* 10.7*   HCT 32.7* 33.2*   * 442   MCV 79.9* 78.4*   MCH 25.4* 25.2*   MCHC 31.8 32.1   RDW 15.5* 15.8*          BMP:  Recent Labs     01/29/24  1210 01/30/24  0500 01/31/24  0550    142 140   K 3.4* 3.4* 3.1*    107 104   CO2 21 26 30   BUN 11 8 7   CREATININE <0.5* <0.5* <0.5*   CALCIUM 8.3 7.9* 8.1*   GLUCOSE 189*  A49.8    Citrobacter infection A49.8    Enterococcus faecalis infection A49.8    E coli infection A49.8       Please note that this chart was generated using Dragon dictation software. Although every effort was made to ensure the accuracy of this automated transcription, some errors in transcription may have occurred inadvertently. If you may need any clarification, please do not hesitate to contact me through EPIC or at the phone number provided below with my electronic signature.  Any pictures or media included in this note were obtained after taking informed verbal consent from the patient and with their approval to include those in the patient's medical record.        Rigoberto Poe MD, MPH, FACP, FIDSA  1/31/2024, 2:00 PM  [facility] Infectious Disease   [address] ([email or web address] [facility]  [place] [address]  Office: [phone]  Fax: [phone]  In-person Clinic days:  Tuesday & Thursday a.m.  Virtual clinic days: Monday, Wednesday & Friday a.m.

## 2024-02-01 LAB
ALBUMIN SERPL-MCNC: 2.5 G/DL (ref 3.4–5)
ANION GAP SERPL CALCULATED.3IONS-SCNC: 9 MMOL/L (ref 3–16)
BASOPHILS # BLD: 0.1 K/UL (ref 0–0.2)
BASOPHILS NFR BLD: 1.1 %
BUN SERPL-MCNC: 7 MG/DL (ref 7–20)
CALCIUM SERPL-MCNC: 8.3 MG/DL (ref 8.3–10.6)
CHLORIDE SERPL-SCNC: 105 MMOL/L (ref 99–110)
CO2 SERPL-SCNC: 28 MMOL/L (ref 21–32)
CREAT SERPL-MCNC: <0.5 MG/DL (ref 0.9–1.3)
DEPRECATED RDW RBC AUTO: 15.5 % (ref 12.4–15.4)
EOSINOPHIL # BLD: 0.3 K/UL (ref 0–0.6)
EOSINOPHIL NFR BLD: 2.4 %
GFR SERPLBLD CREATININE-BSD FMLA CKD-EPI: >60 ML/MIN/{1.73_M2}
GLUCOSE BLD-MCNC: 253 MG/DL (ref 70–99)
GLUCOSE BLD-MCNC: 275 MG/DL (ref 70–99)
GLUCOSE BLD-MCNC: 278 MG/DL (ref 70–99)
GLUCOSE BLD-MCNC: 279 MG/DL (ref 70–99)
GLUCOSE BLD-MCNC: 292 MG/DL (ref 70–99)
GLUCOSE BLD-MCNC: 304 MG/DL (ref 70–99)
GLUCOSE BLD-MCNC: 314 MG/DL (ref 70–99)
GLUCOSE BLD-MCNC: 367 MG/DL (ref 70–99)
GLUCOSE SERPL-MCNC: 216 MG/DL (ref 70–99)
HCT VFR BLD AUTO: 33.8 % (ref 40.5–52.5)
HGB BLD-MCNC: 10.9 G/DL (ref 13.5–17.5)
LYMPHOCYTES # BLD: 2.3 K/UL (ref 1–5.1)
LYMPHOCYTES NFR BLD: 19.5 %
MAGNESIUM SERPL-MCNC: 2 MG/DL (ref 1.8–2.4)
MCH RBC QN AUTO: 25.3 PG (ref 26–34)
MCHC RBC AUTO-ENTMCNC: 32.3 G/DL (ref 31–36)
MCV RBC AUTO: 78.2 FL (ref 80–100)
MONOCYTES # BLD: 0.8 K/UL (ref 0–1.3)
MONOCYTES NFR BLD: 6.9 %
NEUTROPHILS # BLD: 8.3 K/UL (ref 1.7–7.7)
NEUTROPHILS NFR BLD: 70.1 %
PERFORMED ON: ABNORMAL
PHOSPHATE SERPL-MCNC: 3 MG/DL (ref 2.5–4.9)
PLATELET # BLD AUTO: 454 K/UL (ref 135–450)
PMV BLD AUTO: 7.7 FL (ref 5–10.5)
POTASSIUM SERPL-SCNC: 3 MMOL/L (ref 3.5–5.1)
RBC # BLD AUTO: 4.32 M/UL (ref 4.2–5.9)
SODIUM SERPL-SCNC: 142 MMOL/L (ref 136–145)
WBC # BLD AUTO: 11.9 K/UL (ref 4–11)

## 2024-02-01 PROCEDURE — 2580000003 HC RX 258: Performed by: SURGERY

## 2024-02-01 PROCEDURE — 6360000002 HC RX W HCPCS: Performed by: SURGERY

## 2024-02-01 PROCEDURE — 85025 COMPLETE CBC W/AUTO DIFF WBC: CPT

## 2024-02-01 PROCEDURE — 2500000003 HC RX 250 WO HCPCS: Performed by: SURGERY

## 2024-02-01 PROCEDURE — 6360000002 HC RX W HCPCS: Performed by: INTERNAL MEDICINE

## 2024-02-01 PROCEDURE — 83735 ASSAY OF MAGNESIUM: CPT

## 2024-02-01 PROCEDURE — 99232 SBSQ HOSP IP/OBS MODERATE 35: CPT | Performed by: INTERNAL MEDICINE

## 2024-02-01 PROCEDURE — 6360000002 HC RX W HCPCS: Performed by: STUDENT IN AN ORGANIZED HEALTH CARE EDUCATION/TRAINING PROGRAM

## 2024-02-01 PROCEDURE — 1200000000 HC SEMI PRIVATE

## 2024-02-01 PROCEDURE — 80069 RENAL FUNCTION PANEL: CPT

## 2024-02-01 PROCEDURE — 6370000000 HC RX 637 (ALT 250 FOR IP): Performed by: SURGERY

## 2024-02-01 PROCEDURE — 99024 POSTOP FOLLOW-UP VISIT: CPT | Performed by: SURGERY

## 2024-02-01 PROCEDURE — C9113 INJ PANTOPRAZOLE SODIUM, VIA: HCPCS | Performed by: SURGERY

## 2024-02-01 PROCEDURE — 6370000000 HC RX 637 (ALT 250 FOR IP): Performed by: STUDENT IN AN ORGANIZED HEALTH CARE EDUCATION/TRAINING PROGRAM

## 2024-02-01 PROCEDURE — 6360000002 HC RX W HCPCS: Performed by: NURSE PRACTITIONER

## 2024-02-01 RX ORDER — HYDROCODONE BITARTRATE AND ACETAMINOPHEN 5; 325 MG/1; MG/1
2 TABLET ORAL EVERY 4 HOURS PRN
Status: DISCONTINUED | OUTPATIENT
Start: 2024-02-01 | End: 2024-02-03 | Stop reason: HOSPADM

## 2024-02-01 RX ORDER — INSULIN LISPRO 100 [IU]/ML
5 INJECTION, SOLUTION INTRAVENOUS; SUBCUTANEOUS
Status: DISCONTINUED | OUTPATIENT
Start: 2024-02-01 | End: 2024-02-02

## 2024-02-01 RX ORDER — POTASSIUM CHLORIDE 29.8 MG/ML
20 INJECTION INTRAVENOUS 2 TIMES DAILY
Status: COMPLETED | OUTPATIENT
Start: 2024-02-01 | End: 2024-02-01

## 2024-02-01 RX ORDER — HYDROCODONE BITARTRATE AND ACETAMINOPHEN 5; 325 MG/1; MG/1
1 TABLET ORAL EVERY 4 HOURS PRN
Status: DISCONTINUED | OUTPATIENT
Start: 2024-02-01 | End: 2024-02-03 | Stop reason: HOSPADM

## 2024-02-01 RX ORDER — INSULIN GLARGINE 100 [IU]/ML
10 INJECTION, SOLUTION SUBCUTANEOUS 2 TIMES DAILY
Status: DISCONTINUED | OUTPATIENT
Start: 2024-02-01 | End: 2024-02-02

## 2024-02-01 RX ORDER — POTASSIUM CHLORIDE 29.8 MG/ML
20 INJECTION INTRAVENOUS ONCE
Status: DISCONTINUED | OUTPATIENT
Start: 2024-02-01 | End: 2024-02-01

## 2024-02-01 RX ADMIN — POTASSIUM CHLORIDE 20 MEQ: 29.8 INJECTION, SOLUTION INTRAVENOUS at 08:26

## 2024-02-01 RX ADMIN — INSULIN LISPRO 8 UNITS: 100 INJECTION, SOLUTION INTRAVENOUS; SUBCUTANEOUS at 06:36

## 2024-02-01 RX ADMIN — INSULIN LISPRO 5 UNITS: 100 INJECTION, SOLUTION INTRAVENOUS; SUBCUTANEOUS at 17:26

## 2024-02-01 RX ADMIN — PIPERACILLIN AND TAZOBACTAM 3375 MG: 3; .375 INJECTION, POWDER, FOR SOLUTION INTRAVENOUS at 23:56

## 2024-02-01 RX ADMIN — PIPERACILLIN AND TAZOBACTAM 3375 MG: 3; .375 INJECTION, POWDER, FOR SOLUTION INTRAVENOUS at 17:28

## 2024-02-01 RX ADMIN — MICONAZOLE NITRATE: 20 POWDER TOPICAL at 08:29

## 2024-02-01 RX ADMIN — HYDROMORPHONE HYDROCHLORIDE 1 MG: 1 INJECTION, SOLUTION INTRAMUSCULAR; INTRAVENOUS; SUBCUTANEOUS at 09:44

## 2024-02-01 RX ADMIN — PIPERACILLIN AND TAZOBACTAM 3375 MG: 3; .375 INJECTION, POWDER, FOR SOLUTION INTRAVENOUS at 08:31

## 2024-02-01 RX ADMIN — HYDROCODONE BITARTRATE AND ACETAMINOPHEN 2 TABLET: 5; 325 TABLET ORAL at 18:40

## 2024-02-01 RX ADMIN — PIPERACILLIN AND TAZOBACTAM 3375 MG: 3; .375 INJECTION, POWDER, FOR SOLUTION INTRAVENOUS at 01:21

## 2024-02-01 RX ADMIN — MICONAZOLE NITRATE: 20 POWDER TOPICAL at 20:26

## 2024-02-01 RX ADMIN — HYDROMORPHONE HYDROCHLORIDE 1 MG: 1 INJECTION, SOLUTION INTRAMUSCULAR; INTRAVENOUS; SUBCUTANEOUS at 11:55

## 2024-02-01 RX ADMIN — DIAZEPAM 2.5 MG: 5 INJECTION, SOLUTION INTRAMUSCULAR; INTRAVENOUS at 13:57

## 2024-02-01 RX ADMIN — HYDROMORPHONE HYDROCHLORIDE 1 MG: 1 INJECTION, SOLUTION INTRAMUSCULAR; INTRAVENOUS; SUBCUTANEOUS at 01:18

## 2024-02-01 RX ADMIN — HYDROMORPHONE HYDROCHLORIDE 1 MG: 1 INJECTION, SOLUTION INTRAMUSCULAR; INTRAVENOUS; SUBCUTANEOUS at 16:17

## 2024-02-01 RX ADMIN — HYDROMORPHONE HYDROCHLORIDE 1 MG: 1 INJECTION, SOLUTION INTRAMUSCULAR; INTRAVENOUS; SUBCUTANEOUS at 20:41

## 2024-02-01 RX ADMIN — POTASSIUM CHLORIDE 20 MEQ: 29.8 INJECTION, SOLUTION INTRAVENOUS at 09:30

## 2024-02-01 RX ADMIN — INSULIN GLARGINE 10 UNITS: 100 INJECTION, SOLUTION SUBCUTANEOUS at 20:25

## 2024-02-01 RX ADMIN — ENOXAPARIN SODIUM 30 MG: 100 INJECTION SUBCUTANEOUS at 08:29

## 2024-02-01 RX ADMIN — ASCORBIC ACID, VITAMIN A PALMITATE, CHOLECALCIFEROL, THIAMINE HYDROCHLORIDE, RIBOFLAVIN-5 PHOSPHATE SODIUM, PYRIDOXINE HYDROCHLORIDE, NIACINAMIDE, DEXPANTHENOL, ALPHA-TOCOPHEROL ACETATE, VITAMIN K1, FOLIC ACID, BIOTIN, CYANOCOBALAMIN: 200; 3300; 200; 6; 3.6; 6; 40; 15; 10; 150; 600; 60; 5 INJECTION, SOLUTION INTRAVENOUS at 18:43

## 2024-02-01 RX ADMIN — ENOXAPARIN SODIUM 30 MG: 100 INJECTION SUBCUTANEOUS at 20:25

## 2024-02-01 RX ADMIN — INSULIN LISPRO 16 UNITS: 100 INJECTION, SOLUTION INTRAVENOUS; SUBCUTANEOUS at 11:54

## 2024-02-01 RX ADMIN — HYDROMORPHONE HYDROCHLORIDE 1 MG: 1 INJECTION, SOLUTION INTRAMUSCULAR; INTRAVENOUS; SUBCUTANEOUS at 03:57

## 2024-02-01 RX ADMIN — MICONAZOLE NITRATE: 20 POWDER TOPICAL at 01:21

## 2024-02-01 RX ADMIN — INSULIN LISPRO 8 UNITS: 100 INJECTION, SOLUTION INTRAVENOUS; SUBCUTANEOUS at 02:43

## 2024-02-01 RX ADMIN — INSULIN LISPRO 12 UNITS: 100 INJECTION, SOLUTION INTRAVENOUS; SUBCUTANEOUS at 15:07

## 2024-02-01 RX ADMIN — INSULIN GLARGINE 6 UNITS: 100 INJECTION, SOLUTION SUBCUTANEOUS at 08:21

## 2024-02-01 RX ADMIN — INSULIN LISPRO 12 UNITS: 100 INJECTION, SOLUTION INTRAVENOUS; SUBCUTANEOUS at 22:09

## 2024-02-01 RX ADMIN — POTASSIUM CHLORIDE 20 MEQ: 29.8 INJECTION, SOLUTION INTRAVENOUS at 22:01

## 2024-02-01 RX ADMIN — Medication 10 ML: at 08:28

## 2024-02-01 RX ADMIN — HYDROCODONE BITARTRATE AND ACETAMINOPHEN 2 TABLET: 5; 325 TABLET ORAL at 23:30

## 2024-02-01 RX ADMIN — INSULIN LISPRO 8 UNITS: 100 INJECTION, SOLUTION INTRAVENOUS; SUBCUTANEOUS at 17:26

## 2024-02-01 RX ADMIN — HYDROMORPHONE HYDROCHLORIDE 1 MG: 1 INJECTION, SOLUTION INTRAMUSCULAR; INTRAVENOUS; SUBCUTANEOUS at 06:31

## 2024-02-01 RX ADMIN — SODIUM CHLORIDE, PRESERVATIVE FREE 10 ML: 5 INJECTION INTRAVENOUS at 08:30

## 2024-02-01 RX ADMIN — PANTOPRAZOLE SODIUM 40 MG: 40 INJECTION, POWDER, FOR SOLUTION INTRAVENOUS at 08:21

## 2024-02-01 RX ADMIN — I.V. FAT EMULSION 250 ML: 20 EMULSION INTRAVENOUS at 18:47

## 2024-02-01 RX ADMIN — THIAMINE HYDROCHLORIDE 100 MG: 100 INJECTION, SOLUTION INTRAMUSCULAR; INTRAVENOUS at 15:09

## 2024-02-01 ASSESSMENT — PAIN SCALES - GENERAL
PAINLEVEL_OUTOF10: 7
PAINLEVEL_OUTOF10: 6
PAINLEVEL_OUTOF10: 7
PAINLEVEL_OUTOF10: 0
PAINLEVEL_OUTOF10: 7
PAINLEVEL_OUTOF10: 0
PAINLEVEL_OUTOF10: 7
PAINLEVEL_OUTOF10: 0
PAINLEVEL_OUTOF10: 8
PAINLEVEL_OUTOF10: 7

## 2024-02-01 ASSESSMENT — PAIN DESCRIPTION - ORIENTATION
ORIENTATION: MID
ORIENTATION: RIGHT;LEFT
ORIENTATION: MID
ORIENTATION: RIGHT;LEFT
ORIENTATION: MID
ORIENTATION: MID

## 2024-02-01 ASSESSMENT — ENCOUNTER SYMPTOMS
SORE THROAT: 0
SHORTNESS OF BREATH: 0
SINUS PRESSURE: 0
WHEEZING: 0
BACK PAIN: 0
ABDOMINAL PAIN: 0
RHINORRHEA: 0
EYE DISCHARGE: 0
SINUS PAIN: 0
CONSTIPATION: 0
EYE REDNESS: 0
DIARRHEA: 0
NAUSEA: 0
COUGH: 0

## 2024-02-01 ASSESSMENT — PAIN DESCRIPTION - DIRECTION: RADIATING_TOWARDS: SIDE

## 2024-02-01 ASSESSMENT — PAIN DESCRIPTION - DESCRIPTORS
DESCRIPTORS: ACHING
DESCRIPTORS: ACHING
DESCRIPTORS: ACHING;SORE
DESCRIPTORS: ACHING;SORE;THROBBING
DESCRIPTORS: ACHING
DESCRIPTORS: ACHING;SORE;THROBBING

## 2024-02-01 ASSESSMENT — PAIN DESCRIPTION - LOCATION
LOCATION: ABDOMEN

## 2024-02-01 ASSESSMENT — PAIN - FUNCTIONAL ASSESSMENT
PAIN_FUNCTIONAL_ASSESSMENT: ACTIVITIES ARE NOT PREVENTED
PAIN_FUNCTIONAL_ASSESSMENT: ACTIVITIES ARE NOT PREVENTED
PAIN_FUNCTIONAL_ASSESSMENT: PREVENTS OR INTERFERES SOME ACTIVE ACTIVITIES AND ADLS
PAIN_FUNCTIONAL_ASSESSMENT: PREVENTS OR INTERFERES SOME ACTIVE ACTIVITIES AND ADLS
PAIN_FUNCTIONAL_ASSESSMENT: ACTIVITIES ARE NOT PREVENTED
PAIN_FUNCTIONAL_ASSESSMENT: ACTIVITIES ARE NOT PREVENTED

## 2024-02-01 ASSESSMENT — PAIN DESCRIPTION - FREQUENCY: FREQUENCY: INTERMITTENT

## 2024-02-01 ASSESSMENT — PAIN SCALES - WONG BAKER
WONGBAKER_NUMERICALRESPONSE: 0

## 2024-02-01 ASSESSMENT — PAIN DESCRIPTION - ONSET: ONSET: ON-GOING

## 2024-02-01 ASSESSMENT — PAIN DESCRIPTION - PAIN TYPE: TYPE: SURGICAL PAIN

## 2024-02-01 NOTE — PROGRESS NOTES
V2.0    [facility] [place]      Name:  Deshaun Ayers /Age/Sex: 1965  (58 y.o. male)   MRN & CSN:  [id number] & [id number] Encounter Date/Time: 2024 7:35 AM EST   Location:  [facility], [id number] PCP: Ginger Anguiano MD     Attending:Russel Toscano MD       Hospital Day: 8    Assessment and Recommendations   Deshaun Ayers is a 58 y.o. male with pmh of T2DM, HLD, HTN, IZABELA, diverticulitis with complications s/p mesh in  leading to prolonged hospitalization, ICU management at [facility] who was admitted for exploratory laparotomy, excision of infected abdominal wall mesh, lysis of adhesions and small bowel resection.       Plan:   Infected mesh  Patient is s/p exploratory laparotomy, excision of infected abdominal wall mesh, lysis of adhesions and small bowel resection. Patient had diverticulitis with complications s/p mesh in  leading to prolonged hospitalization, ICU management at [facility]  - Continue zosyn  - Pain control  -General surgery following     T2DM -uncontrolled  Patient's last HgbA1c from 2024 = 8.5%  Lantus 10 U BID  Lispro 5U TID  High dose sliding scale  At home patient is on insulin, metformin, Jardiance, Amaryl     HTN  At home patient is on Hyzaar     HLD  At home patient is on rosuvastatin      Diet PN-Adult Premix 20 - Standard Electrolytes - Central Line  ADULT DIET; Clear Liquid  ADULT ORAL NUTRITION SUPPLEMENT; Breakfast, Lunch, Dinner; Clear Liquid Oral Supplement   DVT Prophylaxis [x] Lovenox, []  Heparin, [] SCDs, [] Ambulation,  [] Eliquis, [] Xarelto  [] Coumadin   Code Status Full Code   Disposition Estimated Date of Discharge: 3-4 days  Patient requires continued admission due to infected mesh.   Surrogate Decision Maker/ POA       Personally reviewed Lab Studies and Imaging         Subjective:     Chief Complaint: infected mesh removal    Deshaun Ayers is a 58 y.o. male who presents with infected mesh removal.    Patient was seen and examined  mg, Q2H PRN  magnesium sulfate, 2,000 mg, PRN  potassium chloride, 20 mEq, PRN  benzocaine-menthol, 1 lozenge, Q2H PRN  phenol, 1 spray, Q2H PRN  sodium chloride flush, 5-40 mL, PRN  sodium chloride, 25 mL, PRN  sodium chloride flush, 5-40 mL, PRN  sodium chloride, , PRN  ondansetron, 4 mg, Q6H PRN  diazePAM, 2.5 mg, Q6H PRN  glucose, 4 tablet, PRN  dextrose bolus, 125 mL, PRN   Or  dextrose bolus, 250 mL, PRN  glucagon (rDNA), 1 mg, PRN  dextrose, , Continuous PRN  labetalol, 10 mg, Q6H PRN        Labs and Imaging   XR ABDOMEN (2 VIEWS)    Result Date: 1/29/2024  EXAMINATION: TWO XRAY VIEWS OF THE ABDOMEN 1/29/2024 2:43 pm COMPARISON: None HISTORY: ORDERING SYSTEM PROVIDED HISTORY: Eval bowel gas pattern TECHNOLOGIST PROVIDED HISTORY: Reason for exam:->Eval bowel gas pattern FINDINGS: There is a gastric tube in place with the tip in the fundus and the side port seen probably just above the EG junction.  There are multiple loops of mildly dilated large and small bowel throughout the abdomen with small air-fluid levels throughout.  There are surgical clips along the right upper quadrant. There is small radiopaque linear catheter tubing projecting along the right pelvis which is of uncertain etiology.  No urinary calculi are seen.  The bones are intact.     Nasogastric tube tip along the fundus of the stomach with the side port probably just above the EG junction.  Recommend readjusting the gastric tube tip into the distal stomach for more physiologic positioning. Questionable diffuse mild ileus. Linear radiopaque catheter tubing projecting along the right lower quadrant which is of uncertain etiology.  Recommend clinical follow-up correlating with prior surgical history. Status post cholecystectomy       CBC:   Recent Labs     01/29/24  1210 01/31/24  0550 02/01/24  0512   WBC 13.4* 12.2* 11.9*   HGB 10.4* 10.7* 10.9*   * 442 454*       BMP:    Recent Labs     01/30/24  0500 01/31/24  0550 02/01/24  0512   NA

## 2024-02-01 NOTE — PROGRESS NOTES
2000: shift assessment done, VSS, PRN medication given as per MAR, call light within reach, plan of care ongoing. The care plan and education has been reviewed and mutually agreed upon with the patient.     0115: change of dressing to midline incision done and to PICC line.

## 2024-02-01 NOTE — PLAN OF CARE
Problem: Chronic Conditions and Co-morbidities  Goal: Patient's chronic conditions and co-morbidity symptoms are monitored and maintained or improved  1/31/2024 2111 by Hakeem Short RN  Outcome: Progressing     Problem: Discharge Planning  Goal: Discharge to home or other facility with appropriate resources  1/31/2024 2111 by Hakeem Short RN  Outcome: Progressing     Problem: Pain  Goal: Verbalizes/displays adequate comfort level or baseline comfort level  1/31/2024 2111 by Hakeem Short RN  Outcome: Progressing     Problem: Safety - Adult  Goal: Free from fall injury  1/31/2024 2111 by Hakeem Short RN  Outcome: Progressing      Problem: Skin/Tissue Integrity  Goal: Absence of new skin breakdown  Description: 1.  Monitor for areas of redness and/or skin breakdown  2.  Assess vascular access sites hourly  3.  Every 4-6 hours minimum:  Change oxygen saturation probe site  4.  Every 4-6 hours:  If on nasal continuous positive airway pressure, respiratory therapy assess nares and determine need for appliance change or resting period.  1/31/2024 2111 by Hakeem Short RN  Outcome: Progressing     Problem: ABCDS Injury Assessment  Goal: Absence of physical injury  1/31/2024 2111 by Hakeem Short RN  Outcome: Progressing     Problem: Nutrition Deficit:  Goal: Optimize nutritional status  1/31/2024 2111 by Hakeem Short RN  Outcome: Progressing

## 2024-02-01 NOTE — PROGRESS NOTES
Nutrition Note    RECOMMENDATIONS  PO Diet: Full liquids; advance as tolerated  ONS: Ensure Enlive TID  Nutrition Support: TPN @ goal rate of 83 ml/hr.  When eating adequately & ready to d/c TPN, , cut rate by half & allow bag to run out.     NUTRITION ASSESSMENT   Pt is tolerating TPN @ goal rate of 83 ml/hr.  NG tube was removed yesterday, & diet advanced.  Pt reports is tolerating clears, with diet advanced to full liquids today. Pt would like Ensure clear changed to Ensure shakes now that he is on a full liquid diet.  Per MD, will remain on TPN until able to consume adequate po intake.     Nutrition Related Findings: nonpitting generalized edema; LBM 1/29; phos & Mg WNL; gluc 292; k+ 3.0  Wounds: Surgical Incision  Nutrition Education:  Education not indicated   Nutrition Goals: PO intake 50% or greater, by next RD assessment, Tolerate nutrition support at goal rate     MALNUTRITION ASSESSMENT   Acute Illness  Malnutrition Status: At risk for malnutrition (Comment)    NUTRITION DIAGNOSIS   Inadequate oral intake related to altered GI function as evidenced by NPO or clear liquid status due to medical condition, nutrition support - parenteral nutrition      CURRENT NUTRITION THERAPIES  PN-Adult Premix 5/20 - Standard Electrolytes - Central Line  ADULT DIET; Full Liquid  ADULT ORAL NUTRITION SUPPLEMENT; Breakfast, Lunch, Dinner; Standard High Calorie/High Protein Oral Supplement     PO Intake: 26-50%   PO Supplement Intake:%    Current Parenteral Nutrition Orders:  Type and Formula: Premix Central   Lipids: 250ml, Two times weekly  Duration: Continuous  Current PN Order Provides: as below  Goal PN Orders Provides: Clinimx 5/20 at 83 mL/hr provides 1992 mL total volume, 1755 calories without lipids, 100 grams of protein, and dextrose load of 2.18 mg/kg/min    ANTHROPOMETRICS  Current Height: 182.9 cm (6')  Current Weight - Scale: 126.6 kg (279 lb)    Admission weight: 127 kg (280 lb)  Ideal Body Weight (IBW):

## 2024-02-01 NOTE — PROGRESS NOTES
Infectious Diseases   Progress Note      Admission Date: 1/25/2024  Hospital Day: Hospital Day: 8   Attending: Russel Toscano MD  Date of service: 2/1/2024     Chief complaint/ Reason for consult:     Infected abdominal mesh, s/p surgical removal of the infected mesh on 1/15/2024-surgical cultures are growing Streptococcus, Enterococcus faecalis, Citrobacter, anaerobes  Leukocytosis on admission  Type 2 diabetes mellitus  History of colostomy reversal  Obstructive sleep apnea    Microbiology:        I have reviewed allavailable micro lab data and cultures    Abdominal surgical culture  - collected on 1/25/2024: Group B streptococcus, Enterococcus faecalis, Citrobacter, Prevotella    Susceptibility    Citrobacter freundii (1)    Antibiotic Interpretation Microscan  Method Status    ceFAZolin Resistant >=64 mcg/mL BACTERIAL SUSCEPTIBILITY PANEL BY JOSE GUADALUPE     cefTRIAXone Sensitive <=0.25 mcg/mL BACTERIAL SUSCEPTIBILITY PANEL BY JOSE GUADALUPE     ciprofloxacin Sensitive <=0.25 mcg/mL BACTERIAL SUSCEPTIBILITY PANEL BY JOSE GUADALUPE     ertapenem Sensitive <=0.12 mcg/mL BACTERIAL SUSCEPTIBILITY PANEL BY JOSE GUADALUPE     gentamicin Sensitive <=1 mcg/mL BACTERIAL SUSCEPTIBILITY PANEL BY JOSE GUADALUPE     levofloxacin Sensitive <=0.12 mcg/mL BACTERIAL SUSCEPTIBILITY PANEL BY JOSE GUADALUPE     piperacillin-tazobactam Sensitive <=4 mcg/mL BACTERIAL SUSCEPTIBILITY PANEL BY JOSE GUADALUPE     trimethoprim-sulfamethoxazole Sensitive <=20 mcg/mL BACTERIAL SUSCEPTIBILITY PANEL BY JOSE GUADALUPE     Enterococcus faecalis (2)      Antibiotic Interpretation Microscan  Method Status    ampicillin Sensitive <=2 mcg/mL BACTERIAL SUSCEPTIBILITY PANEL BY JOSE GUADALUPE     vancomycin Sensitive 1 mcg/mL BACTERIAL SUSCEPTIBILITY PANEL BY JOSE GUADALUPE       Antibiotics and immunizations:       Current antibiotics: All antibiotics and their doses were reviewed by me    Recent Abx Admin                     piperacillin-tazobactam (ZOSYN) 3,375 mg in sodium chloride 0.9 % 50 mL IVPB (mini-bag) (mg) 3,375 mg New Bag 02/01/24  All past surgical history was reviewed today.    Past Surgical History:   Procedure Laterality Date    ABSCESS DRAINAGE Left 01/05/2014    incision and drainage of an abcess on left calf    APPENDECTOMY      CHOLECYSTECTOMY      COLOSTOMY      ERCP N/A 05/26/2022    ERCP STENT INSERTION performed by Hung Field MD at [facility] ENDOSCOPY    ERCP N/A 09/27/2022    ERCP STENT INSERTION performed by Hung Field MD at [facility] ENDOSCOPY    ERCP N/A 8/9/2023    ERCP STENT INSERTION performed by Hung Field MD at [facility] ENDOSCOPY    INNER EAR SURGERY      RIGHT    KIDNEY STONE REMOVAL      LAPAROTOMY N/A 1/25/2024    EXPLORATORY LAPAROTOMY, EXCISION INFECTED ABDOMINAL WALL MESH, LYSIS OF ADHESIONS, SMALL BOWEL RESECTION performed by Russel Toscano MD at [facility] OR    NERVE SURGERY  04/14/2022    CELIAC PLEXUS BLOCK performed by Hung Field MD at [facility] ENDOSCOPY    NERVE SURGERY  01/24/2023    CELIAC PLEXUS NEUROLYSIS performed by Hung Field MD at [facility] ENDOSCOPY    PAIN MANAGEMENT PROCEDURE  10/30/2023    CELIAC PLEXUS NEUROLYSIS performed by Hung Field MD at [facility] ENDOSCOPY    PANCREAS SURGERY      duct stent    REVISION COLOSTOMY      SUBTOTAL COLECTOMY      Had wound dehiscence, mesh    TRACHEOSTOMY      TRACHEOSTOMY CLOSURE      UPPER GASTROINTESTINAL ENDOSCOPY N/A 04/14/2022    EGD ESOPHAGOGASTRODUODENOSCOPY ULTRASOUND performed by Hung Field MD at [facility] ENDOSCOPY    UPPER GASTROINTESTINAL ENDOSCOPY N/A 07/29/2022    ESOPHAGOGASTRODUODENOSCOPY WITH ENDOSCOPIC ULTRASOUND WITH CELIAC PLEXUS BLOCK performed by Hung Field MD at [facility] ENDOSCOPY    UPPER GASTROINTESTINAL ENDOSCOPY N/A 07/29/2022    EGD W/EUS FNA performed by Hung Field MD at [facility] ENDOSCOPY    UPPER GASTROINTESTINAL ENDOSCOPY  01/24/2023    EGD W/EUS FNA TAIL OF PANCREAS AND REMOVAL OF PANCREATIC STENT performed by Hung Field MD at [facility] ENDOSCOPY    UPPER GASTROINTESTINAL ENDOSCOPY  01/24/2023    EGD STENT REMOVAL performed by Hung

## 2024-02-01 NOTE — PROGRESS NOTES
Deshaun Ayers is a 58 y.o. male patient.    Current Facility-Administered Medications   Medication Dose Route Frequency Provider Last Rate Last Admin    potassium chloride 20 mEq/50 mL IVPB (Central Line)  20 mEq IntraVENous BID Slava Zendejas MD 50 mL/hr at 02/01/24 0930 20 mEq at 02/01/24 0930    PN-Adult Premix 5/20 - Standard Electrolytes - Central Line   IntraVENous Continuous TPN Russel Toscano MD        insulin glargine (LANTUS) injection vial 10 Units  10 Units SubCUTAneous BID Slava Zendejas MD        insulin lispro (HUMALOG) injection vial 5 Units  5 Units SubCUTAneous TID  Slava Zendejas MD        PN-Adult Premix 5/20 - Standard Electrolytes - Central Line   IntraVENous Continuous TPN Russel Toscano MD 83 mL/hr at 01/31/24 1805 New Bag at 01/31/24 1805    thiamine (B-1) 100 mg in sodium chloride 0.9 % 100 mL IVPB  100 mg IntraVENous Q24H Russel Toscano MD   Stopped at 02/01/24 1539    insulin lispro (HUMALOG) injection vial 0-16 Units  0-16 Units SubCUTAneous Q4H Russel Toscano MD   12 Units at 02/01/24 1507    miconazole (MICOTIN) 2 % powder   Topical BID Jany Ontiveros APRN - CNP   Given at 02/01/24 0829    HYDROmorphone HCl PF (DILAUDID) injection 0.5 mg  0.5 mg IntraVENous Q2H PRN Jany Ontiveros APRN - CNP   0.5 mg at 01/29/24 1543    Or    HYDROmorphone HCl PF (DILAUDID) injection 1 mg  1 mg IntraVENous Q2H PRN Jany Ontiveros, APRN - CNP   1 mg at 02/01/24 1617    fat emulsion (INTRALIPID/NUTRILIPID) 20 % infusion 250 mL  250 mL IntraVENous Once per day on Mon Thu Russel Toscano MD        magnesium sulfate 2000 mg in 50 mL IVPB premix  2,000 mg IntraVENous PRN Russel Toscano MD        potassium chloride 20 mEq/50 mL IVPB (Central Line)  20 mEq IntraVENous PRN Russel Toscano MD 50 mL/hr at 02/01/24 0826 20 mEq at 02/01/24 0826    benzocaine-menthol (CEPACOL SORE THROAT) lozenge 1 lozenge  1 lozenge Oral Q2H PRN Russel Toscano MD

## 2024-02-01 NOTE — PLAN OF CARE
Problem: Chronic Conditions and Co-morbidities  Goal: Patient's chronic conditions and co-morbidity symptoms are monitored and maintained or improved  2/1/2024 1031 by Nakita Leiva RN  Outcome: Progressing     Problem: Discharge Planning  Goal: Discharge to home or other facility with appropriate resources  2/1/2024 1031 by Nakita Leiva RN  Outcome: Progressing     Problem: Pain  Goal: Verbalizes/displays adequate comfort level or baseline comfort level  2/1/2024 1031 by Nakita Leiva RN  Outcome: Progressing     Problem: Safety - Adult  Goal: Free from fall injury  2/1/2024 1031 by Nakita Leiva RN  Outcome: Progressing     Problem: Skin/Tissue Integrity  Goal: Absence of new skin breakdown  Description: 1.  Monitor for areas of redness and/or skin breakdown  2.  Assess vascular access sites hourly  3.  Every 4-6 hours minimum:  Change oxygen saturation probe site  4.  Every 4-6 hours:  If on nasal continuous positive airway pressure, respiratory therapy assess nares and determine need for appliance change or resting period.  2/1/2024 1031 by Nakita Leiva RN  Outcome: Progressing     Problem: ABCDS Injury Assessment  Goal: Absence of physical injury  2/1/2024 1031 by Nakita Leiva RN  Outcome: Progressing     Problem: Nutrition Deficit:  Goal: Optimize nutritional status  2/1/2024 1031 by Nakita Leiva RN  Outcome: Progressing

## 2024-02-02 LAB
ALBUMIN SERPL-MCNC: 2.5 G/DL (ref 3.4–5)
ANION GAP SERPL CALCULATED.3IONS-SCNC: 9 MMOL/L (ref 3–16)
BASOPHILS # BLD: 0.1 K/UL (ref 0–0.2)
BASOPHILS NFR BLD: 0.6 %
BUN SERPL-MCNC: 8 MG/DL (ref 7–20)
CALCIUM SERPL-MCNC: 8.3 MG/DL (ref 8.3–10.6)
CHLORIDE SERPL-SCNC: 104 MMOL/L (ref 99–110)
CO2 SERPL-SCNC: 25 MMOL/L (ref 21–32)
CREAT SERPL-MCNC: <0.5 MG/DL (ref 0.9–1.3)
DEPRECATED RDW RBC AUTO: 16.1 % (ref 12.4–15.4)
EOSINOPHIL # BLD: 0.4 K/UL (ref 0–0.6)
EOSINOPHIL NFR BLD: 3.8 %
GFR SERPLBLD CREATININE-BSD FMLA CKD-EPI: >60 ML/MIN/{1.73_M2}
GLUCOSE BLD-MCNC: 183 MG/DL (ref 70–99)
GLUCOSE BLD-MCNC: 188 MG/DL (ref 70–99)
GLUCOSE BLD-MCNC: 230 MG/DL (ref 70–99)
GLUCOSE BLD-MCNC: 294 MG/DL (ref 70–99)
GLUCOSE BLD-MCNC: 297 MG/DL (ref 70–99)
GLUCOSE BLD-MCNC: 301 MG/DL (ref 70–99)
GLUCOSE BLD-MCNC: 312 MG/DL (ref 70–99)
GLUCOSE BLD-MCNC: 318 MG/DL (ref 70–99)
GLUCOSE SERPL-MCNC: 327 MG/DL (ref 70–99)
HCT VFR BLD AUTO: 35.8 % (ref 40.5–52.5)
HGB BLD-MCNC: 11.4 G/DL (ref 13.5–17.5)
LYMPHOCYTES # BLD: 2.3 K/UL (ref 1–5.1)
LYMPHOCYTES NFR BLD: 22 %
MAGNESIUM SERPL-MCNC: 2.1 MG/DL (ref 1.8–2.4)
MCH RBC QN AUTO: 25.9 PG (ref 26–34)
MCHC RBC AUTO-ENTMCNC: 31.8 G/DL (ref 31–36)
MCV RBC AUTO: 81.4 FL (ref 80–100)
MONOCYTES # BLD: 0.9 K/UL (ref 0–1.3)
MONOCYTES NFR BLD: 8.4 %
NEUTROPHILS # BLD: 6.9 K/UL (ref 1.7–7.7)
NEUTROPHILS NFR BLD: 65.2 %
PERFORMED ON: ABNORMAL
PHOSPHATE SERPL-MCNC: 2.7 MG/DL (ref 2.5–4.9)
PLATELET # BLD AUTO: 345 K/UL (ref 135–450)
PMV BLD AUTO: 8.3 FL (ref 5–10.5)
POTASSIUM SERPL-SCNC: 3.5 MMOL/L (ref 3.5–5.1)
RBC # BLD AUTO: 4.4 M/UL (ref 4.2–5.9)
SODIUM SERPL-SCNC: 138 MMOL/L (ref 136–145)
WBC # BLD AUTO: 10.5 K/UL (ref 4–11)

## 2024-02-02 PROCEDURE — 2580000003 HC RX 258: Performed by: SURGERY

## 2024-02-02 PROCEDURE — 6360000002 HC RX W HCPCS: Performed by: INTERNAL MEDICINE

## 2024-02-02 PROCEDURE — 1200000000 HC SEMI PRIVATE

## 2024-02-02 PROCEDURE — 83735 ASSAY OF MAGNESIUM: CPT

## 2024-02-02 PROCEDURE — 6370000000 HC RX 637 (ALT 250 FOR IP): Performed by: STUDENT IN AN ORGANIZED HEALTH CARE EDUCATION/TRAINING PROGRAM

## 2024-02-02 PROCEDURE — 6360000002 HC RX W HCPCS: Performed by: SURGERY

## 2024-02-02 PROCEDURE — 80069 RENAL FUNCTION PANEL: CPT

## 2024-02-02 PROCEDURE — 94760 N-INVAS EAR/PLS OXIMETRY 1: CPT

## 2024-02-02 PROCEDURE — 6370000000 HC RX 637 (ALT 250 FOR IP): Performed by: SURGERY

## 2024-02-02 PROCEDURE — 99232 SBSQ HOSP IP/OBS MODERATE 35: CPT | Performed by: INTERNAL MEDICINE

## 2024-02-02 PROCEDURE — APPNB30 APP NON BILLABLE TIME 0-30 MINS: Performed by: NURSE PRACTITIONER

## 2024-02-02 PROCEDURE — APPSS15 APP SPLIT SHARED TIME 0-15 MINUTES: Performed by: NURSE PRACTITIONER

## 2024-02-02 PROCEDURE — 99024 POSTOP FOLLOW-UP VISIT: CPT | Performed by: SURGERY

## 2024-02-02 PROCEDURE — C9113 INJ PANTOPRAZOLE SODIUM, VIA: HCPCS | Performed by: SURGERY

## 2024-02-02 PROCEDURE — 85025 COMPLETE CBC W/AUTO DIFF WBC: CPT

## 2024-02-02 PROCEDURE — 36592 COLLECT BLOOD FROM PICC: CPT

## 2024-02-02 PROCEDURE — 6360000002 HC RX W HCPCS: Performed by: NURSE PRACTITIONER

## 2024-02-02 RX ORDER — INSULIN LISPRO 100 [IU]/ML
7 INJECTION, SOLUTION INTRAVENOUS; SUBCUTANEOUS
Status: DISCONTINUED | OUTPATIENT
Start: 2024-02-03 | End: 2024-02-03 | Stop reason: HOSPADM

## 2024-02-02 RX ORDER — INSULIN GLARGINE 100 [IU]/ML
14 INJECTION, SOLUTION SUBCUTANEOUS 2 TIMES DAILY
Status: DISCONTINUED | OUTPATIENT
Start: 2024-02-02 | End: 2024-02-03 | Stop reason: HOSPADM

## 2024-02-02 RX ORDER — HYDROMORPHONE HYDROCHLORIDE 1 MG/ML
0.5 INJECTION, SOLUTION INTRAMUSCULAR; INTRAVENOUS; SUBCUTANEOUS
Status: DISCONTINUED | OUTPATIENT
Start: 2024-02-02 | End: 2024-02-02

## 2024-02-02 RX ADMIN — ENOXAPARIN SODIUM 30 MG: 100 INJECTION SUBCUTANEOUS at 19:53

## 2024-02-02 RX ADMIN — INSULIN LISPRO 5 UNITS: 100 INJECTION, SOLUTION INTRAVENOUS; SUBCUTANEOUS at 08:39

## 2024-02-02 RX ADMIN — INSULIN LISPRO 5 UNITS: 100 INJECTION, SOLUTION INTRAVENOUS; SUBCUTANEOUS at 17:34

## 2024-02-02 RX ADMIN — INSULIN GLARGINE 14 UNITS: 100 INJECTION, SOLUTION SUBCUTANEOUS at 19:52

## 2024-02-02 RX ADMIN — PIPERACILLIN AND TAZOBACTAM 3375 MG: 3; .375 INJECTION, POWDER, FOR SOLUTION INTRAVENOUS at 08:43

## 2024-02-02 RX ADMIN — HYDROCODONE BITARTRATE AND ACETAMINOPHEN 2 TABLET: 5; 325 TABLET ORAL at 08:41

## 2024-02-02 RX ADMIN — MICONAZOLE NITRATE: 20 POWDER TOPICAL at 19:53

## 2024-02-02 RX ADMIN — INSULIN LISPRO 8 UNITS: 100 INJECTION, SOLUTION INTRAVENOUS; SUBCUTANEOUS at 02:11

## 2024-02-02 RX ADMIN — PIPERACILLIN AND TAZOBACTAM 3375 MG: 3; .375 INJECTION, POWDER, FOR SOLUTION INTRAVENOUS at 17:34

## 2024-02-02 RX ADMIN — INSULIN LISPRO 12 UNITS: 100 INJECTION, SOLUTION INTRAVENOUS; SUBCUTANEOUS at 08:40

## 2024-02-02 RX ADMIN — INSULIN LISPRO 12 UNITS: 100 INJECTION, SOLUTION INTRAVENOUS; SUBCUTANEOUS at 15:06

## 2024-02-02 RX ADMIN — HYDROCODONE BITARTRATE AND ACETAMINOPHEN 2 TABLET: 5; 325 TABLET ORAL at 19:52

## 2024-02-02 RX ADMIN — DIAZEPAM 2.5 MG: 5 INJECTION, SOLUTION INTRAMUSCULAR; INTRAVENOUS at 20:00

## 2024-02-02 RX ADMIN — HYDROMORPHONE HYDROCHLORIDE 1 MG: 1 INJECTION, SOLUTION INTRAMUSCULAR; INTRAVENOUS; SUBCUTANEOUS at 05:17

## 2024-02-02 RX ADMIN — INSULIN GLARGINE 10 UNITS: 100 INJECTION, SOLUTION SUBCUTANEOUS at 08:40

## 2024-02-02 RX ADMIN — INSULIN LISPRO 5 UNITS: 100 INJECTION, SOLUTION INTRAVENOUS; SUBCUTANEOUS at 11:55

## 2024-02-02 RX ADMIN — ENOXAPARIN SODIUM 30 MG: 100 INJECTION SUBCUTANEOUS at 08:39

## 2024-02-02 RX ADMIN — INSULIN LISPRO 8 UNITS: 100 INJECTION, SOLUTION INTRAVENOUS; SUBCUTANEOUS at 05:17

## 2024-02-02 RX ADMIN — THIAMINE HYDROCHLORIDE 100 MG: 100 INJECTION, SOLUTION INTRAMUSCULAR; INTRAVENOUS at 15:06

## 2024-02-02 RX ADMIN — HYDROCODONE BITARTRATE AND ACETAMINOPHEN 2 TABLET: 5; 325 TABLET ORAL at 15:06

## 2024-02-02 RX ADMIN — DIAZEPAM 2.5 MG: 5 INJECTION, SOLUTION INTRAMUSCULAR; INTRAVENOUS at 12:51

## 2024-02-02 RX ADMIN — MICONAZOLE NITRATE: 20 POWDER TOPICAL at 08:41

## 2024-02-02 RX ADMIN — PANTOPRAZOLE SODIUM 40 MG: 40 INJECTION, POWDER, FOR SOLUTION INTRAVENOUS at 08:39

## 2024-02-02 RX ADMIN — SODIUM CHLORIDE, PRESERVATIVE FREE 10 ML: 5 INJECTION INTRAVENOUS at 19:53

## 2024-02-02 RX ADMIN — INSULIN LISPRO 4 UNITS: 100 INJECTION, SOLUTION INTRAVENOUS; SUBCUTANEOUS at 17:34

## 2024-02-02 ASSESSMENT — ENCOUNTER SYMPTOMS
COUGH: 0
SINUS PAIN: 0
EYE REDNESS: 0
SINUS PRESSURE: 0
ABDOMINAL PAIN: 0
BACK PAIN: 0
WHEEZING: 0
SHORTNESS OF BREATH: 0
EYE DISCHARGE: 0
NAUSEA: 0
CONSTIPATION: 0
RHINORRHEA: 0
SORE THROAT: 0
DIARRHEA: 0

## 2024-02-02 ASSESSMENT — PAIN DESCRIPTION - PAIN TYPE
TYPE: SURGICAL PAIN
TYPE: ACUTE PAIN;SURGICAL PAIN

## 2024-02-02 ASSESSMENT — PAIN SCALES - GENERAL
PAINLEVEL_OUTOF10: 8
PAINLEVEL_OUTOF10: 6
PAINLEVEL_OUTOF10: 7
PAINLEVEL_OUTOF10: 0

## 2024-02-02 ASSESSMENT — PAIN DESCRIPTION - FREQUENCY
FREQUENCY: INTERMITTENT
FREQUENCY: INTERMITTENT

## 2024-02-02 ASSESSMENT — PAIN DESCRIPTION - ORIENTATION
ORIENTATION: MID

## 2024-02-02 ASSESSMENT — PAIN - FUNCTIONAL ASSESSMENT
PAIN_FUNCTIONAL_ASSESSMENT: PREVENTS OR INTERFERES SOME ACTIVE ACTIVITIES AND ADLS
PAIN_FUNCTIONAL_ASSESSMENT: ACTIVITIES ARE NOT PREVENTED

## 2024-02-02 ASSESSMENT — PAIN DESCRIPTION - LOCATION
LOCATION: ABDOMEN

## 2024-02-02 ASSESSMENT — PAIN DESCRIPTION - ONSET
ONSET: ON-GOING
ONSET: ON-GOING

## 2024-02-02 ASSESSMENT — PAIN DESCRIPTION - DESCRIPTORS
DESCRIPTORS: ACHING;SORE;THROBBING
DESCRIPTORS: ACHING;SORE
DESCRIPTORS: ACHING
DESCRIPTORS: ACHING;DISCOMFORT

## 2024-02-02 ASSESSMENT — PAIN DESCRIPTION - DIRECTION
RADIATING_TOWARDS: SIDES
RADIATING_TOWARDS: SIDES

## 2024-02-02 ASSESSMENT — PAIN SCALES - WONG BAKER: WONGBAKER_NUMERICALRESPONSE: 0

## 2024-02-02 NOTE — PLAN OF CARE
Problem: Chronic Conditions and Co-morbidities  Goal: Patient's chronic conditions and co-morbidity symptoms are monitored and maintained or improved  2/2/2024 0947 by Nakita Leiva RN  Outcome: Progressing     Problem: Discharge Planning  Goal: Discharge to home or other facility with appropriate resources  2/2/2024 0947 by Nakita Leiva RN  Outcome: Progressing     Problem: Pain  Goal: Verbalizes/displays adequate comfort level or baseline comfort level  2/2/2024 0947 by Nakita Leiva RN  Outcome: Progressing     Problem: Safety - Adult  Goal: Free from fall injury  2/2/2024 0947 by Nakita Leiva RN  Outcome: Progressing     Problem: Skin/Tissue Integrity  Goal: Absence of new skin breakdown  Description: 1.  Monitor for areas of redness and/or skin breakdown  2.  Assess vascular access sites hourly  3.  Every 4-6 hours minimum:  Change oxygen saturation probe site  4.  Every 4-6 hours:  If on nasal continuous positive airway pressure, respiratory therapy assess nares and determine need for appliance change or resting period.  2/2/2024 0947 by Nakita Leiva RN  Outcome: Progressing     Problem: ABCDS Injury Assessment  Goal: Absence of physical injury  2/2/2024 0947 by Nakita Leiva RN  Outcome: Progressing     Problem: Nutrition Deficit:  Goal: Optimize nutritional status  2/2/2024 0947 by Nakita Leiva RN  Outcome: Progressing

## 2024-02-02 NOTE — PROGRESS NOTES
Shift assessment completed, morning medication given per MAR. VSS, alert and oriented. Call light within reach. The care plan and education has been reviewed and mutually agreed upon with the patient.

## 2024-02-02 NOTE — PROGRESS NOTES
advanced.  Serum creatinine 0.5.    Hemoglobin is 11.4.  Platelet count is 345,000    Plan:     Diagnostic Workup:      Continue to follow  fever curve, WBC count and blood cultures.  Continue to monitor blood counts, liver and renal function.    Antimicrobials:    Continue IV Zosyn 3.375 g every 8 hours  Patient remains on TPN.  Diet is being advanced  At the time of discharge, if antibiotics are needed, can switch him to oral antibiotics  Will leave him on IV Zosyn while in the hospital.  Continue probiotic twice daily while on antibiotic  Surgical site care  Pain control per primary  We will follow up on the culture results and clinical progress and will make further recommendations accordingly.  Continue close vitals monitoring.  Maintain good glycemic control.  Fall precautions.  Aspiration precautions.  Continue to watch for new fever or diarrhea.  DVT prophylaxis.  Discussed all above with patient and RN.      Drug Monitoring:    Continue monitoring for antibiotic toxicity as follows: CBC, CMP   Continue to watch for following: new or worsening fever, new hypotension, hives, lip swelling and redness or purulence at vascular access sites.     I/v access Management:    Continue to monitor i.v access sites for erythema, induration, discharge or tenderness.   As always, continue efforts to minimize tubes/lines/drains as clinically appropriate to reduce chances of line associated infections.    Patient education and counseling:        The patient was educated in detail about the side-effects of various antibiotics and things to watch for like new rashes, lip swelling, severe reaction, worsening diarrhea, break through fever etc.  Discussed patient's condition and what to expect. All of the patient's questions were addressed in a satisfactory manner and patient verbalized understanding all instructions.      Level of complexity of visit and medical decision making: High       Thank you for involving me in the care  phenol, sodium chloride flush, sodium chloride, sodium chloride flush, sodium chloride, ondansetron, diazePAM, glucose, dextrose bolus **OR** dextrose bolus, glucagon (rDNA), dextrose, labetalol      Problem list:       Patient Active Problem List   Diagnosis Code    Mixed hyperlipidemia E78.2    Essential hypertension I10    Chronic pancreatitis K86.1    Chronic pain syndrome G89.4    Esophageal reflux K21.9    IZABELA (obstructive sleep apnea) G47.33    Allergic rhinitis J30.9    Moderate episode of recurrent major depressive disorder ([facility]) F33.1    Erectile dysfunction, non organic F52.8    Meniere's disease H81.09    Insomnia G47.00    Morbid obesity due to excess calories ([facility]) E66.01    Type 2 diabetes mellitus with renal manifestations ([facility]) E11.29    Microalbuminuria R80.9    Abdominal pain R10.9    Anxiety F41.9    Chronic, continuous use of opioids F11.90    Diabetes mellitus type 2 in obese ([facility]) E11.69, E66.9    Vitamin D deficiency E55.9    Acute abdominal pain R10.9    Intra-abdominal infection B99.9    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    Sepsis ([facility]) A41.9    Nephrolithiasis N20.0    History of colostomy reversal Z98.890    History of ventral hernia repair Z98.890, Z87.19    Encounter for medication counseling Z71.89    Infected prosthetic mesh of abdominal wall, subsequent encounter T85.79XD    Leukocytosis D72.829    Class 2 obesity due to excess calories with body mass index (BMI) of 37.0 to 37.9 in adult E66.09, Z68.37    History of depression Z86.59    History of MRSA infection Z86.14    Anaerobic bacterial infection A49.8    Citrobacter infection A49.8    Enterococcus faecalis infection A49.8    E coli infection A49.8       Please note that this chart was generated using Dragon dictation software. Although every effort was made to ensure the accuracy of this automated transcription, some errors in transcription may have occurred inadvertently. If you may need any

## 2024-02-02 NOTE — PLAN OF CARE
Problem: Chronic Conditions and Co-morbidities  Goal: Patient's chronic conditions and co-morbidity symptoms are monitored and maintained or improved  2/2/2024 0859 by Vee Zhong  Outcome: Progressing     Problem: Discharge Planning  Goal: Discharge to home or other facility with appropriate resources  2/2/2024 0859 by Vee Zhong  Outcome: Progressing  2/1/2024 2122 by Isabela Delgado RN  Outcome: Progressing  Flowsheets (Taken 2/1/2024 2025)  Discharge to home or other facility with appropriate resources: Identify barriers to discharge with patient and caregiver     Problem: Pain  Goal: Verbalizes/displays adequate comfort level or baseline comfort level  Outcome: Progressing  Flowsheets  Taken 2/2/2024 0506 by Isabela Delgado, RN  Verbalizes/displays adequate comfort level or baseline comfort level: Encourage patient to monitor pain and request assistance  Taken 2/1/2024 2329 by Isabela Delgado RN  Verbalizes/displays adequate comfort level or baseline comfort level: Encourage patient to monitor pain and request assistance  Taken 2/1/2024 2023 by Isabela Delgado, RN  Verbalizes/displays adequate comfort level or baseline comfort level: Encourage patient to monitor pain and request assistance     Problem: Safety - Adult  Goal: Free from fall injury  Outcome: Progressing  Flowsheets (Taken 2/1/2024 2025 by Isabela Delgado, RN)  Free From Fall Injury: Instruct family/caregiver on patient safety     Problem: Skin/Tissue Integrity  Goal: Absence of new skin breakdown  Description: 1.  Monitor for areas of redness and/or skin breakdown  2.  Assess vascular access sites hourly  3.  Every 4-6 hours minimum:  Change oxygen saturation probe site  4.  Every 4-6 hours:  If on nasal continuous positive airway pressure, respiratory therapy assess nares and determine need for appliance change or resting period.  Outcome: Progressing     Problem: ABCDS Injury Assessment  Goal: Absence of physical injury  Outcome:

## 2024-02-02 NOTE — PROGRESS NOTES
Physician Progress Note      PATIENT:               RACHID JOSEPH  CSN #:                  [id number]  :                       1965  ADMIT DATE:       2024 6:01 AM  DISCH DATE:  RESPONDING  PROVIDER #:        Russel Toscano MD          QUERY TEXT:    Pt admitted with Infected prosthetic mesh of abdominal wall. Noted   documentation of abdominal wall cellulitis by ordered ID consultant.  If   possible, please document in progress notes and discharge summary:    The medical record reflects the following:  Risk Factors: Hx. hernia repair with mesh >20 years ago... Pain, erythema of   abd wall noted 2023 and now worsened.  Clinical Indicators: Diagnosed with Infected prosthetic mesh of abdominal   wall. Per ID consult on : \"Office f/u with [facility] - abd wall cellulitis and   spontaneous drainage of foul-smelling fluid noted.\"  Treatment: ID consult, removal of all mesh, Zosyn IV  Options provided:  -- abdominal wall cellulitis present on admission  -- abdominal wall cellulitis ruled out  -- Defer to ID consultant regarding abdominal wall cellulitis  -- Other - I will add my own diagnosis  -- Disagree - Not applicable / Not valid  -- Disagree - Clinically unable to determine / Unknown  -- Refer to Clinical Documentation Reviewer    PROVIDER RESPONSE TEXT:    The diagnosis of abdominal wall cellulitis was present on admission.    Query created by: Gina Hoang on 2024 3:50 PM      Electronically signed by:  Russel Toscano MD 2024 6:13 PM

## 2024-02-02 NOTE — PROGRESS NOTES
Physical Therapy             Deshaun Ayers    PT treatment session attempted.  The pt stated he has had more abdominal pain today and does not want to walk again until after he has pain medication.  He plans to bathe when his wife arrives and then walk in the hallway.  He is ambulating independently in his room.  He hopes to DC home tomorrow with no therapy needs.  PT will follow-up with this pt as the schedule allows.  Thanks.  Fany Francisco, PT DPT [id number]

## 2024-02-02 NOTE — PROGRESS NOTES
V2.0    [facility] [place] Progress Note      Name:  Deshaun Ayers /Age/Sex: 1965  (58 y.o. male)   MRN & CSN:  [id number] & [id number] Encounter Date/Time: 2024 7:35 AM EST   Location:  [facility]4480/4480-01 PCP: Ginger Anguiano MD     Attending:Russel Toscano MD       Hospital Day: 9    Assessment and Recommendations   Deshaun Ayers is a 58 y.o. male with pmh of T2DM, HLD, HTN, IZABELA, diverticulitis with complications s/p mesh in  leading to prolonged hospitalization, ICU management at [facility] who was admitted for exploratory laparotomy, excision of infected abdominal wall mesh, lysis of adhesions and small bowel resection.       Plan:   Infected mesh  Patient is s/p exploratory laparotomy, excision of infected abdominal wall mesh, lysis of adhesions and small bowel resection. Patient had diverticulitis with complications s/p mesh in  leading to prolonged hospitalization, ICU management at [facility]  - Continue zosyn  - Pain control  -General surgery following     T2DM -uncontrolled  Patient's last HgbA1c from 2024 = 8.5%  Lantus 14 U BID  Lispro 7U TID  High dose sliding scale  At home patient is on insulin, metformin, Jardiance, Amaryl     HTN  At home patient is on Hyzaar     HLD  At home patient is on rosuvastatin      Diet ADULT ORAL NUTRITION SUPPLEMENT; Breakfast, Lunch, Dinner; Standard High Calorie/High Protein Oral Supplement  ADULT DIET; Regular   DVT Prophylaxis [x] Lovenox, []  Heparin, [] SCDs, [] Ambulation,  [] Eliquis, [] Xarelto  [] Coumadin   Code Status Full Code   Disposition Estimated Date of Discharge: tomorrow  Patient requires continued admission due to infected mesh.   Surrogate Decision Maker/ POA       Personally reviewed Lab Studies and Imaging         Subjective:     Chief Complaint: infected mesh removal    Deshaun Ayers is a 58 y.o. male who presents with infected mesh removal.    Patient was seen and examined this morning. No acute event  PRN  phenol, 1 spray, Q2H PRN  sodium chloride flush, 5-40 mL, PRN  sodium chloride, 25 mL, PRN  sodium chloride flush, 5-40 mL, PRN  sodium chloride, , PRN  ondansetron, 4 mg, Q6H PRN  diazePAM, 2.5 mg, Q6H PRN  glucose, 4 tablet, PRN  dextrose bolus, 125 mL, PRN   Or  dextrose bolus, 250 mL, PRN  glucagon (rDNA), 1 mg, PRN  dextrose, , Continuous PRN  labetalol, 10 mg, Q6H PRN        Labs and Imaging   XR ABDOMEN (2 VIEWS)    Result Date: 1/29/2024  EXAMINATION: TWO XRAY VIEWS OF THE ABDOMEN 1/29/2024 2:43 pm COMPARISON: None HISTORY: ORDERING SYSTEM PROVIDED HISTORY: Eval bowel gas pattern TECHNOLOGIST PROVIDED HISTORY: Reason for exam:->Eval bowel gas pattern FINDINGS: There is a gastric tube in place with the tip in the fundus and the side port seen probably just above the EG junction.  There are multiple loops of mildly dilated large and small bowel throughout the abdomen with small air-fluid levels throughout.  There are surgical clips along the right upper quadrant. There is small radiopaque linear catheter tubing projecting along the right pelvis which is of uncertain etiology.  No urinary calculi are seen.  The bones are intact.     Nasogastric tube tip along the fundus of the stomach with the side port probably just above the EG junction.  Recommend readjusting the gastric tube tip into the distal stomach for more physiologic positioning. Questionable diffuse mild ileus. Linear radiopaque catheter tubing projecting along the right lower quadrant which is of uncertain etiology.  Recommend clinical follow-up correlating with prior surgical history. Status post cholecystectomy       CBC:   Recent Labs     01/31/24  0550 02/01/24  0512 02/02/24  0530   WBC 12.2* 11.9* 10.5   HGB 10.7* 10.9* 11.4*    454* 345       BMP:    Recent Labs     01/31/24  0550 02/01/24  0512 02/02/24  0530    142 138   K 3.1* 3.0* 3.5    105 104   CO2 30 28 25   BUN 7 7 8   CREATININE <0.5* <0.5* <0.5*   GLUCOSE          Electronically signed by Slava Zendejas MD on 2/2/2024 at 2:58 PM

## 2024-02-02 NOTE — PLAN OF CARE
Problem: Chronic Conditions and Co-morbidities  Goal: Patient's chronic conditions and co-morbidity symptoms are monitored and maintained or improved  2/1/2024 2122 by Isabela Delgado RN  Outcome: Progressing  Flowsheets (Taken 2/1/2024 2025)  Care Plan - Patient's Chronic Conditions and Co-Morbidity Symptoms are Monitored and Maintained or Improved: Monitor and assess patient's chronic conditions and comorbid symptoms for stability, deterioration, or improvement  2/1/2024 1031 by Nakita Leiva RN  Outcome: Progressing     Problem: Discharge Planning  Goal: Discharge to home or other facility with appropriate resources  2/1/2024 2122 by Isabela Delgado RN  Outcome: Progressing  Flowsheets (Taken 2/1/2024 2025)  Discharge to home or other facility with appropriate resources: Identify barriers to discharge with patient and caregiver  2/1/2024 1031 by Nakita Leiva, RN  Outcome: Progressing

## 2024-02-02 NOTE — DISCHARGE INSTRUCTIONS
[facility]    Discharge Instructions      Activity  - activity as tolerated, no driving while on analgesics, and no heavy lifting for 6 weeks; it is OK to be up walking around--walking up and down stairs is also OK. Do what is comfortable: stop and rest if you feel tired.    Diet  - regular diet  - Drink plenty of fluids     Pain  - You may use narcotic pain medication as prescribed for breakthrough pain  - You can use OTC Tylenol or Ibuprofen for 1-2 weeks (may need to adjust the dose as directed on the bottle if enteric coated ibuprofen chosen)  - Avoid taking narcotic pain medication on an empty stomach which may result in nausea, if pain medication is causing nausea try decreasing the dose  - Narcotic pain medication is not necessary, please contact the office if pain is not controlled  - Narcotic pain medication will not be refilled on weekends and after hours  - Please contact the office Monday-Friday 8a-4p if a refill is requested    Nausea  - Avoid taking narcotic pain medication on an empty stomach which may result in nausea  - If pain medication is causing nausea you may try decreasing the dose  - Nausea can be common for 24 hours after surgery--if nausea uncontrolled or worsening, contact the office or go to the ED    Constipation  - Pain medication can be constipating  - Often, it helps to take a stool softener with the goal of at least one soft bowel movement daily with minimal straining  - You may also need to increase water and fiber intake--may supplement with Benefiber and increasing your activity will also be helpful  - If a stool softener is needed, the following OTC medications are recommend: Colace 100 mg by mouth twice daily, Miralax 17 gm by mouth 1-3 times daily with glass of water, dulcolax suppositories, and Fleets enemas    Wound Care  - Normal saline wet-to-dry dressing to midline abdominal wound daily and as needed, keep retention sutures/associated

## 2024-02-02 NOTE — PROGRESS NOTES
Morning assessment completed. Vital signs stable, call light within reach, bed in lowest position, no further needs at this time.

## 2024-02-02 NOTE — PROGRESS NOTES
[facility]        Progress Note    Patient Name: Deshaun Ayers  MRN: [id number]  YOB: 1965  Date of Evaluation: 2024    Subjective:  No acute events overnight  Pain controlled  No nausea or vomiting, tolerating full liquids  Passing flatus and stool, denies bloating  Resting in bed at this time    Post-Operative Day #8      Vital Signs:  Patient Vitals for the past 24 hrs:   BP Temp Temp src Pulse Resp SpO2 Weight   24 1245 (!) 179/82 97.6 °F (36.4 °C) Oral 95 18 97 % --   24 1117 -- -- -- -- -- 95 % --   24 0911 -- -- -- -- 18 -- --   24 0846 -- -- -- 85 -- -- --   24 0841 (!) 150/96 98.2 °F (36.8 °C) Oral 76 18 96 % --   24 0547 -- -- -- -- 16 -- --   24 0506 135/80 97.6 °F (36.4 °C) Oral 86 18 98 % 129.7 kg (285 lb 14.4 oz)   24 0000 -- -- -- -- 17 -- --   24 2329 (!) 160/94 98.1 °F (36.7 °C) Oral 81 18 97 % --   24 -- -- -- -- 18 -- --   24 -- -- -- -- 18 -- --   24 (!) 158/87 98.9 °F (37.2 °C) Oral 87 18 98 % --   24 1910 -- -- -- -- 18 -- --   24 1840 -- -- -- -- 16 -- --   24 1632 (!) 143/92 98.2 °F (36.8 °C) Oral 87 19 95 % --   24 1617 -- -- -- -- 18 -- --        TEMPERATURE HISTORY 24H: Temp (24hrs), Av.1 °F (36.7 °C), Min:97.6 °F (36.4 °C), Max:98.9 °F (37.2 °C)    BLOOD PRESSURE HISTORY: Systolic (36hrs), Av , Min:135 , Max:179    Diastolic (36hrs), Av, Min:79, Max:96      Intake/Output:  I/O last 3 completed shifts:  In: 6408.1 [P.O.:120; IV Piggyback:1230]  Out: 2650 [Urine:2650]  I/O this shift:  In: 240 [P.O.:240]  Out: 1400 [Urine:1400]  Drain/tube Output:       Physical Exam:  General: awake, alert, oriented to person, place, time  Lungs: unlabored respirations  Abdomen: soft, non-distended, incisional tenderness only, bowel sounds present  Skin/Wound: open midline abdominal wound bed is clean, no drainage,  approximately 5 cm from the first end margin, is adhered to itself, and the outer surface at this area exhibits a portion of necrotic mesh-like material, measuring approximately 7 x 5.5 cm. The portion of bowel is adhered to itself via yellow-white fibroadipose tissue; no masses or lesions are seen. The mucosal surfaces are tan-red and folded, with no masses or polyps seen. The small bowel wall is slightly thickened at the adhered area. Representative sections are submitted as follows:     A1: First end margin.   A2 - A3: Adhered bowel.   A4: Second end margin.     B: Part B is received in formalin, labeled \"Deshaun Bowling, infected   prosthetic mesh of abdominal wall\", and consists of an aggregate of   tan-pink to red, highly fragmented to ragged mesh-like material with   minimal attached soft tissue, measuring approximately 15.2 x 14.6 x 3.5 cm. The outer surfaces exhibit large areas of tan-white to slightly   green-tinged exudative material. Multiple fragments of blue suture   material are identified. The specimen is for gross only.  GROER/PJS     MICROSCOPIC DESCRIPTION: A.  4 H&E slides reviewed.  Microscopic   examination performed.     CPT: 88300 X1   88307 X1      Imaging:  I have personally reviewed the following films:    No results found.    Scheduled Meds:   insulin glargine  14 Units SubCUTAneous BID    insulin lispro  5 Units SubCUTAneous TID WC    thiamine (B-1) 100 mg in sodium chloride 0.9 % 100 mL IVPB  100 mg IntraVENous Q24H    insulin lispro  0-16 Units SubCUTAneous Q4H    miconazole   Topical BID    lidocaine 1 % injection  5 mL IntraDERmal Once    sodium chloride flush  5-40 mL IntraVENous 2 times per day    sodium chloride flush  5-40 mL IntraVENous 2 times per day    enoxaparin  30 mg SubCUTAneous BID    pantoprazole  40 mg IntraVENous Daily    piperacillin-tazobactam  3,375 mg IntraVENous Q8H     Continuous Infusions:   sodium chloride      sodium chloride      dextrose       PRN  Meds:.HYDROcodone 5 mg - acetaminophen **OR** HYDROcodone 5 mg - acetaminophen, magnesium sulfate, potassium chloride, benzocaine-menthol, phenol, sodium chloride flush, sodium chloride, sodium chloride flush, sodium chloride, ondansetron, diazePAM, glucose, dextrose bolus **OR** dextrose bolus, glucagon (rDNA), dextrose, labetalol      Assessment:  58 y.o. male admitted with   1. 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 Pain controlled, incisional tenderness only, wound bed is clean--improved appearance of rash along lower aspect of wound, no nausea or vomiting, tolerating full liquids, passing flatus and stool, vitals/labs stable--leukocytosis resolved; continued supportive care, local wound care--no plans for secondary closure at this time, keep abdominal binder in place  2. Advance to regular diet with supplements as tolerated; monitor bowel function  3. Stop TPN; monitor and correct electrolytes  4. Antibiotics per ID  5. Activity as tolerated, ambulate TID, up to chair for meals--PT/OT following  6. Pulmonary toilet, incentive spirometry  7. PRN analgesics and antiemetics--minimizing narcotics as tolerated, transition to PO  8. DVT prophylaxis with  Lovenox and SCD's  9. Management of medical comorbid etiologies per consulting services  10. Disposition: Aiming for discharge home with home care tomorrow    EDUCATION:  Educated patient on plan of care and disease process--all questions answered.    Plans discussed with patient and nursing.  Reviewed and discussed with Dr. Toscano.      Signed:  Jany Ontiveros, JOELLE - CNP  2/2/2024 2:22 PM    Doing well  Advance to general diet

## 2024-02-02 NOTE — PROGRESS NOTES
Pt evening shift assessment complete. VSS and medication given as ordered. Dressing to mid abdomen changed per orders. TPN running at ordered rate. Pt assessed for comfort needs, given pain medication per pt request. Pt does not express any additional needs at this time, resting comfortably in bed.

## 2024-02-03 VITALS
SYSTOLIC BLOOD PRESSURE: 147 MMHG | DIASTOLIC BLOOD PRESSURE: 80 MMHG | BODY MASS INDEX: 38.64 KG/M2 | TEMPERATURE: 98 F | RESPIRATION RATE: 16 BRPM | WEIGHT: 285.3 LBS | OXYGEN SATURATION: 98 % | HEIGHT: 72 IN | HEART RATE: 77 BPM

## 2024-02-03 LAB
ANION GAP SERPL CALCULATED.3IONS-SCNC: 9 MMOL/L (ref 3–16)
BASOPHILS # BLD: 0 K/UL (ref 0–0.2)
BASOPHILS NFR BLD: 0.4 %
BUN SERPL-MCNC: 7 MG/DL (ref 7–20)
CALCIUM SERPL-MCNC: 8.5 MG/DL (ref 8.3–10.6)
CHLORIDE SERPL-SCNC: 104 MMOL/L (ref 99–110)
CO2 SERPL-SCNC: 27 MMOL/L (ref 21–32)
CREAT SERPL-MCNC: <0.5 MG/DL (ref 0.9–1.3)
DEPRECATED RDW RBC AUTO: 15.9 % (ref 12.4–15.4)
EOSINOPHIL # BLD: 0.3 K/UL (ref 0–0.6)
EOSINOPHIL NFR BLD: 3.2 %
GFR SERPLBLD CREATININE-BSD FMLA CKD-EPI: >60 ML/MIN/{1.73_M2}
GLUCOSE BLD-MCNC: 182 MG/DL (ref 70–99)
GLUCOSE BLD-MCNC: 191 MG/DL (ref 70–99)
GLUCOSE BLD-MCNC: 207 MG/DL (ref 70–99)
GLUCOSE BLD-MCNC: 250 MG/DL (ref 70–99)
GLUCOSE SERPL-MCNC: 204 MG/DL (ref 70–99)
HCT VFR BLD AUTO: 33.4 % (ref 40.5–52.5)
HGB BLD-MCNC: 10.6 G/DL (ref 13.5–17.5)
LYMPHOCYTES # BLD: 1.8 K/UL (ref 1–5.1)
LYMPHOCYTES NFR BLD: 17.1 %
MAGNESIUM SERPL-MCNC: 1.8 MG/DL (ref 1.8–2.4)
MCH RBC QN AUTO: 25.1 PG (ref 26–34)
MCHC RBC AUTO-ENTMCNC: 31.9 G/DL (ref 31–36)
MCV RBC AUTO: 78.9 FL (ref 80–100)
MONOCYTES # BLD: 0.8 K/UL (ref 0–1.3)
MONOCYTES NFR BLD: 7.5 %
NEUTROPHILS # BLD: 7.7 K/UL (ref 1.7–7.7)
NEUTROPHILS NFR BLD: 71.8 %
PERFORMED ON: ABNORMAL
PHOSPHATE SERPL-MCNC: 2.9 MG/DL (ref 2.5–4.9)
PLATELET # BLD AUTO: 374 K/UL (ref 135–450)
PMV BLD AUTO: 8.6 FL (ref 5–10.5)
POTASSIUM SERPL-SCNC: 3.6 MMOL/L (ref 3.5–5.1)
RBC # BLD AUTO: 4.23 M/UL (ref 4.2–5.9)
SODIUM SERPL-SCNC: 140 MMOL/L (ref 136–145)
WBC # BLD AUTO: 10.8 K/UL (ref 4–11)

## 2024-02-03 PROCEDURE — 2580000003 HC RX 258: Performed by: SURGERY

## 2024-02-03 PROCEDURE — 83735 ASSAY OF MAGNESIUM: CPT

## 2024-02-03 PROCEDURE — APPSS30 APP SPLIT SHARED TIME 16-30 MINUTES: Performed by: NURSE PRACTITIONER

## 2024-02-03 PROCEDURE — APPNB30 APP NON BILLABLE TIME 0-30 MINS: Performed by: NURSE PRACTITIONER

## 2024-02-03 PROCEDURE — 99024 POSTOP FOLLOW-UP VISIT: CPT | Performed by: SURGERY

## 2024-02-03 PROCEDURE — 80048 BASIC METABOLIC PNL TOTAL CA: CPT

## 2024-02-03 PROCEDURE — 6370000000 HC RX 637 (ALT 250 FOR IP): Performed by: SURGERY

## 2024-02-03 PROCEDURE — 84100 ASSAY OF PHOSPHORUS: CPT

## 2024-02-03 PROCEDURE — C9113 INJ PANTOPRAZOLE SODIUM, VIA: HCPCS | Performed by: SURGERY

## 2024-02-03 PROCEDURE — 6370000000 HC RX 637 (ALT 250 FOR IP): Performed by: STUDENT IN AN ORGANIZED HEALTH CARE EDUCATION/TRAINING PROGRAM

## 2024-02-03 PROCEDURE — 85025 COMPLETE CBC W/AUTO DIFF WBC: CPT

## 2024-02-03 PROCEDURE — 6360000002 HC RX W HCPCS: Performed by: SURGERY

## 2024-02-03 RX ADMIN — MICONAZOLE NITRATE: 20 POWDER TOPICAL at 08:55

## 2024-02-03 RX ADMIN — HYDROCODONE BITARTRATE AND ACETAMINOPHEN 2 TABLET: 5; 325 TABLET ORAL at 05:34

## 2024-02-03 RX ADMIN — THIAMINE HYDROCHLORIDE 100 MG: 100 INJECTION, SOLUTION INTRAMUSCULAR; INTRAVENOUS at 14:06

## 2024-02-03 RX ADMIN — HYDROCODONE BITARTRATE AND ACETAMINOPHEN 2 TABLET: 5; 325 TABLET ORAL at 00:27

## 2024-02-03 RX ADMIN — HYDROCODONE BITARTRATE AND ACETAMINOPHEN 2 TABLET: 5; 325 TABLET ORAL at 12:17

## 2024-02-03 RX ADMIN — INSULIN GLARGINE 14 UNITS: 100 INJECTION, SOLUTION SUBCUTANEOUS at 08:46

## 2024-02-03 RX ADMIN — PIPERACILLIN AND TAZOBACTAM 3375 MG: 3; .375 INJECTION, POWDER, FOR SOLUTION INTRAVENOUS at 00:27

## 2024-02-03 RX ADMIN — PIPERACILLIN AND TAZOBACTAM 3375 MG: 3; .375 INJECTION, POWDER, FOR SOLUTION INTRAVENOUS at 08:47

## 2024-02-03 RX ADMIN — INSULIN LISPRO 7 UNITS: 100 INJECTION, SOLUTION INTRAVENOUS; SUBCUTANEOUS at 08:50

## 2024-02-03 RX ADMIN — SODIUM CHLORIDE, PRESERVATIVE FREE 10 ML: 5 INJECTION INTRAVENOUS at 08:43

## 2024-02-03 RX ADMIN — INSULIN LISPRO 4 UNITS: 100 INJECTION, SOLUTION INTRAVENOUS; SUBCUTANEOUS at 08:46

## 2024-02-03 RX ADMIN — INSULIN LISPRO 7 UNITS: 100 INJECTION, SOLUTION INTRAVENOUS; SUBCUTANEOUS at 12:14

## 2024-02-03 RX ADMIN — PANTOPRAZOLE SODIUM 40 MG: 40 INJECTION, POWDER, FOR SOLUTION INTRAVENOUS at 08:42

## 2024-02-03 RX ADMIN — Medication 10 ML: at 08:56

## 2024-02-03 RX ADMIN — ENOXAPARIN SODIUM 30 MG: 100 INJECTION SUBCUTANEOUS at 08:47

## 2024-02-03 ASSESSMENT — PAIN SCALES - GENERAL
PAINLEVEL_OUTOF10: 6
PAINLEVEL_OUTOF10: 7
PAINLEVEL_OUTOF10: 7
PAINLEVEL_OUTOF10: 0
PAINLEVEL_OUTOF10: 0

## 2024-02-03 ASSESSMENT — PAIN SCALES - WONG BAKER
WONGBAKER_NUMERICALRESPONSE: 0

## 2024-02-03 ASSESSMENT — PAIN DESCRIPTION - DESCRIPTORS
DESCRIPTORS: ACHING
DESCRIPTORS: ACHING;DISCOMFORT

## 2024-02-03 ASSESSMENT — PAIN DESCRIPTION - PAIN TYPE
TYPE: SURGICAL PAIN;ACUTE PAIN
TYPE: SURGICAL PAIN;ACUTE PAIN

## 2024-02-03 ASSESSMENT — PAIN DESCRIPTION - ORIENTATION
ORIENTATION: MID
ORIENTATION: MID

## 2024-02-03 ASSESSMENT — PAIN DESCRIPTION - FREQUENCY
FREQUENCY: INTERMITTENT
FREQUENCY: INTERMITTENT

## 2024-02-03 ASSESSMENT — PAIN - FUNCTIONAL ASSESSMENT
PAIN_FUNCTIONAL_ASSESSMENT: PREVENTS OR INTERFERES SOME ACTIVE ACTIVITIES AND ADLS
PAIN_FUNCTIONAL_ASSESSMENT: PREVENTS OR INTERFERES SOME ACTIVE ACTIVITIES AND ADLS

## 2024-02-03 ASSESSMENT — PAIN DESCRIPTION - ONSET
ONSET: ON-GOING
ONSET: ON-GOING

## 2024-02-03 ASSESSMENT — PAIN DESCRIPTION - LOCATION
LOCATION: ABDOMEN
LOCATION: ABDOMEN

## 2024-02-03 ASSESSMENT — PAIN DESCRIPTION - DIRECTION: RADIATING_TOWARDS: SIDES

## 2024-02-03 NOTE — PROGRESS NOTES
Shift assessment done, VSS, A/O. Neuro checks WNL. Reports pain 7/10. Meds given per MAR. Standard safety measures in place. The care plan and education has been reviewed and mutually agreed upon with the patient.

## 2024-02-03 NOTE — PLAN OF CARE
Problem: Chronic Conditions and Co-morbidities  Goal: Patient's chronic conditions and co-morbidity symptoms are monitored and maintained or improved  2/2/2024 2151 by Isabela Delgado RN  Outcome: Progressing  Flowsheets (Taken 2/2/2024 1946)  Care Plan - Patient's Chronic Conditions and Co-Morbidity Symptoms are Monitored and Maintained or Improved: Monitor and assess patient's chronic conditions and comorbid symptoms for stability, deterioration, or improvement  2/2/2024 0947 by Nakita Leiva, RN  Outcome: Progressing  2/2/2024 0859 by Vee Zhong  Outcome: Progressing     Problem: Discharge Planning  Goal: Discharge to home or other facility with appropriate resources  2/2/2024 2151 by Isabela Delgado RN  Outcome: Progressing  Flowsheets (Taken 2/2/2024 1946)  Discharge to home or other facility with appropriate resources: Identify barriers to discharge with patient and caregiver  2/2/2024 0947 by Nakita Leiva, RN  Outcome: Progressing  2/2/2024 0859 by Vee Zhong  Outcome: Progressing

## 2024-02-03 NOTE — PROGRESS NOTES
[facility]        Progress Note    Patient Name: Deshaun Ayers  MRN: [id number]  YOB: 1965  Date of Evaluation: 2024    Subjective:  No acute events overnight  Pain controlled  Tolerating diet  Passing stool    Post-Operative Day #9    Vital Signs:  Patient Vitals for the past 24 hrs:   BP Temp Temp src Pulse Resp SpO2 Weight   24 1245 (!) 179/82 97.6 °F (36.4 °C) Oral 95 18 97 % --   24 1117 -- -- -- -- -- 95 % --   24 0911 -- -- -- -- 18 -- --   24 0846 -- -- -- 85 -- -- --   24 0841 (!) 150/96 98.2 °F (36.8 °C) Oral 76 18 96 % --   24 0547 -- -- -- -- 16 -- --   24 0506 135/80 97.6 °F (36.4 °C) Oral 86 18 98 % 129.7 kg (285 lb 14.4 oz)   24 0000 -- -- -- -- 17 -- --   24 2329 (!) 160/94 98.1 °F (36.7 °C) Oral 81 18 97 % --   24 -- -- -- -- 18 -- --   24 -- -- -- -- 18 -- --   24 (!) 158/87 98.9 °F (37.2 °C) Oral 87 18 98 % --   24 1910 -- -- -- -- 18 -- --   24 1840 -- -- -- -- 16 -- --   24 1632 (!) 143/92 98.2 °F (36.8 °C) Oral 87 19 95 % --   24 1617 -- -- -- -- 18 -- --        TEMPERATURE HISTORY 24H: Temp (24hrs), Av.1 °F (36.7 °C), Min:97.6 °F (36.4 °C), Max:98.9 °F (37.2 °C)    BLOOD PRESSURE HISTORY: Systolic (36hrs), Av , Min:135 , Max:179    Diastolic (36hrs), Av, Min:79, Max:96      Intake/Output:  I/O last 3 completed shifts:  In: 6408.1 [P.O.:120; IV Piggyback:1230]  Out: 2650 [Urine:2650]  I/O this shift:  In: 240 [P.O.:240]  Out: 1400 [Urine:1400]  Drain/tube Output:       Physical Exam:  General: awake, alert, oriented to person, place, time  Abdomen: soft, non-distended, appropriate incisional tenderness, midline wound open and with healthy underlying fascia an granulation tissue, retention sutures in place    Labs:  CBC:    Recent Labs     24  0550 24  0512 24  0530   WBC 12.2* 11.9* 10.5  and other beta lactams is  not necessary for beta hemolytic Streptococci since resistant  strains have not been identified. (CLSI M100)       Blood culture 1  No results found for requested labs within last 30 days.     Blood culture 2  No results found for requested labs within last 30 days.     Fecal occult  No results found for requested labs within last 30 days.     GI bacterial pathogens by PCR  No results found for requested labs within last 30 days.     C. difficile  No results found for requested labs within last 30 days.     Urine culture  Lab Results   Component Value Date/Time    LABURIN No growth at 18-36 hours 12/07/2018 01:25 PM       Pathology:  OR 1/25/2024--FINAL DIAGNOSIS:     A.  Small bowel, segmental resection:   -Small bowel segment with extensive serositis associated with serosal   adhesions, foreign body giant cell reaction and subacute mesenteric   abscess.   -The proximal and distal resection margins are free of inflammation.     B.  Surgical mesh, removal:   Consistent with surgical mesh, removed (gross examination only).      MUTGE/MUTGE       Preoperative Diagnosis:  Infected abdominal prosthetic mesh   Postoperative Diagnosis:  Infected abdominal prosthetic mesh     SPECIMEN:   A. SMALL BOWEL   B.  ABDOMINAL MESH     GROSS DESCRIPTION:   A: Part A is received in formalin, labeled \"Deshaun Ayers, small   bowel\", and consists of a previously opened segment of small bowel,   measuring 40 cm in length and up to 4.3 cm in diameter. The specimen was previously opened. The areas of intact serosa are tan-red and dull, with extensive filmy adhesions. Definitive perforations are not seen, due to the previously opened nature of the specimen. A portion of the bowel, approximately 5 cm from the first end margin, is adhered to itself, and the outer surface at this area exhibits a portion of necrotic mesh-like material, measuring approximately 7 x 5.5 cm. The portion of bowel is adhered to itself

## 2024-02-03 NOTE — PROGRESS NOTES
Pt evening shift assessment complete. VSS and medication given as ordered. Pt assessed for comfort needs, given pain medication and anti anxiety medication per pt request and per orders. Dressing to abdomen changed per orders.Pt tolerated well.  Pt does not express any additional needs at this time, resting comfortably in bed.

## 2024-02-03 NOTE — PROGRESS NOTES
Infusions:    sodium chloride      sodium chloride      dextrose       PRN Meds: HYDROcodone 5 mg - acetaminophen, 1 tablet, Q4H PRN   Or  HYDROcodone 5 mg - acetaminophen, 2 tablet, Q4H PRN  magnesium sulfate, 2,000 mg, PRN  potassium chloride, 20 mEq, PRN  benzocaine-menthol, 1 lozenge, Q2H PRN  phenol, 1 spray, Q2H PRN  sodium chloride flush, 5-40 mL, PRN  sodium chloride, 25 mL, PRN  sodium chloride flush, 5-40 mL, PRN  sodium chloride, , PRN  ondansetron, 4 mg, Q6H PRN  diazePAM, 2.5 mg, Q6H PRN  glucose, 4 tablet, PRN  dextrose bolus, 125 mL, PRN   Or  dextrose bolus, 250 mL, PRN  glucagon (rDNA), 1 mg, PRN  dextrose, , Continuous PRN  labetalol, 10 mg, Q6H PRN        Labs and Imaging   XR ABDOMEN (2 VIEWS)    Result Date: 1/29/2024  EXAMINATION: TWO XRAY VIEWS OF THE ABDOMEN 1/29/2024 2:43 pm COMPARISON: None HISTORY: ORDERING SYSTEM PROVIDED HISTORY: Eval bowel gas pattern TECHNOLOGIST PROVIDED HISTORY: Reason for exam:->Eval bowel gas pattern FINDINGS: There is a gastric tube in place with the tip in the fundus and the side port seen probably just above the EG junction.  There are multiple loops of mildly dilated large and small bowel throughout the abdomen with small air-fluid levels throughout.  There are surgical clips along the right upper quadrant. There is small radiopaque linear catheter tubing projecting along the right pelvis which is of uncertain etiology.  No urinary calculi are seen.  The bones are intact.     Nasogastric tube tip along the fundus of the stomach with the side port probably just above the EG junction.  Recommend readjusting the gastric tube tip into the distal stomach for more physiologic positioning. Questionable diffuse mild ileus. Linear radiopaque catheter tubing projecting along the right lower quadrant which is of uncertain etiology.  Recommend clinical follow-up correlating with prior surgical history. Status post cholecystectomy       CBC:   Recent Labs      02/01/24  0512 02/02/24  0530 02/03/24  0950   WBC 11.9* 10.5 10.8   HGB 10.9* 11.4* 10.6*   * 345 374       BMP:    Recent Labs     02/01/24  0512 02/02/24  0530 02/03/24  0525    138 140   K 3.0* 3.5 3.6    104 104   CO2 28 25 27   BUN 7 8 7   CREATININE <0.5* <0.5* <0.5*   GLUCOSE 216* 327* 204*       Hepatic:   No results for input(s): \"AST\", \"ALT\", \"ALB\", \"BILITOT\", \"ALKPHOS\" in the last 72 hours.    Lipids:   Lab Results   Component Value Date/Time    CHOL 157 11/20/2023 10:57 AM    HDL 33 11/20/2023 10:57 AM    HDL 35 12/05/2011 03:40 PM    TRIG 137 01/30/2024 05:00 AM     Hemoglobin A1C:   Lab Results   Component Value Date/Time    LABA1C 8.5 01/25/2024 06:39 AM     TSH:   Lab Results   Component Value Date/Time    TSH 1.76 11/30/2015 01:08 PM     Troponin: No results found for: \"TROPONINT\"  Lactic Acid: No results for input(s): \"LACTA\" in the last 72 hours.  BNP: No results for input(s): \"PROBNP\" in the last 72 hours.  UA:  Lab Results   Component Value Date/Time    NITRU Negative 12/10/2023 02:31 PM    COLORU Yellow 12/10/2023 02:31 PM    PHUR 6.0 12/10/2023 02:31 PM    WBCUA 1 12/10/2023 02:31 PM    RBCUA 1 12/10/2023 02:31 PM    YEAST 0 12/07/2018 01:17 PM    BACTERIA None Seen 12/10/2023 02:31 PM    CLARITYU Clear 12/10/2023 02:31 PM    SPECGRAV >=1.030 12/10/2023 02:31 PM    LEUKOCYTESUR Negative 12/10/2023 02:31 PM    UROBILINOGEN 0.2 12/10/2023 02:31 PM    BILIRUBINUR Negative 12/10/2023 02:31 PM    BILIRUBINUR NEGATIVE 10/12/2011 06:30 PM    BLOODU Negative 12/10/2023 02:31 PM    GLUCOSEU >=1000 12/10/2023 02:31 PM    GLUCOSEU 500 10/12/2011 06:30 PM    KETUA 40 12/10/2023 02:31 PM     Urine Cultures:   Lab Results   Component Value Date/Time    LABURIN No growth at 18-36 hours 12/07/2018 01:25 PM     Blood Cultures:   Lab Results   Component Value Date/Time    BC No Growth after 4 days of incubation. 12/10/2023 06:58 AM     Lab Results   Component Value Date/Time    BLOODCULT2 No  Growth after 4 days of incubation. 12/10/2023 07:40 AM     Organism:   Lab Results   Component Value Date/Time    ORG Citrobacter freundii 01/25/2024 08:03 AM    ORG Enterococcus faecalis 01/25/2024 08:03 AM    ORG Beta Strep Group G 01/25/2024 08:03 AM    ORG Prevotella buccae 01/25/2024 08:03 AM         Electronically signed by Slava Zendejas MD on 2/3/2024 at 1:02 PM

## 2024-02-03 NOTE — PLAN OF CARE
Problem: Chronic Conditions and Co-morbidities  Goal: Patient's chronic conditions and co-morbidity symptoms are monitored and maintained or improved  2/3/2024 1025 by Jessica Wyman RN  Outcome: Progressing     Problem: Discharge Planning  Goal: Discharge to home or other facility with appropriate resources  2/3/2024 1025 by Jessica Wyman RN  Outcome: Progressing     Problem: Pain  Goal: Verbalizes/displays adequate comfort level or baseline comfort level  Outcome: Progressing  Flowsheets  Taken 2/3/2024 0519 by Isabela Delgado RN  Verbalizes/displays adequate comfort level or baseline comfort level: Encourage patient to monitor pain and request assistance  Taken 2/3/2024 0025 by Isabela Delgado RN  Verbalizes/displays adequate comfort level or baseline comfort level: Encourage patient to monitor pain and request assistance     Problem: Safety - Adult  Goal: Free from fall injury  Outcome: Progressing     Problem: Skin/Tissue Integrity  Goal: Absence of new skin breakdown  Description: 1.  Monitor for areas of redness and/or skin breakdown  2.  Assess vascular access sites hourly  3.  Every 4-6 hours minimum:  Change oxygen saturation probe site  4.  Every 4-6 hours:  If on nasal continuous positive airway pressure, respiratory therapy assess nares and determine need for appliance change or resting period.  Outcome: Progressing     Problem: ABCDS Injury Assessment  Goal: Absence of physical injury  Outcome: Progressing     Problem: Nutrition Deficit:  Goal: Optimize nutritional status  Outcome: Progressing

## 2024-02-03 NOTE — PROGRESS NOTES
Patient to discharge wife at bedside. Spoke to ID to verify antibiotics Dr. Dan sent Augmentin 875 mg PO BID x two weeks to the [facility] on [facility]. Awaiting response to pull PICC line.     1707 PICC pulled no complications.

## 2024-02-05 ENCOUNTER — CARE COORDINATION (OUTPATIENT)
Dept: CASE MANAGEMENT | Age: 59
End: 2024-02-05

## 2024-02-05 DIAGNOSIS — T85.79XD INFECTED PROSTHETIC MESH OF ABDOMINAL WALL, SUBSEQUENT ENCOUNTER: Primary | ICD-10-CM

## 2024-02-05 LAB
FUNGUS SPEC CULT: NORMAL
LOEFFLER MB STN SPEC: NORMAL

## 2024-02-05 PROCEDURE — 1111F DSCHRG MED/CURRENT MED MERGE: CPT | Performed by: FAMILY MEDICINE

## 2024-02-05 NOTE — CARE COORDINATION
reported he will reach out to PCP.     Follow Up  Future Appointments   Date Time Provider Department Center   2/23/2024 11:00 AM Ginger Anguiano MD [facility] [place]       Care Transition Nurse provided contact information.  Plan for follow-up call in 5-7 days based on severity of symptoms and risk factors.  Plan for next call: self management-incision sites HC pain PCP apt surgeon apt ?    Piedad CHRISTIANN, RN, [facility]  Care Transition Nurse  [phone] mobile

## 2024-02-06 NOTE — DISCHARGE SUMMARY
[facility]        Discharge Summary    Patient Name: Deshaun Ayers  MRN: [id number]  YOB: 1965  PCP: Ginger Anguiano MD  Admission Date: 1/25/2024  Discharge Date: 2/3/2024  Disposition: home  Admitting Diagnosis: Infected prosthetic mesh of abdominal wall, initial encounter ([facility]) [T85.79XA]  Infected prosthetic mesh of abdominal wall, subsequent encounter [T85.79XD]  Discharge Diagnosis:   Patient Active Problem List   Diagnosis    Mixed hyperlipidemia    Essential hypertension    Chronic pancreatitis    Chronic pain syndrome    Esophageal reflux    IZABELA (obstructive sleep apnea)    Allergic rhinitis    Moderate episode of recurrent major depressive disorder (HCC)    Erectile dysfunction, non organic    Meniere's disease    Insomnia    Morbid obesity due to excess calories ([facility])    Type 2 diabetes mellitus with renal manifestations (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)    Sepsis (HCC)    Nephrolithiasis    History of colostomy reversal    History of ventral hernia repair    Encounter for medication counseling    Infected prosthetic mesh of abdominal wall, subsequent encounter    Leukocytosis    Class 2 obesity due to excess calories with body mass index (BMI) of 37.0 to 37.9 in adult    History of depression    History of MRSA infection    Anaerobic bacterial infection    Citrobacter infection    Enterococcus faecalis infection    E coli infection      Consultants: IP CONSULT TO HOSPITALIST  IP CONSULT TO INFECTIOUS DISEASES  IP CONSULT TO DIETITIAN  IP CONSULT TO HOME CARE NEEDS    Procedures/Diagnostic Test(s):  XR ABDOMEN (2 VIEWS)   Final Result   Nasogastric tube tip along the fundus of the stomach with the side port 
Opt out

## 2024-02-09 ENCOUNTER — CARE COORDINATION (OUTPATIENT)
Dept: CASE MANAGEMENT | Age: 59
End: 2024-02-09

## 2024-02-09 NOTE — CARE COORDINATION
Care Transitions Follow Up Call    Patient Current Location:  Home: [address] [place] [id number]    [facility] Care Coordinator contacted the patient by telephone to follow up after admission on .  Verified name and  with patient as identifiers.    Patient: Deshaun Ayers  Patient : 1965   MRN: [id number]  Reason for Admission: Infected abd wall mesh, small bowel resection with anastomosis, explantation of infected hernia mesh  Discharge Date: 2/3/24 RARS: Readmission Risk Score: 14.6      Needs to be reviewed by the provider   Additional needs identified to be addressed with provider: No  none             Method of communication with provider: none.    Spoke with Deshaun Ayers who reported that he is making progress. He stated that he wished healing was going faster but have been told by doctors that his healing is on track. Patient reported that [facility] nurse reported to him that his incisions are doing well. Patient reported continued abdominal pain r/t incisions/wounds but he knows that healing takes time. Patient denied cp, sob, cough, dizziness, headache, n/v, diarrhea, fever, or chills. Patient report that appetite and fluid intake is good and denied any problems with bowel or bladder. Patient reported that he is taking all medications as ordered. Patient denied any other needs at this time. Patient instructed to continue to monitor s/s, reporting any that may present to MD immediately for early intervention.  Patient is agreeable to f/u calls.    Addressed changes since last contact:  none  Discussed follow-up appointments. If no appointment was previously scheduled, appointment scheduling offered: Yes.   Is follow up appointment scheduled within 7 days of discharge? No.    Follow Up  Future Appointments   Date Time Provider Department Center   2024 11:00 AM Ginger Anguiano MD [facility] [place] - [place]     External follow up appointment(s): bree    LPN Care Coordinator reviewed medical action

## 2024-02-12 LAB
FUNGUS SPEC CULT: NORMAL
LOEFFLER MB STN SPEC: NORMAL

## 2024-02-15 ENCOUNTER — OFFICE VISIT (OUTPATIENT)
Dept: SURGERY | Age: 59
End: 2024-02-15

## 2024-02-15 VITALS — DIASTOLIC BLOOD PRESSURE: 78 MMHG | SYSTOLIC BLOOD PRESSURE: 124 MMHG | WEIGHT: 269.4 LBS | BODY MASS INDEX: 36.54 KG/M2

## 2024-02-15 DIAGNOSIS — T85.79XD INFECTED PROSTHETIC MESH OF ABDOMINAL WALL, SUBSEQUENT ENCOUNTER: Primary | ICD-10-CM

## 2024-02-15 DIAGNOSIS — E66.01 SEVERE OBESITY (BMI 35.0-39.9) WITH COMORBIDITY (HCC): ICD-10-CM

## 2024-02-15 PROCEDURE — 99024 POSTOP FOLLOW-UP VISIT: CPT | Performed by: SURGERY

## 2024-02-15 NOTE — PROGRESS NOTES
Subjective:      Patient ID: Deshaun Ayers is a 58 y.o. male.    HPI    Review of Systems    Objective:   Physical Exam  Abdomen soft  Wound clean  Assessment:      58-year-old male status post explantation of infected hernia mesh, extensive lysis of adhesions and small bowel resection with anastomosis.  His midline incision was left open and he has retention sutures in place.  The midline wound is progressing well.  His appetite is improving.  He still has expected fatigue.      Plan:      Continue wound care  Follow-up in 1 week for removal of retention sutures        Russel Toscano MD

## 2024-02-16 ENCOUNTER — CARE COORDINATION (OUTPATIENT)
Dept: CASE MANAGEMENT | Age: 59
End: 2024-02-16

## 2024-02-16 NOTE — CARE COORDINATION
[facility] Care Transitions Follow Up Call    2024    Patient: Deshaun Ayers  Patient : 1965   MRN: [id number]  Reason for Admission: Infected abd wall mesh, small bowel resection with anastomosis, explantation of infected hernia mesh  Discharge Date: 2/3/24 RARS: Readmission Risk Score: 14.6         Spoke with: bree    [facility] attempted outreach for care transition follow up call. Left HIPPA compliant message and contact information for call back.     Care Transitions Subsequent and Final Call    Subsequent and Final Calls  Are you currently active with any services?: Home Health  Care Transitions Interventions   Home Care Waiver: Completed     Other Interventions:             Follow Up  Future Appointments   Date Time Provider Department Center   2024  1:30 PM Russel Toscano MD  G&L SURG [facility]   2024 11:00 AM Ginger Anguiano MD [facility] Alyssa Kamara LPN

## 2024-02-19 ENCOUNTER — CARE COORDINATION (OUTPATIENT)
Dept: CASE MANAGEMENT | Age: 59
End: 2024-02-19

## 2024-02-19 LAB
FUNGUS SPEC CULT: NORMAL
LOEFFLER MB STN SPEC: NORMAL

## 2024-02-19 NOTE — CARE COORDINATION
Care Transitions Follow Up Call    Patient Current Location:  Home: [address] [place] [id number]    [facility] Care Coordinator contacted the patient by telephone to follow up after admission on 2024.  Verified name and  with patient as identifiers.    Patient: Deshaun Ayers  Patient : 1965   MRN: [id number]  Reason for Admission: Infected abd wall mesh, small bowel resection with anastomosis, explantation of infected hernia mesh   Discharge Date: 2/3/24 RARS: Readmission Risk Score: 14.6      Needs to be reviewed by the provider   Additional needs identified to be addressed with provider: No  none             Method of communication with provider: none.  Patient stated he is some better. He has retention sutures. They are getting taken out on Thursday. Appetite and fluid intake is good. Taking medication as prescribed. He has a nurse coming weekly to check wound and change dressing. He reports wound looks good. Denies fever, chills, cough, lh, dizziness, nvd or sob. He has fatigue and weakness. This is expected. , taken after he ate. B & B wnl's. No needs at this time. Will continue to follow.      Addressed changes since last contact:  none  Discussed follow-up appointments. If no appointment was previously scheduled, appointment scheduling offered: No.   Is follow up appointment scheduled within 7 days of discharge? No.    Follow Up  Future Appointments   Date Time Provider Department Center   2024  1:30 PM Russel Toscano MD [facility] G&L SURG [facility]   2024 11:00 AM Ginger Anguiano MD [facility] [place] [facility] [place]     External follow up appointment(s):     LPN Care Coordinator reviewed medical action plan and red flags with patient and discussed any barriers to care and/or understanding of plan of care after discharge. Discussed appropriate site of care based on symptoms and resources available to patient including: PCP  Specialist  Urgent care clinics  [facility]  When to call 911. The

## 2024-02-21 NOTE — PROGRESS NOTES
Deshaun Ayers is a 58 y.o. male who presents for follow-up of Type 2 diabetes mellitus.  Had major abd surgery to remove infected mesh from abdomen in January.     Current medication use: taking as prescribed (metformin, basal insulin, mealtime insulin, Jardiance, and glimepiride)  Eye exam current (within one year): no,   Exercise:  intermittently, walking  Do you eat balanced/healthy meals regularly? Yes  Do you limit your carbs (pasta/rice/bread/potatoes/sugar) Yes  Sees podiatrist: no  Checks sugars at home:  3x/day  Hypoglycemia symptoms: No  AM Fastin-148  Lunch - 200-220  Evening - 170-180's      States sugars were in 300's when first came home from surgery but now 200's or less      Checks BP at home: No  BP numbers: N/A  Taking medication daily: Yes (losartan and HCTZ)  Side effects of medication: no      Mood - states doing better, down some from surgery but feels like improving as recovering.       Takes diazepam couple times a week for vertigo. No SE      Has been able to come off fentanyl patch. Just using oxycodone currently. Follows with pain mgmt.        Body mass index is 37.08 kg/m².  Vitals:    24 1107   BP: 120/60   Site: Left Upper Arm   Position: Sitting   Cuff Size: Large Adult   Pulse: 80   Temp: 98.1 °F (36.7 °C)   TempSrc: Infrared   SpO2: 98%   Weight: 124 kg (273 lb 6.4 oz)     Wt Readings from Last 3 Encounters:   24 124 kg (273 lb 6.4 oz)   24 123.1 kg (271 lb 6.4 oz)   02/15/24 122.2 kg (269 lb 6.4 oz)       GENERAL:Alert and oriented x 4 NAD, affect appropriate and obese, well hydrated, well developed.  LUNG:clear to auscultation bilaterally with normal respiratory effort and good air movement  CV: Normal heart sounds, regular rate and rhythm without murmurs    LE Edema: normal,          2024    11:04 AM 2023     9:57 AM 2023     9:17 AM 2022     1:27 PM 2022     1:02 PM 2022     2:28 PM 2021     8:57 AM   PHQ Scores

## 2024-02-22 ENCOUNTER — OFFICE VISIT (OUTPATIENT)
Dept: SURGERY | Age: 59
End: 2024-02-22

## 2024-02-22 VITALS — WEIGHT: 271.4 LBS | BODY MASS INDEX: 36.81 KG/M2 | DIASTOLIC BLOOD PRESSURE: 76 MMHG | SYSTOLIC BLOOD PRESSURE: 128 MMHG

## 2024-02-22 DIAGNOSIS — Z87.19 HISTORY OF VENTRAL HERNIA REPAIR: Primary | ICD-10-CM

## 2024-02-22 DIAGNOSIS — Z98.890 HISTORY OF VENTRAL HERNIA REPAIR: Primary | ICD-10-CM

## 2024-02-22 PROCEDURE — 99024 POSTOP FOLLOW-UP VISIT: CPT | Performed by: SURGERY

## 2024-02-22 NOTE — PROGRESS NOTES
Subjective:      Patient ID: Deshaun Ayers is a 58 y.o. male.    HPI    Review of Systems    Objective:   Physical Exam  Abdomen soft  Wound progressing adequately  Assessment:      58-year-old male seen in follow-up status post explantation of infected hernia mesh, extensive lysis of adhesions and small bowel resection.  He continues to clinically improve.  The retention sutures were removed.  The midline wound is progressing well.      Plan:      Continue current dressing changes to abdominal wound.  Follow-up again in 2 weeks.        Russel Toscano MD

## 2024-02-23 ENCOUNTER — OFFICE VISIT (OUTPATIENT)
Dept: FAMILY MEDICINE CLINIC | Age: 59
End: 2024-02-23
Payer: MEDICARE

## 2024-02-23 ENCOUNTER — HOSPITAL ENCOUNTER (OUTPATIENT)
Age: 59
Discharge: HOME | End: 2024-02-23
Payer: MEDICARE

## 2024-02-23 VITALS
BODY MASS INDEX: 37.08 KG/M2 | DIASTOLIC BLOOD PRESSURE: 60 MMHG | OXYGEN SATURATION: 98 % | TEMPERATURE: 98.1 F | WEIGHT: 273.4 LBS | SYSTOLIC BLOOD PRESSURE: 120 MMHG | HEART RATE: 80 BPM

## 2024-02-23 DIAGNOSIS — K86.1 OTHER CHRONIC PANCREATITIS (HCC): ICD-10-CM

## 2024-02-23 DIAGNOSIS — E11.65 TYPE 2 DIABETES MELLITUS WITH HYPERGLYCEMIA, WITH LONG-TERM CURRENT USE OF INSULIN (HCC): Primary | ICD-10-CM

## 2024-02-23 DIAGNOSIS — F33.1 MODERATE EPISODE OF RECURRENT MAJOR DEPRESSIVE DISORDER (HCC): ICD-10-CM

## 2024-02-23 DIAGNOSIS — T85.79XD INFECTED PROSTHETIC MESH OF ABDOMINAL WALL, SUBSEQUENT ENCOUNTER: ICD-10-CM

## 2024-02-23 DIAGNOSIS — E11.69 DIABETES MELLITUS TYPE 2 IN OBESE (HCC): ICD-10-CM

## 2024-02-23 DIAGNOSIS — Z79.4 TYPE 2 DIABETES MELLITUS WITH HYPERGLYCEMIA, WITH LONG-TERM CURRENT USE OF INSULIN (HCC): Primary | ICD-10-CM

## 2024-02-23 DIAGNOSIS — E66.9 DIABETES MELLITUS TYPE 2 IN OBESE (HCC): ICD-10-CM

## 2024-02-23 DIAGNOSIS — H81.09 MENIERE'S DISEASE, UNSPECIFIED LATERALITY: ICD-10-CM

## 2024-02-23 DIAGNOSIS — T81.40XA INTRA-ABDOMINAL INFECTION AFTER SURGICAL PROCEDURE: ICD-10-CM

## 2024-02-23 PROBLEM — E66.812 CLASS 2 OBESITY DUE TO EXCESS CALORIES WITH BODY MASS INDEX (BMI) OF 37.0 TO 37.9 IN ADULT: Status: RESOLVED | Noted: 2024-01-29 | Resolved: 2024-02-23

## 2024-02-23 PROBLEM — E66.09 CLASS 2 OBESITY DUE TO EXCESS CALORIES WITH BODY MASS INDEX (BMI) OF 37.0 TO 37.9 IN ADULT: Status: RESOLVED | Noted: 2024-01-29 | Resolved: 2024-02-23

## 2024-02-23 PROBLEM — A41.9 SEPSIS (HCC): Status: RESOLVED | Noted: 2023-12-11 | Resolved: 2024-02-23

## 2024-02-23 PROBLEM — B99.9 INTRA-ABDOMINAL INFECTION: Status: RESOLVED | Noted: 2023-12-11 | Resolved: 2024-02-23

## 2024-02-23 PROBLEM — T85.79XA INFECTED PROSTHETIC MESH OF ABDOMINAL WALL (HCC): Status: RESOLVED | Noted: 2023-12-11 | Resolved: 2024-02-23

## 2024-02-23 PROBLEM — D72.829 LEUKOCYTOSIS: Status: RESOLVED | Noted: 2024-01-28 | Resolved: 2024-02-23

## 2024-02-23 PROBLEM — R10.9 ABDOMINAL PAIN: Status: RESOLVED | Noted: 2018-11-05 | Resolved: 2024-02-23

## 2024-02-23 PROBLEM — R10.9 ACUTE ABDOMINAL PAIN: Status: RESOLVED | Noted: 2023-12-10 | Resolved: 2024-02-23

## 2024-02-23 PROBLEM — Z86.59 HISTORY OF DEPRESSION: Status: RESOLVED | Noted: 2024-01-29 | Resolved: 2024-02-23

## 2024-02-23 LAB
BASOPHILS # BLD: 0 K/UL (ref 0–0.2)
BASOPHILS NFR BLD: 0.2 %
CRP SERPL-MCNC: 11.7 MG/L (ref 0–5.1)
DEPRECATED RDW RBC AUTO: 17.3 % (ref 12.4–15.4)
EOSINOPHIL # BLD: 0.3 K/UL (ref 0–0.6)
EOSINOPHIL NFR BLD: 2.7 %
ERYTHROCYTE [SEDIMENTATION RATE] IN BLOOD BY WESTERGREN METHOD: 89 MM/HR (ref 0–20)
HCT VFR BLD AUTO: 33.9 % (ref 40.5–52.5)
HGB BLD-MCNC: 10.8 G/DL (ref 13.5–17.5)
LYMPHOCYTES # BLD: 2.7 K/UL (ref 1–5.1)
LYMPHOCYTES NFR BLD: 26.3 %
MCH RBC QN AUTO: 25.3 PG (ref 26–34)
MCHC RBC AUTO-ENTMCNC: 32 G/DL (ref 31–36)
MCV RBC AUTO: 79.1 FL (ref 80–100)
MONOCYTES # BLD: 1.1 K/UL (ref 0–1.3)
MONOCYTES NFR BLD: 10.6 %
NEUTROPHILS # BLD: 6.2 K/UL (ref 1.7–7.7)
NEUTROPHILS NFR BLD: 60.2 %
PLATELET # BLD AUTO: 400 K/UL (ref 135–450)
PMV BLD AUTO: 9.9 FL (ref 5–10.5)
RBC # BLD AUTO: 4.28 M/UL (ref 4.2–5.9)
WBC # BLD AUTO: 10.3 K/UL (ref 4–11)

## 2024-02-23 PROCEDURE — 85025 COMPLETE CBC W/AUTO DIFF WBC: CPT

## 2024-02-23 PROCEDURE — 3052F HG A1C>EQUAL 8.0%<EQUAL 9.0%: CPT | Performed by: FAMILY MEDICINE

## 2024-02-23 PROCEDURE — 99214 OFFICE O/P EST MOD 30 MIN: CPT | Performed by: FAMILY MEDICINE

## 2024-02-23 PROCEDURE — 3074F SYST BP LT 130 MM HG: CPT | Performed by: FAMILY MEDICINE

## 2024-02-23 PROCEDURE — 86140 C-REACTIVE PROTEIN: CPT

## 2024-02-23 PROCEDURE — 36415 COLL VENOUS BLD VENIPUNCTURE: CPT

## 2024-02-23 PROCEDURE — 3078F DIAST BP <80 MM HG: CPT | Performed by: FAMILY MEDICINE

## 2024-02-23 PROCEDURE — 85652 RBC SED RATE AUTOMATED: CPT

## 2024-02-23 RX ORDER — DIAZEPAM 5 MG/1
5 TABLET ORAL EVERY 12 HOURS PRN
Qty: 30 TABLET | Refills: 0 | Status: SHIPPED | OUTPATIENT
Start: 2024-02-23 | End: 2024-03-24

## 2024-02-23 SDOH — ECONOMIC STABILITY: FOOD INSECURITY: WITHIN THE PAST 12 MONTHS, THE FOOD YOU BOUGHT JUST DIDN'T LAST AND YOU DIDN'T HAVE MONEY TO GET MORE.: NEVER TRUE

## 2024-02-23 SDOH — ECONOMIC STABILITY: INCOME INSECURITY: HOW HARD IS IT FOR YOU TO PAY FOR THE VERY BASICS LIKE FOOD, HOUSING, MEDICAL CARE, AND HEATING?: NOT HARD AT ALL

## 2024-02-23 SDOH — ECONOMIC STABILITY: FOOD INSECURITY: WITHIN THE PAST 12 MONTHS, YOU WORRIED THAT YOUR FOOD WOULD RUN OUT BEFORE YOU GOT MONEY TO BUY MORE.: NEVER TRUE

## 2024-02-23 ASSESSMENT — PATIENT HEALTH QUESTIONNAIRE - PHQ9
SUM OF ALL RESPONSES TO PHQ QUESTIONS 1-9: 6
2. FEELING DOWN, DEPRESSED OR HOPELESS: 3
SUM OF ALL RESPONSES TO PHQ9 QUESTIONS 1 & 2: 6
SUM OF ALL RESPONSES TO PHQ QUESTIONS 1-9: 6
SUM OF ALL RESPONSES TO PHQ QUESTIONS 1-9: 6
1. LITTLE INTEREST OR PLEASURE IN DOING THINGS: 3
SUM OF ALL RESPONSES TO PHQ QUESTIONS 1-9: 6

## 2024-02-26 ENCOUNTER — CARE COORDINATION (OUTPATIENT)
Dept: CASE MANAGEMENT | Age: 59
End: 2024-02-26

## 2024-02-26 LAB
FUNGUS SPEC CULT: NORMAL
LOEFFLER MB STN SPEC: NORMAL

## 2024-02-26 NOTE — CARE COORDINATION
Care Transitions Outreach Attempt       Attempted to reach patient for transitions of care follow up. Unable to reach patient.    Patient: Deshaun Ayers Patient : 1965 MRN: [id number]    Last Discharge Facility       Date Complaint Diagnosis Description Type Department Provider    24  Infected prosthetic mesh of abdominal wall, initial encounter ([facility]) Admission (Discharged) [facility] 4T Russel Toscano MD            Noted following upcoming appointments from discharge chart review:   [facility] follow up appointment(s):   Future Appointments   Date Time Provider Department Center   3/7/2024  1:15 PM Russel Toscano MD  G&L SURG [facility]   2024 10:30 AM Ginger Anguiano MD [facility] [place] [facility] [place]     Non-[facility]  follow up appointment(s):

## 2024-02-28 ENCOUNTER — CARE COORDINATION (OUTPATIENT)
Dept: CASE MANAGEMENT | Age: 59
End: 2024-02-28

## 2024-02-28 NOTE — CARE COORDINATION
Care Transitions Follow Up Call    Patient Current Location:  Home: [address] [place] [id number]    Care Transition Nurse contacted the patient by telephone to follow up after admission.  Verified name and  with patient as identifiers.    Patient: Deshaun Ayers  Patient : 1965   MRN: [id number]  Reason for Admission:   Discharge Date: 2/3/24 RARS: Readmission Risk Score: 14.6      Needs to be reviewed by the provider   Additional needs identified to be addressed with provider: No  none             Method of communication with provider: none.    Pt states doing well, no issues or concerns. No need for further f/u CTC calls, will transition to ACM.    Addressed changes since last contact:  none  Discussed follow-up appointments. If no appointment was previously scheduled, appointment scheduling offered: Yes.   Is follow up appointment scheduled within 7 days of discharge? No within 10 days.    Follow Up  Future Appointments   Date Time Provider Department Center   3/7/2024  1:15 PM Russel Toscano MD FF [facility]   2024 10:30 AM Ginger Anguiano MD FF [place] [facility]     External follow up appointment(s):     Care Transition Nurse reviewed medical action plan with patient and discussed any barriers to care and/or understanding of plan of care after discharge. Discussed appropriate site of care based on symptoms and resources available to patient including: When to call 911. The patient agrees to contact the PCP office for questions related to their healthcare.     Advance Care Planning:   not on file; education provided.     Offered patient enrollment in the Remote Patient Monitoring (RPM) program for in-home monitoring: Patient is not eligible for RPM program. Only has diabetes.     Care Transitions Subsequent and Final Call    Subsequent and Final Calls  Do you have any ongoing symptoms?: No  Have your medications changed?: No  Do you have any questions related to your

## 2024-03-05 ENCOUNTER — CARE COORDINATION (OUTPATIENT)
Dept: CARE COORDINATION | Age: 59
End: 2024-03-05

## 2024-03-05 ENCOUNTER — ENROLLMENT (OUTPATIENT)
Dept: CARE COORDINATION | Age: 59
End: 2024-03-05

## 2024-03-05 NOTE — CARE COORDINATION
[facility] has made outreach to patient for Care Management from CTN handoff/Payor Referral. Patient has agreed to work with [facility] and to continued outreach. Patient was unavailable to complete enrollment at this time. Patient advised that he keeps his grand children on Tuesday and Wednesday. [facility] has arranged to follow up with patient at a later date/time.

## 2024-03-07 ENCOUNTER — CARE COORDINATION (OUTPATIENT)
Dept: CARE COORDINATION | Age: 59
End: 2024-03-07

## 2024-03-07 ENCOUNTER — OFFICE VISIT (OUTPATIENT)
Dept: SURGERY | Age: 59
End: 2024-03-07

## 2024-03-07 VITALS — BODY MASS INDEX: 24.18 KG/M2 | SYSTOLIC BLOOD PRESSURE: 122 MMHG | WEIGHT: 178.3 LBS | DIASTOLIC BLOOD PRESSURE: 78 MMHG

## 2024-03-07 DIAGNOSIS — Z98.890 HISTORY OF VENTRAL HERNIA REPAIR: Primary | ICD-10-CM

## 2024-03-07 DIAGNOSIS — Z87.19 HISTORY OF VENTRAL HERNIA REPAIR: Primary | ICD-10-CM

## 2024-03-07 PROCEDURE — 99024 POSTOP FOLLOW-UP VISIT: CPT | Performed by: SURGERY

## 2024-03-07 SDOH — HEALTH STABILITY: PHYSICAL HEALTH: ON AVERAGE, HOW MANY DAYS PER WEEK DO YOU ENGAGE IN MODERATE TO STRENUOUS EXERCISE (LIKE A BRISK WALK)?: 0 DAYS

## 2024-03-07 SDOH — HEALTH STABILITY: PHYSICAL HEALTH: ON AVERAGE, HOW MANY MINUTES DO YOU ENGAGE IN EXERCISE AT THIS LEVEL?: 0 MIN

## 2024-03-07 NOTE — PROGRESS NOTES
Subjective:      Patient ID: Deshaun Ayers is a 58 y.o. male.    HPI    Review of Systems    Objective:   Physical Exam  Abdomen is soft  Wound clean  Assessment:      58-year-old male status post explantation of infected abdominal wall mesh, extensive lysis of adhesions and small bowel resection with anastomosis.  His wound was intentionally left open postoperatively.  He is doing well overall.  The wound has almost healed.      Plan:      Continue PSO and dry bandage to site.  Follow-up in 2 weeks at which time the wound should be completely healed.        Russel Toscano MD

## 2024-03-07 NOTE — CARE COORDINATION
Ambulatory Care Coordination Note  3/7/2024    Patient Current Location:  Home: [address]  [place] [address]     ACM contacted the patient by telephone. Verified name and  with patient as identifiers. Provided introduction to self, and explanation of the ACM role.       Method of communication with provider: none.    ACM: Christen Corbin RN    ACM made outreach to patient for care management.  Patient advised ACM that he is doing well at this time. Patient has follow up with surgeon today. Per patient he has some abdominal pain that he feels is due to moving too much. No RED flags noted. Patient plans to discuss today at appointment. Patient advised that he has home health nurse in home weekly for wound care. He is not sure how long she will continue to come. His wife has been taught how to care for wound and per patient [facility] has noted that she is doing a good job. Patient had no questions for ACM and will address any concerns today. He was encouraged to call if he has additional questions after appointment.  ACM discussed patient's diabetes with patient. Patient advised ACM that he checks his BG 4 times a day. Patient advised ACM that fasting his range is between 120-130 and meal time tends to be higher around 180-210. Patient advised ACM that he did have a recent low blood sugar and was able to feel it. Patient verbalized appropriate treatment and is aware of the rule of 15. Patient is aware of both hyper and hypoglycemia s/sx and appropriate treatment. Patient is taking medications as prescribed with no questions/concerns at this time.  AC has arranged for follow up with patient at a later date/time.  Plan   Fall Assessment  SDOH - social connections  O2 therapy, DME?  Med Rec  Follow up abdomin   [facility]     Offered patient enrollment in the Remote Patient Monitoring (RPM) program for in-home monitoring: Patient declined.    Lab Results       None            Care Coordination Interventions    Referral

## 2024-03-14 ENCOUNTER — CARE COORDINATION (OUTPATIENT)
Dept: CARE COORDINATION | Age: 59
End: 2024-03-14

## 2024-03-22 ENCOUNTER — CARE COORDINATION (OUTPATIENT)
Dept: CARE COORDINATION | Age: 59
End: 2024-03-22

## 2024-03-30 RX ORDER — GLIMEPIRIDE 4 MG/1
TABLET ORAL
Qty: 90 TABLET | Refills: 0 | Status: SHIPPED | OUTPATIENT
Start: 2024-03-30

## 2024-04-04 ENCOUNTER — CARE COORDINATION (OUTPATIENT)
Dept: CARE COORDINATION | Age: 59
End: 2024-04-04

## 2024-04-04 NOTE — CARE COORDINATION
ACM sent my chart message to patient for final outreach attempt. ACM will end care management if no return call.

## 2024-04-08 NOTE — PROGRESS NOTES
oxyCODONE-acetaminophen (PERCOCET)  MG per tablet Take 1 tablet by mouth every 8 hours as needed for Pain for up to 30 days. Intended supply: 30 days 90 tablet 0    diazepam (VALIUM) 5 MG tablet Take 1 tablet by mouth every 12 hours as needed (meniere's) for up to 30 days. 15 tablet 0    doxepin (SINEQUAN) 25 MG capsule TAKE 1 CAPSULE BY MOUTH NIGHTLY 90 capsule 3    fluticasone (FLONASE) 50 MCG/ACT nasal spray SHAKE WELL AND USE 2 SPRAYS IN EACH NOSTRIL DAILY 3 Bottle 3    ibuprofen (ADVIL;MOTRIN) 800 MG tablet TAKE 1 TABLET BY MOUTH THREE TIMES DAILY WITH MEALS 90 tablet 0    metFORMIN (GLUCOPHAGE-XR) 500 MG extended release tablet TAKE 2 TABLETS BY MOUTH TWICE DAILY 360 tablet 3    glimepiride (AMARYL) 4 MG tablet TAKE 1 TABLET BY MOUTH EVERY MORNING 90 tablet 3    insulin detemir (LEVEMIR FLEXTOUCH) 100 UNIT/ML injection pen Inject 40 Units into the skin nightly 15 mL 12    montelukast (SINGULAIR) 10 MG tablet TAKE 1 TABLET BY MOUTH NIGHTLY 90 tablet 3    JARDIANCE 10 MG tablet TAKE 1 TABLET BY MOUTH DAILY 90 tablet 3    esomeprazole (NEXIUM) 40 MG delayed release capsule TAKE 1 CAPSULE BY MOUTH DAILY 90 capsule 3    losartan-hydrochlorothiazide (HYZAAR) 100-25 MG per tablet TAKE 1 TABLET BY MOUTH DAILY 90 tablet 2    ondansetron (ZOFRAN ODT) 4 MG disintegrating tablet Take 1-2 tablets by mouth every 8 hours as needed for Nausea May Sub regular tablet (non-ODT) if insurance does not cover ODT.  20 tablet 0    B-D UF III MINI PEN NEEDLES 31G X 5 MM MISC USE ONCE DAILY AS DIRECTED 100 each 3    nystatin (MYCOSTATIN) 174748 UNIT/GM cream APPLY TOPICALLY TWICE DAILY TO FOUR TIMES DAILY 60 g 0    simvastatin (ZOCOR) 40 MG tablet TAKE 1 TABLET BY MOUTH NIGHTLY 90 tablet 3    clobetasol (OLUX) 0.05 % foam APPLY EXTERNALLY TO THE SCALP TWICE DAILY AS NEEDED 50 g 3    ONE TOUCH ULTRA TEST strip USE TO TEST THREE TIMES DAILY AS NEEDED 300 strip 1    fluticasone (FLONASE) 50 MCG/ACT nasal spray SHAKE 1-2 drinks

## 2024-04-11 ENCOUNTER — CARE COORDINATION (OUTPATIENT)
Dept: CARE COORDINATION | Age: 59
End: 2024-04-11

## 2024-04-11 ENCOUNTER — TELEPHONE (OUTPATIENT)
Dept: FAMILY MEDICINE CLINIC | Age: 59
End: 2024-04-11

## 2024-04-11 DIAGNOSIS — K59.03 DRUG INDUCED CONSTIPATION: Primary | ICD-10-CM

## 2024-04-11 NOTE — TELEPHONE ENCOUNTER
Patient wants message held for RK    Patient has been having ongoing issues with constipation, said he has discussed this with you before.  It has been going on since his surgery in January.   He has tried all over the counter stool softners and not getting any relief, wanting to know if there is a rx that can be called in.    Viviane Valentine    Also, he used to get a rx for Ibuprofen 800 mg and is wondering if this is something you can call in for him again.    Rx pended.

## 2024-04-11 NOTE — CARE COORDINATION
[facility] has ended care management after multiple UTR outreach attempts. No further outreach at this time.

## 2024-04-12 RX ORDER — IBUPROFEN 800 MG/1
TABLET ORAL
Qty: 90 TABLET | Refills: 12 | Status: SHIPPED | OUTPATIENT
Start: 2024-04-12

## 2024-05-03 NOTE — PROGRESS NOTES
Bed: G15  Expected date:   Expected time:   Means of arrival:   Comments:  ik11   Shahab  received a viral test for COVID-19. They were educated on isolation and quarantine as appropriate. For any symptoms, they were directed to seek care from their PCP, given contact information to establish with a doctor, directed to an urgent care or the emergency room.

## 2024-05-18 DIAGNOSIS — H81.09 MENIERE'S DISEASE, UNSPECIFIED LATERALITY: ICD-10-CM

## 2024-05-19 RX ORDER — DIAZEPAM 5 MG/1
TABLET ORAL
Qty: 30 TABLET | Refills: 0 | Status: SHIPPED | OUTPATIENT
Start: 2024-05-19 | End: 2024-06-18

## 2024-05-28 RX ORDER — INSULIN GLARGINE 100 [IU]/ML
INJECTION, SOLUTION SUBCUTANEOUS
Qty: 21 ML | Refills: 5 | Status: SHIPPED | OUTPATIENT
Start: 2024-05-28 | End: 2024-05-31

## 2024-05-28 NOTE — TELEPHONE ENCOUNTER
Medication:   Requested Prescriptions     Pending Prescriptions Disp Refills    LANTUS SOLOSTAR 100 UNIT/ML injection pen [Pharmacy Med Name: LANTUS SOLOSTAR PEN INJ 3ML] 21 mL      Sig: ADMINISTER 70 UNITS UNDER THE SKIN EVERY NIGHT          Patient Phone Number: [phone] (home)     Last appt: 2/23/2024   Next appt: 5/31/2024    Last OARRS:       2/23/2024    11:12 AM   RX Monitoring   Periodic Controlled Substance Monitoring No signs of potential drug abuse or diversion identified.     PDMP Monitoring:    Last PDMP [place] as Reviewed ([place]):  Review User Review Instant Review Result   HERMELINDA JOAQUIN 5/19/2024  9:37 PM Reviewed PDMP [1]     Preferred Pharmacy:   [facility] #[id number] [place], [place] - [address]  - P [phone] - F [phone]  [address] [facility] [address]  Phone: [phone] Fax: [phone]    [facility] #[id number] [place], [place] - [address] -  [phone] - F [phone]  [address] [facility] [address]  Phone: [phone] Fax: [phone]    [id number] [place], [place] - [address] [facility] [phone] - F [phone]  [address] [facility] [address]  Phone: [phone] Fax: [phone]    [facility] [place], [place] - [address] [facility] [phone] - F [phone]  [address] [facility] [address]  Phone: [phone] Fax: [phone]

## 2024-05-29 NOTE — PROGRESS NOTES
Deshaun Ayers is a 58 y.o. male who presents for follow-up of Type 2 diabetes mellitus.       Current medication use: taking as prescribed (metformin, basal insulin, mealtime insulin, Jardiance, and glimepiride)  Hadn't been taking metformin BID but restarted. Has been taking everything else   Eye exam current (within one year): yes, will request records  Exercise:  intermittently, walking and yard work  Do you eat balanced/healthy meals regularly? Yes  Do you limit your carbs (pasta/rice/bread/potatoes/sugar) Yes  Sees podiatrist: no  Checks sugars at home:  4-5 times a day  Hypoglycemia symptoms: No  AM Fastin-140 in the past couple days   Lunch - 200's  Dinner - 300's sometimes.         Belly surgery healed, states feeling better, getting out and doing more      Having ED - tried viagra in past and worked well.      Uses diazepam prn for meniere's flare ups and rarely anxiety.      Body mass index is 38.22 kg/m².  Vitals:    24 1028   BP: 120/70   Site: Left Upper Arm   Position: Sitting   Cuff Size: Large Adult   Pulse: 62   Temp: 98.6 °F (37 °C)   TempSrc: Infrared   SpO2: 98%   Weight: 127.8 kg (281 lb 12.8 oz)     Wt Readings from Last 3 Encounters:   24 127.8 kg (281 lb 12.8 oz)   24 80.9 kg (178 lb 4.8 oz)   24 124 kg (273 lb 6.4 oz)       GENERAL:Alert and oriented x 4 NAD, affect appropriate and obese, well hydrated, well developed.  LUNG:clear to auscultation bilaterally with normal respiratory effort and good air movement  CV: Normal heart sounds, regular rate and rhythm without murmurs    LE Edema: normal,  LE Pulses:  normal,       Monofilament Sensation:  normal,   LE Skin lesions: normal   Bunions: yes -   Hammertoes: yes -   Callus: yes -   Nail Deformity: no  Signs of Fungus: no          2024    10:26 AM 2024    11:04 AM 2023     9:57 AM 2023     9:17 AM 2022     1:27 PM 2022     1:02 PM 2022     2:28 PM   PHQ Scores   PHQ2

## 2024-05-30 ENCOUNTER — HOSPITAL ENCOUNTER (OUTPATIENT)
Age: 59
Discharge: HOME | End: 2024-05-30
Payer: MEDICARE

## 2024-05-30 DIAGNOSIS — E11.65 TYPE 2 DIABETES MELLITUS WITH HYPERGLYCEMIA, WITH LONG-TERM CURRENT USE OF INSULIN (HCC): ICD-10-CM

## 2024-05-30 DIAGNOSIS — Z79.4 TYPE 2 DIABETES MELLITUS WITH HYPERGLYCEMIA, WITH LONG-TERM CURRENT USE OF INSULIN (HCC): ICD-10-CM

## 2024-05-30 LAB
ANION GAP SERPL CALCULATED.3IONS-SCNC: 10 MMOL/L (ref 3–16)
BUN SERPL-MCNC: 13 MG/DL (ref 7–20)
CALCIUM SERPL-MCNC: 9.4 MG/DL (ref 8.3–10.6)
CHLORIDE SERPL-SCNC: 103 MMOL/L (ref 99–110)
CO2 SERPL-SCNC: 27 MMOL/L (ref 21–32)
CREAT SERPL-MCNC: 0.6 MG/DL (ref 0.9–1.3)
GFR SERPLBLD CREATININE-BSD FMLA CKD-EPI: >90 ML/MIN/{1.73_M2}
GLUCOSE SERPL-MCNC: 135 MG/DL (ref 70–99)
POTASSIUM SERPL-SCNC: 4.2 MMOL/L (ref 3.5–5.1)
SODIUM SERPL-SCNC: 140 MMOL/L (ref 136–145)

## 2024-05-30 PROCEDURE — 83036 HEMOGLOBIN GLYCOSYLATED A1C: CPT

## 2024-05-30 PROCEDURE — 80048 BASIC METABOLIC PNL TOTAL CA: CPT

## 2024-05-30 PROCEDURE — 36415 COLL VENOUS BLD VENIPUNCTURE: CPT

## 2024-05-31 ENCOUNTER — OFFICE VISIT (OUTPATIENT)
Dept: FAMILY MEDICINE CLINIC | Age: 59
End: 2024-05-31
Payer: MEDICARE

## 2024-05-31 VITALS
TEMPERATURE: 98.6 F | OXYGEN SATURATION: 98 % | DIASTOLIC BLOOD PRESSURE: 70 MMHG | BODY MASS INDEX: 38.22 KG/M2 | WEIGHT: 281.8 LBS | SYSTOLIC BLOOD PRESSURE: 120 MMHG | HEART RATE: 62 BPM

## 2024-05-31 DIAGNOSIS — Z79.4 TYPE 2 DIABETES MELLITUS WITH HYPERGLYCEMIA, WITH LONG-TERM CURRENT USE OF INSULIN (HCC): Primary | ICD-10-CM

## 2024-05-31 DIAGNOSIS — E11.65 TYPE 2 DIABETES MELLITUS WITH HYPERGLYCEMIA, WITH LONG-TERM CURRENT USE OF INSULIN (HCC): Primary | ICD-10-CM

## 2024-05-31 DIAGNOSIS — H81.09 MENIERE'S DISEASE, UNSPECIFIED LATERALITY: ICD-10-CM

## 2024-05-31 LAB
EST. AVERAGE GLUCOSE BLD GHB EST-MCNC: 208.7 MG/DL
HBA1C MFR BLD: 8.9 %

## 2024-05-31 PROCEDURE — G2211 COMPLEX E/M VISIT ADD ON: HCPCS | Performed by: FAMILY MEDICINE

## 2024-05-31 PROCEDURE — 3074F SYST BP LT 130 MM HG: CPT | Performed by: FAMILY MEDICINE

## 2024-05-31 PROCEDURE — 99214 OFFICE O/P EST MOD 30 MIN: CPT | Performed by: FAMILY MEDICINE

## 2024-05-31 PROCEDURE — 3052F HG A1C>EQUAL 8.0%<EQUAL 9.0%: CPT | Performed by: FAMILY MEDICINE

## 2024-05-31 PROCEDURE — 3078F DIAST BP <80 MM HG: CPT | Performed by: FAMILY MEDICINE

## 2024-05-31 RX ORDER — INSULIN GLARGINE 100 [IU]/ML
40 INJECTION, SOLUTION SUBCUTANEOUS 2 TIMES DAILY
Qty: 24 ML | Refills: 5 | Status: SHIPPED | OUTPATIENT
Start: 2024-05-31

## 2024-05-31 RX ORDER — SILDENAFIL 100 MG/1
100 TABLET, FILM COATED ORAL DAILY PRN
Qty: 30 TABLET | Refills: 1 | Status: SHIPPED | OUTPATIENT
Start: 2024-05-31

## 2024-05-31 ASSESSMENT — PATIENT HEALTH QUESTIONNAIRE - PHQ9
SUM OF ALL RESPONSES TO PHQ QUESTIONS 1-9: 0
SUM OF ALL RESPONSES TO PHQ9 QUESTIONS 1 & 2: 0
SUM OF ALL RESPONSES TO PHQ QUESTIONS 1-9: 0
SUM OF ALL RESPONSES TO PHQ QUESTIONS 1-9: 0
1. LITTLE INTEREST OR PLEASURE IN DOING THINGS: NOT AT ALL
2. FEELING DOWN, DEPRESSED OR HOPELESS: NOT AT ALL
SUM OF ALL RESPONSES TO PHQ QUESTIONS 1-9: 0

## 2024-05-31 NOTE — PATIENT INSTRUCTIONS
--increase lantus to 40 units twice a day  --send me sugars in 2 weeks    --CONTROLLED SUBSTANCE CONTRACT    --You are on a pain medication or other controlled substance.  These medications have serious side effects including sedation, constipation, addiction, dependence, overdose and death.    High doses of pain medication and benzodiazapines (Xanax, Ativan, etc) have been associated with a high risk of overdose and death, even when taken as directed.    You have signed a medication agreement as required by [place] law.  This pain agreement is so you as a patient understand what is allowed and not allowed when taking controlled medications.  This includes:    -You must make AND keep your scheduled appointments.  If you do not keep appointments your medications will not be filled.    -You may not take medications that are not prescribed for you.      -You may not take narcotic medications or other controlled substances from other providers.  This includes medications from the ER, dentist or specialist.  If you need pain medication or other controlled substances from another provider you must immediately notify this office about the medication.  In rare circumstances (surgery, inpatient hospital stay) this will be allowed under the agreement.  However, it will be up to the provider to decide if this violates the agreement.  In general, it is best to just not get the medications.      -You may be asked to do random urine drug screenings.  Failure to comply with these requests will result in you not receiving your medications.    -We will run pharmacy reports to confirm your medication fills.    -Bring all medications to your appointments for random pill counts.  Failure to do this may result in you not receiving your prescriptions.    -If you think you have a drug problem/addiction please let us know.  The website SAMHSA.gov has lists of resources to get treatment.    -It is your responsibility to safe guard your

## 2024-06-18 RX ORDER — GLIMEPIRIDE 4 MG/1
TABLET ORAL
Qty: 90 TABLET | Refills: 3 | Status: SHIPPED | OUTPATIENT
Start: 2024-06-18

## 2024-06-18 NOTE — TELEPHONE ENCOUNTER
Medication:   Requested Prescriptions     Pending Prescriptions Disp Refills    glimepiride (AMARYL) 4 MG tablet [Pharmacy Med Name: GLIMEPIRIDE 4MG TABLETS] 90 tablet 0     Sig: TAKE 1 TABLET BY MOUTH EVERY MORNING BEFORE BREAKFAST          Patient Phone Number: [phone] (home)     Last appt: 5/31/2024   Next appt: 9/9/2024    Last OARRS:       5/31/2024    10:40 AM   RX Monitoring   Periodic Controlled Substance Monitoring No signs of potential drug abuse or diversion identified.     PDMP Monitoring:    Last PDMP [place] as Reviewed ([place]):  Review User Review Instant Review Result   HERMELINDA JOAQUIN 5/31/2024 10:40 AM Reviewed PDMP [1]     Preferred Pharmacy:   [facility] #[id number] [facility] [address]  - P [phone] - F [phone]  [facility] [facility] [address]  Phone: [phone] Fax: [phone]    [facility] #[id number] [place], [place] - [address] -  [phone] - F [phone]  [address] [facility] [address]  Phone: [phone] Fax: [phone]    [id number] [place], [place] - [address] [facility] [phone] - F [phone]  [address] [facility] [address]  Phone: [phone] Fax: [phone]    [facility] [place], [place] - [address] [facility] [phone] - F [phone]  [address] [facility] [address]  Phone: [phone] Fax: [phone]

## 2024-06-21 ENCOUNTER — TELEPHONE (OUTPATIENT)
Dept: FAMILY MEDICINE CLINIC | Age: 59
End: 2024-06-21

## 2024-06-21 RX ORDER — INSULIN GLARGINE 100 [IU]/ML
INJECTION, SOLUTION SUBCUTANEOUS
Qty: 24 ML | Refills: 5
Start: 2024-06-21

## 2024-08-01 DIAGNOSIS — Z79.4 TYPE 2 DIABETES MELLITUS WITH DIABETIC NEPHROPATHY, WITH LONG-TERM CURRENT USE OF INSULIN (HCC): ICD-10-CM

## 2024-08-01 DIAGNOSIS — E11.21 TYPE 2 DIABETES MELLITUS WITH DIABETIC NEPHROPATHY, WITH LONG-TERM CURRENT USE OF INSULIN (HCC): ICD-10-CM

## 2024-08-01 RX ORDER — BLOOD SUGAR DIAGNOSTIC
STRIP MISCELLANEOUS
Qty: 300 STRIP | Refills: 12 | Status: SHIPPED | OUTPATIENT
Start: 2024-08-01

## 2024-08-12 DIAGNOSIS — H81.09 MENIERE'S DISEASE, UNSPECIFIED LATERALITY: ICD-10-CM

## 2024-08-12 RX ORDER — DIAZEPAM 5 MG/1
TABLET ORAL
Qty: 30 TABLET | Refills: 0 | Status: SHIPPED | OUTPATIENT
Start: 2024-08-12 | End: 2024-09-11

## 2024-08-12 NOTE — TELEPHONE ENCOUNTER
Medication:   Requested Prescriptions     Pending Prescriptions Disp Refills    diazePAM (VALIUM) 5 MG tablet [Pharmacy Med Name: DIAZEPAM 5MG TABLETS] 30 tablet      Sig: TAKE 1 TABLET BY MOUTH EVERY 12 HOURS AS NEEDED FOR ANXIETY OR DIZZINESS. MAX DAILY AMOUNT: 10 MG      Last Filled:  5/19/24    Patient Phone Number: [phone] (home)     Last appt: 5/31/2024   Next appt: 9/9/2024    Last OARRS:       5/31/2024    10:40 AM   RX Monitoring   Periodic Controlled Substance Monitoring No signs of potential drug abuse or diversion identified.     PDMP Monitoring:    Last PDMP [place] Reviewed ([place]):  Review User Review Instant Review Result   HERMELINDA JOAQUIN 5/31/2024 10:40 AM Reviewed PDMP [1]     Preferred Pharmacy:   [facility] #[id number] [place], [place] - [address]  - P [phone] - F [phone]  [facility] [facility] [address]  Phone: [phone] Fax: [phone]    [facility] #[id number] [place], [place] - [address] [facility] -  [phone] - F [phone]  [address] [facility] [address]  Phone: [phone] Fax: [phone]    [facility]/pharmacy #[id number] [place], [place] - [address] [facility] [phone] - F [phone]  [address] [facility] [address]  Phone: [phone] Fax: [phone]    [employer] [facility] [place], [place] - [address] [facility] [phone] - F [phone]  [address] [facility] [address]  Phone: [phone] Fax: [phone]

## 2024-08-25 NOTE — PROGRESS NOTES
[facility] received a viral test for COVID-19. They were educated on isolation and quarantine as appropriate. For any symptoms, they were directed to seek care from their PCP, given contact information to establish with a doctor, directed to an urgent care or the emergency room. No

## 2024-08-30 RX ORDER — ERGOCALCIFEROL 1.25 MG/1
50000 CAPSULE, LIQUID FILLED ORAL WEEKLY
Qty: 12 CAPSULE | Refills: 3 | Status: SHIPPED | OUTPATIENT
Start: 2024-08-30

## 2024-08-30 NOTE — TELEPHONE ENCOUNTER
Medication:   Requested Prescriptions     Pending Prescriptions Disp Refills    vitamin D (ERGOCALCIFEROL) 1.25 MG (90988 UT) CAPS capsule [Pharmacy Med Name: VITAMIN D2 50,000IU (ERGO) CAP RX] 12 capsule 3     Sig: TAKE 1 CAPSULE BY MOUTH 1 TIME A WEEK          Patient Phone Number: [phone] (home)     Last appt: 5/31/2024   Next appt: 9/9/2024    Last OARRS:       5/31/2024    10:40 AM   RX Monitoring   Periodic Controlled Substance Monitoring No signs of potential drug abuse or diversion identified.     PDMP Monitoring:    Last PDMP [place]ed ([place]):  Review User Review Instant Review Result   HERMELINDOHERMELINDA MOLINA 5/31/2024 10:40 AM Reviewed PDMP [1]     Preferred Pharmacy:   [facility] #[id number] [place], [place] - [address]  - P [phone] - F [phone]  [facility] [facility] [address]  Phone: [phone] Fax: [phone]    [facility] #[id number] [place], [place] [facility] [address] [facility] [phone] - F [phone]  [address] [facility] [address]  Phone: [phone] Fax: [phone]    [facility] [place], [place] - [address] [facility] [phone] - F [phone]  [address] [facility] [address]  Phone: [phone] Fax: [phone]    [employer] [facility] [place] [place] - [address] [facility] [phone] - F [phone]  [address] [facility] [address]  Phone: [phone] Fax: [phone]

## 2024-09-03 RX ORDER — METFORMIN HCL 500 MG
1000 TABLET, EXTENDED RELEASE 24 HR ORAL 2 TIMES DAILY
Qty: 360 TABLET | Refills: 3 | Status: SHIPPED | OUTPATIENT
Start: 2024-09-03

## 2024-09-03 RX ORDER — PANCRELIPASE 24000; 76000; 120000 [USP'U]/1; [USP'U]/1; [USP'U]/1
CAPSULE, DELAYED RELEASE PELLETS ORAL
Qty: 180 CAPSULE | Refills: 5 | Status: SHIPPED | OUTPATIENT
Start: 2024-09-03

## 2024-09-03 NOTE — TELEPHONE ENCOUNTER
Medication:   Requested Prescriptions     Pending Prescriptions Disp Refills    metFORMIN (GLUCOPHAGE-XR) 500 MG extended release tablet [Pharmacy Med Name: METFORMIN ER 500MG 24HR TABS] 360 tablet 3     Sig: TAKE 2 TABLETS BY MOUTH TWICE DAILY      Provider out of office.     Patient Phone Number: [phone] (home)     Last appt: 5/31/2024   Next appt: 9/9/2024    Last OARRS:       5/31/2024    10:40 AM   RX Monitoring   Periodic Controlled Substance Monitoring No signs of potential drug abuse or diversion identified.     PDMP Monitoring:    Last PDMP [place]ed ([place]):  Review User Review Instant Review Result   HERMELINDA JOAQUIN 5/31/2024 10:40 AM Reviewed PDMP [1]     Preferred Pharmacy:   [facility] #[id number] [place], [place] - [address]  - P [phone] - F [phone]  [address] [facility] [address]  Phone: [phone] Fax: [phone]    [facility] #[id number] [place], [place] - [address] -  [phone] - F [phone]  [address] [facility] [address]  Phone: [phone] Fax: [phone]    [facility]/pharmacy #7699 [place], [place] - [address] [facility] -  [phone] - F [phone]  [address] [facility] [address]  Phone: [phone] Fax: [phone]    [facility] - [place], [place] - [address] [facility] [phone] - F [phone]  [address] [facility] [address]  Phone: [phone] Fax: [phone]

## 2024-09-03 NOTE — TELEPHONE ENCOUNTER
Medication:   Requested Prescriptions      No prescriptions requested or ordered in this encounter      Provider out of office.     Patient Phone Number: [phone] (home)     Last appt: 5/31/2024   Next appt: 9/9/2024    Last OARRS:       5/31/2024    10:40 AM   RX Monitoring   Periodic Controlled Substance Monitoring No signs of potential drug abuse or diversion identified.     PDMP Monitoring:    Last PDMP [place] Reviewed ([place]):  Review User Review Instant Review Result   HERMELINDA JOAQUIN 5/31/2024 10:40 AM Reviewed PDMP [1]     Preferred Pharmacy:   [facility][id number] [place], [place] - [address]  - P [phone] - F [phone]  [facility] [facility] [address]  Phone: [phone] Fax: [phone]    [facility] #76074 [place], [place] - [address] -  [phone] - F [phone]  [address] [facility] [address]  Phone: [phone] Fax: [phone]    [facility] [place], [place] - [address] [facility] [phone] - F [phone]  [address] [facility] [address]  Phone: [phone] Fax: [phone]    [employer] [facility] [place] - [address] [facility] [phone] - F [phone]  [address] [facility] [address]  Phone: [phone] Fax: [phone]

## 2024-09-13 NOTE — PATIENT INSTRUCTIONS
Make appt to see the dentist    Schedule your COVID booster at your local pharmacy. If you got the J&J vaccine you can get any of the 3 available vaccines for your booster 2 months after your first dose. If you got the Adam Peter or Moderna vaccines you should get either a Pfizer or Destiny Reas booster 5 months after your second dose. You do not need to get the same vaccine you got with your initial vaccinations. Well Visit, Men 48 to 72: Care Instructions  Overview     Well visits can help you stay healthy. Your doctor has checked your overall health and may have suggested ways to take good care of yourself. Your doctor also may have recommended tests. At home, you can help prevent illness withhealthy eating, regular exercise, and other steps. Follow-up care is a key part of your treatment and safety. Be sure to make and go to all appointments, and call your doctor if you are having problems. It's also a good idea to know your test results and keep alist of the medicines you take. How can you care for yourself at home?  Get screening tests that you and your doctor decide on. Screening helps find diseases before any symptoms appear.  Eat healthy foods. Choose fruits, vegetables, whole grains, protein, and low-fat dairy foods. Limit fat, especially saturated fat. Reduce salt in your diet.  Limit alcohol. Have no more than 2 drinks a day or 14 drinks a week.  Get at least 30 minutes of exercise on most days of the week. Walking is a good choice. You also may want to do other activities, such as running, swimming, cycling, or playing tennis or team sports.  Reach and stay at a healthy weight. This will lower your risk for many problems, such as obesity, diabetes, heart disease, and high blood pressure.  Do not smoke. Smoking can make health problems worse. If you need help quitting, talk to your doctor about stop-smoking programs and medicines.  These can increase your chances of quitting for [Duration of Psychotherapy Visit (minutes spent in synchronous communication): ____] : Duration of Psychotherapy Visit (minutes spent in synchronous communication): [unfilled] [Individual Psychotherapy for 16-37 minutes] : Individual Psychotherapy for 16-37 minutes good.   Care for your mental health. It is easy to get weighed down by worry and stress. Learn strategies to manage stress, like deep breathing and mindfulness, and stay connected with your family and community. If you find you often feel sad or hopeless, talk with your doctor. Treatment can help.  Talk to your doctor about whether you have any risk factors for sexually transmitted infections (STIs). You can help prevent STIs if you wait to have sex with a new partner (or partners) until you've each been tested for STIs. It also helps if you use condoms (male or female condoms) and if you limit your sex partners to one person who only has sex with you. Vaccines are available for some STIs.  If it's important to you to prevent pregnancy with your partner, talk with your doctor about birth control options that might be best for you.  If you think you may have a problem with alcohol or drug use, talk to your doctor. This includes prescription medicines (such as amphetamines and opioids) and illegal drugs (such as cocaine and methamphetamine). Your doctor can help you figure out what type of treatment is best for you.  Protect your skin from too much sun. When you're outdoors from 10 a.m. to 4 p.m., stay in the shade or cover up with clothing and a hat with a wide brim. Wear sunglasses that block UV rays. Even when it's cloudy, put broad-spectrum sunscreen (SPF 30 or higher) on any exposed skin.  See a dentist one or two times a year for checkups and to have your teeth cleaned.  Wear a seat belt in the car. When should you call for help? Watch closely for changes in your health, and be sure to contact your doctor if you have any problems or symptoms that concern you. Where can you learn more? Go to https://charlie.[facility]. org and sign in to your [employer] account. Enter U131 in the [employer] box to learn more about \"Well Visit, Men 48 to 72: Care Instructions. \"     If you do not have an account, please click on the \"Sign Up Now\" link. Current as of: October 6, 2021               Content Version: 13.2  © 2006-2022 Healthwise, [employer]. Care instructions adapted under license by [facility] ([facility]). If you have questions about a medical condition or this instruction, always ask your healthcare professional. [address] any warranty or liability for your use of this information. FALL PREVENTION TIPS    Who is at high risk of falling? Anyone can fall, although the risk is higher in older people. This increased risk of falling may be the result of changes that come with aging, and certain medical conditions, such as arthritis, cataracts or hip problems. What can I do to lower my risk of falling? Most falls happen in the home. Consider the following tips to make your home safe:  -Make sure that you have good lighting in your home. A well lit home will help you avoid tripping over objects that are not easy to see. Put night lights in your bedroom, hallways, stairs and bathrooms.  -Rugs should be firmly fastened to the floor or have nonskid backing. Loose ends should be tacked down.  -Electrical cords should not be lying on the floor in walking areas.  -Put hand rails in your bathroom for bath, shower and toilet use. -Have rails on both sides of your stairs for support.  -In the kitchen, make sure items are within easy reach. Dont store things too high or too low. Then you wont have to use a stepladder or a stool to reach them. Its also a good idea to avoid storing things too low, so you wont have to bend down to get them.  -Wear shoes with firm nonskid soles. Avoid wearing loose-fitting slippers that could cause you to trip. What else can I do? Take good care of your body. Try to stay healthy by following these tips:  -See your eye doctor once a year. Cataracts and other eye diseases that cause you not to see well, can lead to falls.   -Get regular physical activity to keep your bones and muscles strong.  -Take good care of your feet. If you have pain in your feet or if you have large, thick nails and corns, have your doctor look at your feet. -Talk to your doctor about any side effects you may have from your medicines. Problems caused by side effects from medicine are a common cause of falls. The more medicines you take, the greater your risk of falling.  -Talk to your doctor if you have dizzy spells.  -If your doctor suggests that you use a cane or a walker to help you walk, be sure to use it. This will give you extra stability when walking and will help you avoid falls.  -Dont smoke.  -Limit alcohol to no more than 2 drinks per day. -When you get out of bed in the morning or at night to use the bathroom, sit on the side of the bed for a few minutes before standing up. Your blood pressure takes some time to adjust when you sit up. It may be too low if you get up quickly. This can make you dizzy, and you might lose your balance and fall. Last Updated: November 2010\cb3 This article was contributed by: [email or web address]. org editorial staff    Patient Education        Hepatitis B Vaccine: What You Need to Know  Why get vaccinated? Hepatitis B vaccine can prevent hepatitis B. Hepatitis B is a liver disease that can cause mild illness lasting a few weeks,or it can lead to a serious, lifelong illness.  Acute hepatitis B infection is a short-term illness that can lead to fever, fatigue, loss of appetite, nausea, vomiting, jaundice (yellow skin or eyes, dark urine, panfilo-colored bowel movements), and pain in the muscles, joints, and stomach.  Chronic hepatitis B infection is a long-term illness that occurs when the hepatitis B virus remains in a person's body. Most people who go on to develop chronic hepatitis B do not have symptoms, but it is still very serious and can lead to liver damage (cirrhosis), liver cancer, and death.  Chronically infected people can spread hepatitis B virus to others, even if they do not feel or look sick themselves. Hepatitis B is spread when blood, semen, or other body fluid infected with the hepatitis B virus enters the body of a person who is not infected. People canbecome infected through:  [employer] (if a pregnant person has hepatitis B, their baby can become infected)   Sharing items such as razors or toothbrushes with an infected person   Contact with the blood or open sores of an infected person   Sex with an infected partner   Sharing needles, syringes, or other drug-injection equipment   Exposure to blood from needlesticks or other sharp instruments  Most people who are vaccinated with hepatitis B vaccine are immune for life. Hepatitis B vaccine  Hepatitis B vaccine is usually given as 2, 3, or 4 shots. Infants should get their first dose of hepatitis B vaccine at birth and will usually complete the series at 7-21 months of age. The birth dose of hepatitis B vaccine is an important part of preventing long-term illness in infants and the spread of hepatitis B in the [facility]. Children and adolescents younger than 23years of age who have not yet gotten the vaccine should bevaccinated. Adults who were not vaccinated previously and want to be protected against hepatitisB can also get the vaccine.   Hepatitis B vaccine is also recommended for the following people:   People whose sex partners have hepatitis B   Sexually active persons who are not in a long-term, monogamous relationship   People seeking evaluation or treatment for a sexually transmitted disease   Victims of sexual assault or abuse   Men who have sexual contact with other men   People who share needles, syringes, or other drug-injection equipment   People who live with someone infected with the hepatitis B virus  [address] [facility] care and public safety workers at risk for exposure to blood or body fluids   Residents and staff of facilities for developmentally disabled people   People living in FPC or senior living   Travelers to regions with increased rates of hepatitis B   People with chronic liver disease, kidney disease on dialysis, HIV infection, infection with hepatitis C, or diabetes  Hepatitis B vaccine may be given as a stand-alone vaccine, or as part of a combination vaccine (a type of vaccine that combines more than one vaccinetogether into one shot). Hepatitis B vaccine may be given at the same time as other vaccines. Talk with your health care provider  Tell your vaccination provider if the person getting the vaccine:   Has had an allergic reaction after a previous dose of hepatitis B vaccine, or has any severe, life-threatening allergies  In some cases, your health care provider may decide to postpone hepatitis Bvaccination until a future visit. Pregnant or breastfeeding people should be vaccinated if they are at risk for getting hepatitis B. Pregnancy or breastfeeding are not reasons to avoidhepatitis B vaccination. People with minor illnesses, such as a cold, may be vaccinated. People who are moderately or severely ill should usually wait until they recover beforegetting hepatitis B vaccine. Your health care provider can give you more information. Risks of a vaccine reaction   Soreness where the shot is given or fever can happen after hepatitis B vaccination. People sometimes faint after medical procedures, including vaccination. Tellyour provider if you feel dizzy or have vision changes or ringing in the ears. As with any medicine, there is a very remote chance of a vaccine causing asevere allergic reaction, other serious injury, or death. What if there is a serious problem? An allergic reaction could occur after the vaccinated person leaves the clinic.  If you see signs of a severe allergic reaction (hives, swelling of the face and throat, difficulty breathing, a fast heartbeat, dizziness, or weakness), call 9-1-1 and get the person to the nearest hospital.  For other signs that concern you, call your health care provider. Adverse reactions should be reported to the Vaccine Adverse Event Reporting System (VAERS). Your health care provider will usually file this report, or you can do it yourself. Visit the VAERS website at www.vaers. [facility].gov or call 6-449.336.6165. VAERS is only for reporting reactions, and VAERS staff members do not give medical advice. The National Vaccine Injury Compensation Program  The National Vaccine Injury Compensation Program (VICP) is a federal program that was created to compensate people who may have been injured by certain vaccines. Claims regarding alleged injury or death due to vaccination have a time limit for filing, which may be as short as two years. Visit the VICP website at www.[facility].gov/vaccinecompensation or call 4-854.358.2991 to learn about the program and about filing a claim. How can I learn more?  Ask your health care provider.  Call your local or state health department.  Visit the website of the Food and Drug Administration (FDA) for vaccine package inserts and additional information at www.fda.gov/vaccines-blood-biologics/vaccines.  Contact the Centers for Disease Control and Prevention (CDC):  ? Call 7-374.189.4335 (1-800-CDC-INFO) or  ? Visit CDC's website at www.cdc.gov/vaccines. Vaccine Information Statement  Hepatitis B Vaccine  10/15/2021  42 U. S.C. § 300aa-26  U. S. Department of Health and Human Services  Centers for Disease Control and Prevention  Many vaccine information statements are available in Welsh and other languages. See www.immunize.org/vis  Hojas de información sobre vacunas están disponibles en español y en muchos otros idiomas. Visite www.immunize.org/vis  Care instructions adapted under license by [facility] ([facility]).  If you have questions about a medical condition or this instruction, always ask your healthcare professional. [address] any warranty or liability for your use of this information. Patient Education        Pneumococcal Conjugate Vaccine (PCV13): What You Need to Know  Why get vaccinated? Pneumococcal conjugate vaccine (PCV13) can prevent pneumococcal disease. Pneumococcal disease refers to any illness caused by pneumococcal bacteria. These bacteria can cause many types of illnesses, including pneumonia, which is an infection ofthe lungs. Pneumococcal bacteria are one of the most common causes of pneumonia. Besides pneumonia, pneumococcal bacteria can also cause:   Ear infections   Sinus infections   Meningitis (infection of the tissue covering the brain and spinal cord)   Bacteremia (infection of the blood)  Anyone can get pneumococcal disease, but children under 3years old, people with certain medical conditions, adults 72 years or older, and cigarettesmokers are at the highest risk. Most pneumococcal infections are mild. However, some can result in long-term problems, such as brain damage or hearing loss. Meningitis, bacteremia, andpneumonia caused by pneumococcal disease can be fatal.  PCV13  PCV13 protects against 13 types of bacteria that cause pneumococcal disease. Infants and young children usually need 4 doses of pneumococcal conjugate vaccine, at ages 3, 3, 10, and 12-15 months. Older children (through age 62 months) may be vaccinated if they did not receive the recommended doses. A dose of PCV13 is also recommended for adults and children 6 years or older with certain medical conditions if they did not already receive PCV13. This vaccine may be given to healthy adults 72 years or older who did not already receive PCV13, based on discussions between the patientand health care provider.   Talk with your health care provider  Tell your vaccination provider if the person getting the vaccine:   Has had an allergic reaction after a previous dose of PCV13, to an earlier pneumococcal conjugate vaccine known as PCV7, or to any vaccine containing diphtheria toxoid (for example, DTaP), or has any severe, life-threatening allergies  In some cases, your health care provider may decide to postpone [id number] until a future visit. People with minor illnesses, such as a cold, may be vaccinated. People who are moderately or severely ill should usually wait until they recover beforegetting PCV13. Your health care provider can give you more information. Risks of a vaccine reaction   Redness, swelling, pain, or tenderness where the shot is given, and fever, loss of appetite, fussiness (irritability), feeling tired, headache, and chills can happen after PCV13 vaccination. [place] children may be at increased risk for seizures caused by fever after PCV13 if it is administered at the same time as inactivated influenza vaccine. Ask your health care provider for more information. People sometimes faint after medical procedures, including vaccination. Tellyour provider if you feel dizzy or have vision changes or ringing in the ears. As with any medicine, there is a very remote chance of a vaccine causing asevere allergic reaction, other serious injury, or death. What if there is a serious problem? An allergic reaction could occur after the vaccinated person leaves the clinic. If you see signs of a severe allergic reaction (hives, swelling of the face and throat, difficulty breathing, a fast heartbeat, dizziness, or weakness), call 9-1-1 and get the person to the nearest hospital.  For other signs that concern you, call your health care provider. Adverse reactions should be reported to the Vaccine Adverse Event Reporting System (VAERS). Your health care provider will usually file this report, or you can do it yourself. Visit the VAERS website at www.vaers. hhs.gov or call 9-374.393.2690. VAERS is only for reporting reactions, and VAERS staff members do not give medical advice.   The [facility] Injury Compensation Program  The National Vaccine Injury Compensation Program (VICP) is a federal program that was created to compensate people who may have been injured by certain vaccines. Claims regarding alleged injury or death due to vaccination have a time limit for filing, which may be as short as two years. Visit the VICP website at www.[facility].gov/vaccinecompensation or call 7-338.448.4601 to learn about the program and about filing a claim. How can I learn more?  Ask your health care provider.  Call your local or state health department.  Visit the website of the Food and Drug Administration (FDA) for vaccine package inserts and additional information at www.fda.gov/vaccines-blood-biologics/vaccines.  Contact the Centers for Disease Control and Prevention (CDC):  ? Call 1-897.629.2206 (1-800-CDC-INFO) or  ? Visit CDC's website at www.cdc.gov/vaccines. Vaccine Information Statement  PCV13  8/6/2021  42 MARY ANNE Dianne Stands 570TI-68  [facility] and [facility] for Disease Control and Prevention  Many vaccine information statements are available in [place]ian and other languages. See www.immunize.org/vis  Hojas de información sobre vacunas están disponibles en español y en muchos otros idiomas. Visite www.immunize.org/vis  Care instructions adapted under license by [facility] ([facility]). If you have questions about a medical condition or this instruction, always ask your healthcare professional. [place] [address] any warranty or liability for your use of this information.

## 2024-09-23 ENCOUNTER — HOSPITAL ENCOUNTER (OUTPATIENT)
Age: 59
Discharge: HOME | End: 2024-09-23
Payer: MEDICARE

## 2024-09-23 DIAGNOSIS — Z79.4 TYPE 2 DIABETES MELLITUS WITH HYPERGLYCEMIA, WITH LONG-TERM CURRENT USE OF INSULIN (HCC): ICD-10-CM

## 2024-09-23 DIAGNOSIS — E11.65 TYPE 2 DIABETES MELLITUS WITH HYPERGLYCEMIA, WITH LONG-TERM CURRENT USE OF INSULIN (HCC): ICD-10-CM

## 2024-09-23 LAB
ANION GAP SERPL CALCULATED.3IONS-SCNC: 16 MMOL/L (ref 3–16)
BUN SERPL-MCNC: 12 MG/DL (ref 7–20)
CALCIUM SERPL-MCNC: 9.2 MG/DL (ref 8.3–10.6)
CHLORIDE SERPL-SCNC: 102 MMOL/L (ref 99–110)
CO2 SERPL-SCNC: 21 MMOL/L (ref 21–32)
CREAT SERPL-MCNC: 0.7 MG/DL (ref 0.9–1.3)
EST. AVERAGE GLUCOSE BLD GHB EST-MCNC: 194.4 MG/DL
GFR SERPLBLD CREATININE-BSD FMLA CKD-EPI: >90 ML/MIN/{1.73_M2}
GLUCOSE SERPL-MCNC: 128 MG/DL (ref 70–99)
HBA1C MFR BLD: 8.4 %
POTASSIUM SERPL-SCNC: 3.9 MMOL/L (ref 3.5–5.1)
SODIUM SERPL-SCNC: 139 MMOL/L (ref 136–145)

## 2024-09-23 PROCEDURE — 80048 BASIC METABOLIC PNL TOTAL CA: CPT

## 2024-09-23 PROCEDURE — 36415 COLL VENOUS BLD VENIPUNCTURE: CPT

## 2024-09-23 PROCEDURE — 83036 HEMOGLOBIN GLYCOSYLATED A1C: CPT

## 2024-09-26 ENCOUNTER — OFFICE VISIT (OUTPATIENT)
Dept: FAMILY MEDICINE CLINIC | Age: 59
End: 2024-09-26

## 2024-09-26 VITALS
BODY MASS INDEX: 38.38 KG/M2 | TEMPERATURE: 97.2 F | WEIGHT: 283 LBS | OXYGEN SATURATION: 95 % | HEART RATE: 65 BPM | SYSTOLIC BLOOD PRESSURE: 126 MMHG | DIASTOLIC BLOOD PRESSURE: 84 MMHG

## 2024-09-26 DIAGNOSIS — Z23 NEED FOR VACCINATION: ICD-10-CM

## 2024-09-26 DIAGNOSIS — H81.09 MENIERE'S DISEASE, UNSPECIFIED LATERALITY: ICD-10-CM

## 2024-09-26 DIAGNOSIS — Z79.4 TYPE 2 DIABETES MELLITUS WITH HYPERGLYCEMIA, WITH LONG-TERM CURRENT USE OF INSULIN (HCC): Primary | ICD-10-CM

## 2024-09-26 DIAGNOSIS — F41.9 ANXIETY: ICD-10-CM

## 2024-09-26 DIAGNOSIS — E11.65 TYPE 2 DIABETES MELLITUS WITH HYPERGLYCEMIA, WITH LONG-TERM CURRENT USE OF INSULIN (HCC): Primary | ICD-10-CM

## 2024-09-26 DIAGNOSIS — Z12.5 SCREENING PSA (PROSTATE SPECIFIC ANTIGEN): ICD-10-CM

## 2024-10-10 DIAGNOSIS — K59.03 DRUG INDUCED CONSTIPATION: ICD-10-CM

## 2024-10-10 RX ORDER — NALOXEGOL OXALATE 25 MG/1
25 TABLET, FILM COATED ORAL
Qty: 30 TABLET | Refills: 3 | Status: SHIPPED | OUTPATIENT
Start: 2024-10-10

## 2024-10-21 RX ORDER — ONDANSETRON 4 MG/1
4 TABLET, FILM COATED ORAL EVERY 8 HOURS PRN
Qty: 90 TABLET | Refills: 0 | Status: SHIPPED | OUTPATIENT
Start: 2024-10-21

## 2024-10-28 DIAGNOSIS — H81.09 MENIERE'S DISEASE, UNSPECIFIED LATERALITY: ICD-10-CM

## 2024-10-28 RX ORDER — DIAZEPAM 5 MG/1
TABLET ORAL
Qty: 30 TABLET | Refills: 0 | Status: SHIPPED | OUTPATIENT
Start: 2024-10-28 | End: 2024-11-27

## 2024-10-28 NOTE — TELEPHONE ENCOUNTER
Medication:   Requested Prescriptions     Pending Prescriptions Disp Refills    diazePAM (VALIUM) 5 MG tablet [Pharmacy Med Name: DIAZEPAM 5MG TABLETS] 30 tablet      Sig: TAKE 1 TABLET BY MOUTH EVERY 12 HOURS AS NEEDED FOR ANXIETY OR DIZZINESS. MAX DAILY AMOUNT: 10 MG      Last Filled:  8/12/24    Patient Phone Number: [phone] (home)     Last appt: 9/26/2024   Next appt: 12/16/2024    Last OARRS:       9/26/2024     9:34 AM   RX Monitoring   Periodic Controlled Substance Monitoring No signs of potential drug abuse or diversion identified.     PDMP Monitoring:    Last PDMP Hung as Reviewed ([place]):  Review User Review Instant Review Result   HERMELINDA JOAQUIN 9/26/2024  9:34 AM Reviewed PDMP [1]     Preferred Pharmacy:   [id number] [place], [place] - [address]  - P [phone] - F [phone] [address] [facility] [address]  Phone: [phone] Fax: [phone]    [id number] [place], [place] - [address] -  [phone] - F [phone]  [address] [facility] [address]  Phone: [phone] Fax: [phone]    [facility]/pharmacy #[id number] [place], [place] - [address] [facility] [phone] - F [phone]  [address] [facility] [address]  Phone: [phone] Fax: [phone]    [facility] [place], [place] - [address] [facility] [phone] - F [phone]  [address] [facility] [address]  Phone: [phone] Fax: [phone]

## 2024-11-18 RX ORDER — ESOMEPRAZOLE MAGNESIUM 40 MG/1
40 CAPSULE, DELAYED RELEASE ORAL
Qty: 90 CAPSULE | Refills: 0 | Status: SHIPPED | OUTPATIENT
Start: 2024-11-18

## 2024-11-19 ENCOUNTER — TELEPHONE (OUTPATIENT)
Dept: ADMINISTRATIVE | Age: 59
End: 2024-11-19

## 2024-11-19 DIAGNOSIS — E11.65 TYPE 2 DIABETES MELLITUS WITH HYPERGLYCEMIA, WITH LONG-TERM CURRENT USE OF INSULIN (HCC): Primary | ICD-10-CM

## 2024-11-19 DIAGNOSIS — Z79.4 TYPE 2 DIABETES MELLITUS WITH HYPERGLYCEMIA, WITH LONG-TERM CURRENT USE OF INSULIN (HCC): Primary | ICD-10-CM

## 2024-11-19 RX ORDER — ACYCLOVIR 400 MG/1
TABLET ORAL
Qty: 1 EACH | Refills: 0 | Status: SHIPPED | OUTPATIENT
Start: 2024-11-19

## 2024-11-19 NOTE — TELEPHONE ENCOUNTER
There was a PA received VIA Dexcom G7  device , but there is not a current RX on file. (Key: [id number])       If you want PA please submit new RX, and resend this PA request back to the pool    If this requires a response please respond to the pool ( P [facility] PSC MEDICATION PRE-AUTH).      Thank you please advise patient.

## 2024-11-20 NOTE — TELEPHONE ENCOUNTER
Thank you. Submitted PA. Available without authorization. The member is able to fill the requested drug at the pharmacy. If coverage is still needed or requesting prior to the expiration of a current authorization, a request can be made by sending a fax or calling the number on the back of the member's ID card. Please ensure pharmacy is running for 1 per 365 days,     If this requires a response please respond to the pool ( P MHCX PSC MEDICATION PRE-AUTH).      Thank you please advise patient.

## 2024-12-13 ENCOUNTER — HOSPITAL ENCOUNTER (OUTPATIENT)
Age: 59
Discharge: HOME | End: 2024-12-13
Payer: MEDICARE

## 2024-12-13 DIAGNOSIS — Z79.4 TYPE 2 DIABETES MELLITUS WITH HYPERGLYCEMIA, WITH LONG-TERM CURRENT USE OF INSULIN (HCC): ICD-10-CM

## 2024-12-13 DIAGNOSIS — E11.65 TYPE 2 DIABETES MELLITUS WITH HYPERGLYCEMIA, WITH LONG-TERM CURRENT USE OF INSULIN (HCC): ICD-10-CM

## 2024-12-13 DIAGNOSIS — Z12.5 SCREENING PSA (PROSTATE SPECIFIC ANTIGEN): ICD-10-CM

## 2024-12-13 LAB
ALBUMIN SERPL-MCNC: 3.9 G/DL (ref 3.4–5)
ALBUMIN/GLOB SERPL: 1 {RATIO} (ref 1.1–2.2)
ALP SERPL-CCNC: 86 U/L (ref 40–129)
ALT SERPL-CCNC: 27 U/L (ref 10–40)
ANION GAP SERPL CALCULATED.3IONS-SCNC: 13 MMOL/L (ref 3–16)
AST SERPL-CCNC: 22 U/L (ref 15–37)
BILIRUB SERPL-MCNC: 0.3 MG/DL (ref 0–1)
BUN SERPL-MCNC: 13 MG/DL (ref 7–20)
CALCIUM SERPL-MCNC: 9.3 MG/DL (ref 8.3–10.6)
CHLORIDE SERPL-SCNC: 105 MMOL/L (ref 99–110)
CHOLEST SERPL-MCNC: 98 MG/DL (ref 0–199)
CO2 SERPL-SCNC: 22 MMOL/L (ref 21–32)
CREAT SERPL-MCNC: 0.6 MG/DL (ref 0.9–1.3)
CREAT UR-MCNC: 106 MG/DL (ref 39–259)
DEPRECATED RDW RBC AUTO: 15.6 % (ref 12.4–15.4)
EST. AVERAGE GLUCOSE BLD GHB EST-MCNC: 197.3 MG/DL
GFR SERPLBLD CREATININE-BSD FMLA CKD-EPI: >90 ML/MIN/{1.73_M2}
GLUCOSE SERPL-MCNC: 86 MG/DL (ref 70–99)
HBA1C MFR BLD: 8.5 %
HCT VFR BLD AUTO: 45.4 % (ref 40.5–52.5)
HDLC SERPL-MCNC: 28 MG/DL (ref 40–60)
HGB BLD-MCNC: 14.6 G/DL (ref 13.5–17.5)
LDLC SERPL CALC-MCNC: 51 MG/DL
MAGNESIUM SERPL-MCNC: 1.75 MG/DL (ref 1.8–2.4)
MCH RBC QN AUTO: 26.6 PG (ref 26–34)
MCHC RBC AUTO-ENTMCNC: 32.2 G/DL (ref 31–36)
MCV RBC AUTO: 82.6 FL (ref 80–100)
MICROALBUMIN UR DL<=1MG/L-MCNC: <1.2 MG/DL
MICROALBUMIN/CREAT UR: NORMAL MG/G (ref 0–30)
PLATELET # BLD AUTO: 207 K/UL (ref 135–450)
PMV BLD AUTO: 10.8 FL (ref 5–10.5)
POTASSIUM SERPL-SCNC: 3.7 MMOL/L (ref 3.5–5.1)
PROT SERPL-MCNC: 7.7 G/DL (ref 6.4–8.2)
PSA SERPL DL<=0.01 NG/ML-MCNC: 0.79 NG/ML (ref 0–4)
RBC # BLD AUTO: 5.5 M/UL (ref 4.2–5.9)
SODIUM SERPL-SCNC: 140 MMOL/L (ref 136–145)
TRIGL SERPL-MCNC: 94 MG/DL (ref 0–150)
VLDLC SERPL CALC-MCNC: 19 MG/DL
WBC # BLD AUTO: 6 K/UL (ref 4–11)

## 2024-12-13 PROCEDURE — 83735 ASSAY OF MAGNESIUM: CPT

## 2024-12-13 PROCEDURE — 80053 COMPREHEN METABOLIC PANEL: CPT

## 2024-12-13 PROCEDURE — 82570 ASSAY OF URINE CREATININE: CPT

## 2024-12-13 PROCEDURE — 36415 COLL VENOUS BLD VENIPUNCTURE: CPT

## 2024-12-13 PROCEDURE — 80061 LIPID PANEL: CPT

## 2024-12-13 PROCEDURE — 85027 COMPLETE CBC AUTOMATED: CPT

## 2024-12-13 PROCEDURE — 83036 HEMOGLOBIN GLYCOSYLATED A1C: CPT

## 2024-12-13 PROCEDURE — 82043 UR ALBUMIN QUANTITATIVE: CPT

## 2024-12-13 PROCEDURE — 84153 ASSAY OF PSA TOTAL: CPT

## 2024-12-13 NOTE — PROGRESS NOTES
rate and rhythm without murmurs            12/16/2024    10:29 AM 5/31/2024    10:26 AM 2/23/2024    11:04 AM 7/24/2023     9:57 AM 1/20/2023     9:17 AM 8/18/2022     1:27 PM 5/12/2022     1:02 PM   PHQ Scores   PHQ2 Score 0 0 6 2 2 2 4   PHQ9 Score 0 0 6 2 7 2 19       Interpretation of Total Score Depression Severity: 1-4 = Minimal depression, 5-9 = Mild depression, 10-14 = Moderate depression, 15-19 = Moderately severe depression, 20-27 = Severe depression       ASSESSMENT AND PLAN:       Deshaun was seen today for diabetes and anxiety.    Diagnoses and all orders for this visit:    Type 2 diabetes mellitus with hyperglycemia, with long-term current use of insulin (HCC)  -     Lancets MISC; 1 each by Does not apply route daily To use with glucometer to test sugars 4-6 times a day and with symptoms of abnormal glucose  -     Hemoglobin A1C; Future  -     Basic Metabolic Panel; Future      -A1C Stable and Uncontrolled  -discussed correct dose of insulin to take  -send sugars in 2 weeks so can adjust  -Reviewed pre-visit labs with patient. (Lipid, CMP, A1C, Microalbumin, CBC, and PSA) Review of these labs contributed to MDM.     Lab Results   Component Value Date    LABA1C 8.5 12/13/2024       -lab slip given for next visit (ordered above, did not contribute to MDM)  -patient to check sugars four times a day  -encouraged a healthy diet, regular exercise and maintaining a healthy weight. Discussed the importance of TLC in controlling diabetes and preventing long term complications of diabetes including CV, renal, neuropathic and ocular.   -RTO  3 months  -eye form given:  no      Type 2 diabetes mellitus with diabetic nephropathy, with long-term current use of insulin (HCC)  -     Insulin Pen Needle (B-D UF III MINI PEN NEEDLES) 31G X 5 MM MISC; USE ONCE DAILY AS DIRECTED  Better sugar control  On ACEI, Jardiance    Essential hypertension  -Stable, continue current medications.    Mixed hyperlipidemia  -Stable,

## 2024-12-16 ENCOUNTER — OFFICE VISIT (OUTPATIENT)
Dept: FAMILY MEDICINE CLINIC | Age: 59
End: 2024-12-16

## 2024-12-16 VITALS
WEIGHT: 288.8 LBS | DIASTOLIC BLOOD PRESSURE: 62 MMHG | OXYGEN SATURATION: 98 % | SYSTOLIC BLOOD PRESSURE: 116 MMHG | BODY MASS INDEX: 39.17 KG/M2 | TEMPERATURE: 97.2 F | HEART RATE: 68 BPM

## 2024-12-16 DIAGNOSIS — F41.9 ANXIETY: ICD-10-CM

## 2024-12-16 DIAGNOSIS — I10 ESSENTIAL HYPERTENSION: ICD-10-CM

## 2024-12-16 DIAGNOSIS — Z79.4 TYPE 2 DIABETES MELLITUS WITH HYPERGLYCEMIA, WITH LONG-TERM CURRENT USE OF INSULIN (HCC): Primary | ICD-10-CM

## 2024-12-16 DIAGNOSIS — Z79.4 TYPE 2 DIABETES MELLITUS WITH DIABETIC NEPHROPATHY, WITH LONG-TERM CURRENT USE OF INSULIN (HCC): ICD-10-CM

## 2024-12-16 DIAGNOSIS — E11.21 TYPE 2 DIABETES MELLITUS WITH DIABETIC NEPHROPATHY, WITH LONG-TERM CURRENT USE OF INSULIN (HCC): ICD-10-CM

## 2024-12-16 DIAGNOSIS — E11.65 TYPE 2 DIABETES MELLITUS WITH HYPERGLYCEMIA, WITH LONG-TERM CURRENT USE OF INSULIN (HCC): Primary | ICD-10-CM

## 2024-12-16 DIAGNOSIS — K43.9 ABDOMINAL WALL HERNIA: ICD-10-CM

## 2024-12-16 DIAGNOSIS — H81.09 MENIERE'S DISEASE, UNSPECIFIED LATERALITY: ICD-10-CM

## 2024-12-16 DIAGNOSIS — E78.2 MIXED HYPERLIPIDEMIA: ICD-10-CM

## 2024-12-16 RX ORDER — ESOMEPRAZOLE MAGNESIUM 40 MG/1
40 CAPSULE, DELAYED RELEASE ORAL
Qty: 90 CAPSULE | Refills: 3 | Status: SHIPPED | OUTPATIENT
Start: 2024-12-16

## 2024-12-16 RX ORDER — LOSARTAN POTASSIUM AND HYDROCHLOROTHIAZIDE 25; 100 MG/1; MG/1
1 TABLET ORAL DAILY
Qty: 90 TABLET | Refills: 3 | Status: SHIPPED | OUTPATIENT
Start: 2024-12-16

## 2024-12-16 RX ORDER — FLURBIPROFEN SODIUM 0.3 MG/ML
SOLUTION/ DROPS OPHTHALMIC
Qty: 100 EACH | Refills: 12 | Status: SHIPPED | OUTPATIENT
Start: 2024-12-16

## 2024-12-16 RX ORDER — LANCETS 30 GAUGE
1 EACH MISCELLANEOUS DAILY
Qty: 400 EACH | Refills: 0 | Status: SHIPPED | OUTPATIENT
Start: 2024-12-16

## 2024-12-16 RX ORDER — ROSUVASTATIN CALCIUM 20 MG/1
20 TABLET, COATED ORAL NIGHTLY
Qty: 90 TABLET | Refills: 3 | Status: SHIPPED | OUTPATIENT
Start: 2024-12-16

## 2024-12-16 ASSESSMENT — PATIENT HEALTH QUESTIONNAIRE - PHQ9
2. FEELING DOWN, DEPRESSED OR HOPELESS: NOT AT ALL
SUM OF ALL RESPONSES TO PHQ QUESTIONS 1-9: 0
SUM OF ALL RESPONSES TO PHQ9 QUESTIONS 1 & 2: 0
SUM OF ALL RESPONSES TO PHQ QUESTIONS 1-9: 0
1. LITTLE INTEREST OR PLEASURE IN DOING THINGS: NOT AT ALL
SUM OF ALL RESPONSES TO PHQ QUESTIONS 1-9: 0
SUM OF ALL RESPONSES TO PHQ QUESTIONS 1-9: 0

## 2024-12-16 NOTE — PATIENT INSTRUCTIONS
--Check with the pharmacy about getting the Tdap (tetanus, diptheria and pertussis) vaccine.      --Check with pharmacy about getting the shingles vaccine, Shingrix (not Zostavax)        --Send me sugar readings in 2 weeks - checking 3-4 times a day.

## 2025-01-02 ENCOUNTER — OFFICE VISIT (OUTPATIENT)
Dept: FAMILY MEDICINE CLINIC | Age: 60
End: 2025-01-02
Payer: MEDICARE

## 2025-01-02 ENCOUNTER — HOSPITAL ENCOUNTER (OUTPATIENT)
Age: 60
Discharge: HOME | End: 2025-01-02
Payer: MEDICARE

## 2025-01-02 ENCOUNTER — HOSPITAL ENCOUNTER (OUTPATIENT)
Dept: GENERAL RADIOLOGY | Age: 60
Discharge: HOME | End: 2025-01-02
Payer: MEDICARE

## 2025-01-02 VITALS
DIASTOLIC BLOOD PRESSURE: 74 MMHG | TEMPERATURE: 97.6 F | OXYGEN SATURATION: 98 % | SYSTOLIC BLOOD PRESSURE: 122 MMHG | WEIGHT: 282.14 LBS | HEART RATE: 58 BPM | BODY MASS INDEX: 38.27 KG/M2

## 2025-01-02 DIAGNOSIS — M54.6 ACUTE BILATERAL THORACIC BACK PAIN: ICD-10-CM
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
## 2025-01-02 DIAGNOSIS — M54.2 NECK PAIN, BILATERAL: ICD-10-CM
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
## 2025-01-02 DIAGNOSIS — Z20.822 EXPOSURE TO COVID-19 VIRUS: ICD-10-CM

## 2025-01-02 DIAGNOSIS — R10.84 GENERALIZED ABDOMINAL PAIN: ICD-10-CM

## 2025-01-02 LAB
INFLUENZA A ANTIGEN, POC: NEGATIVE
INFLUENZA B ANTIGEN, POC: NEGATIVE
LOT EXPIRE DATE: NORMAL
LOT KIT NUMBER: NORMAL
SARS-COV-2, POC: NORMAL
VALID INTERNAL CONTROL: YES
VENDOR AND KIT NAME POC: NORMAL

## 2025-01-02 PROCEDURE — 72110 X-RAY EXAM L-2 SPINE 4/>VWS: CPT

## 2025-01-02 PROCEDURE — 72072 X-RAY EXAM THORAC SPINE 3VWS: CPT

## 2025-01-02 PROCEDURE — 72040 X-RAY EXAM NECK SPINE 2-3 VW: CPT

## 2025-01-02 PROCEDURE — 99214 OFFICE O/P EST MOD 30 MIN: CPT | Performed by: NURSE PRACTITIONER

## 2025-01-02 PROCEDURE — 87428 SARSCOV & INF VIR A&B AG IA: CPT | Performed by: NURSE PRACTITIONER

## 2025-01-02 PROCEDURE — 3074F SYST BP LT 130 MM HG: CPT | Performed by: NURSE PRACTITIONER

## 2025-01-02 PROCEDURE — 3078F DIAST BP <80 MM HG: CPT | Performed by: NURSE PRACTITIONER

## 2025-01-02 RX ORDER — TIZANIDINE 2 MG/1
2 TABLET ORAL 3 TIMES DAILY PRN
Qty: 30 TABLET | Refills: 0 | Status: SHIPPED | OUTPATIENT
Start: 2025-01-02

## 2025-01-02 ASSESSMENT — ENCOUNTER SYMPTOMS
SHORTNESS OF BREATH: 0
DIARRHEA: 0
COUGH: 0
NAUSEA: 0
VOMITING: 0
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
## 2025-01-02 NOTE — DISCHARGE INSTRUCTIONS
EGD DISCHARGE INSTRUCTIONS    Use salt water gargle, lozenges, or Chloraseptic spray as needed for sore throat. If you have fever, chills, excessive bleeding, severe chest pain, or abdominal pain, or any other problems, contact your physician's office immediately at [phone]. Continue home medications as directed. Call your doctor at [phone] if you have any concerns. See your physician's report for procedure details and recommendations. ANESTHESIA DISCHARGE INSTRUCTIONS    Wear your seatbelt home. A responsible adult needs to be with you for 24 hours. You are under the influence of drugs-do not drink alcohol, drive ,operate machinery,or make any important decisions or sign any legal documentsfor 24 hours. You may resume normal activities tomorrow. You may experience lightheadedness,dizziness,or sleepiness following surgery. Rest at home today - increase activity as tolerated. It is recommended to take a four hour nap after procedure. Progress slowly to a regular diet unless your physician has instructed you otherwise. Avoid spicy and greasy food on first meal.  Drink plenty of water. If nausea becomes a problem call your physician. Call your doctor if concerns arise.
Psychiatric
Psychiatric

## 2025-01-03 DIAGNOSIS — H81.09 MENIERE'S DISEASE, UNSPECIFIED LATERALITY: ICD-10-CM

## 2025-01-03 RX ORDER — DIAZEPAM 5 MG/1
TABLET ORAL
Qty: 30 TABLET | Refills: 0 | Status: SHIPPED | OUTPATIENT
Start: 2025-01-03 | End: 2025-02-02

## 2025-01-03 NOTE — TELEPHONE ENCOUNTER
Medication:   Requested Prescriptions     Pending Prescriptions Disp Refills    diazePAM (VALIUM) 5 MG tablet [Pharmacy Med Name: DIAZEPAM 5MG TABLETS] 30 tablet      Sig: TAKE 1 TABLET BY MOUTH EVERY 12 HOURS AS NEEDED FOR ANXIETY OR DIZZINESS. MAX DAILY AMOUNT: 10 MG      Last Filled:  10/28/24    Patient Phone Number: [phone] (home)     Last appt: 1/2/2025   Next appt: 3/24/2025    Last OARRS:       12/16/2024    10:55 AM   RX Monitoring   Periodic Controlled Substance Monitoring No signs of potential drug abuse or diversion identified.     PDMP Monitoring:    Last PDMP [place] as Reviewed ([place]):  Review User Review Instant Review Result   LAUREN BURNETTE 1/2/2025 10:55 AM Reviewed PDMP [1]     Preferred Pharmacy:   [facility][id number] [place], [place] - [address]  - P [phone] - F [phone]  [facility] [facility] [address]  Phone: [phone] Fax: [phone]    [facility] #[id number] [place], [place] - [address] -  [phone] - F [phone]  [address] [facility] [address]  Phone: [phone] Fax: [phone]    [facility]pharmacy #[id number] [place], [place] - [address] [facility] [phone] - F [phone]  [address] [facility] [address]  Phone: [phone] Fax: [phone]    [facility] - [place], [place] - [address] [facility] [phone] - F [phone]  [address] [facility] [address]  Phone: [phone] Fax: [phone]

## 2025-01-07 ENCOUNTER — TELEPHONE (OUTPATIENT)
Dept: FAMILY MEDICINE CLINIC | Age: 60
End: 2025-01-07

## 2025-01-07 RX ORDER — POLYMYXIN B SULFATE AND TRIMETHOPRIM 1; 10000 MG/ML; [USP'U]/ML
1 SOLUTION OPHTHALMIC EVERY 4 HOURS
Qty: 3 ML | Refills: 0 | Status: SHIPPED | OUTPATIENT
Start: 2025-01-07 | End: 2025-01-14

## 2025-01-07 NOTE — TELEPHONE ENCOUNTER
Patient woke up with pink eye in right eye. He is wanting to know if you could please send in a prescription for him. He can be reached at [phone] to let him know if you are able to sent the prescription.    [facility] #[id number] - [place], [place] - [address] - P [phone] - F [phone] [address], [facility] [address]  Phone: [phone]  Fax: [phone]

## 2025-01-23 RX ORDER — INSULIN LISPRO 100 [IU]/ML
INJECTION, SOLUTION INTRAVENOUS; SUBCUTANEOUS
Qty: 18 ML | Refills: 1 | Status: SHIPPED | OUTPATIENT
Start: 2025-01-23

## 2025-01-29 RX ORDER — INSULIN GLARGINE 100 [IU]/ML
INJECTION, SOLUTION SUBCUTANEOUS
Qty: 21 ML | Refills: 1 | Status: SHIPPED | OUTPATIENT
Start: 2025-01-29

## 2025-01-29 RX ORDER — FLUTICASONE PROPIONATE 50 MCG
SPRAY, SUSPENSION (ML) NASAL
Qty: 48 G | Refills: 3 | Status: SHIPPED | OUTPATIENT
Start: 2025-01-29

## 2025-01-29 NOTE — TELEPHONE ENCOUNTER
Patient called and he is also in need of a prior authorization for his:     [facility] #[id number] - [place], [place] - [facility] [address] - P [phone] - F [phone]  [facility] [address], [facility] [address]  Phone: [phone]  Fax: [phone]    Statement Selected

## 2025-02-03 ENCOUNTER — TELEPHONE (OUTPATIENT)
Dept: ADMINISTRATIVE | Age: 60
End: 2025-02-03

## 2025-02-03 NOTE — TELEPHONE ENCOUNTER
Submitted PA for OneTouch Delica Plus [id number]   Via [facility] (Key: [id number]) STATUS: PENDING.    Follow up done daily; if no decision with in three days we will refax.  If another three days goes by with no decision will call the insurance for status.

## 2025-02-04 NOTE — TELEPHONE ENCOUNTER
The medication is APPROVED THRU 02/03/2026    J CODE IF APPLICABLE     If this requires a response please respond to the pool ( P MHCX PSC MEDICATION PRE-AUTH).      Thank you please advise patient.

## 2025-02-17 DIAGNOSIS — E11.65 TYPE 2 DIABETES MELLITUS WITH HYPERGLYCEMIA, WITH LONG-TERM CURRENT USE OF INSULIN (HCC): ICD-10-CM

## 2025-02-17 DIAGNOSIS — Z79.4 TYPE 2 DIABETES MELLITUS WITH HYPERGLYCEMIA, WITH LONG-TERM CURRENT USE OF INSULIN (HCC): ICD-10-CM

## 2025-02-17 RX ORDER — ACYCLOVIR 400 MG/1
TABLET ORAL
Qty: 9 EACH | Refills: 4 | Status: SHIPPED | OUTPATIENT
Start: 2025-02-17

## 2025-02-24 NOTE — TELEPHONE ENCOUNTER
Patient arrived from home with c/o chest pain that started 30minutes ago. Endorses SOB, n/v/d. Denies any drug use. Reports that he she had two glasses of wine early yesterday evening. Denies taking her prescribed adderall. Patient alert and oriented x 4. Ambulatory from triage.    Received BS log, per Dr. Anguiano, increase his lantus to 50 units int he morning and 40 units in the evening.      Left message to call.

## 2025-03-06 RX ORDER — ONDANSETRON 4 MG/1
4 TABLET, FILM COATED ORAL EVERY 8 HOURS PRN
Qty: 90 TABLET | Refills: 0 | Status: SHIPPED | OUTPATIENT
Start: 2025-03-06

## 2025-03-06 NOTE — TELEPHONE ENCOUNTER
Bhavani,   I am unsure if this is ok to send to the pharmacy as it was only sent in once in oct of 2024.  I attempted to contact patient to see what he needs it for and he did not answer.  Please advise.

## 2025-03-11 ENCOUNTER — HOSPITAL ENCOUNTER (EMERGENCY)
Age: 60
Discharge: HOME | End: 2025-03-11
Attending: INTERNAL MEDICINE
Payer: MEDICARE

## 2025-03-11 ENCOUNTER — APPOINTMENT (OUTPATIENT)
Dept: CT IMAGING | Age: 60
End: 2025-03-11
Payer: MEDICARE

## 2025-03-11 VITALS
SYSTOLIC BLOOD PRESSURE: 143 MMHG | BODY MASS INDEX: 39.08 KG/M2 | DIASTOLIC BLOOD PRESSURE: 84 MMHG | RESPIRATION RATE: 16 BRPM | TEMPERATURE: 97.7 F | OXYGEN SATURATION: 94 % | HEIGHT: 72 IN | HEART RATE: 67 BPM | WEIGHT: 288.5 LBS

## 2025-03-11 DIAGNOSIS — M54.50 ACUTE BILATERAL LOW BACK PAIN WITHOUT SCIATICA: ICD-10-CM

## 2025-03-11 DIAGNOSIS — R10.32 ACUTE BILATERAL LOWER ABDOMINAL PAIN: Primary | ICD-10-CM

## 2025-03-11 DIAGNOSIS — R10.31 ACUTE BILATERAL LOWER ABDOMINAL PAIN: Primary | ICD-10-CM

## 2025-03-11 LAB
ALBUMIN SERPL-MCNC: 4.1 G/DL (ref 3.4–5)
ALBUMIN/GLOB SERPL: 0.9 {RATIO} (ref 1.1–2.2)
ALP SERPL-CCNC: 86 U/L (ref 40–129)
ALT SERPL-CCNC: 33 U/L (ref 10–40)
ANION GAP SERPL CALCULATED.3IONS-SCNC: 12 MMOL/L (ref 3–16)
AST SERPL-CCNC: 25 U/L (ref 15–37)
BASOPHILS # BLD: 0 K/UL (ref 0–0.2)
BASOPHILS NFR BLD: 0.4 %
BILIRUB SERPL-MCNC: 0.4 MG/DL (ref 0–1)
BILIRUB UR QL STRIP.AUTO: NEGATIVE
BUN SERPL-MCNC: 16 MG/DL (ref 7–20)
CALCIUM SERPL-MCNC: 9.4 MG/DL (ref 8.3–10.6)
CHLORIDE SERPL-SCNC: 102 MMOL/L (ref 99–110)
CLARITY UR: CLEAR
CO2 SERPL-SCNC: 24 MMOL/L (ref 21–32)
COLOR UR: YELLOW
CREAT SERPL-MCNC: 0.7 MG/DL (ref 0.9–1.3)
DEPRECATED RDW RBC AUTO: 15.3 % (ref 12.4–15.4)
EOSINOPHIL # BLD: 0.2 K/UL (ref 0–0.6)
EOSINOPHIL NFR BLD: 2.6 %
GFR SERPLBLD CREATININE-BSD FMLA CKD-EPI: >90 ML/MIN/{1.73_M2}
GLUCOSE SERPL-MCNC: 224 MG/DL (ref 70–99)
GLUCOSE UR STRIP.AUTO-MCNC: >=1000 MG/DL
HCT VFR BLD AUTO: 48.1 % (ref 40.5–52.5)
HGB BLD-MCNC: 15.4 G/DL (ref 13.5–17.5)
HGB UR QL STRIP.AUTO: NEGATIVE
KETONES UR STRIP.AUTO-MCNC: NEGATIVE MG/DL
LEUKOCYTE ESTERASE UR QL STRIP.AUTO: NEGATIVE
LIPASE SERPL-CCNC: 33 U/L (ref 13–60)
LYMPHOCYTES # BLD: 2.7 K/UL (ref 1–5.1)
LYMPHOCYTES NFR BLD: 37 %
MCH RBC QN AUTO: 26.2 PG (ref 26–34)
MCHC RBC AUTO-ENTMCNC: 32 G/DL (ref 31–36)
MCV RBC AUTO: 81.8 FL (ref 80–100)
MONOCYTES # BLD: 0.6 K/UL (ref 0–1.3)
MONOCYTES NFR BLD: 8.9 %
NEUTROPHILS # BLD: 3.7 K/UL (ref 1.7–7.7)
NEUTROPHILS NFR BLD: 51.1 %
NITRITE UR QL STRIP.AUTO: NEGATIVE
PH UR STRIP.AUTO: 5.5 [PH] (ref 5–8)
PLATELET # BLD AUTO: 217 K/UL (ref 135–450)
PMV BLD AUTO: 9.7 FL (ref 5–10.5)
POTASSIUM SERPL-SCNC: 3.9 MMOL/L (ref 3.5–5.1)
PROT SERPL-MCNC: 8.6 G/DL (ref 6.4–8.2)
PROT UR STRIP.AUTO-MCNC: NEGATIVE MG/DL
RBC # BLD AUTO: 5.87 M/UL (ref 4.2–5.9)
SODIUM SERPL-SCNC: 138 MMOL/L (ref 136–145)
SP GR UR STRIP.AUTO: >=1.03 (ref 1–1.03)
UA COMPLETE W REFLEX CULTURE PNL UR: ABNORMAL
UA DIPSTICK W REFLEX MICRO PNL UR: ABNORMAL
URN SPEC COLLECT METH UR: ABNORMAL
UROBILINOGEN UR STRIP-ACNC: 1 E.U./DL
WBC # BLD AUTO: 7.3 K/UL (ref 4–11)

## 2025-03-11 PROCEDURE — 99285 EMERGENCY DEPT VISIT HI MDM: CPT

## 2025-03-11 PROCEDURE — 96374 THER/PROPH/DIAG INJ IV PUSH: CPT

## 2025-03-11 PROCEDURE — 96375 TX/PRO/DX INJ NEW DRUG ADDON: CPT

## 2025-03-11 PROCEDURE — 81003 URINALYSIS AUTO W/O SCOPE: CPT

## 2025-03-11 PROCEDURE — 83690 ASSAY OF LIPASE: CPT

## 2025-03-11 PROCEDURE — 6360000004 HC RX CONTRAST MEDICATION: Performed by: INTERNAL MEDICINE

## 2025-03-11 PROCEDURE — 80053 COMPREHEN METABOLIC PANEL: CPT

## 2025-03-11 PROCEDURE — 74177 CT ABD & PELVIS W/CONTRAST: CPT

## 2025-03-11 PROCEDURE — 6360000002 HC RX W HCPCS: Performed by: INTERNAL MEDICINE

## 2025-03-11 PROCEDURE — 85025 COMPLETE CBC W/AUTO DIFF WBC: CPT

## 2025-03-11 RX ORDER — IOPAMIDOL 755 MG/ML
75 INJECTION, SOLUTION INTRAVASCULAR
Status: COMPLETED | OUTPATIENT
Start: 2025-03-11 | End: 2025-03-11

## 2025-03-11 RX ORDER — DICYCLOMINE HYDROCHLORIDE 10 MG/1
10 CAPSULE ORAL
Qty: 16 CAPSULE | Refills: 0 | Status: SHIPPED | OUTPATIENT
Start: 2025-03-11 | End: 2025-03-15

## 2025-03-11 RX ORDER — ONDANSETRON 2 MG/ML
4 INJECTION INTRAMUSCULAR; INTRAVENOUS ONCE
Status: COMPLETED | OUTPATIENT
Start: 2025-03-11 | End: 2025-03-11

## 2025-03-11 RX ORDER — KETOROLAC TROMETHAMINE 15 MG/ML
15 INJECTION, SOLUTION INTRAMUSCULAR; INTRAVENOUS ONCE
Status: COMPLETED | OUTPATIENT
Start: 2025-03-11 | End: 2025-03-11

## 2025-03-11 RX ADMIN — KETOROLAC TROMETHAMINE 15 MG: 15 INJECTION, SOLUTION INTRAMUSCULAR; INTRAVENOUS at 06:42

## 2025-03-11 RX ADMIN — IOPAMIDOL 75 ML: 755 INJECTION, SOLUTION INTRAVENOUS at 06:34

## 2025-03-11 RX ADMIN — ONDANSETRON 4 MG: 2 INJECTION, SOLUTION INTRAMUSCULAR; INTRAVENOUS at 07:32

## 2025-03-11 ASSESSMENT — LIFESTYLE VARIABLES
HOW MANY STANDARD DRINKS CONTAINING ALCOHOL DO YOU HAVE ON A TYPICAL DAY: PATIENT DOES NOT DRINK
HOW OFTEN DO YOU HAVE A DRINK CONTAINING ALCOHOL: NEVER

## 2025-03-11 ASSESSMENT — PAIN - FUNCTIONAL ASSESSMENT: PAIN_FUNCTIONAL_ASSESSMENT: 0-10

## 2025-03-11 ASSESSMENT — PAIN SCALES - GENERAL
PAINLEVEL_OUTOF10: 8
PAINLEVEL_OUTOF10: 9

## 2025-03-11 ASSESSMENT — PAIN DESCRIPTION - LOCATION: LOCATION: ABDOMEN

## 2025-03-11 NOTE — ED PROVIDER NOTES
1.0 - 5.1 K/uL    Monocytes Absolute 0.6 0.0 - 1.3 K/uL    Eosinophils Absolute 0.2 0.0 - 0.6 K/uL    Basophils Absolute 0.0 0.0 - 0.2 K/uL   Comprehensive Metabolic Panel w/ Reflex to MG   Result Value Ref Range    Sodium 138 136 - 145 mmol/L    Potassium reflex Magnesium 3.9 3.5 - 5.1 mmol/L    Chloride 102 99 - 110 mmol/L    CO2 24 21 - 32 mmol/L    Anion Gap 12 3 - 16    Glucose 224 (H) 70 - 99 mg/dL    BUN 16 7 - 20 mg/dL    Creatinine 0.7 (L) 0.9 - 1.3 mg/dL    Est, Glom Filt Rate >90 >60    Calcium 9.4 8.3 - 10.6 mg/dL    Total Protein 8.6 (H) 6.4 - 8.2 g/dL    Albumin 4.1 3.4 - 5.0 g/dL    Albumin/Globulin Ratio 0.9 (L) 1.1 - 2.2    Total Bilirubin 0.4 0.0 - 1.0 mg/dL    Alkaline Phosphatase 86 40 - 129 U/L    ALT 33 10 - 40 U/L    AST 25 15 - 37 U/L   Lipase   Result Value Ref Range    Lipase 33.0 13.0 - 60.0 U/L   Urinalysis with Reflex to Culture (“IF” dysuria, frequency, or urgency.)    Specimen: Urine   Result Value Ref Range    Color, UA Yellow Straw/Yellow    Clarity, UA Clear Clear    Glucose, Ur >=1000 (A) Negative mg/dL    Bilirubin, Urine Negative Negative    Ketones, Urine Negative Negative mg/dL    Specific Gravity, UA >=1.030 1.005 - 1.030    Blood, Urine Negative Negative    pH, Urine 5.5 5.0 - 8.0    Protein, UA Negative Negative mg/dL    Urobilinogen, Urine 1.0 <2.0 E.U./dL    Nitrite, Urine Negative Negative    Leukocyte Esterase, Urine Negative Negative    Microscopic Examination Not Indicated     Urine Type NotGiven     Urine Reflex to Culture Not Indicated        SEP-1  Is this patient to be included in the SEP-1 Core Measure due to severe sepsis or septic shock?   No    Screenings                    Medications Given During ED Visit  Medications   ketorolac (TORADOL) injection 15 mg (15 mg IntraVENous Given 3/11/25 7542)   iopamidol (ISOVUE-370) 76 % injection 75 mL (75 mLs IntraVENous Given 3/11/25 0760)   ondansetron (ZOFRAN) injection 4 mg (4 mg IntraVENous Given 3/11/25 6487)

## 2025-03-31 RX ORDER — EMPAGLIFLOZIN 25 MG/1
25 TABLET, FILM COATED ORAL DAILY
Qty: 90 TABLET | Refills: 3 | Status: SHIPPED | OUTPATIENT
Start: 2025-03-31

## 2025-03-31 NOTE — TELEPHONE ENCOUNTER
Medication:   Requested Prescriptions     Pending Prescriptions Disp Refills    JARDIANCE 25 MG tablet [Pharmacy Med Name: JARDIANCE 25MG TABLETS] 90 tablet 3     Sig: TAKE 1 TABLET BY MOUTH DAILY          Patient Phone Number: [phone] (home)     Last appt: 1/2/2025   Next appt: 4/11/2025    Last OARRS:       12/16/2024    10:55 AM   RX Monitoring   Periodic Controlled Substance Monitoring No signs of potential drug abuse or diversion identified.     PDMP Monitoring:    Last PDMP [place] Reviewed ([place]):  Review User Review Instant Review Result   ERNST MCCOY 3/11/2025  8:04 AM Reviewed PDMP [1]     Preferred Pharmacy:   [facility] #[id number] [place], [place] - [address]  - P [phone] - F [phone]  [address] [facility] [address]  Phone: [phone] Fax: [phone]    [facility] #[id number] [place], [place] - [address] [facility] - P [phone] - F [phone]  [address]  [facility] [address]  Phone: [phone] Fax: [phone]    [id number] [place], [place] - [address] [facility] -  [phone] - F [phone]  [address] [facility] [address]  Phone: [phone] Fax: [phone]    [facility] - [place], [place] - [address] [facility] -  [phone] - F [phone]  [address] [facility] [address]  Phone: [phone] Fax: [phone]    [facility] - [place], [place] - [address] - P [phone] - F [phone]  [address]  [facility] [address]  Phone: [phone] Fax: [phone]

## 2025-04-07 ENCOUNTER — HOSPITAL ENCOUNTER (OUTPATIENT)
Age: 60
Discharge: HOME | End: 2025-04-07
Payer: MEDICARE

## 2025-04-07 DIAGNOSIS — E11.65 TYPE 2 DIABETES MELLITUS WITH HYPERGLYCEMIA, WITH LONG-TERM CURRENT USE OF INSULIN (HCC): ICD-10-CM

## 2025-04-07 DIAGNOSIS — Z79.4 TYPE 2 DIABETES MELLITUS WITH HYPERGLYCEMIA, WITH LONG-TERM CURRENT USE OF INSULIN (HCC): ICD-10-CM

## 2025-04-07 LAB
ANION GAP SERPL CALCULATED.3IONS-SCNC: 11 MMOL/L (ref 3–16)
BUN SERPL-MCNC: 12 MG/DL (ref 7–20)
CALCIUM SERPL-MCNC: 8.8 MG/DL (ref 8.3–10.6)
CHLORIDE SERPL-SCNC: 106 MMOL/L (ref 99–110)
CO2 SERPL-SCNC: 25 MMOL/L (ref 21–32)
CREAT SERPL-MCNC: 0.7 MG/DL (ref 0.9–1.3)
EST. AVERAGE GLUCOSE BLD GHB EST-MCNC: 194.4 MG/DL
GFR SERPLBLD CREATININE-BSD FMLA CKD-EPI: >90 ML/MIN/{1.73_M2}
GLUCOSE SERPL-MCNC: 110 MG/DL (ref 70–99)
HBA1C MFR BLD: 8.4 %
POTASSIUM SERPL-SCNC: 4 MMOL/L (ref 3.5–5.1)
SODIUM SERPL-SCNC: 142 MMOL/L (ref 136–145)

## 2025-04-07 PROCEDURE — 83036 HEMOGLOBIN GLYCOSYLATED A1C: CPT

## 2025-04-07 PROCEDURE — 80048 BASIC METABOLIC PNL TOTAL CA: CPT

## 2025-04-07 PROCEDURE — 36415 COLL VENOUS BLD VENIPUNCTURE: CPT

## 2025-04-07 RX ORDER — INSULIN GLARGINE 100 [IU]/ML
INJECTION, SOLUTION SUBCUTANEOUS
Qty: 21 ML | Refills: 1 | Status: SHIPPED | OUTPATIENT
Start: 2025-04-07 | End: 2025-04-11 | Stop reason: SDUPTHER

## 2025-04-07 NOTE — TELEPHONE ENCOUNTER
Medication:   Requested Prescriptions     Pending Prescriptions Disp Refills    LANTUS SOLOSTAR 100 UNIT/ML injection pen [Pharmacy Med Name: LANTUS SOLOSTAR PEN INJ 3ML] 21 mL 1     Sig: ADMINISTER 70 UNITS UNDER THE SKIN EVERY NIGHT      Provider out of office.     Patient Phone Number: [phone] (home)     Last appt: 1/2/2025   Next appt: 4/11/2025    Last OARRS:       12/16/2024    10:55 AM   RX Monitoring   Periodic Controlled Substance Monitoring No signs of potential drug abuse or diversion identified.     PDMP Monitoring:    Last PDMP [place] Reviewed ([place]):  Review User Review Instant Review Result   ERNST MCCOY 3/11/2025  8:04 AM Reviewed PDMP [1]     Preferred Pharmacy:   [facility] #[id number] [place], [place] - [address]  - P [phone] - F [phone]  [address] [facility] [address]  Phone: [phone] Fax: [phone]    [facility] #[id number] [place], [place] - [address] -  [phone] - F [phone]  [address] [facility] [address]  Phone: [phone] Fax: [phone]    [id number] [place], [place] - [address] [facility] -  [phone] - F [phone]  [address] [facility] [address]  Phone: [phone] Fax: [phone]    [facility] [facility] - [place], [place] - [address] [facility] -  [phone] - F [phone]  [address] [facility] [address]  Phone: [phone] Fax: [phone]    [facility] - [place], [place] - [address] [place] [address] - P [phone] - F [phone] [address]  [facility] [address]  Phone: [phone] Fax: [phone]

## 2025-04-08 ENCOUNTER — RESULTS FOLLOW-UP (OUTPATIENT)
Dept: FAMILY MEDICINE CLINIC | Age: 60
End: 2025-04-08

## 2025-04-08 NOTE — PROGRESS NOTES
MEDICARE ANNUAL WELLNESS VISIT    Patient is here for their Medicare Annual Wellness Visit.  No concerns    Last eye exam: within the past year  Last dental exam: over a year ago, aware he is due  Exercise:   4 days a week , walking and yard work  Do you eat balanced/healthy meals regularly? No     How would you rate your overall health? : Poor            4/11/2025     1:35 PM 7/24/2023     9:57 AM 5/12/2022     1:02 PM   Fall Risk   Do you feel unsteady or are you worried about falling?  no no no   2 or more falls in past year? no no no   Fall with injury in past year? no no no           4/11/2025     1:35 PM 12/16/2024    10:29 AM 5/31/2024    10:26 AM 2/23/2024    11:04 AM 7/24/2023     9:57 AM 1/20/2023     9:17 AM 8/18/2022     1:27 PM   PHQ Scores   PHQ2 Score 2  0 0 6 2 2 2   PHQ9 Score 2  0 0 6 2 7 2       Patient-reported         Do you always wear a seat belt in the car?: Yes      Have you noted any problems with hearing?: No  Have you noted any vision problems?: No  Are you sexually active? : no  Do you have concerns about your sexual health including any concerns about having an STD?: no  In the past month how much has pain been an issue for you?:  Quite a bit  In the past month have you had issues with anxiety, loneliness, irritability or fatigue:  Not at all    Do you take opioid medications even sometimes? Yes: follows with pain mgmt    Living Will and/or Healthcare POA: No,   Additional information provided      Healthcare Decision Maker:    Primary Decision Maker: Isabela Collado - Child - [phone]           Who lives at home with you: no one  Do you have any pets? none  Do you have any services coming to your home (meals on wheels, home health, etc) ?: no      Do you need help with:  Using the phone:  No  Bathing: No  Dressing:  No  Toileting: No  Transportation:  No  Shopping: No  Preparing meals: No  Housework/Laundry: No  Medications: No  Money management: No    Does your home

## 2025-04-11 ENCOUNTER — TELEPHONE (OUTPATIENT)
Dept: FAMILY MEDICINE CLINIC | Age: 60
End: 2025-04-11

## 2025-04-11 ENCOUNTER — OFFICE VISIT (OUTPATIENT)
Dept: FAMILY MEDICINE CLINIC | Age: 60
End: 2025-04-11

## 2025-04-11 VITALS
WEIGHT: 286.6 LBS | BODY MASS INDEX: 38.82 KG/M2 | OXYGEN SATURATION: 98 % | SYSTOLIC BLOOD PRESSURE: 144 MMHG | DIASTOLIC BLOOD PRESSURE: 84 MMHG | HEIGHT: 72 IN | TEMPERATURE: 98.5 F | HEART RATE: 80 BPM

## 2025-04-11 DIAGNOSIS — Z00.00 WELL ADULT EXAM: Primary | ICD-10-CM

## 2025-04-11 DIAGNOSIS — Z12.11 SCREEN FOR COLON CANCER: ICD-10-CM

## 2025-04-11 DIAGNOSIS — Z79.4 TYPE 2 DIABETES MELLITUS WITH HYPERGLYCEMIA, WITH LONG-TERM CURRENT USE OF INSULIN (HCC): ICD-10-CM

## 2025-04-11 DIAGNOSIS — K86.1 OTHER CHRONIC PANCREATITIS: ICD-10-CM

## 2025-04-11 DIAGNOSIS — K76.0 FATTY LIVER: ICD-10-CM

## 2025-04-11 DIAGNOSIS — E11.65 TYPE 2 DIABETES MELLITUS WITH HYPERGLYCEMIA, WITH LONG-TERM CURRENT USE OF INSULIN (HCC): ICD-10-CM

## 2025-04-11 DIAGNOSIS — I10 ESSENTIAL HYPERTENSION: ICD-10-CM

## 2025-04-11 DIAGNOSIS — R16.0 HEPATOMEGALY: ICD-10-CM

## 2025-04-11 DIAGNOSIS — Z79.4 TYPE 2 DIABETES MELLITUS WITH DIABETIC NEPHROPATHY, WITH LONG-TERM CURRENT USE OF INSULIN (HCC): ICD-10-CM

## 2025-04-11 DIAGNOSIS — E11.21 TYPE 2 DIABETES MELLITUS WITH DIABETIC NEPHROPATHY, WITH LONG-TERM CURRENT USE OF INSULIN (HCC): ICD-10-CM

## 2025-04-11 DIAGNOSIS — H81.09 MENIERE'S DISEASE, UNSPECIFIED LATERALITY: ICD-10-CM

## 2025-04-11 PROBLEM — A49.8 ENTEROCOCCUS FAECALIS INFECTION: Status: RESOLVED | Noted: 2024-01-29 | Resolved: 2025-04-11

## 2025-04-11 PROBLEM — A49.8 ANAEROBIC BACTERIAL INFECTION: Status: RESOLVED | Noted: 2024-01-29 | Resolved: 2025-04-11

## 2025-04-11 PROBLEM — A49.8 CITROBACTER INFECTION: Status: RESOLVED | Noted: 2024-01-29 | Resolved: 2025-04-11

## 2025-04-11 PROBLEM — A49.8 E COLI INFECTION: Status: RESOLVED | Noted: 2024-01-29 | Resolved: 2025-04-11

## 2025-04-11 RX ORDER — DIAZEPAM 5 MG/1
5 TABLET ORAL EVERY 12 HOURS PRN
Qty: 30 TABLET | Refills: 0 | Status: SHIPPED | OUTPATIENT
Start: 2025-04-11 | End: 2025-05-11

## 2025-04-11 RX ORDER — INSULIN LISPRO 100 [IU]/ML
INJECTION, SOLUTION INTRAVENOUS; SUBCUTANEOUS
Qty: 18 ML | Refills: 1 | Status: SHIPPED | OUTPATIENT
Start: 2025-04-11

## 2025-04-11 RX ORDER — INSULIN GLARGINE 100 [IU]/ML
INJECTION, SOLUTION SUBCUTANEOUS
Qty: 21 ML | Refills: 4 | Status: SHIPPED | OUTPATIENT
Start: 2025-04-11

## 2025-04-11 SDOH — HEALTH STABILITY: PHYSICAL HEALTH: ON AVERAGE, HOW MANY DAYS PER WEEK DO YOU ENGAGE IN MODERATE TO STRENUOUS EXERCISE (LIKE A BRISK WALK)?: 4 DAYS

## 2025-04-11 SDOH — ECONOMIC STABILITY: INCOME INSECURITY: IN THE LAST 12 MONTHS, WAS THERE A TIME WHEN YOU WERE NOT ABLE TO PAY THE MORTGAGE OR RENT ON TIME?: NO

## 2025-04-11 SDOH — ECONOMIC STABILITY: FOOD INSECURITY: WITHIN THE PAST 12 MONTHS, YOU WORRIED THAT YOUR FOOD WOULD RUN OUT BEFORE YOU GOT MONEY TO BUY MORE.: NEVER TRUE

## 2025-04-11 SDOH — ECONOMIC STABILITY: FOOD INSECURITY: WITHIN THE PAST 12 MONTHS, THE FOOD YOU BOUGHT JUST DIDN'T LAST AND YOU DIDN'T HAVE MONEY TO GET MORE.: NEVER TRUE

## 2025-04-11 SDOH — HEALTH STABILITY: PHYSICAL HEALTH: ON AVERAGE, HOW MANY MINUTES DO YOU ENGAGE IN EXERCISE AT THIS LEVEL?: 60 MIN

## 2025-04-11 SDOH — ECONOMIC STABILITY: TRANSPORTATION INSECURITY
IN THE PAST 12 MONTHS, HAS THE LACK OF TRANSPORTATION KEPT YOU FROM MEDICAL APPOINTMENTS OR FROM GETTING MEDICATIONS?: NO

## 2025-04-11 ASSESSMENT — PATIENT HEALTH QUESTIONNAIRE - PHQ9
SUM OF ALL RESPONSES TO PHQ QUESTIONS 1-9: 2
SUM OF ALL RESPONSES TO PHQ QUESTIONS 1-9: 2
1. LITTLE INTEREST OR PLEASURE IN DOING THINGS: SEVERAL DAYS
2. FEELING DOWN, DEPRESSED OR HOPELESS: SEVERAL DAYS
SUM OF ALL RESPONSES TO PHQ QUESTIONS 1-9: 2
SUM OF ALL RESPONSES TO PHQ QUESTIONS 1-9: 2

## 2025-04-11 ASSESSMENT — LIFESTYLE VARIABLES
HOW MANY STANDARD DRINKS CONTAINING ALCOHOL DO YOU HAVE ON A TYPICAL DAY: PATIENT DOES NOT DRINK
HOW MANY STANDARD DRINKS CONTAINING ALCOHOL DO YOU HAVE ON A TYPICAL DAY: 0
HOW OFTEN DO YOU HAVE SIX OR MORE DRINKS ON ONE OCCASION: 1
HOW OFTEN DO YOU HAVE A DRINK CONTAINING ALCOHOL: NEVER
HOW OFTEN DO YOU HAVE A DRINK CONTAINING ALCOHOL: 1

## 2025-04-11 NOTE — PATIENT INSTRUCTIONS
Personalized Preventative Plan for Deshaun Ayesr    Medicare offers a range of preventative health benefits. Some of the tests and screenings are paid in full while others may be subject to a deductible, co-insurance and/or copay. Some of these benefits include a comprehensive review of your medical history including lifestyle, illnesses that may run in your family and various assessments and screenings as appropriate. After reviewing your medical record and screening and assessments performed today your provider may have ordered/recommended immunizations, labs, imaging and/or referrals for you. A list of these orders as well as your Preventative Care list are included within your After Visit Summary for your review.       --Check with the pharmacy about getting the Tdap (tetanus, diptheria and pertussis) vaccine.      --Check with pharmacy about getting the shingles vaccine, Shingrix (not Zostavax)      --Get your Covid vaccine this fall. If you recently had Covid you can wait 3-4 months before getting it.      --Make sure you get your flu shot this fall. If you are over 65 look for the \"high dose\" flu shot for better protection.     --Make appointment to see the dentist     --SEND ME YOUR SUGARS IN TWO WEEKS

## 2025-04-14 ENCOUNTER — CLINICAL DOCUMENTATION (OUTPATIENT)
Dept: SPIRITUAL SERVICES | Age: 60
End: 2025-04-14

## 2025-04-14 NOTE — ACP (ADVANCE CARE PLANNING)
Advance Care Planning   Ambulatory ACP Specialist Patient Outreach    Date:  4/14/2025, 4/17/25    ACP Specialist:  Amara Singleton LPN    Outreach call to patient in follow-up to ACP Specialist referral from:Ginger Anguiano MD    [x] PCP  [] Provider   [] Ambulatory Care Management [] Other     For:                  [x] Advance Directive Assistance              [] Complete Portable DNR order              [] Complete POST/POLST/MOST              [] Code Status Discussion             [] Discuss Goals of Care             [] Early ACP Decision-Making              [] Other (Specify)    Date Referral Received:4/11/25    Next Step:   [x] ACP scheduled conversation  [] Outreach again in one week               [x] Email / Mail ACP Info Sheets  [x] Email / Mail Advance Directive   [] Closing referral.  Routing closure to referring provider/staff and to ACP Specialist .    [] Closure letter mailed to patient with invitation to contact ACP Specialist if / when ready.   [x] Other (Specify here):  Pt requested call back Thursday 4/17/25     [x] At this time, Healthcare Decision Maker Is:     Primary Decision Maker: Isabela Collado - Child - [phone]          [] Primary agent named in scanned advance directive.    [x] Legal Next of Kin.     [] Unable to determine legal decision maker at this time.    Outreaches:       [x] 1st -  Date:  4/14/25               Intervention:  [x] Spoke with Patient   [] Left Voice mail [] Email / Mail    [] [employer]  [] Other (Specify) :     Outcomes:Writer contacted patient on home/mobile phone [phone] to discuss ACP.  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   Outreach will be done then.           [x] 2nd -  Date:  4/17/25               Intervention:  [x] Spoke with Patient  [] Left Voice mail [x] Email / Mail    [] [employer]  [] Other (Specify) :              Outcomes:Writer contacted patient on

## 2025-04-15 ENCOUNTER — CARE COORDINATION (OUTPATIENT)
Dept: CARE COORDINATION | Age: 60
End: 2025-04-15

## 2025-04-15 NOTE — CARE COORDINATION
Ambulatory Care Coordination Note     4/15/2025 9:02 AM     Patient outreach attempt by this ACM today to offer care management services. ACM was unable to reach the patient by telephone today;   [employer] message sent requesting patient to contact this ACM.  VM not set up      ACM: Silvia Colón RN     Care Summary Note:     PCP/Specialist follow up:   Future Appointments         Provider Specialty Dept Phone    7/14/2025 10:00 AM Ginger Anguiano MD Family Medicine [phone]            Follow Up:   Plan for next ACM outreach in approximately 1-2 days  to complete:  - outreach attempt to offer care management services.

## 2025-04-17 ENCOUNTER — CARE COORDINATION (OUTPATIENT)
Dept: CARE COORDINATION | Age: 60
End: 2025-04-17

## 2025-04-17 ENCOUNTER — CLINICAL DOCUMENTATION (OUTPATIENT)
Dept: SPIRITUAL SERVICES | Age: 60
End: 2025-04-17

## 2025-04-17 NOTE — PROGRESS NOTES
Admit Date: 1/25/2024  Diet: Diet NPO Exceptions are: Ice Chips, Popsicles    CC:  Infected mesh removal     Interval history:   Overnight, there were no acute events. Patient's vitals remained stable    Patient was seen this morning in bed with his daughter at bedside.  Patient endorses that overnight his IV access blew and had to get a PICC line.  This caused him to not sleep very well.  He is glad he has a line now.  He feels that he is making great improvements.  Patient denies fevers, chills, nausea, vomiting, chest pain, shortness of breath, diarrhea, constipation, dysuria, urinary frequency or urgency.     Plan:     -Continue IV Zosyn  -WBC count increasing despite patient being on Zosyn, patient vitals remained very stable; will monitor closely for now  -Continue pain control  -As needed anxiolytics/antihypertensives  -Sliding scale insulin, keep blood glucose between 140-180    Assessment:   Deshaun Ayers is a 58 y.o. male with PMH of T2DM, HLD, HTN, IZABELA, diverticulitis with complications s/p mesh in 2002 leading to prolonged hospitalization, ICU management at [facility] who was admitted for exploratory laparotomy, excision of infected abdominal wall mesh, lysis of adhesions and small bowel resection.     Infected mesh  Patient is s/p exploratory laparotomy, excision of infected abdominal wall mesh, lysis of adhesions and small bowel resection. Patient had diverticulitis with complications s/p mesh in 2002 leading to prolonged hospitalization, ICU management at [facility]  -General surgery following     T2DM -uncontrolled  Patient's last HgbA1c from 1/2024 = 8.5%  At home patient is on insulin, metformin, Jardiance, Amaryl     HTN  At home patient is on Hyzaar     HLD  At home patient is on rosuvastatin    Code Status: Full Code   FEN: Diet NPO Exceptions are: Ice Chips, Popsicles   DVT PPX: [x] Lovenox, []Heparin, [] SCDs,[] Eliquis, [] Xarelto, [] Coumadin  DISPO: [x] GMF, [] ICU, [] CVU    Chito N  --   --  1.9*   ANIONGAP 13 13 13       Hepatic:   Recent Labs     01/27/24  0520   LABALBU 2.8*     Troponin: No results for input(s): \"TROPONINI\" in the last 72 hours.  BNP: No results for input(s): \"BNP\" in the last 72 hours.  Lipids: No results for input(s): \"CHOL\", \"HDL\" in the last 72 hours.    Invalid input(s): \"LDLCALCU\", \"TRIGLYCERIDE\"  ABGs:  No results for input(s): \"PHART\", \"IFD9DOH\", \"PO2ART\", \"EJO1EIN\", \"BEART\", \"THGBART\", \"O1MPZNII\", \"ZRG8MEX\" in the last 72 hours.    INR: No results for input(s): \"INR\" in the last 72 hours.  Lactate: No results for input(s): \"LACTATE\" in the last 72 hours.  Cultures:  -----------------------------------------------------------------  RAD:   No orders to display       Medications:     Scheduled Meds:   insulin lispro  0-8 Units SubCUTAneous TID WC    lidocaine 1 % injection  5 mL IntraDERmal Once    sodium chloride flush  5-40 mL IntraVENous 2 times per day    sodium chloride flush  5-40 mL IntraVENous 2 times per day    enoxaparin  30 mg SubCUTAneous BID    pantoprazole  40 mg IntraVENous Daily    ketorolac  30 mg IntraVENous Once    piperacillin-tazobactam  3,375 mg IntraVENous Q8H    insulin lispro  0-4 Units SubCUTAneous Nightly     Continuous Infusions:   lactated ringers IV soln 100 mL/hr at 01/26/24 1049    sodium chloride      sodium chloride      HYDROmorphone      dextrose       PRN Meds:benzocaine-menthol, sodium chloride flush, sodium chloride, sodium chloride flush, sodium chloride, naloxone 0.4 mg in 10 mL sodium chloride syringe, ondansetron, diazePAM, glucose, dextrose bolus **OR** dextrose bolus, glucagon (rDNA), dextrose, labetalol     [TextEntry] :  A detailed set of ROS were asked and negative except those outlined in HPI.

## 2025-04-17 NOTE — CARE COORDINATION
ACM made contact with pt to offer RPM.  Pt states he would like to contact his insurance company first to make sure there is no out of pocket cost to him.  Pt is concerned it may be hard for him to navigate as well because he is not good with technology.  ACM assured pt it was an easy process and someone would walk him through step by step on how to set up.  Pt requested call back next Wednesday to discuss if he would like to enroll or not. All questions answered at this time.

## 2025-04-23 ENCOUNTER — CARE COORDINATION (OUTPATIENT)
Dept: CARE COORDINATION | Age: 60
End: 2025-04-23

## 2025-04-23 NOTE — CARE COORDINATION
ACM contact ed pt to follow up on discussion for RPM.  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  Pt requested call back on Friday morning to give him time to call and discuss.

## 2025-04-24 ENCOUNTER — CLINICAL DOCUMENTATION (OUTPATIENT)
Dept: SPIRITUAL SERVICES | Age: 60
End: 2025-04-24

## 2025-04-25 ENCOUNTER — CARE COORDINATION (OUTPATIENT)
Dept: CARE COORDINATION | Age: 60
End: 2025-04-25

## 2025-04-25 RX ORDER — INSULIN LISPRO 100 [IU]/ML
INJECTION, SOLUTION INTRAVENOUS; SUBCUTANEOUS
Qty: 18 ML | Refills: 1 | OUTPATIENT
Start: 2025-04-25

## 2025-04-25 NOTE — CARE COORDINATION
Ambulatory Care Coordination Note     4/25/2025 11:59 AM     Patient outreach attempt by this ACM today to offer care management services. ACM was unable to reach the patient by telephone today;   left voice message requesting a return phone call to this ACM.     ACM: Silvia Colón RN     Care Summary Note:     PCP/Specialist follow up:   Future Appointments         Provider Specialty Dept Phone    7/14/2025 10:00 AM Ginger Anguiano MD Family Medicine [phone]            Follow Up:   Plan for next ACM outreach in approximately 1-2 days  to complete:  - outreach attempt to offer care management services  - RPM.

## 2025-04-25 NOTE — TELEPHONE ENCOUNTER
Medication:   Requested Prescriptions     Pending Prescriptions Disp Refills    insulin lispro, 1 Unit Dial, (HUMALOG/ADMELOG) 100 UNIT/ML SOPN [Pharmacy Med Name: INSULIN LISPRO 100U/ML KWIKPEN 3ML] 18 mL 1     Sig: IF LESS THEN 100 INJECT NONE, 100-150 INJECT 16 UNITS, 151-200 INJECT 18 UNITS, 201-250 INJECT 20 UNITS, 251-300 INJECT 22 UNITS 3 TIMES DAILY      Last Filled:  4/11/25    Patient Phone Number: [phone] (home)     Last appt: 4/11/2025   Next appt: 7/14/2025    Last OARRS:       4/11/2025     3:13 PM   RX Monitoring   Periodic Controlled Substance Monitoring No signs of potential drug abuse or diversion identified.     PDMP Monitoring:    Last PDMP Hung as Reviewed ([place]):  Review User Review Instant Review Result   HERMELINDA JOAQUIN 4/11/2025  3:12 PM Reviewed PDMP [1]     Preferred Pharmacy:   [id number] [address]  - P [phone] - F [phone]  [facility] [facility] [address]  Phone: [phone] Fax: [phone]    [id number] [place], [place] - [address] -  [phone] - F [phone]  [address] [facility] [facility] [address]  Phone: [phone] Fax: [phone]    [id number] [place], [place] - [address] [facility] [phone] - F [phone]  [address] [facility] [address]  Phone: [phone] Fax: [phone]    [facility] - [place], [place] - [address] [facility] [phone] - F [phone]  [address] [facility] [address]  Phone: [phone] Fax: [phone]    [facility] - [place], [place] - [address] - P [phone] - F [phone]  [address]  [facility] [address]  Phone: [phone] Fax: [phone]

## 2025-04-28 ENCOUNTER — CARE COORDINATION (OUTPATIENT)
Dept: CARE COORDINATION | Age: 60
End: 2025-04-28

## 2025-04-28 NOTE — CARE COORDINATION
Ambulatory Care Coordination Note     4/28/2025 11:07 AM     Patient outreach attempt by this ACM today to offer care management services. ACM was unable to reach the patient by telephone today;   left voice message requesting a return phone call to this ACM. Attempt x 2     ACM: Silvia Colón RN     Care Summary Note:     PCP/Specialist follow up:   Future Appointments         Provider Specialty Dept Phone    7/14/2025 10:00 AM Ginger Anguiano MD Family Medicine [phone]            Follow Up:   Plan for next ACM outreach in approximately 1-2 days  to complete:  - outreach attempt to offer care management services  - RPM.

## 2025-05-01 ENCOUNTER — TELEPHONE (OUTPATIENT)
Dept: FAMILY MEDICINE CLINIC | Age: 60
End: 2025-05-01

## 2025-05-01 RX ORDER — INSULIN LISPRO 100 [IU]/ML
INJECTION, SOLUTION INTRAVENOUS; SUBCUTANEOUS
Qty: 18 ML | Refills: 1 | Status: SHIPPED | OUTPATIENT
Start: 2025-05-01

## 2025-05-01 RX ORDER — IBUPROFEN 800 MG/1
TABLET, FILM COATED ORAL
Qty: 90 TABLET | Refills: 0 | Status: SHIPPED | OUTPATIENT
Start: 2025-05-01

## 2025-05-01 NOTE — TELEPHONE ENCOUNTER
Patient dropped off sugar log - scanned to chart    AM  and all over place  Noon: 119-268 with most 200's  Dinner:  with most upper 100's      Continue lantus 60AM and 40 PM  Increase meal times SSI - go up by 2 units at each level     Send sugars in 2 weeks

## 2025-05-02 ENCOUNTER — CARE COORDINATION (OUTPATIENT)
Dept: CARE COORDINATION | Age: 60
End: 2025-05-02

## 2025-05-02 NOTE — CARE COORDINATION
Ambulatory Care Coordination Note     5/2/2025 9:05 AM     patient outreach attempt by this ACM today to offer care management services. ACM was unable to reach the patient by telephone today;   left voice message requesting a return phone call to this ACM.     Patient closed (unable to reach patient) from the High Risk Care Management program on 5/2/2025.  No further Ambulatory Care Manager follow up scheduled.

## 2025-05-05 ENCOUNTER — TELEPHONE (OUTPATIENT)
Dept: FAMILY MEDICINE CLINIC | Age: 60
End: 2025-05-05

## 2025-05-05 DIAGNOSIS — G47.33 OSA (OBSTRUCTIVE SLEEP APNEA): Primary | ICD-10-CM

## 2025-05-05 NOTE — TELEPHONE ENCOUNTER
Patient needs to get a new cpap, current machine is 8 years old.  Needs a new order sent to [facility].    Phone- [phone]  Fax- [phone]

## 2025-05-08 RX ORDER — INSULIN LISPRO 100 [IU]/ML
INJECTION, SOLUTION INTRAVENOUS; SUBCUTANEOUS
Qty: 18 ML | Refills: 1 | OUTPATIENT
Start: 2025-05-08

## 2025-05-08 NOTE — TELEPHONE ENCOUNTER
Medication:   Requested Prescriptions     Pending Prescriptions Disp Refills    insulin lispro, 1 Unit Dial, (HUMALOG/ADMELOG) 100 UNIT/ML SOPN [Pharmacy Med Name: INSULIN LISPRO 100U/ML KWIKPEN 3ML] 18 mL 1     Sig: IF LESS THEN 100 INJECT NONE, 100-150 INJECT 16 UNITS, 151-200 INJECT 18 UNITS, 201-250 INJECT 20 UNITS, 251-300 INJECT 22 UNITS 3 TIMES DAILY      Last Filled:  5/1/25 too soon    Patient Phone Number: [phone] (home)     Last appt: 4/11/2025   Next appt: 7/14/2025    Last OARRS:       4/11/2025     3:13 PM   RX Monitoring   Periodic Controlled Substance Monitoring No signs of potential drug abuse or diversion identified.     PDMP Monitoring:    Last PDMP [place] Reviewed ([place]):  Review User Review Instant Review Result   HERMELINDA JOAQUIN 4/11/2025  3:12 PM Reviewed PDMP [1]     Preferred Pharmacy:   [id number] [place] [address] [address] - P [phone] - F [phone]  [facility] [address]  [facility] [address]  Phone: [phone] Fax: [phone]      Recent Visits  Date Type Provider Dept   04/11/25 Office Visit Hermelinda Joaquin MD [facility]   01/02/25 Office Visit LachelleBhavani APRN - CNP [facility]   12/16/24 Office Visit Hermelinda Joaquin MD [facility]   09/26/24 Office Visit Hermelinda Joaquin MD [facility]   05/31/24 Office Visit Hermelinda Joaquin MD [facility]   02/23/24 Office Visit Hermelinda Joaquin MD [facility]   12/26/23 Office Visit Hermelinda Joaquin MD [facility]   11/22/23 Office Visit Hermelinda Joaquin MD [facility]   Showing recent visits within past 540 days with a meds authorizing provider and meeting all other requirements  Future Appointments  Date Type Provider Dept   07/14/25 Appointment Hermelinda Joaquin MD [facility]   Showing future appointments within next 150 days with a meds authorizing provider and meeting all other requirements

## 2025-05-08 NOTE — TELEPHONE ENCOUNTER
Advised patient looks like he needs a new sleep study. Placed referral to Dr. Neal with sleep medicine. Sent Dr. Neal's phone number and address to patient via [employer], at patient's request.

## 2025-05-12 RX ORDER — INSULIN LISPRO 100 [IU]/ML
INJECTION, SOLUTION INTRAVENOUS; SUBCUTANEOUS
Qty: 18 ML | Refills: 1 | Status: SHIPPED | OUTPATIENT
Start: 2025-05-12

## 2025-05-12 NOTE — TELEPHONE ENCOUNTER
Medication:   Requested Prescriptions     Pending Prescriptions Disp Refills    insulin lispro, 1 Unit Dial, (HUMALOG/ADMELOG) 100 UNIT/ML SOPN 18 mL 1     Sig: IF LESS THEN 100 INJECT NONE, 100-150 INJECT 18 UNITS, 151-200 INJECT 24 UNITS, 201-250 INJECT 28 UNITS, 251-300 INJECT 32 UNITS 3 TIMES DAILY          Patient Phone Number: [phone] (home)     Last appt: 4/11/2025   Next appt: 7/14/2025    Last OARRS:       4/11/2025     3:13 PM   RX Monitoring   Periodic Controlled Substance Monitoring No signs of potential drug abuse or diversion identified.     PDMP Monitoring:    Last PDMP [place]ed ([place]):  Review User Review Instant Review Result   HERMELINDA JOAQUIN 4/11/2025  3:12 PM Reviewed PDMP [1]     Preferred Pharmacy:   [id number] [place], [place] - [address]  - P [phone] - F [phone]  [facility] [address] [facility] [address]  Phone: [phone] Fax: [phone]    [id number] [place], [place] - [address] -  [phone] - F [phone]  [address] [facility] [address] [facility] [address]  Phone: [phone] Fax: [phone]    [id number] [place], [place] - [address] [facility] [phone] - F [phone]  [address] [facility] [address]  Phone: [phone] Fax: [phone]    [facility] - [place], [place] - [address] [facility] [phone] - F [phone]  [address] [facility] [address]  Phone: [phone] Fax: [phone]    [facility] - [place], [place] - [address] - P [phone] - F [phone]  [address]  [facility] [address]  Phone: [phone] Fax: [phone]

## 2025-05-12 NOTE — TELEPHONE ENCOUNTER
insulin lispro, 1 Unit Dial, (HUMALOG/ADMELOG) 100 UNIT/ML SOPN      Note patient   [id number] - [place] [address] - P [phone] - F [phone]  [facility] [address], [facility] [address]  Phone: [phone]  Fax: [phone]

## 2025-05-29 ENCOUNTER — TELEPHONE (OUTPATIENT)
Dept: FAMILY MEDICINE CLINIC | Age: 60
End: 2025-05-29

## 2025-05-29 DIAGNOSIS — E11.21 TYPE 2 DIABETES MELLITUS WITH DIABETIC NEPHROPATHY, WITH LONG-TERM CURRENT USE OF INSULIN (HCC): ICD-10-CM

## 2025-05-29 DIAGNOSIS — Z79.4 TYPE 2 DIABETES MELLITUS WITH DIABETIC NEPHROPATHY, WITH LONG-TERM CURRENT USE OF INSULIN (HCC): ICD-10-CM

## 2025-05-29 NOTE — TELEPHONE ENCOUNTER
Submitted PA for insulin glargine (LANTUS SOLOSTAR) 100 UNIT/ML injection pen   Via CMM (Key: [id number]) STATUS: PENDING.    Follow up done daily; if no decision with in three days we will refax.  If another three days goes by with no decision will call the insurance for status.

## 2025-05-29 NOTE — TELEPHONE ENCOUNTER
ONETOUCH ULTRA strip     Patient was given enough strips to only test 3 times a day when he tests 6-8 times a day he requested more strips be sent over.       insulin glargine (LANTUS SOLOSTAR) 100 UNIT/ML injection pen      Pharmacy notified patient that his insurance will only approve this one time a day instead of two.    [id number] - [place] [address] - P [phone] - F [phone]  [facility] [address] [facility] [address]  Phone: [phone]  Fax: [phone]

## 2025-05-30 ENCOUNTER — TELEPHONE (OUTPATIENT)
Dept: ADMINISTRATIVE | Age: 60
End: 2025-05-30

## 2025-05-30 RX ORDER — BLOOD SUGAR DIAGNOSTIC
STRIP MISCELLANEOUS
Qty: 600 STRIP | Refills: 12 | Status: SHIPPED | OUTPATIENT
Start: 2025-05-30

## 2025-05-30 NOTE — TELEPHONE ENCOUNTER
PA for the insulin is approved (per other encounter).  Need to send Rx for the strips again.  Changed the directions

## 2025-05-30 NOTE — TELEPHONE ENCOUNTER
The medication insulin glargine (LANTUS SOLOSTAR) 100 UNIT/ML injection pen  is APPROVED from 02/27/25 to 05/29/26.    Please notify the patient.    If this requires a response please respond to the pool ( P MHCX PSC MEDICATION PRE-AUTH).

## 2025-06-02 NOTE — TELEPHONE ENCOUNTER
One touch ultra strips QID  Insurance will only cover TID  DX-  E11.65, Z79.4  
Submitted PA for OneTouch Ultra strips  Via [facility] (Key: [id number]) STATUS: PENDING.    Follow up done daily; if no decision with in three days we will refax.  If another three days goes by with no decision will call the insurance for status.    
The medication OneTouch Ultra strips  is APPROVED from 02/28/25 to 05/30/26.    Please notify the patient.    If this requires a response please respond to the pool ( P MHCX PSC MEDICATION PRE-AUTH).      
23-Dec-2022 15:26

## 2025-06-09 ENCOUNTER — TELEPHONE (OUTPATIENT)
Dept: FAMILY MEDICINE CLINIC | Age: 60
End: 2025-06-09

## 2025-06-09 PROBLEM — F41.9 ANXIETY: Chronic | Status: ACTIVE | Noted: 2018-11-30

## 2025-06-09 PROBLEM — Z79.891 LONG-TERM CURRENT USE OF OPIATE ANALGESIC: Chronic | Status: ACTIVE | Noted: 2025-06-09

## 2025-06-09 RX ORDER — INSULIN GLARGINE 100 [IU]/ML
INJECTION, SOLUTION SUBCUTANEOUS
Qty: 21 ML | Refills: 4 | Status: SHIPPED | OUTPATIENT
Start: 2025-06-09

## 2025-06-09 NOTE — TELEPHONE ENCOUNTER
Received BS log from patient.  Per Dr. Vazquez, to increase his Lantus to 60 units in the morning 45 units in the evening, and to get us another set of numbers in a week.      Left message to call.

## 2025-06-16 NOTE — TELEPHONE ENCOUNTER
Patient informed.  Said that he will do and get us the numbers.  Will bring to next appointment.

## 2025-07-07 ENCOUNTER — TELEPHONE (OUTPATIENT)
Dept: FAMILY MEDICINE CLINIC | Age: 60
End: 2025-07-07

## 2025-07-07 RX ORDER — INSULIN GLARGINE 100 [IU]/ML
INJECTION, SOLUTION SUBCUTANEOUS
Qty: 21 ML | Refills: 4 | Status: SHIPPED | OUTPATIENT
Start: 2025-07-07

## 2025-07-07 RX ORDER — ONDANSETRON 4 MG/1
4 TABLET, FILM COATED ORAL EVERY 8 HOURS PRN
Qty: 90 TABLET | Refills: 0 | Status: SHIPPED | OUTPATIENT
Start: 2025-07-07

## 2025-07-07 NOTE — TELEPHONE ENCOUNTER
Sent sugar log, scanned to media    AM - , most 120-140's  Lunch 138-248, most >150  Dinner 150-278  Bed 134-208       Increase Lantus AM from 60 to 65, continue 45 in evening    Bring sugars to appt next week

## 2025-07-11 ENCOUNTER — HOSPITAL ENCOUNTER (OUTPATIENT)
Age: 60
Discharge: HOME | End: 2025-07-11
Payer: MEDICARE

## 2025-07-11 DIAGNOSIS — E11.65 TYPE 2 DIABETES MELLITUS WITH HYPERGLYCEMIA, WITH LONG-TERM CURRENT USE OF INSULIN (HCC): ICD-10-CM

## 2025-07-11 DIAGNOSIS — Z79.4 TYPE 2 DIABETES MELLITUS WITH HYPERGLYCEMIA, WITH LONG-TERM CURRENT USE OF INSULIN (HCC): ICD-10-CM

## 2025-07-11 LAB
ANION GAP SERPL CALCULATED.3IONS-SCNC: 12 MMOL/L (ref 3–16)
BUN SERPL-MCNC: 11 MG/DL (ref 7–20)
CALCIUM SERPL-MCNC: 9.1 MG/DL (ref 8.3–10.6)
CHLORIDE SERPL-SCNC: 104 MMOL/L (ref 99–110)
CO2 SERPL-SCNC: 23 MMOL/L (ref 21–32)
CREAT SERPL-MCNC: 0.7 MG/DL (ref 0.9–1.3)
EST. AVERAGE GLUCOSE BLD GHB EST-MCNC: 171.4 MG/DL
GFR SERPLBLD CREATININE-BSD FMLA CKD-EPI: >90 ML/MIN/{1.73_M2}
GLUCOSE SERPL-MCNC: 110 MG/DL (ref 70–99)
HBA1C MFR BLD: 7.6 %
POTASSIUM SERPL-SCNC: 4.3 MMOL/L (ref 3.5–5.1)
SODIUM SERPL-SCNC: 139 MMOL/L (ref 136–145)

## 2025-07-11 PROCEDURE — 80048 BASIC METABOLIC PNL TOTAL CA: CPT

## 2025-07-11 PROCEDURE — 83036 HEMOGLOBIN GLYCOSYLATED A1C: CPT

## 2025-07-11 PROCEDURE — 36415 COLL VENOUS BLD VENIPUNCTURE: CPT

## 2025-07-14 ENCOUNTER — OFFICE VISIT (OUTPATIENT)
Dept: FAMILY MEDICINE CLINIC | Age: 60
End: 2025-07-14
Payer: MEDICARE

## 2025-07-14 VITALS
BODY MASS INDEX: 39.14 KG/M2 | TEMPERATURE: 97.2 F | WEIGHT: 288.6 LBS | DIASTOLIC BLOOD PRESSURE: 60 MMHG | OXYGEN SATURATION: 98 % | SYSTOLIC BLOOD PRESSURE: 148 MMHG | HEART RATE: 79 BPM

## 2025-07-14 DIAGNOSIS — E11.65 TYPE 2 DIABETES MELLITUS WITH HYPERGLYCEMIA, WITH LONG-TERM CURRENT USE OF INSULIN (HCC): Primary | Chronic | ICD-10-CM

## 2025-07-14 DIAGNOSIS — F33.41 RECURRENT MAJOR DEPRESSIVE DISORDER, IN PARTIAL REMISSION: ICD-10-CM

## 2025-07-14 DIAGNOSIS — H81.09 MENIERE'S DISEASE, UNSPECIFIED LATERALITY: ICD-10-CM

## 2025-07-14 DIAGNOSIS — I10 ESSENTIAL HYPERTENSION: Chronic | ICD-10-CM

## 2025-07-14 DIAGNOSIS — F41.9 ANXIETY: Chronic | ICD-10-CM

## 2025-07-14 DIAGNOSIS — Z79.4 TYPE 2 DIABETES MELLITUS WITH HYPERGLYCEMIA, WITH LONG-TERM CURRENT USE OF INSULIN (HCC): Primary | Chronic | ICD-10-CM

## 2025-07-14 PROCEDURE — 3051F HG A1C>EQUAL 7.0%<8.0%: CPT | Performed by: FAMILY MEDICINE

## 2025-07-14 PROCEDURE — 3078F DIAST BP <80 MM HG: CPT | Performed by: FAMILY MEDICINE

## 2025-07-14 PROCEDURE — 3077F SYST BP >= 140 MM HG: CPT | Performed by: FAMILY MEDICINE

## 2025-07-14 PROCEDURE — 99214 OFFICE O/P EST MOD 30 MIN: CPT | Performed by: FAMILY MEDICINE

## 2025-07-14 PROCEDURE — G2211 COMPLEX E/M VISIT ADD ON: HCPCS | Performed by: FAMILY MEDICINE

## 2025-07-14 RX ORDER — DIAZEPAM 5 MG/1
5 TABLET ORAL EVERY 12 HOURS PRN
Qty: 30 TABLET | Refills: 0 | Status: SHIPPED | OUTPATIENT
Start: 2025-07-14 | End: 2025-08-13

## 2025-07-14 RX ORDER — INSULIN LISPRO 100 [IU]/ML
INJECTION, SOLUTION INTRAVENOUS; SUBCUTANEOUS
Qty: 90 ML | Refills: 4 | Status: SHIPPED | OUTPATIENT
Start: 2025-07-14

## 2025-07-14 RX ORDER — INSULIN GLARGINE 100 [IU]/ML
INJECTION, SOLUTION SUBCUTANEOUS
Qty: 99 ML | Refills: 4 | Status: SHIPPED | OUTPATIENT
Start: 2025-07-14

## 2025-07-14 RX ORDER — GLIMEPIRIDE 4 MG/1
4 TABLET ORAL
Qty: 90 TABLET | Refills: 3 | Status: SHIPPED | OUTPATIENT
Start: 2025-07-14

## 2025-07-14 RX ORDER — INSULIN LISPRO 100 [IU]/ML
INJECTION, SOLUTION INTRAVENOUS; SUBCUTANEOUS
Qty: 90 ML | Refills: 4 | Status: SHIPPED | OUTPATIENT
Start: 2025-07-14 | End: 2025-07-14

## 2025-07-14 NOTE — PROGRESS NOTES
Chief Complaint   Patient presents with    Diabetes         History of Present Illness      Diabetes  - He has been using a continuous glucose monitor since 07/10/2025 but is struggling to keep it in place, resulting in bruising.  - His blood sugar was 120 this AM, but after consuming a McMuffin and Regular Coke, it spiked to 230.  - He did not take his medication today due to nausea.  - He has been recording his blood sugar levels for the past 5 days.  - His morning blood sugar levels range from 80 to 140, with an average of around 120 or 115.  - He experienced one episode of low blood sugar, feeling unwell and measuring a level of 80, which he believes was closer to 70.  - He adjusts his insulin dosage based on his blood sugar levels, taking a minimum of 20 units and increasing as needed.  - He takes 5 to 6 injections daily, including morning and night doses.  - He reports no numbness or tingling in his feet.  - He is up to date with his eye exams, with the most recent one in 02/2025 showing no issues.  - He has been increasing his insulin injections, sometimes requiring 40 to 60 units of short-acting insulin post-meal to lower his blood sugar.  - His current regimen includes Lantus 65 units in the morning and 45 units at night.  - he also takes jardiance, metformin and glimepiride    Anxiety  - He takes diazepam every other day, sometimes twice a day when he feels particularly anxious.  - He finds it helpful for his anxiety   - will also take for his menieres if needed  - He has Narcan at home.    Hypertension  - Feels His blood pressure is elevated today due to pain.  - He does not have a blood pressure cuff at home and is considering purchasing a generic one.  - He is managing well on losartan and hydrochlorothiazide, no SE    Depression  - His mood is generally good, although he has some off days.  - He reports no suicidal thoughts but experiences periods of low energy and motivation.  - He wishes he could

## 2025-07-14 NOTE — PATIENT INSTRUCTIONS
STARTING GLP1/GIP MEDICATIONS   (Semeglutide, dulaglutide. Tirzepatide)    - These medications may cause a significant decrease in appetite. Make sure you are not missing meals! You may need to eat several smaller meals throughout the day.  - Recommend taking a multivitamin daily.  - Nausea is a common side effect. Avoiding large heavy meals can help. Drinking cold water can also be helpful. If the nausea is severe you need to let us know.  - Constipation is a common side effect. Make sure you are drinking plenty of fluids and getting fiber in your diet. You can use Miralax if needed.  - Muscle mass can decrease quickly with weight loss. Recommend a diet high in protein and regular exercise that includes a weight/strength program.  - Pancreatitis is a rare but serious side effect. Symptoms include severe upper abdominal pain, usually in the middle that sometimes goes through to your back. Often people have nausea and vomiting with it. If these symptoms occur you need to stop the medication and let us know immediately.        - Skin Tac Wipes - help monitors stick better  -can get breathable covers on [employer] or similar to keep the monitors in place

## 2025-07-24 ENCOUNTER — HOSPITAL ENCOUNTER (OUTPATIENT)
Dept: SLEEP CENTER | Age: 60
Discharge: HOME | End: 2025-07-24
Payer: MEDICARE

## 2025-07-24 DIAGNOSIS — Z79.891 LONG-TERM CURRENT USE OF OPIATE ANALGESIC: Chronic | ICD-10-CM

## 2025-07-24 DIAGNOSIS — G47.33 OBSTRUCTIVE SLEEP APNEA SYNDROME: ICD-10-CM

## 2025-07-24 PROCEDURE — 95810 POLYSOM 6/> YRS 4/> PARAM: CPT

## 2025-08-03 PROBLEM — G47.31 PRIMARY CENTRAL SLEEP APNEA: Status: ACTIVE | Noted: 2025-08-03

## 2025-08-11 ENCOUNTER — TELEPHONE (OUTPATIENT)
Dept: FAMILY MEDICINE CLINIC | Age: 60
End: 2025-08-11

## 2025-08-11 DIAGNOSIS — Z79.4 TYPE 2 DIABETES MELLITUS WITH HYPERGLYCEMIA, WITH LONG-TERM CURRENT USE OF INSULIN (HCC): Chronic | ICD-10-CM

## 2025-08-11 DIAGNOSIS — E11.65 TYPE 2 DIABETES MELLITUS WITH HYPERGLYCEMIA, WITH LONG-TERM CURRENT USE OF INSULIN (HCC): Chronic | ICD-10-CM

## 2025-08-13 ENCOUNTER — TELEPHONE (OUTPATIENT)
Dept: PHARMACY | Facility: CLINIC | Age: 60
End: 2025-08-13

## 2025-08-13 ENCOUNTER — TELEPHONE (OUTPATIENT)
Dept: FAMILY MEDICINE CLINIC | Age: 60
End: 2025-08-13

## 2025-08-18 RX ORDER — IBUPROFEN 800 MG/1
TABLET, FILM COATED ORAL
Qty: 90 TABLET | Refills: 0 | Status: SHIPPED | OUTPATIENT
Start: 2025-08-18

## (undated) DEVICE — POSITIONER HD W8XH4XL8.5IN RASPBERRY FOAM SLT

## (undated) DEVICE — Device: Brand: BALLOON3

## (undated) DEVICE — BW-412T DISP COMBO CLEANING BRUSH: Brand: SINGLE USE COMBINATION CLEANING BRUSH

## (undated) DEVICE — GOWN SIRUS NONREIN XL W/TWL: Brand: MEDLINE INDUSTRIES, INC.

## (undated) DEVICE — GOWN AURORA NONREINF LG: Brand: MEDLINE INDUSTRIES, INC.

## (undated) DEVICE — ELECTRODE PT RET AD L9FT HI MOIST COND ADH HYDRGEL CORDED

## (undated) DEVICE — ENDOSCOPIC KIT 6X3/16 FT COLON W/ 1.1 OZ 2 GWN W/O BRSH

## (undated) DEVICE — ENDOSCOPIC ULTRASOUND FINE NEEDLE BIOPSY (FNB) DEVICE: Brand: ACQUIRE

## (undated) DEVICE — AIR/WATER CLEANING ADAPTER FOR OLYMPUS® GI ENDOSCOPE: Brand: BULLDOG®

## (undated) DEVICE — MOUTHPIECE ENDOSCP L CTRL OPN AND SIDE PORTS DISP

## (undated) DEVICE — Device: Brand: SINGLE USE SOFT BRUSH

## (undated) DEVICE — VALVE SUCTION AIR H2O SET ORCA POD + DISP

## (undated) DEVICE — SOLUTION IV IRRIG WATER 500ML POUR BRL ST 2F7113

## (undated) DEVICE — PAD ABSRB W8XL10IN ABD HYDROPHOBIC NONWOVEN THCK LAYR CELOS

## (undated) DEVICE — SUTURE PROL SZ 1 L30IN NONABSORBABLE BLU CTX L48MM 1/2 CIR 8455H

## (undated) DEVICE — GAUZE,SPONGE,4"X4",8PLY,STRL,LF,10/TRAY: Brand: MEDLINE

## (undated) DEVICE — SUTURE PDS + SZ 1 L96IN ABSRB VLT L65MM TP-1 1/2 CIR PDP880G

## (undated) DEVICE — SPONGE,PEANUT,XRAY,ST,SM,3/8",5/CARD: Brand: MEDLINE INDUSTRIES, INC.

## (undated) DEVICE — STAPLER INT H4.4X1.5MM DIA75MM 0DEG TI UNIV 6 ROW CART

## (undated) DEVICE — WET SKIN PREP TRAY: Brand: MEDLINE INDUSTRIES, INC.

## (undated) DEVICE — MAJOR SET UP PK

## (undated) DEVICE — SOLUTION IRRIG 1000ML 0.9% SOD CHL USP POUR PLAS BTL

## (undated) DEVICE — MERCY FAIRFIELD TURNOVER KIT: Brand: MEDLINE INDUSTRIES, INC.

## (undated) DEVICE — SINGLE USE DISTAL COVER MAJ-2315: Brand: SINGLE USE DISTAL COVER

## (undated) DEVICE — CANNULATING SPHINCTEROTOME: Brand: TRUETOME JAG 44

## (undated) DEVICE — BLADE CLIPPER GEN PURP NS

## (undated) DEVICE — SYRINGE MED 50ML LUERLOCK TIP

## (undated) DEVICE — DRAPE,LAP,CHOLE,W/TROUGHS,STERILE: Brand: MEDLINE

## (undated) DEVICE — STAPLER INT L75MM H1.5X1.8X2MM STD TI 6 ROW LIN CUT

## (undated) DEVICE — STERILE POLYISOPRENE POWDER-FREE SURGICAL GLOVES: Brand: PROTEXIS

## (undated) DEVICE — SPONGE LAP W18XL18IN WHT COT 4 PLY FLD STRUNG RADPQ DISP ST 2 PER PACK

## (undated) DEVICE — SHEET,DRAPE,53X77,STERILE: Brand: MEDLINE

## (undated) DEVICE — SYRINGE, LUER LOCK, 10ML: Brand: MEDLINE

## (undated) DEVICE — Device: Brand: DISPOSABLE ELECTROSURGICAL SNARE

## (undated) DEVICE — SUTURE VCRL + SZ 3-0 L18IN ABSRB UD SH 1/2 CIR TAPERCUT NDL VCP864D

## (undated) DEVICE — NEEDLE ASPIR 22 GAX0-8 CM 1.65X2.4 MM SHTH EXPECT SLM LN

## (undated) DEVICE — SUTURE VCRL + SZ 2-0 L18IN ABSRB CLR TIE POLYGLACTIN 910 VCP111G

## (undated) DEVICE — PROCEDURE KIT ENDOSCP CUST

## (undated) DEVICE — ENDOSCOPIC ULTRASOUND ASPIRATION NEEDLE: Brand: EXPECT SLIMLINE SL

## (undated) DEVICE — PAD,ABDOMINAL,8"X10",ST,LF: Brand: MEDLINE

## (undated) DEVICE — CONTAINER DENT 8OZ AQUA W/ LID

## (undated) DEVICE — SUTURE PERMAHAND SZ 3-0 L18IN NONABSORBABLE BLK L26MM SH C013D

## (undated) DEVICE — Z INACTIVE USE 2762646 COVER ENDOSCP INSTRUMENT DSTL CVR DIP 20/EA

## (undated) DEVICE — SEALER ENDOSCP NANO COAT OPN DIV CRV L JAW LIGASURE IMPACT

## (undated) DEVICE — Z CONVERTED USE 2271182 CUP DENT W4XL3IN D2IN GRN LEAK RESIST DBL SEAL CLSR ETCHED

## (undated) DEVICE — 3M™ IOBAN™ 2 ANTIMICROBIAL INCISE DRAPE 6650EZ: Brand: IOBAN™ 2

## (undated) DEVICE — PENCIL SMK EVAC TELSCP 3 M TBNG

## (undated) DEVICE — DRAPE THER FLUID WARMING 66X44 IN FLAT SLUSH DBL DISC ORS

## (undated) DEVICE — SNARE ENDOSCP 11 MM BRAIDED WIRE CAPTFLX DISP

## (undated) DEVICE — TOWEL,OR,DSP,ST,BLUE,STD,4/PK,20PK/CS: Brand: MEDLINE

## (undated) DEVICE — 1LYRTR 16FR10ML 100%SILI SNAP: Brand: MEDLINE INDUSTRIES, INC.

## (undated) DEVICE — Device: Brand: BALLOON

## (undated) DEVICE — PENCIL ES L3M BTTN SWCH S STL HEX LOK BLDE ELECTRD HOLSTER